# Patient Record
Sex: MALE | Race: ASIAN | NOT HISPANIC OR LATINO | Employment: FULL TIME | URBAN - METROPOLITAN AREA
[De-identification: names, ages, dates, MRNs, and addresses within clinical notes are randomized per-mention and may not be internally consistent; named-entity substitution may affect disease eponyms.]

---

## 2021-04-04 ENCOUNTER — HOSPITAL ENCOUNTER (INPATIENT)
Facility: HOSPITAL | Age: 46
LOS: 2 days | Discharge: HOME | DRG: 065 | End: 2021-04-06
Attending: EMERGENCY MEDICINE | Admitting: PSYCHIATRY & NEUROLOGY
Payer: COMMERCIAL

## 2021-04-04 DIAGNOSIS — I61.9 ICH (INTRACEREBRAL HEMORRHAGE): ICD-10-CM

## 2021-04-04 DIAGNOSIS — R00.0 TACHYCARDIA: ICD-10-CM

## 2021-04-04 DIAGNOSIS — I10 ESSENTIAL HYPERTENSION: ICD-10-CM

## 2021-04-04 DIAGNOSIS — E87.6 HYPOKALEMIA: ICD-10-CM

## 2021-04-04 DIAGNOSIS — E11.65 TYPE 2 DIABETES MELLITUS WITH HYPERGLYCEMIA, WITHOUT LONG-TERM CURRENT USE OF INSULIN: ICD-10-CM

## 2021-04-04 DIAGNOSIS — I61.0 NONTRAUMATIC SUBCORTICAL HEMORRHAGE OF RIGHT CEREBRAL HEMISPHERE: ICD-10-CM

## 2021-04-04 DIAGNOSIS — I63.9 STROKE: ICD-10-CM

## 2021-04-04 PROBLEM — I16.1 HYPERTENSIVE EMERGENCY: Status: ACTIVE | Noted: 2021-04-04

## 2021-04-04 PROBLEM — G93.6 VASOGENIC BRAIN EDEMA: Status: ACTIVE | Noted: 2021-04-04

## 2021-04-04 LAB
ABO + RH BLD: NORMAL
ALBUMIN SERPL BCP-MCNC: 3.7 G/DL (ref 3.5–5.2)
ALBUMIN SERPL BCP-MCNC: 4.2 G/DL (ref 3.5–5.2)
ALP SERPL-CCNC: 106 U/L (ref 55–135)
ALP SERPL-CCNC: 123 U/L (ref 55–135)
ALT SERPL W/O P-5'-P-CCNC: 33 U/L (ref 10–44)
ALT SERPL W/O P-5'-P-CCNC: 39 U/L (ref 10–44)
ANION GAP SERPL CALC-SCNC: 10 MMOL/L (ref 8–16)
ANION GAP SERPL CALC-SCNC: 14 MMOL/L (ref 8–16)
ANION GAP SERPL CALC-SCNC: 8 MMOL/L (ref 8–16)
ASCENDING AORTA: 3.11 CM
AST SERPL-CCNC: 25 U/L (ref 10–40)
AST SERPL-CCNC: 27 U/L (ref 10–40)
AV INDEX (PROSTH): 0.84
AV MEAN GRADIENT: 3 MMHG
AV PEAK GRADIENT: 4 MMHG
AV VALVE AREA: 3.07 CM2
AV VELOCITY RATIO: 0.87
BACTERIA #/AREA URNS AUTO: NORMAL /HPF
BASOPHILS # BLD AUTO: 0.04 K/UL (ref 0–0.2)
BASOPHILS # BLD AUTO: 0.04 K/UL (ref 0–0.2)
BASOPHILS NFR BLD: 0.5 % (ref 0–1.9)
BASOPHILS NFR BLD: 0.6 % (ref 0–1.9)
BILIRUB SERPL-MCNC: 0.8 MG/DL (ref 0.1–1)
BILIRUB SERPL-MCNC: 0.9 MG/DL (ref 0.1–1)
BILIRUB UR QL STRIP: NEGATIVE
BLD GP AB SCN CELLS X3 SERPL QL: NORMAL
BSA FOR ECHO PROCEDURE: 1.78 M2
BUN SERPL-MCNC: 10 MG/DL (ref 6–20)
BUN SERPL-MCNC: 11 MG/DL (ref 6–20)
BUN SERPL-MCNC: 13 MG/DL (ref 6–20)
CALCIUM SERPL-MCNC: 7.9 MG/DL (ref 8.7–10.5)
CALCIUM SERPL-MCNC: 8.3 MG/DL (ref 8.7–10.5)
CALCIUM SERPL-MCNC: 8.9 MG/DL (ref 8.7–10.5)
CHLORIDE SERPL-SCNC: 104 MMOL/L (ref 95–110)
CHLORIDE SERPL-SCNC: 105 MMOL/L (ref 95–110)
CHLORIDE SERPL-SCNC: 98 MMOL/L (ref 95–110)
CHOLEST SERPL-MCNC: 276 MG/DL (ref 120–199)
CHOLEST/HDLC SERPL: 4.9 {RATIO} (ref 2–5)
CLARITY UR REFRACT.AUTO: CLEAR
CO2 SERPL-SCNC: 24 MMOL/L (ref 23–29)
CO2 SERPL-SCNC: 26 MMOL/L (ref 23–29)
CO2 SERPL-SCNC: 28 MMOL/L (ref 23–29)
COLOR UR AUTO: YELLOW
CREAT SERPL-MCNC: 1.1 MG/DL (ref 0.5–1.4)
CREAT SERPL-MCNC: 1.2 MG/DL (ref 0.5–1.4)
CREAT SERPL-MCNC: 1.4 MG/DL (ref 0.5–1.4)
CTP QC/QA: YES
CV ECHO LV RWT: 0.36 CM
DIFFERENTIAL METHOD: ABNORMAL
DIFFERENTIAL METHOD: NORMAL
DOP CALC AO PEAK VEL: 1.04 M/S
DOP CALC AO VTI: 22.04 CM
DOP CALC LVOT AREA: 3.7 CM2
DOP CALC LVOT DIAMETER: 2.16 CM
DOP CALC LVOT PEAK VEL: 0.9 M/S
DOP CALC LVOT STROKE VOLUME: 67.76 CM3
DOP CALCLVOT PEAK VEL VTI: 18.5 CM
E WAVE DECELERATION TIME: 143.76 MSEC
E/A RATIO: 0.9
E/E' RATIO: 9.5 M/S
ECHO LV POSTERIOR WALL: 0.74 CM (ref 0.6–1.1)
EJECTION FRACTION: 65 %
EOSINOPHIL # BLD AUTO: 0.1 K/UL (ref 0–0.5)
EOSINOPHIL # BLD AUTO: 0.1 K/UL (ref 0–0.5)
EOSINOPHIL NFR BLD: 1 % (ref 0–8)
EOSINOPHIL NFR BLD: 1.2 % (ref 0–8)
ERYTHROCYTE [DISTWIDTH] IN BLOOD BY AUTOMATED COUNT: 12.2 % (ref 11.5–14.5)
ERYTHROCYTE [DISTWIDTH] IN BLOOD BY AUTOMATED COUNT: 12.3 % (ref 11.5–14.5)
EST. GFR  (AFRICAN AMERICAN): >60 ML/MIN/1.73 M^2
EST. GFR  (NON AFRICAN AMERICAN): 59 ML/MIN/1.73 M^2
EST. GFR  (NON AFRICAN AMERICAN): >60 ML/MIN/1.73 M^2
EST. GFR  (NON AFRICAN AMERICAN): >60 ML/MIN/1.73 M^2
ESTIMATED AVG GLUCOSE: 278 MG/DL (ref 68–131)
FRACTIONAL SHORTENING: 38 % (ref 28–44)
GLUCOSE SERPL-MCNC: 226 MG/DL (ref 70–110)
GLUCOSE SERPL-MCNC: 265 MG/DL (ref 70–110)
GLUCOSE SERPL-MCNC: 349 MG/DL (ref 70–110)
GLUCOSE UR QL STRIP: ABNORMAL
HBA1C MFR BLD: 11.3 % (ref 4–5.6)
HCT VFR BLD AUTO: 49.5 % (ref 40–54)
HCT VFR BLD AUTO: 52.2 % (ref 40–54)
HDLC SERPL-MCNC: 56 MG/DL (ref 40–75)
HDLC SERPL: 20.3 % (ref 20–50)
HGB BLD-MCNC: 17.6 G/DL (ref 14–18)
HGB BLD-MCNC: 18.5 G/DL (ref 14–18)
HGB UR QL STRIP: NEGATIVE
HYALINE CASTS UR QL AUTO: 0 /LPF
IMM GRANULOCYTES # BLD AUTO: 0.03 K/UL (ref 0–0.04)
IMM GRANULOCYTES # BLD AUTO: 0.04 K/UL (ref 0–0.04)
IMM GRANULOCYTES NFR BLD AUTO: 0.4 % (ref 0–0.5)
IMM GRANULOCYTES NFR BLD AUTO: 0.5 % (ref 0–0.5)
INR PPP: 0.9 (ref 0.8–1.2)
INTERVENTRICULAR SEPTUM: 0.89 CM (ref 0.6–1.1)
IVRT: 119.89 MSEC
KETONES UR QL STRIP: NEGATIVE
LA MAJOR: 5.19 CM
LA MINOR: 5.23 CM
LA WIDTH: 3.25 CM
LDLC SERPL CALC-MCNC: 156 MG/DL (ref 63–159)
LEFT ATRIUM SIZE: 3.44 CM
LEFT ATRIUM VOLUME INDEX MOD: 19.3 ML/M2
LEFT ATRIUM VOLUME INDEX: 28.5 ML/M2
LEFT ATRIUM VOLUME MOD: 33.62 CM3
LEFT ATRIUM VOLUME: 49.51 CM3
LEFT INTERNAL DIMENSION IN SYSTOLE: 2.53 CM (ref 2.1–4)
LEFT VENTRICLE DIASTOLIC VOLUME INDEX: 41.71 ML/M2
LEFT VENTRICLE DIASTOLIC VOLUME: 72.57 ML
LEFT VENTRICLE MASS INDEX: 56 G/M2
LEFT VENTRICLE SYSTOLIC VOLUME INDEX: 13.2 ML/M2
LEFT VENTRICLE SYSTOLIC VOLUME: 22.95 ML
LEFT VENTRICULAR INTERNAL DIMENSION IN DIASTOLE: 4.06 CM (ref 3.5–6)
LEFT VENTRICULAR MASS: 98.19 G
LEUKOCYTE ESTERASE UR QL STRIP: NEGATIVE
LV LATERAL E/E' RATIO: 9.5 M/S
LV SEPTAL E/E' RATIO: 9.5 M/S
LYMPHOCYTES # BLD AUTO: 1.5 K/UL (ref 1–4.8)
LYMPHOCYTES # BLD AUTO: 2 K/UL (ref 1–4.8)
LYMPHOCYTES NFR BLD: 20.8 % (ref 18–48)
LYMPHOCYTES NFR BLD: 25.4 % (ref 18–48)
MAGNESIUM SERPL-MCNC: 1.7 MG/DL (ref 1.6–2.6)
MAGNESIUM SERPL-MCNC: 2 MG/DL (ref 1.6–2.6)
MCH RBC QN AUTO: 30 PG (ref 27–31)
MCH RBC QN AUTO: 30.2 PG (ref 27–31)
MCHC RBC AUTO-ENTMCNC: 35.4 G/DL (ref 32–36)
MCHC RBC AUTO-ENTMCNC: 35.6 G/DL (ref 32–36)
MCV RBC AUTO: 85 FL (ref 82–98)
MCV RBC AUTO: 85 FL (ref 82–98)
MICROSCOPIC COMMENT: NORMAL
MONOCYTES # BLD AUTO: 0.5 K/UL (ref 0.3–1)
MONOCYTES # BLD AUTO: 0.6 K/UL (ref 0.3–1)
MONOCYTES NFR BLD: 6.8 % (ref 4–15)
MONOCYTES NFR BLD: 7.2 % (ref 4–15)
MV PEAK A VEL: 0.84 M/S
MV PEAK E VEL: 0.76 M/S
MV STENOSIS PRESSURE HALF TIME: 41.69 MS
MV VALVE AREA P 1/2 METHOD: 5.28 CM2
NEUTROPHILS # BLD AUTO: 5.1 K/UL (ref 1.8–7.7)
NEUTROPHILS # BLD AUTO: 5.1 K/UL (ref 1.8–7.7)
NEUTROPHILS NFR BLD: 65.2 % (ref 38–73)
NEUTROPHILS NFR BLD: 70.4 % (ref 38–73)
NITRITE UR QL STRIP: NEGATIVE
NONHDLC SERPL-MCNC: 220 MG/DL
NRBC BLD-RTO: 0 /100 WBC
NRBC BLD-RTO: 0 /100 WBC
PH UR STRIP: 6 [PH] (ref 5–8)
PHOSPHATE SERPL-MCNC: 2.2 MG/DL (ref 2.7–4.5)
PHOSPHATE SERPL-MCNC: 4 MG/DL (ref 2.7–4.5)
PISA TR MAX VEL: 2.26 M/S
PLATELET # BLD AUTO: 161 K/UL (ref 150–450)
PLATELET # BLD AUTO: 175 K/UL (ref 150–450)
PMV BLD AUTO: 10.2 FL (ref 9.2–12.9)
PMV BLD AUTO: 10.4 FL (ref 9.2–12.9)
POCT GLUCOSE: 169 MG/DL (ref 70–110)
POCT GLUCOSE: 245 MG/DL (ref 70–110)
POCT GLUCOSE: 257 MG/DL (ref 70–110)
POCT GLUCOSE: 294 MG/DL (ref 70–110)
POCT GLUCOSE: 299 MG/DL (ref 70–110)
POTASSIUM SERPL-SCNC: 2.9 MMOL/L (ref 3.5–5.1)
POTASSIUM SERPL-SCNC: 3 MMOL/L (ref 3.5–5.1)
POTASSIUM SERPL-SCNC: 4 MMOL/L (ref 3.5–5.1)
PROT SERPL-MCNC: 7 G/DL (ref 6–8.4)
PROT SERPL-MCNC: 7.9 G/DL (ref 6–8.4)
PROT UR QL STRIP: ABNORMAL
PROTHROMBIN TIME: 10 SEC (ref 9–12.5)
RA MAJOR: 3.71 CM
RA PRESSURE: 3 MMHG
RA WIDTH: 2.4 CM
RBC # BLD AUTO: 5.83 M/UL (ref 4.6–6.2)
RBC # BLD AUTO: 6.16 M/UL (ref 4.6–6.2)
RBC #/AREA URNS AUTO: 1 /HPF (ref 0–4)
RIGHT VENTRICULAR END-DIASTOLIC DIMENSION: 2.98 CM
RV TISSUE DOPPLER FREE WALL SYSTOLIC VELOCITY 1 (APICAL 4 CHAMBER VIEW): 10.6 CM/S
SARS-COV-2 RDRP RESP QL NAA+PROBE: NEGATIVE
SINUS: 3.16 CM
SODIUM SERPL-SCNC: 136 MMOL/L (ref 136–145)
SODIUM SERPL-SCNC: 140 MMOL/L (ref 136–145)
SODIUM SERPL-SCNC: 141 MMOL/L (ref 136–145)
SP GR UR STRIP: 1.01 (ref 1–1.03)
SQUAMOUS #/AREA URNS AUTO: 0 /HPF
STJ: 2.69 CM
TDI LATERAL: 0.08 M/S
TDI SEPTAL: 0.08 M/S
TDI: 0.08 M/S
TR MAX PG: 20 MMHG
TRICUSPID ANNULAR PLANE SYSTOLIC EXCURSION: 1.8 CM
TRIGL SERPL-MCNC: 320 MG/DL (ref 30–150)
TSH SERPL DL<=0.005 MIU/L-ACNC: 2.38 UIU/ML (ref 0.4–4)
TV REST PULMONARY ARTERY PRESSURE: 23 MMHG
URN SPEC COLLECT METH UR: ABNORMAL
WBC # BLD AUTO: 7.18 K/UL (ref 3.9–12.7)
WBC # BLD AUTO: 7.76 K/UL (ref 3.9–12.7)
WBC #/AREA URNS AUTO: 1 /HPF (ref 0–5)
YEAST UR QL AUTO: NORMAL

## 2021-04-04 PROCEDURE — 99291 CRITICAL CARE FIRST HOUR: CPT | Mod: ,,, | Performed by: PHYSICIAN ASSISTANT

## 2021-04-04 PROCEDURE — 99223 1ST HOSP IP/OBS HIGH 75: CPT | Mod: ,,, | Performed by: PSYCHIATRY & NEUROLOGY

## 2021-04-04 PROCEDURE — 93005 ELECTROCARDIOGRAM TRACING: CPT

## 2021-04-04 PROCEDURE — 25000003 PHARM REV CODE 250: Performed by: NURSE PRACTITIONER

## 2021-04-04 PROCEDURE — 96365 THER/PROPH/DIAG IV INF INIT: CPT

## 2021-04-04 PROCEDURE — 81001 URINALYSIS AUTO W/SCOPE: CPT | Performed by: PHYSICIAN ASSISTANT

## 2021-04-04 PROCEDURE — 86900 BLOOD TYPING SEROLOGIC ABO: CPT | Performed by: EMERGENCY MEDICINE

## 2021-04-04 PROCEDURE — 83036 HEMOGLOBIN GLYCOSYLATED A1C: CPT | Performed by: PHYSICIAN ASSISTANT

## 2021-04-04 PROCEDURE — 99291 CRITICAL CARE FIRST HOUR: CPT | Mod: 25

## 2021-04-04 PROCEDURE — 85025 COMPLETE CBC W/AUTO DIFF WBC: CPT | Mod: 91 | Performed by: PHYSICIAN ASSISTANT

## 2021-04-04 PROCEDURE — 80061 LIPID PANEL: CPT | Performed by: EMERGENCY MEDICINE

## 2021-04-04 PROCEDURE — 99223 PR INITIAL HOSPITAL CARE,LEVL III: ICD-10-PCS | Mod: ,,, | Performed by: PSYCHIATRY & NEUROLOGY

## 2021-04-04 PROCEDURE — 25500020 PHARM REV CODE 255: Performed by: PHYSICIAN ASSISTANT

## 2021-04-04 PROCEDURE — 25000003 PHARM REV CODE 250: Performed by: EMERGENCY MEDICINE

## 2021-04-04 PROCEDURE — 83735 ASSAY OF MAGNESIUM: CPT | Mod: 91 | Performed by: PSYCHIATRY & NEUROLOGY

## 2021-04-04 PROCEDURE — 25000003 PHARM REV CODE 250: Performed by: PSYCHIATRY & NEUROLOGY

## 2021-04-04 PROCEDURE — 84443 ASSAY THYROID STIM HORMONE: CPT | Performed by: EMERGENCY MEDICINE

## 2021-04-04 PROCEDURE — 80053 COMPREHEN METABOLIC PANEL: CPT | Mod: 91 | Performed by: PHYSICIAN ASSISTANT

## 2021-04-04 PROCEDURE — 83735 ASSAY OF MAGNESIUM: CPT | Performed by: PHYSICIAN ASSISTANT

## 2021-04-04 PROCEDURE — 85610 PROTHROMBIN TIME: CPT | Performed by: EMERGENCY MEDICINE

## 2021-04-04 PROCEDURE — 25000003 PHARM REV CODE 250: Performed by: PHYSICIAN ASSISTANT

## 2021-04-04 PROCEDURE — 82962 GLUCOSE BLOOD TEST: CPT

## 2021-04-04 PROCEDURE — 99223 1ST HOSP IP/OBS HIGH 75: CPT | Mod: ,,, | Performed by: NEUROLOGICAL SURGERY

## 2021-04-04 PROCEDURE — U0002 COVID-19 LAB TEST NON-CDC: HCPCS | Performed by: EMERGENCY MEDICINE

## 2021-04-04 PROCEDURE — 80048 BASIC METABOLIC PNL TOTAL CA: CPT | Performed by: PSYCHIATRY & NEUROLOGY

## 2021-04-04 PROCEDURE — 93010 ELECTROCARDIOGRAM REPORT: CPT | Mod: ,,, | Performed by: INTERNAL MEDICINE

## 2021-04-04 PROCEDURE — 96375 TX/PRO/DX INJ NEW DRUG ADDON: CPT

## 2021-04-04 PROCEDURE — 84100 ASSAY OF PHOSPHORUS: CPT | Mod: 91 | Performed by: PSYCHIATRY & NEUROLOGY

## 2021-04-04 PROCEDURE — 99223 PR INITIAL HOSPITAL CARE,LEVL III: ICD-10-PCS | Mod: ,,, | Performed by: NEUROLOGICAL SURGERY

## 2021-04-04 PROCEDURE — 84100 ASSAY OF PHOSPHORUS: CPT | Performed by: PHYSICIAN ASSISTANT

## 2021-04-04 PROCEDURE — 80053 COMPREHEN METABOLIC PANEL: CPT | Performed by: EMERGENCY MEDICINE

## 2021-04-04 PROCEDURE — 63600175 PHARM REV CODE 636 W HCPCS: Performed by: NURSE PRACTITIONER

## 2021-04-04 PROCEDURE — 11000001 HC ACUTE MED/SURG PRIVATE ROOM

## 2021-04-04 PROCEDURE — 85025 COMPLETE CBC W/AUTO DIFF WBC: CPT | Performed by: EMERGENCY MEDICINE

## 2021-04-04 PROCEDURE — 93010 EKG 12-LEAD: ICD-10-PCS | Mod: ,,, | Performed by: INTERNAL MEDICINE

## 2021-04-04 PROCEDURE — 94761 N-INVAS EAR/PLS OXIMETRY MLT: CPT

## 2021-04-04 PROCEDURE — 99291 PR CRITICAL CARE, E/M 30-74 MINUTES: ICD-10-PCS | Mod: ,,, | Performed by: PHYSICIAN ASSISTANT

## 2021-04-04 RX ORDER — LANOLIN ALCOHOL/MO/W.PET/CERES
800 CREAM (GRAM) TOPICAL
Status: DISCONTINUED | OUTPATIENT
Start: 2021-04-04 | End: 2021-04-04

## 2021-04-04 RX ORDER — NICARDIPINE HYDROCHLORIDE 0.2 MG/ML
0-15 INJECTION INTRAVENOUS CONTINUOUS
Status: DISCONTINUED | OUTPATIENT
Start: 2021-04-04 | End: 2021-04-04

## 2021-04-04 RX ORDER — AMLODIPINE BESYLATE 10 MG/1
10 TABLET ORAL DAILY
Status: DISCONTINUED | OUTPATIENT
Start: 2021-04-04 | End: 2021-04-06 | Stop reason: HOSPADM

## 2021-04-04 RX ORDER — ONDANSETRON 2 MG/ML
4 INJECTION INTRAMUSCULAR; INTRAVENOUS EVERY 8 HOURS PRN
Status: DISCONTINUED | OUTPATIENT
Start: 2021-04-04 | End: 2021-04-06 | Stop reason: HOSPADM

## 2021-04-04 RX ORDER — NICARDIPINE HYDROCHLORIDE 0.2 MG/ML
5 INJECTION INTRAVENOUS CONTINUOUS
Status: DISCONTINUED | OUTPATIENT
Start: 2021-04-04 | End: 2021-04-04

## 2021-04-04 RX ORDER — MUPIROCIN 20 MG/G
OINTMENT TOPICAL 2 TIMES DAILY
Status: DISCONTINUED | OUTPATIENT
Start: 2021-04-04 | End: 2021-04-06 | Stop reason: HOSPADM

## 2021-04-04 RX ORDER — LABETALOL HYDROCHLORIDE 5 MG/ML
10 INJECTION, SOLUTION INTRAVENOUS
Status: COMPLETED | OUTPATIENT
Start: 2021-04-04 | End: 2021-04-04

## 2021-04-04 RX ORDER — INSULIN ASPART 100 [IU]/ML
1-10 INJECTION, SOLUTION INTRAVENOUS; SUBCUTANEOUS
Status: DISCONTINUED | OUTPATIENT
Start: 2021-04-04 | End: 2021-04-05

## 2021-04-04 RX ORDER — SODIUM,POTASSIUM PHOSPHATES 280-250MG
2 POWDER IN PACKET (EA) ORAL
Status: DISCONTINUED | OUTPATIENT
Start: 2021-04-04 | End: 2021-04-04

## 2021-04-04 RX ORDER — AMOXICILLIN 250 MG
1 CAPSULE ORAL 2 TIMES DAILY
Status: DISCONTINUED | OUTPATIENT
Start: 2021-04-04 | End: 2021-04-06 | Stop reason: HOSPADM

## 2021-04-04 RX ORDER — SODIUM CHLORIDE 0.9 % (FLUSH) 0.9 %
10 SYRINGE (ML) INJECTION
Status: DISCONTINUED | OUTPATIENT
Start: 2021-04-04 | End: 2021-04-06 | Stop reason: HOSPADM

## 2021-04-04 RX ORDER — LABETALOL HCL 20 MG/4 ML
10 SYRINGE (ML) INTRAVENOUS EVERY 6 HOURS PRN
Status: DISCONTINUED | OUTPATIENT
Start: 2021-04-04 | End: 2021-04-06 | Stop reason: HOSPADM

## 2021-04-04 RX ORDER — GLUCAGON 1 MG
1 KIT INJECTION
Status: DISCONTINUED | OUTPATIENT
Start: 2021-04-04 | End: 2021-04-06 | Stop reason: HOSPADM

## 2021-04-04 RX ORDER — IBUPROFEN 200 MG
16 TABLET ORAL
Status: DISCONTINUED | OUTPATIENT
Start: 2021-04-04 | End: 2021-04-06 | Stop reason: HOSPADM

## 2021-04-04 RX ORDER — IBUPROFEN 200 MG
24 TABLET ORAL
Status: DISCONTINUED | OUTPATIENT
Start: 2021-04-04 | End: 2021-04-06 | Stop reason: HOSPADM

## 2021-04-04 RX ORDER — SODIUM CHLORIDE 9 MG/ML
INJECTION, SOLUTION INTRAVENOUS CONTINUOUS
Status: DISCONTINUED | OUTPATIENT
Start: 2021-04-04 | End: 2021-04-04

## 2021-04-04 RX ORDER — ACETAMINOPHEN 325 MG/1
650 TABLET ORAL EVERY 6 HOURS PRN
Status: DISCONTINUED | OUTPATIENT
Start: 2021-04-04 | End: 2021-04-06 | Stop reason: HOSPADM

## 2021-04-04 RX ADMIN — MUPIROCIN: 20 OINTMENT TOPICAL at 09:04

## 2021-04-04 RX ADMIN — DOCUSATE SODIUM AND SENNOSIDES 1 TABLET: 8.6; 5 TABLET, FILM COATED ORAL at 09:04

## 2021-04-04 RX ADMIN — DOCUSATE SODIUM AND SENNOSIDES 1 TABLET: 8.6; 5 TABLET, FILM COATED ORAL at 08:04

## 2021-04-04 RX ADMIN — AMLODIPINE BESYLATE 10 MG: 10 TABLET ORAL at 10:04

## 2021-04-04 RX ADMIN — NICARDIPINE HYDROCHLORIDE 5 MG/HR: 2.5 INJECTION, SOLUTION INTRAVENOUS at 08:04

## 2021-04-04 RX ADMIN — LABETALOL HYDROCHLORIDE 10 MG: 5 INJECTION INTRAVENOUS at 12:04

## 2021-04-04 RX ADMIN — LABETALOL HYDROCHLORIDE 10 MG: 5 INJECTION INTRAVENOUS at 01:04

## 2021-04-04 RX ADMIN — Medication 10 MG: at 08:04

## 2021-04-04 RX ADMIN — NICARDIPINE HYDROCHLORIDE 5 MG/HR: 0.2 INJECTION, SOLUTION INTRAVENOUS at 01:04

## 2021-04-04 RX ADMIN — INSULIN ASPART 2 UNITS: 100 INJECTION, SOLUTION INTRAVENOUS; SUBCUTANEOUS at 10:04

## 2021-04-04 RX ADMIN — POTASSIUM BICARBONATE 60 MEQ: 391 TABLET, EFFERVESCENT ORAL at 08:04

## 2021-04-04 RX ADMIN — Medication 800 MG: at 08:04

## 2021-04-04 RX ADMIN — INSULIN ASPART 3 UNITS: 100 INJECTION, SOLUTION INTRAVENOUS; SUBCUTANEOUS at 08:04

## 2021-04-04 RX ADMIN — SODIUM CHLORIDE: 0.9 INJECTION, SOLUTION INTRAVENOUS at 04:04

## 2021-04-04 RX ADMIN — MUPIROCIN: 20 OINTMENT TOPICAL at 08:04

## 2021-04-04 RX ADMIN — INSULIN ASPART 4 UNITS: 100 INJECTION, SOLUTION INTRAVENOUS; SUBCUTANEOUS at 04:04

## 2021-04-04 RX ADMIN — POTASSIUM & SODIUM PHOSPHATES POWDER PACK 280-160-250 MG 2 PACKET: 280-160-250 PACK at 08:04

## 2021-04-04 RX ADMIN — Medication 800 MG: at 05:04

## 2021-04-04 RX ADMIN — SODIUM CHLORIDE 75 ML/HR: 0.9 INJECTION, SOLUTION INTRAVENOUS at 08:04

## 2021-04-04 RX ADMIN — IOHEXOL 75 ML: 350 INJECTION, SOLUTION INTRAVENOUS at 06:04

## 2021-04-04 RX ADMIN — POTASSIUM & SODIUM PHOSPHATES POWDER PACK 280-160-250 MG 2 PACKET: 280-160-250 PACK at 05:04

## 2021-04-04 RX ADMIN — POTASSIUM BICARBONATE 60 MEQ: 391 TABLET, EFFERVESCENT ORAL at 05:04

## 2021-04-04 RX ADMIN — SODIUM CHLORIDE 1000 ML: 0.9 INJECTION, SOLUTION INTRAVENOUS at 02:04

## 2021-04-05 LAB
ALBUMIN SERPL BCP-MCNC: 3.5 G/DL (ref 3.5–5.2)
ALP SERPL-CCNC: 92 U/L (ref 55–135)
ALT SERPL W/O P-5'-P-CCNC: 31 U/L (ref 10–44)
ANION GAP SERPL CALC-SCNC: 8 MMOL/L (ref 8–16)
AST SERPL-CCNC: 23 U/L (ref 10–40)
BASOPHILS # BLD AUTO: 0.04 K/UL (ref 0–0.2)
BASOPHILS NFR BLD: 0.6 % (ref 0–1.9)
BILIRUB SERPL-MCNC: 1 MG/DL (ref 0.1–1)
BUN SERPL-MCNC: 14 MG/DL (ref 6–20)
CALCIUM SERPL-MCNC: 8.2 MG/DL (ref 8.7–10.5)
CHLORIDE SERPL-SCNC: 105 MMOL/L (ref 95–110)
CO2 SERPL-SCNC: 24 MMOL/L (ref 23–29)
CREAT SERPL-MCNC: 1.1 MG/DL (ref 0.5–1.4)
DIFFERENTIAL METHOD: ABNORMAL
EOSINOPHIL # BLD AUTO: 0.2 K/UL (ref 0–0.5)
EOSINOPHIL NFR BLD: 3.4 % (ref 0–8)
ERYTHROCYTE [DISTWIDTH] IN BLOOD BY AUTOMATED COUNT: 12.7 % (ref 11.5–14.5)
EST. GFR  (AFRICAN AMERICAN): >60 ML/MIN/1.73 M^2
EST. GFR  (NON AFRICAN AMERICAN): >60 ML/MIN/1.73 M^2
GLUCOSE SERPL-MCNC: 171 MG/DL (ref 70–110)
HCT VFR BLD AUTO: 49 % (ref 40–54)
HGB BLD-MCNC: 16.7 G/DL (ref 14–18)
IMM GRANULOCYTES # BLD AUTO: 0.02 K/UL (ref 0–0.04)
IMM GRANULOCYTES NFR BLD AUTO: 0.3 % (ref 0–0.5)
LYMPHOCYTES # BLD AUTO: 1.6 K/UL (ref 1–4.8)
LYMPHOCYTES NFR BLD: 24.1 % (ref 18–48)
MAGNESIUM SERPL-MCNC: 1.9 MG/DL (ref 1.6–2.6)
MCH RBC QN AUTO: 29.2 PG (ref 27–31)
MCHC RBC AUTO-ENTMCNC: 34.1 G/DL (ref 32–36)
MCV RBC AUTO: 86 FL (ref 82–98)
MONOCYTES # BLD AUTO: 0.5 K/UL (ref 0.3–1)
MONOCYTES NFR BLD: 7.1 % (ref 4–15)
NEUTROPHILS # BLD AUTO: 4.2 K/UL (ref 1.8–7.7)
NEUTROPHILS NFR BLD: 64.5 % (ref 38–73)
NRBC BLD-RTO: 0 /100 WBC
PHOSPHATE SERPL-MCNC: 3.1 MG/DL (ref 2.7–4.5)
PLATELET # BLD AUTO: 143 K/UL (ref 150–450)
PMV BLD AUTO: 9.9 FL (ref 9.2–12.9)
POCT GLUCOSE: 170 MG/DL (ref 70–110)
POCT GLUCOSE: 202 MG/DL (ref 70–110)
POCT GLUCOSE: 205 MG/DL (ref 70–110)
POCT GLUCOSE: 273 MG/DL (ref 70–110)
POTASSIUM SERPL-SCNC: 3.5 MMOL/L (ref 3.5–5.1)
PROT SERPL-MCNC: 6.6 G/DL (ref 6–8.4)
RBC # BLD AUTO: 5.72 M/UL (ref 4.6–6.2)
SODIUM SERPL-SCNC: 137 MMOL/L (ref 136–145)
WBC # BLD AUTO: 6.48 K/UL (ref 3.9–12.7)

## 2021-04-05 PROCEDURE — 97165 OT EVAL LOW COMPLEX 30 MIN: CPT

## 2021-04-05 PROCEDURE — 83735 ASSAY OF MAGNESIUM: CPT | Performed by: PHYSICIAN ASSISTANT

## 2021-04-05 PROCEDURE — C9399 UNCLASSIFIED DRUGS OR BIOLOG: HCPCS | Performed by: NURSE PRACTITIONER

## 2021-04-05 PROCEDURE — 99233 PR SUBSEQUENT HOSPITAL CARE,LEVL III: ICD-10-PCS | Mod: ,,, | Performed by: PSYCHIATRY & NEUROLOGY

## 2021-04-05 PROCEDURE — 36415 COLL VENOUS BLD VENIPUNCTURE: CPT | Performed by: PHYSICIAN ASSISTANT

## 2021-04-05 PROCEDURE — 25000003 PHARM REV CODE 250: Performed by: PSYCHIATRY & NEUROLOGY

## 2021-04-05 PROCEDURE — 11000001 HC ACUTE MED/SURG PRIVATE ROOM

## 2021-04-05 PROCEDURE — 25000003 PHARM REV CODE 250: Performed by: PHYSICIAN ASSISTANT

## 2021-04-05 PROCEDURE — 99233 SBSQ HOSP IP/OBS HIGH 50: CPT | Mod: ,,, | Performed by: PSYCHIATRY & NEUROLOGY

## 2021-04-05 PROCEDURE — 97530 THERAPEUTIC ACTIVITIES: CPT

## 2021-04-05 PROCEDURE — 84100 ASSAY OF PHOSPHORUS: CPT | Performed by: PHYSICIAN ASSISTANT

## 2021-04-05 PROCEDURE — 25000003 PHARM REV CODE 250: Performed by: NURSE PRACTITIONER

## 2021-04-05 PROCEDURE — 97161 PT EVAL LOW COMPLEX 20 MIN: CPT

## 2021-04-05 PROCEDURE — 92610 EVALUATE SWALLOWING FUNCTION: CPT

## 2021-04-05 PROCEDURE — 99222 1ST HOSP IP/OBS MODERATE 55: CPT | Mod: ,,, | Performed by: NURSE PRACTITIONER

## 2021-04-05 PROCEDURE — 63600175 PHARM REV CODE 636 W HCPCS: Performed by: NURSE PRACTITIONER

## 2021-04-05 PROCEDURE — 92523 SPEECH SOUND LANG COMPREHEN: CPT

## 2021-04-05 PROCEDURE — 85025 COMPLETE CBC W/AUTO DIFF WBC: CPT | Performed by: PHYSICIAN ASSISTANT

## 2021-04-05 PROCEDURE — 80053 COMPREHEN METABOLIC PANEL: CPT | Performed by: PHYSICIAN ASSISTANT

## 2021-04-05 PROCEDURE — 99222 PR INITIAL HOSPITAL CARE,LEVL II: ICD-10-PCS | Mod: ,,, | Performed by: NURSE PRACTITIONER

## 2021-04-05 RX ORDER — HYDROCHLOROTHIAZIDE 12.5 MG/1
12.5 TABLET ORAL DAILY
Status: DISCONTINUED | OUTPATIENT
Start: 2021-04-05 | End: 2021-04-06

## 2021-04-05 RX ORDER — INSULIN ASPART 100 [IU]/ML
0-5 INJECTION, SOLUTION INTRAVENOUS; SUBCUTANEOUS
Status: DISCONTINUED | OUTPATIENT
Start: 2021-04-05 | End: 2021-04-06 | Stop reason: HOSPADM

## 2021-04-05 RX ORDER — INSULIN ASPART 100 [IU]/ML
4 INJECTION, SOLUTION INTRAVENOUS; SUBCUTANEOUS
Status: DISCONTINUED | OUTPATIENT
Start: 2021-04-05 | End: 2021-04-06

## 2021-04-05 RX ADMIN — INSULIN ASPART 4 UNITS: 100 INJECTION, SOLUTION INTRAVENOUS; SUBCUTANEOUS at 04:04

## 2021-04-05 RX ADMIN — MUPIROCIN: 20 OINTMENT TOPICAL at 08:04

## 2021-04-05 RX ADMIN — INSULIN ASPART 2 UNITS: 100 INJECTION, SOLUTION INTRAVENOUS; SUBCUTANEOUS at 04:04

## 2021-04-05 RX ADMIN — DOCUSATE SODIUM AND SENNOSIDES 1 TABLET: 8.6; 5 TABLET, FILM COATED ORAL at 10:04

## 2021-04-05 RX ADMIN — Medication 10 MG: at 08:04

## 2021-04-05 RX ADMIN — Medication 10 MG: at 02:04

## 2021-04-05 RX ADMIN — INSULIN ASPART 2 UNITS: 100 INJECTION, SOLUTION INTRAVENOUS; SUBCUTANEOUS at 08:04

## 2021-04-05 RX ADMIN — DOCUSATE SODIUM AND SENNOSIDES 1 TABLET: 8.6; 5 TABLET, FILM COATED ORAL at 09:04

## 2021-04-05 RX ADMIN — INSULIN ASPART 1 UNITS: 100 INJECTION, SOLUTION INTRAVENOUS; SUBCUTANEOUS at 08:04

## 2021-04-05 RX ADMIN — INSULIN ASPART 4 UNITS: 100 INJECTION, SOLUTION INTRAVENOUS; SUBCUTANEOUS at 11:04

## 2021-04-05 RX ADMIN — AMLODIPINE BESYLATE 10 MG: 10 TABLET ORAL at 08:04

## 2021-04-05 RX ADMIN — HYDROCHLOROTHIAZIDE 12.5 MG: 12.5 TABLET ORAL at 11:04

## 2021-04-05 RX ADMIN — Medication 10 MG: at 11:04

## 2021-04-05 RX ADMIN — INSULIN DETEMIR 6 UNITS: 100 INJECTION, SOLUTION SUBCUTANEOUS at 08:04

## 2021-04-06 ENCOUNTER — PATIENT MESSAGE (OUTPATIENT)
Dept: ENDOCRINOLOGY | Facility: HOSPITAL | Age: 46
End: 2021-04-06

## 2021-04-06 ENCOUNTER — TELEPHONE (OUTPATIENT)
Dept: ENDOCRINOLOGY | Facility: HOSPITAL | Age: 46
End: 2021-04-06

## 2021-04-06 VITALS
OXYGEN SATURATION: 98 % | SYSTOLIC BLOOD PRESSURE: 146 MMHG | HEIGHT: 62 IN | WEIGHT: 147 LBS | BODY MASS INDEX: 27.05 KG/M2 | HEART RATE: 99 BPM | RESPIRATION RATE: 18 BRPM | TEMPERATURE: 97 F | DIASTOLIC BLOOD PRESSURE: 87 MMHG

## 2021-04-06 DIAGNOSIS — E11.65 TYPE 2 DIABETES MELLITUS WITH HYPERGLYCEMIA, WITHOUT LONG-TERM CURRENT USE OF INSULIN: Primary | ICD-10-CM

## 2021-04-06 LAB
ALBUMIN SERPL BCP-MCNC: 3.6 G/DL (ref 3.5–5.2)
ALP SERPL-CCNC: 94 U/L (ref 55–135)
ALT SERPL W/O P-5'-P-CCNC: 33 U/L (ref 10–44)
ANION GAP SERPL CALC-SCNC: 12 MMOL/L (ref 8–16)
AST SERPL-CCNC: 28 U/L (ref 10–40)
BASOPHILS # BLD AUTO: 0.03 K/UL (ref 0–0.2)
BASOPHILS NFR BLD: 0.4 % (ref 0–1.9)
BILIRUB SERPL-MCNC: 1.2 MG/DL (ref 0.1–1)
BUN SERPL-MCNC: 16 MG/DL (ref 6–20)
C PEPTIDE SERPL-MCNC: 2.99 NG/ML (ref 0.78–5.19)
CALCIUM SERPL-MCNC: 9.1 MG/DL (ref 8.7–10.5)
CHLORIDE SERPL-SCNC: 99 MMOL/L (ref 95–110)
CO2 SERPL-SCNC: 24 MMOL/L (ref 23–29)
CREAT SERPL-MCNC: 1 MG/DL (ref 0.5–1.4)
DIFFERENTIAL METHOD: NORMAL
EOSINOPHIL # BLD AUTO: 0.2 K/UL (ref 0–0.5)
EOSINOPHIL NFR BLD: 2.8 % (ref 0–8)
ERYTHROCYTE [DISTWIDTH] IN BLOOD BY AUTOMATED COUNT: 12.7 % (ref 11.5–14.5)
EST. GFR  (AFRICAN AMERICAN): >60 ML/MIN/1.73 M^2
EST. GFR  (NON AFRICAN AMERICAN): >60 ML/MIN/1.73 M^2
GLUCOSE SERPL-MCNC: 137 MG/DL (ref 70–110)
GLUCOSE SERPL-MCNC: 211 MG/DL (ref 70–110)
HCT VFR BLD AUTO: 50.7 % (ref 40–54)
HGB BLD-MCNC: 17.5 G/DL (ref 14–18)
IMM GRANULOCYTES # BLD AUTO: 0.04 K/UL (ref 0–0.04)
IMM GRANULOCYTES NFR BLD AUTO: 0.5 % (ref 0–0.5)
LYMPHOCYTES # BLD AUTO: 1.9 K/UL (ref 1–4.8)
LYMPHOCYTES NFR BLD: 23.9 % (ref 18–48)
MAGNESIUM SERPL-MCNC: 1.8 MG/DL (ref 1.6–2.6)
MCH RBC QN AUTO: 29.7 PG (ref 27–31)
MCHC RBC AUTO-ENTMCNC: 34.5 G/DL (ref 32–36)
MCV RBC AUTO: 86 FL (ref 82–98)
MONOCYTES # BLD AUTO: 0.6 K/UL (ref 0.3–1)
MONOCYTES NFR BLD: 7.7 % (ref 4–15)
NEUTROPHILS # BLD AUTO: 5.1 K/UL (ref 1.8–7.7)
NEUTROPHILS NFR BLD: 64.7 % (ref 38–73)
NRBC BLD-RTO: 0 /100 WBC
PHOSPHATE SERPL-MCNC: 3.5 MG/DL (ref 2.7–4.5)
PLATELET # BLD AUTO: 172 K/UL (ref 150–450)
PMV BLD AUTO: 10 FL (ref 9.2–12.9)
POCT GLUCOSE: 136 MG/DL (ref 70–110)
POCT GLUCOSE: 177 MG/DL (ref 70–110)
POTASSIUM SERPL-SCNC: 3.1 MMOL/L (ref 3.5–5.1)
POTASSIUM SERPL-SCNC: 4 MMOL/L (ref 3.5–5.1)
PROT SERPL-MCNC: 7.1 G/DL (ref 6–8.4)
RBC # BLD AUTO: 5.89 M/UL (ref 4.6–6.2)
SODIUM SERPL-SCNC: 135 MMOL/L (ref 136–145)
WBC # BLD AUTO: 7.82 K/UL (ref 3.9–12.7)

## 2021-04-06 PROCEDURE — 84132 ASSAY OF SERUM POTASSIUM: CPT | Performed by: NURSE PRACTITIONER

## 2021-04-06 PROCEDURE — 84100 ASSAY OF PHOSPHORUS: CPT | Performed by: PHYSICIAN ASSISTANT

## 2021-04-06 PROCEDURE — 99233 SBSQ HOSP IP/OBS HIGH 50: CPT | Mod: ,,, | Performed by: PSYCHIATRY & NEUROLOGY

## 2021-04-06 PROCEDURE — 80053 COMPREHEN METABOLIC PANEL: CPT | Performed by: PHYSICIAN ASSISTANT

## 2021-04-06 PROCEDURE — 25000003 PHARM REV CODE 250: Performed by: NURSE PRACTITIONER

## 2021-04-06 PROCEDURE — 36415 COLL VENOUS BLD VENIPUNCTURE: CPT | Performed by: PHYSICIAN ASSISTANT

## 2021-04-06 PROCEDURE — 99232 SBSQ HOSP IP/OBS MODERATE 35: CPT | Mod: ,,, | Performed by: NURSE PRACTITIONER

## 2021-04-06 PROCEDURE — 99232 PR SUBSEQUENT HOSPITAL CARE,LEVL II: ICD-10-PCS | Mod: ,,, | Performed by: NURSE PRACTITIONER

## 2021-04-06 PROCEDURE — 86341 ISLET CELL ANTIBODY: CPT | Performed by: NURSE PRACTITIONER

## 2021-04-06 PROCEDURE — 83735 ASSAY OF MAGNESIUM: CPT | Performed by: PHYSICIAN ASSISTANT

## 2021-04-06 PROCEDURE — 99233 PR SUBSEQUENT HOSPITAL CARE,LEVL III: ICD-10-PCS | Mod: ,,, | Performed by: PSYCHIATRY & NEUROLOGY

## 2021-04-06 PROCEDURE — 85025 COMPLETE CBC W/AUTO DIFF WBC: CPT | Performed by: PHYSICIAN ASSISTANT

## 2021-04-06 PROCEDURE — 36415 COLL VENOUS BLD VENIPUNCTURE: CPT | Performed by: NURSE PRACTITIONER

## 2021-04-06 PROCEDURE — 82947 ASSAY GLUCOSE BLOOD QUANT: CPT | Performed by: NURSE PRACTITIONER

## 2021-04-06 PROCEDURE — 25000003 PHARM REV CODE 250: Performed by: PHYSICIAN ASSISTANT

## 2021-04-06 PROCEDURE — 84681 ASSAY OF C-PEPTIDE: CPT | Performed by: NURSE PRACTITIONER

## 2021-04-06 RX ORDER — LISINOPRIL 2.5 MG/1
2.5 TABLET ORAL DAILY
Qty: 30 TABLET | Refills: 0 | Status: SHIPPED | OUTPATIENT
Start: 2021-04-07 | End: 2021-04-16 | Stop reason: DRUGHIGH

## 2021-04-06 RX ORDER — INSULIN PUMP SYRINGE, 3 ML
EACH MISCELLANEOUS
Qty: 1 EACH | Refills: 0 | Status: ON HOLD | OUTPATIENT
Start: 2021-04-06 | End: 2023-10-30 | Stop reason: HOSPADM

## 2021-04-06 RX ORDER — AMLODIPINE BESYLATE 10 MG/1
10 TABLET ORAL DAILY
Qty: 30 TABLET | Refills: 0 | Status: SHIPPED | OUTPATIENT
Start: 2021-04-07 | End: 2021-05-07 | Stop reason: SDUPTHER

## 2021-04-06 RX ORDER — FLASH GLUCOSE SENSOR
1 KIT MISCELLANEOUS
Qty: 1 KIT | Refills: 11 | OUTPATIENT
Start: 2021-04-06 | End: 2021-04-06

## 2021-04-06 RX ORDER — LANCING DEVICE
1 EACH MISCELLANEOUS 2 TIMES DAILY WITH MEALS
Qty: 1 EACH | Refills: 0 | Status: ON HOLD | OUTPATIENT
Start: 2021-04-06 | End: 2023-10-30 | Stop reason: HOSPADM

## 2021-04-06 RX ORDER — PEN NEEDLE, DIABETIC 30 GX3/16"
3 NEEDLE, DISPOSABLE MISCELLANEOUS DAILY
Qty: 100 EACH | Refills: 0 | Status: SHIPPED | OUTPATIENT
Start: 2021-04-06 | End: 2023-10-25

## 2021-04-06 RX ORDER — ATORVASTATIN CALCIUM 40 MG/1
40 TABLET, FILM COATED ORAL DAILY
Qty: 30 TABLET | Refills: 0 | Status: SHIPPED | OUTPATIENT
Start: 2021-04-08 | End: 2021-05-07 | Stop reason: SDUPTHER

## 2021-04-06 RX ORDER — LISINOPRIL 2.5 MG/1
2.5 TABLET ORAL DAILY
Status: DISCONTINUED | OUTPATIENT
Start: 2021-04-06 | End: 2021-04-06 | Stop reason: HOSPADM

## 2021-04-06 RX ORDER — LANCETS 28 GAUGE
1 EACH MISCELLANEOUS 2 TIMES DAILY WITH MEALS
Qty: 100 EACH | Refills: 11 | Status: SHIPPED | OUTPATIENT
Start: 2021-04-06 | End: 2023-10-25

## 2021-04-06 RX ORDER — POTASSIUM CHLORIDE 20 MEQ/1
40 TABLET, EXTENDED RELEASE ORAL ONCE
Status: DISCONTINUED | OUTPATIENT
Start: 2021-04-06 | End: 2021-04-06 | Stop reason: HOSPADM

## 2021-04-06 RX ORDER — ATORVASTATIN CALCIUM 20 MG/1
40 TABLET, FILM COATED ORAL DAILY
Status: DISCONTINUED | OUTPATIENT
Start: 2021-04-06 | End: 2021-04-06 | Stop reason: HOSPADM

## 2021-04-06 RX ORDER — POTASSIUM CHLORIDE 20 MEQ/1
40 TABLET, EXTENDED RELEASE ORAL ONCE
Status: COMPLETED | OUTPATIENT
Start: 2021-04-06 | End: 2021-04-06

## 2021-04-06 RX ORDER — FLASH GLUCOSE SCANNING READER
1 EACH MISCELLANEOUS
Qty: 1 EACH | Refills: 1 | OUTPATIENT
Start: 2021-04-06 | End: 2021-04-06

## 2021-04-06 RX ADMIN — MUPIROCIN: 20 OINTMENT TOPICAL at 09:04

## 2021-04-06 RX ADMIN — DOCUSATE SODIUM AND SENNOSIDES 1 TABLET: 8.6; 5 TABLET, FILM COATED ORAL at 09:04

## 2021-04-06 RX ADMIN — SITAGLIPTIN 100 MG: 100 TABLET, FILM COATED ORAL at 12:04

## 2021-04-06 RX ADMIN — LISINOPRIL 2.5 MG: 2.5 TABLET ORAL at 09:04

## 2021-04-06 RX ADMIN — POTASSIUM CHLORIDE 40 MEQ: 1500 TABLET, EXTENDED RELEASE ORAL at 09:04

## 2021-04-06 RX ADMIN — ACETAMINOPHEN 650 MG: 325 TABLET ORAL at 02:04

## 2021-04-06 RX ADMIN — AMLODIPINE BESYLATE 10 MG: 10 TABLET ORAL at 09:04

## 2021-04-06 RX ADMIN — INSULIN DETEMIR 6 UNITS: 100 INJECTION, SOLUTION SUBCUTANEOUS at 09:04

## 2021-04-06 RX ADMIN — ATORVASTATIN CALCIUM 40 MG: 20 TABLET, FILM COATED ORAL at 09:04

## 2021-04-06 RX ADMIN — INSULIN ASPART 4 UNITS: 100 INJECTION, SOLUTION INTRAVENOUS; SUBCUTANEOUS at 09:04

## 2021-04-07 ENCOUNTER — TELEPHONE (OUTPATIENT)
Dept: DIABETES | Facility: CLINIC | Age: 46
End: 2021-04-07

## 2021-04-08 ENCOUNTER — TELEPHONE (OUTPATIENT)
Dept: INTERNAL MEDICINE | Facility: CLINIC | Age: 46
End: 2021-04-08

## 2021-04-08 ENCOUNTER — PATIENT MESSAGE (OUTPATIENT)
Dept: INTERNAL MEDICINE | Facility: CLINIC | Age: 46
End: 2021-04-08

## 2021-04-09 LAB — GAD65 AB SER-SCNC: 0 NMOL/L

## 2021-04-16 ENCOUNTER — OFFICE VISIT (OUTPATIENT)
Dept: INTERNAL MEDICINE | Facility: CLINIC | Age: 46
End: 2021-04-16
Payer: COMMERCIAL

## 2021-04-16 ENCOUNTER — TELEPHONE (OUTPATIENT)
Dept: NEUROLOGY | Facility: CLINIC | Age: 46
End: 2021-04-16

## 2021-04-16 ENCOUNTER — TELEPHONE (OUTPATIENT)
Dept: INTERNAL MEDICINE | Facility: CLINIC | Age: 46
End: 2021-04-16

## 2021-04-16 DIAGNOSIS — E11.65 TYPE 2 DIABETES MELLITUS WITH HYPERGLYCEMIA, WITHOUT LONG-TERM CURRENT USE OF INSULIN: ICD-10-CM

## 2021-04-16 DIAGNOSIS — I10 ESSENTIAL HYPERTENSION: ICD-10-CM

## 2021-04-16 DIAGNOSIS — I61.0 NONTRAUMATIC SUBCORTICAL HEMORRHAGE OF RIGHT CEREBRAL HEMISPHERE: ICD-10-CM

## 2021-04-16 DIAGNOSIS — R00.0 TACHYCARDIA: ICD-10-CM

## 2021-04-16 PROCEDURE — 99204 OFFICE O/P NEW MOD 45 MIN: CPT | Mod: S$GLB,,, | Performed by: FAMILY MEDICINE

## 2021-04-16 PROCEDURE — 1126F AMNT PAIN NOTED NONE PRSNT: CPT | Mod: S$GLB,,, | Performed by: FAMILY MEDICINE

## 2021-04-16 PROCEDURE — 3008F PR BODY MASS INDEX (BMI) DOCUMENTED: ICD-10-PCS | Mod: CPTII,S$GLB,, | Performed by: FAMILY MEDICINE

## 2021-04-16 PROCEDURE — 99999 PR PBB SHADOW E&M-EST. PATIENT-LVL V: ICD-10-PCS | Mod: PBBFAC,,, | Performed by: FAMILY MEDICINE

## 2021-04-16 PROCEDURE — 1126F PR PAIN SEVERITY QUANTIFIED, NO PAIN PRESENT: ICD-10-PCS | Mod: S$GLB,,, | Performed by: FAMILY MEDICINE

## 2021-04-16 PROCEDURE — 99204 PR OFFICE/OUTPT VISIT, NEW, LEVL IV, 45-59 MIN: ICD-10-PCS | Mod: S$GLB,,, | Performed by: FAMILY MEDICINE

## 2021-04-16 PROCEDURE — 3008F BODY MASS INDEX DOCD: CPT | Mod: CPTII,S$GLB,, | Performed by: FAMILY MEDICINE

## 2021-04-16 PROCEDURE — 99999 PR PBB SHADOW E&M-EST. PATIENT-LVL V: CPT | Mod: PBBFAC,,, | Performed by: FAMILY MEDICINE

## 2021-04-16 RX ORDER — LISINOPRIL 5 MG/1
5 TABLET ORAL DAILY
Qty: 90 TABLET | Refills: 3 | Status: SHIPPED | OUTPATIENT
Start: 2021-04-16 | End: 2021-04-23 | Stop reason: DRUGHIGH

## 2021-04-19 ENCOUNTER — TELEPHONE (OUTPATIENT)
Dept: INTERNAL MEDICINE | Facility: CLINIC | Age: 46
End: 2021-04-19

## 2021-04-22 VITALS
BODY MASS INDEX: 27.1 KG/M2 | HEART RATE: 114 BPM | OXYGEN SATURATION: 97 % | WEIGHT: 147.25 LBS | TEMPERATURE: 99 F | HEIGHT: 62 IN | SYSTOLIC BLOOD PRESSURE: 126 MMHG | DIASTOLIC BLOOD PRESSURE: 95 MMHG

## 2021-04-23 ENCOUNTER — CLINICAL SUPPORT (OUTPATIENT)
Dept: INTERNAL MEDICINE | Facility: CLINIC | Age: 46
End: 2021-04-23
Payer: COMMERCIAL

## 2021-04-23 VITALS
HEIGHT: 62 IN | WEIGHT: 145.5 LBS | HEART RATE: 86 BPM | SYSTOLIC BLOOD PRESSURE: 130 MMHG | OXYGEN SATURATION: 99 % | DIASTOLIC BLOOD PRESSURE: 90 MMHG | RESPIRATION RATE: 20 BRPM | BODY MASS INDEX: 26.78 KG/M2

## 2021-04-23 DIAGNOSIS — I10 ESSENTIAL HYPERTENSION: Primary | ICD-10-CM

## 2021-04-23 PROCEDURE — 99999 PR PBB SHADOW E&M-EST. PATIENT-LVL III: CPT | Mod: PBBFAC,,,

## 2021-04-23 PROCEDURE — 99999 PR PBB SHADOW E&M-EST. PATIENT-LVL III: ICD-10-PCS | Mod: PBBFAC,,,

## 2021-04-23 RX ORDER — LISINOPRIL 10 MG/1
10 TABLET ORAL DAILY
Qty: 90 TABLET | Refills: 3 | Status: SHIPPED | OUTPATIENT
Start: 2021-04-23 | End: 2021-05-09 | Stop reason: DRUGHIGH

## 2021-05-04 ENCOUNTER — PATIENT OUTREACH (OUTPATIENT)
Dept: ADMINISTRATIVE | Facility: OTHER | Age: 46
End: 2021-05-04

## 2021-05-05 DIAGNOSIS — E11.9 TYPE 2 DIABETES MELLITUS WITHOUT COMPLICATION: ICD-10-CM

## 2021-05-05 DIAGNOSIS — E11.9 TYPE 2 DIABETES MELLITUS WITHOUT COMPLICATION, UNSPECIFIED WHETHER LONG TERM INSULIN USE: ICD-10-CM

## 2021-05-07 ENCOUNTER — TELEPHONE (OUTPATIENT)
Dept: INTERNAL MEDICINE | Facility: CLINIC | Age: 46
End: 2021-05-07

## 2021-05-07 ENCOUNTER — CLINICAL SUPPORT (OUTPATIENT)
Dept: INTERNAL MEDICINE | Facility: CLINIC | Age: 46
End: 2021-05-07
Payer: COMMERCIAL

## 2021-05-07 VITALS
RESPIRATION RATE: 20 BRPM | HEART RATE: 89 BPM | DIASTOLIC BLOOD PRESSURE: 90 MMHG | SYSTOLIC BLOOD PRESSURE: 125 MMHG | OXYGEN SATURATION: 99 %

## 2021-05-07 DIAGNOSIS — I10 ESSENTIAL HYPERTENSION: ICD-10-CM

## 2021-05-07 DIAGNOSIS — I10 ESSENTIAL HYPERTENSION: Primary | ICD-10-CM

## 2021-05-07 DIAGNOSIS — E11.9 TYPE 2 DIABETES MELLITUS WITHOUT COMPLICATION: ICD-10-CM

## 2021-05-07 PROCEDURE — 99999 PR PBB SHADOW E&M-EST. PATIENT-LVL III: CPT | Mod: PBBFAC,,,

## 2021-05-07 PROCEDURE — 99999 PR PBB SHADOW E&M-EST. PATIENT-LVL III: ICD-10-PCS | Mod: PBBFAC,,,

## 2021-05-09 RX ORDER — AMLODIPINE BESYLATE 10 MG/1
10 TABLET ORAL DAILY
Qty: 30 TABLET | Refills: 11 | Status: ON HOLD | OUTPATIENT
Start: 2021-05-09 | End: 2023-10-30

## 2021-05-09 RX ORDER — LISINOPRIL 20 MG/1
20 TABLET ORAL DAILY
Qty: 90 TABLET | Refills: 3 | Status: ON HOLD | OUTPATIENT
Start: 2021-05-09 | End: 2023-10-30

## 2021-05-09 RX ORDER — ATORVASTATIN CALCIUM 40 MG/1
40 TABLET, FILM COATED ORAL DAILY
Qty: 30 TABLET | Refills: 11 | Status: ON HOLD | OUTPATIENT
Start: 2021-05-09 | End: 2023-10-29

## 2021-05-12 DIAGNOSIS — E11.9 TYPE 2 DIABETES MELLITUS WITHOUT COMPLICATION, UNSPECIFIED WHETHER LONG TERM INSULIN USE: ICD-10-CM

## 2021-05-20 ENCOUNTER — TELEPHONE (OUTPATIENT)
Dept: NEUROLOGY | Facility: HOSPITAL | Age: 46
End: 2021-05-20

## 2021-05-21 ENCOUNTER — CLINICAL SUPPORT (OUTPATIENT)
Dept: INTERNAL MEDICINE | Facility: CLINIC | Age: 46
End: 2021-05-21
Payer: COMMERCIAL

## 2021-05-21 ENCOUNTER — PATIENT MESSAGE (OUTPATIENT)
Dept: INTERNAL MEDICINE | Facility: CLINIC | Age: 46
End: 2021-05-21

## 2021-05-21 ENCOUNTER — TELEPHONE (OUTPATIENT)
Dept: INTERNAL MEDICINE | Facility: CLINIC | Age: 46
End: 2021-05-21

## 2021-05-21 VITALS — SYSTOLIC BLOOD PRESSURE: 130 MMHG | DIASTOLIC BLOOD PRESSURE: 87 MMHG | HEART RATE: 90 BPM | OXYGEN SATURATION: 99 %

## 2021-05-21 PROCEDURE — 99999 PR PBB SHADOW E&M-EST. PATIENT-LVL III: CPT | Mod: PBBFAC,,,

## 2021-05-21 PROCEDURE — 99999 PR PBB SHADOW E&M-EST. PATIENT-LVL III: ICD-10-PCS | Mod: PBBFAC,,,

## 2021-05-29 ENCOUNTER — IMMUNIZATION (OUTPATIENT)
Dept: INTERNAL MEDICINE | Facility: CLINIC | Age: 46
End: 2021-05-29
Payer: COMMERCIAL

## 2021-05-29 DIAGNOSIS — Z23 NEED FOR VACCINATION: Primary | ICD-10-CM

## 2021-05-29 PROCEDURE — 91300 COVID-19, MRNA, LNP-S, PF, 30 MCG/0.3 ML DOSE VACCINE: CPT | Mod: PBBFAC | Performed by: INTERNAL MEDICINE

## 2021-06-10 ENCOUNTER — TELEPHONE (OUTPATIENT)
Dept: ADMINISTRATIVE | Facility: HOSPITAL | Age: 46
End: 2021-06-10

## 2021-06-19 ENCOUNTER — IMMUNIZATION (OUTPATIENT)
Dept: INTERNAL MEDICINE | Facility: CLINIC | Age: 46
End: 2021-06-19
Payer: COMMERCIAL

## 2021-06-19 DIAGNOSIS — Z23 NEED FOR VACCINATION: Primary | ICD-10-CM

## 2021-06-19 PROCEDURE — 91300 COVID-19, MRNA, LNP-S, PF, 30 MCG/0.3 ML DOSE VACCINE: CPT | Mod: PBBFAC | Performed by: INTERNAL MEDICINE

## 2021-06-19 PROCEDURE — 0002A COVID-19, MRNA, LNP-S, PF, 30 MCG/0.3 ML DOSE VACCINE: CPT | Mod: PBBFAC | Performed by: INTERNAL MEDICINE

## 2021-06-28 RX ORDER — INSULIN DETEMIR 100 [IU]/ML
INJECTION, SOLUTION SUBCUTANEOUS
Qty: 3 ML | Refills: 11 | Status: SHIPPED | OUTPATIENT
Start: 2021-06-28 | End: 2023-10-25

## 2021-07-06 ENCOUNTER — PATIENT MESSAGE (OUTPATIENT)
Dept: ADMINISTRATIVE | Facility: HOSPITAL | Age: 46
End: 2021-07-06

## 2021-07-06 ENCOUNTER — TELEPHONE (OUTPATIENT)
Dept: ADMINISTRATIVE | Facility: HOSPITAL | Age: 46
End: 2021-07-06

## 2021-10-04 ENCOUNTER — PATIENT MESSAGE (OUTPATIENT)
Dept: ADMINISTRATIVE | Facility: HOSPITAL | Age: 46
End: 2021-10-04

## 2021-10-20 DIAGNOSIS — E11.9 TYPE 2 DIABETES MELLITUS WITHOUT COMPLICATION: ICD-10-CM

## 2022-01-02 ENCOUNTER — PATIENT MESSAGE (OUTPATIENT)
Dept: ADMINISTRATIVE | Facility: HOSPITAL | Age: 47
End: 2022-01-02
Payer: COMMERCIAL

## 2022-01-18 ENCOUNTER — PATIENT MESSAGE (OUTPATIENT)
Dept: ADMINISTRATIVE | Facility: HOSPITAL | Age: 47
End: 2022-01-18
Payer: COMMERCIAL

## 2022-03-16 ENCOUNTER — PATIENT MESSAGE (OUTPATIENT)
Dept: ADMINISTRATIVE | Facility: HOSPITAL | Age: 47
End: 2022-03-16
Payer: COMMERCIAL

## 2022-03-21 ENCOUNTER — PATIENT MESSAGE (OUTPATIENT)
Dept: ADMINISTRATIVE | Facility: HOSPITAL | Age: 47
End: 2022-03-21
Payer: COMMERCIAL

## 2022-04-03 ENCOUNTER — PATIENT MESSAGE (OUTPATIENT)
Dept: ADMINISTRATIVE | Facility: HOSPITAL | Age: 47
End: 2022-04-03
Payer: COMMERCIAL

## 2023-10-25 ENCOUNTER — HOSPITAL ENCOUNTER (INPATIENT)
Facility: HOSPITAL | Age: 48
LOS: 5 days | Discharge: HOME | DRG: 065 | End: 2023-10-30
Attending: EMERGENCY MEDICINE | Admitting: PSYCHIATRY & NEUROLOGY
Payer: MEDICAID

## 2023-10-25 DIAGNOSIS — I63.511 STROKE DUE TO OCCLUSION OF RIGHT MIDDLE CEREBRAL ARTERY: ICD-10-CM

## 2023-10-25 DIAGNOSIS — E87.6 HYPOKALEMIA: ICD-10-CM

## 2023-10-25 DIAGNOSIS — E11.65 TYPE 2 DIABETES MELLITUS WITH HYPERGLYCEMIA, WITHOUT LONG-TERM CURRENT USE OF INSULIN: ICD-10-CM

## 2023-10-25 DIAGNOSIS — I63.311 STROKE DUE TO THROMBOSIS OF RIGHT MIDDLE CEREBRAL ARTERY: Primary | ICD-10-CM

## 2023-10-25 DIAGNOSIS — I10 ESSENTIAL HYPERTENSION: ICD-10-CM

## 2023-10-25 DIAGNOSIS — I63.9 STROKE: ICD-10-CM

## 2023-10-25 PROBLEM — Z86.79 HISTORY OF INTRACEREBRAL HEMORRHAGE WITHOUT RESIDUAL DEFICIT: Status: ACTIVE | Noted: 2021-04-04

## 2023-10-25 LAB
ABO + RH BLD: NORMAL
ALBUMIN SERPL BCP-MCNC: 2.7 G/DL (ref 3.5–5.2)
ALP SERPL-CCNC: 77 U/L (ref 55–135)
ALT SERPL W/O P-5'-P-CCNC: 24 U/L (ref 10–44)
ANION GAP SERPL CALC-SCNC: 14 MMOL/L (ref 8–16)
AST SERPL-CCNC: 23 U/L (ref 10–40)
BASOPHILS # BLD AUTO: 0.05 K/UL (ref 0–0.2)
BASOPHILS NFR BLD: 0.6 % (ref 0–1.9)
BILIRUB SERPL-MCNC: 0.8 MG/DL (ref 0.1–1)
BLD GP AB SCN CELLS X3 SERPL QL: NORMAL
BUN SERPL-MCNC: 13 MG/DL (ref 6–20)
CALCIUM SERPL-MCNC: 8 MG/DL (ref 8.7–10.5)
CHLORIDE SERPL-SCNC: 100 MMOL/L (ref 95–110)
CHOLEST SERPL-MCNC: 202 MG/DL (ref 120–199)
CHOLEST/HDLC SERPL: 4.6 {RATIO} (ref 2–5)
CO2 SERPL-SCNC: 21 MMOL/L (ref 23–29)
CREAT SERPL-MCNC: 1.2 MG/DL (ref 0.5–1.4)
CREAT SERPL-MCNC: 1.3 MG/DL (ref 0.5–1.4)
DIFFERENTIAL METHOD: ABNORMAL
EOSINOPHIL # BLD AUTO: 0 K/UL (ref 0–0.5)
EOSINOPHIL NFR BLD: 0.1 % (ref 0–8)
ERYTHROCYTE [DISTWIDTH] IN BLOOD BY AUTOMATED COUNT: 12.6 % (ref 11.5–14.5)
EST. GFR  (NO RACE VARIABLE): >60 ML/MIN/1.73 M^2
ESTIMATED AVG GLUCOSE: 312 MG/DL (ref 68–131)
GLUCOSE SERPL-MCNC: 339 MG/DL (ref 70–110)
HBA1C MFR BLD: 12.5 % (ref 4–5.6)
HCT VFR BLD AUTO: 41.1 % (ref 40–54)
HCV AB SERPL QL IA: NORMAL
HDLC SERPL-MCNC: 44 MG/DL (ref 40–75)
HDLC SERPL: 21.8 % (ref 20–50)
HGB BLD-MCNC: 14.7 G/DL (ref 14–18)
HIV 1+2 AB+HIV1 P24 AG SERPL QL IA: NORMAL
IMM GRANULOCYTES # BLD AUTO: 0.08 K/UL (ref 0–0.04)
IMM GRANULOCYTES NFR BLD AUTO: 0.9 % (ref 0–0.5)
INR PPP: 1 (ref 0.8–1.2)
LACTATE SERPL-SCNC: 3.1 MMOL/L (ref 0.5–2.2)
LDLC SERPL CALC-MCNC: 122.8 MG/DL (ref 63–159)
LYMPHOCYTES # BLD AUTO: 1.6 K/UL (ref 1–4.8)
LYMPHOCYTES NFR BLD: 17.8 % (ref 18–48)
MCH RBC QN AUTO: 30.6 PG (ref 27–31)
MCHC RBC AUTO-ENTMCNC: 35.8 G/DL (ref 32–36)
MCV RBC AUTO: 85 FL (ref 82–98)
MONOCYTES # BLD AUTO: 0.7 K/UL (ref 0.3–1)
MONOCYTES NFR BLD: 7.5 % (ref 4–15)
NEUTROPHILS # BLD AUTO: 6.6 K/UL (ref 1.8–7.7)
NEUTROPHILS NFR BLD: 73.1 % (ref 38–73)
NONHDLC SERPL-MCNC: 158 MG/DL
NRBC BLD-RTO: 0 /100 WBC
PLATELET # BLD AUTO: 173 K/UL (ref 150–450)
PMV BLD AUTO: 10.3 FL (ref 9.2–12.9)
POC PTINR: 1 (ref 0.9–1.2)
POC PTWBT: 11.8 SEC (ref 9.7–14.3)
POTASSIUM SERPL-SCNC: 3.1 MMOL/L (ref 3.5–5.1)
PROT SERPL-MCNC: 5.4 G/DL (ref 6–8.4)
PROTHROMBIN TIME: 10.5 SEC (ref 9–12.5)
RBC # BLD AUTO: 4.81 M/UL (ref 4.6–6.2)
SAMPLE: NORMAL
SAMPLE: NORMAL
SODIUM SERPL-SCNC: 135 MMOL/L (ref 136–145)
SPECIMEN OUTDATE: NORMAL
TRIGL SERPL-MCNC: 176 MG/DL (ref 30–150)
TSH SERPL DL<=0.005 MIU/L-ACNC: 1.43 UIU/ML (ref 0.4–4)
WBC # BLD AUTO: 9.04 K/UL (ref 3.9–12.7)

## 2023-10-25 PROCEDURE — 80053 COMPREHEN METABOLIC PANEL: CPT | Performed by: EMERGENCY MEDICINE

## 2023-10-25 PROCEDURE — 83605 ASSAY OF LACTIC ACID: CPT | Performed by: EMERGENCY MEDICINE

## 2023-10-25 PROCEDURE — 85610 PROTHROMBIN TIME: CPT

## 2023-10-25 PROCEDURE — 87389 HIV-1 AG W/HIV-1&-2 AB AG IA: CPT | Performed by: PHYSICIAN ASSISTANT

## 2023-10-25 PROCEDURE — 93010 EKG 12-LEAD: ICD-10-PCS | Mod: ,,, | Performed by: INTERNAL MEDICINE

## 2023-10-25 PROCEDURE — 11000001 HC ACUTE MED/SURG PRIVATE ROOM

## 2023-10-25 PROCEDURE — 93010 ELECTROCARDIOGRAM REPORT: CPT | Mod: ,,, | Performed by: INTERNAL MEDICINE

## 2023-10-25 PROCEDURE — 83036 HEMOGLOBIN GLYCOSYLATED A1C: CPT | Performed by: PHYSICIAN ASSISTANT

## 2023-10-25 PROCEDURE — 80061 LIPID PANEL: CPT | Performed by: EMERGENCY MEDICINE

## 2023-10-25 PROCEDURE — 25000003 PHARM REV CODE 250: Performed by: EMERGENCY MEDICINE

## 2023-10-25 PROCEDURE — 99900035 HC TECH TIME PER 15 MIN (STAT)

## 2023-10-25 PROCEDURE — 86901 BLOOD TYPING SEROLOGIC RH(D): CPT | Performed by: EMERGENCY MEDICINE

## 2023-10-25 PROCEDURE — 85610 PROTHROMBIN TIME: CPT | Performed by: EMERGENCY MEDICINE

## 2023-10-25 PROCEDURE — 86803 HEPATITIS C AB TEST: CPT | Performed by: PHYSICIAN ASSISTANT

## 2023-10-25 PROCEDURE — 63600175 PHARM REV CODE 636 W HCPCS: Performed by: PHYSICIAN ASSISTANT

## 2023-10-25 PROCEDURE — 25500020 PHARM REV CODE 255: Performed by: EMERGENCY MEDICINE

## 2023-10-25 PROCEDURE — 85025 COMPLETE CBC W/AUTO DIFF WBC: CPT | Performed by: EMERGENCY MEDICINE

## 2023-10-25 PROCEDURE — 25000003 PHARM REV CODE 250: Performed by: PHYSICIAN ASSISTANT

## 2023-10-25 PROCEDURE — 93005 ELECTROCARDIOGRAM TRACING: CPT

## 2023-10-25 PROCEDURE — 99223 1ST HOSP IP/OBS HIGH 75: CPT | Mod: ,,, | Performed by: PSYCHIATRY & NEUROLOGY

## 2023-10-25 PROCEDURE — 63600175 PHARM REV CODE 636 W HCPCS: Performed by: EMERGENCY MEDICINE

## 2023-10-25 PROCEDURE — 99223 PR INITIAL HOSPITAL CARE,LEVL III: ICD-10-PCS | Mod: ,,, | Performed by: PSYCHIATRY & NEUROLOGY

## 2023-10-25 PROCEDURE — 84443 ASSAY THYROID STIM HORMONE: CPT | Performed by: EMERGENCY MEDICINE

## 2023-10-25 PROCEDURE — 99291 CRITICAL CARE FIRST HOUR: CPT

## 2023-10-25 PROCEDURE — 82565 ASSAY OF CREATININE: CPT

## 2023-10-25 RX ORDER — IBUPROFEN 200 MG
24 TABLET ORAL
Status: DISCONTINUED | OUTPATIENT
Start: 2023-10-25 | End: 2023-10-26

## 2023-10-25 RX ORDER — INSULIN ASPART 100 [IU]/ML
0-5 INJECTION, SOLUTION INTRAVENOUS; SUBCUTANEOUS
Status: DISCONTINUED | OUTPATIENT
Start: 2023-10-25 | End: 2023-10-26

## 2023-10-25 RX ORDER — IBUPROFEN 200 MG
16 TABLET ORAL
Status: DISCONTINUED | OUTPATIENT
Start: 2023-10-25 | End: 2023-10-26

## 2023-10-25 RX ORDER — ONDANSETRON 2 MG/ML
4 INJECTION INTRAMUSCULAR; INTRAVENOUS EVERY 12 HOURS PRN
Status: DISCONTINUED | OUTPATIENT
Start: 2023-10-25 | End: 2023-10-30 | Stop reason: HOSPADM

## 2023-10-25 RX ORDER — LABETALOL HYDROCHLORIDE 5 MG/ML
10 INJECTION, SOLUTION INTRAVENOUS
Status: DISCONTINUED | OUTPATIENT
Start: 2023-10-25 | End: 2023-10-26

## 2023-10-25 RX ORDER — ASPIRIN 325 MG
325 TABLET ORAL ONCE
Status: COMPLETED | OUTPATIENT
Start: 2023-10-25 | End: 2023-10-25

## 2023-10-25 RX ORDER — ACETAMINOPHEN 325 MG/1
650 TABLET ORAL EVERY 6 HOURS PRN
Status: DISCONTINUED | OUTPATIENT
Start: 2023-10-25 | End: 2023-10-30 | Stop reason: HOSPADM

## 2023-10-25 RX ORDER — CLOPIDOGREL BISULFATE 75 MG/1
75 TABLET ORAL DAILY
Status: DISCONTINUED | OUTPATIENT
Start: 2023-10-26 | End: 2023-10-30 | Stop reason: HOSPADM

## 2023-10-25 RX ORDER — ASPIRIN 81 MG/1
81 TABLET ORAL DAILY
Status: DISCONTINUED | OUTPATIENT
Start: 2023-10-26 | End: 2023-10-30 | Stop reason: HOSPADM

## 2023-10-25 RX ORDER — CLOPIDOGREL 300 MG/1
300 TABLET, FILM COATED ORAL ONCE
Status: COMPLETED | OUTPATIENT
Start: 2023-10-25 | End: 2023-10-25

## 2023-10-25 RX ORDER — GLUCAGON 1 MG
1 KIT INJECTION
Status: DISCONTINUED | OUTPATIENT
Start: 2023-10-25 | End: 2023-10-26

## 2023-10-25 RX ORDER — HEPARIN SODIUM 5000 [USP'U]/ML
5000 INJECTION, SOLUTION INTRAVENOUS; SUBCUTANEOUS EVERY 8 HOURS
Status: DISCONTINUED | OUTPATIENT
Start: 2023-10-25 | End: 2023-10-30 | Stop reason: HOSPADM

## 2023-10-25 RX ORDER — SODIUM CHLORIDE 0.9 % (FLUSH) 0.9 %
10 SYRINGE (ML) INJECTION
Status: DISCONTINUED | OUTPATIENT
Start: 2023-10-25 | End: 2023-10-30 | Stop reason: HOSPADM

## 2023-10-25 RX ORDER — ATORVASTATIN CALCIUM 40 MG/1
40 TABLET, FILM COATED ORAL DAILY
Status: DISCONTINUED | OUTPATIENT
Start: 2023-10-26 | End: 2023-10-29

## 2023-10-25 RX ADMIN — POTASSIUM BICARBONATE 25 MEQ: 978 TABLET, EFFERVESCENT ORAL at 09:10

## 2023-10-25 RX ADMIN — HEPARIN SODIUM 5000 UNITS: 5000 INJECTION INTRAVENOUS; SUBCUTANEOUS at 09:10

## 2023-10-25 RX ADMIN — LABETALOL HYDROCHLORIDE 10 MG: 5 INJECTION INTRAVENOUS at 10:10

## 2023-10-25 RX ADMIN — IOHEXOL 100 ML: 350 INJECTION, SOLUTION INTRAVENOUS at 07:10

## 2023-10-25 RX ADMIN — INSULIN ASPART 1 UNITS: 100 INJECTION, SOLUTION INTRAVENOUS; SUBCUTANEOUS at 11:10

## 2023-10-25 RX ADMIN — SODIUM CHLORIDE, POTASSIUM CHLORIDE, SODIUM LACTATE AND CALCIUM CHLORIDE 1000 ML: 600; 310; 30; 20 INJECTION, SOLUTION INTRAVENOUS at 09:10

## 2023-10-25 RX ADMIN — CLOPIDOGREL BISULFATE 300 MG: 300 TABLET, FILM COATED ORAL at 09:10

## 2023-10-25 RX ADMIN — ASPIRIN 325 MG ORAL TABLET 325 MG: 325 PILL ORAL at 09:10

## 2023-10-26 LAB
ALBUMIN SERPL BCP-MCNC: 2.9 G/DL (ref 3.5–5.2)
ALP SERPL-CCNC: 73 U/L (ref 55–135)
ALT SERPL W/O P-5'-P-CCNC: 24 U/L (ref 10–44)
ANION GAP SERPL CALC-SCNC: 12 MMOL/L (ref 8–16)
APTT PPP: 25.4 SEC (ref 21–32)
ASCENDING AORTA: 3.23 CM
AST SERPL-CCNC: 27 U/L (ref 10–40)
AV INDEX (PROSTH): 0.85
AV MEAN GRADIENT: 2 MMHG
AV PEAK GRADIENT: 4 MMHG
AV VALVE AREA BY VELOCITY RATIO: 3.31 CM²
AV VALVE AREA: 4 CM²
AV VELOCITY RATIO: 0.7
BASOPHILS # BLD AUTO: 0.05 K/UL (ref 0–0.2)
BASOPHILS NFR BLD: 0.6 % (ref 0–1.9)
BILIRUB SERPL-MCNC: 1 MG/DL (ref 0.1–1)
BSA FOR ECHO PROCEDURE: 1.78 M2
BUN SERPL-MCNC: 12 MG/DL (ref 6–20)
CALCIUM SERPL-MCNC: 8.6 MG/DL (ref 8.7–10.5)
CHLORIDE SERPL-SCNC: 101 MMOL/L (ref 95–110)
CO2 SERPL-SCNC: 27 MMOL/L (ref 23–29)
CREAT SERPL-MCNC: 1.1 MG/DL (ref 0.5–1.4)
CV ECHO LV RWT: 0.56 CM
DIFFERENTIAL METHOD: ABNORMAL
DOP CALC AO PEAK VEL: 1.04 M/S
DOP CALC AO VTI: 15.59 CM
DOP CALC LVOT AREA: 4.7 CM2
DOP CALC LVOT DIAMETER: 2.45 CM
DOP CALC LVOT PEAK VEL: 0.73 M/S
DOP CALC LVOT STROKE VOLUME: 62.34 CM3
DOP CALCLVOT PEAK VEL VTI: 13.23 CM
E WAVE DECELERATION TIME: 88.58 MSEC
E/A RATIO: 0.79
ECHO LV POSTERIOR WALL: 1.01 CM (ref 0.6–1.1)
EOSINOPHIL # BLD AUTO: 0.2 K/UL (ref 0–0.5)
EOSINOPHIL NFR BLD: 1.9 % (ref 0–8)
ERYTHROCYTE [DISTWIDTH] IN BLOOD BY AUTOMATED COUNT: 12.7 % (ref 11.5–14.5)
EST. GFR  (NO RACE VARIABLE): >60 ML/MIN/1.73 M^2
FRACTIONAL SHORTENING: 27 % (ref 28–44)
GLUCOSE SERPL-MCNC: 159 MG/DL (ref 70–110)
HCT VFR BLD AUTO: 41.8 % (ref 40–54)
HGB BLD-MCNC: 15 G/DL (ref 14–18)
IMM GRANULOCYTES # BLD AUTO: 0.06 K/UL (ref 0–0.04)
IMM GRANULOCYTES NFR BLD AUTO: 0.7 % (ref 0–0.5)
INR PPP: 0.9 (ref 0.8–1.2)
INTERVENTRICULAR SEPTUM: 1.21 CM (ref 0.6–1.1)
IVRT: 77.07 MSEC
LA MAJOR: 4.43 CM
LA MINOR: 4.21 CM
LA WIDTH: 3.39 CM
LEFT ATRIUM SIZE: 3.49 CM
LEFT ATRIUM VOLUME INDEX MOD: 20.2 ML/M2
LEFT ATRIUM VOLUME INDEX: 25 ML/M2
LEFT ATRIUM VOLUME MOD: 35.19 CM3
LEFT ATRIUM VOLUME: 43.42 CM3
LEFT INTERNAL DIMENSION IN SYSTOLE: 2.63 CM (ref 2.1–4)
LEFT VENTRICLE DIASTOLIC VOLUME INDEX: 31.3 ML/M2
LEFT VENTRICLE DIASTOLIC VOLUME: 54.46 ML
LEFT VENTRICLE MASS INDEX: 72 G/M2
LEFT VENTRICLE SYSTOLIC VOLUME INDEX: 14.6 ML/M2
LEFT VENTRICLE SYSTOLIC VOLUME: 25.41 ML
LEFT VENTRICULAR INTERNAL DIMENSION IN DIASTOLE: 3.6 CM (ref 3.5–6)
LEFT VENTRICULAR MASS: 125.8 G
LV SEPTAL E/E' RATIO: 44.5 M/S
LYMPHOCYTES # BLD AUTO: 2.5 K/UL (ref 1–4.8)
LYMPHOCYTES NFR BLD: 28.3 % (ref 18–48)
MAGNESIUM SERPL-MCNC: 1.8 MG/DL (ref 1.6–2.6)
MCH RBC QN AUTO: 29.8 PG (ref 27–31)
MCHC RBC AUTO-ENTMCNC: 35.9 G/DL (ref 32–36)
MCV RBC AUTO: 83 FL (ref 82–98)
MONOCYTES # BLD AUTO: 0.7 K/UL (ref 0.3–1)
MONOCYTES NFR BLD: 8 % (ref 4–15)
MV A" WAVE DURATION": 12.84 MSEC
MV PEAK A VEL: 1.13 M/S
MV PEAK E VEL: 0.89 M/S
MV STENOSIS PRESSURE HALF TIME: 25.69 MS
MV VALVE AREA P 1/2 METHOD: 8.56 CM2
NEUTROPHILS # BLD AUTO: 5.3 K/UL (ref 1.8–7.7)
NEUTROPHILS NFR BLD: 60.5 % (ref 38–73)
NRBC BLD-RTO: 0 /100 WBC
PHOSPHATE SERPL-MCNC: 2.7 MG/DL (ref 2.7–4.5)
PLATELET # BLD AUTO: 171 K/UL (ref 150–450)
PMV BLD AUTO: 10.3 FL (ref 9.2–12.9)
POCT GLUCOSE: 159 MG/DL (ref 70–110)
POCT GLUCOSE: 161 MG/DL (ref 70–110)
POCT GLUCOSE: 196 MG/DL (ref 70–110)
POCT GLUCOSE: 203 MG/DL (ref 70–110)
POCT GLUCOSE: 356 MG/DL (ref 70–110)
POTASSIUM SERPL-SCNC: 2.8 MMOL/L (ref 3.5–5.1)
PROT SERPL-MCNC: 5.8 G/DL (ref 6–8.4)
PROTHROMBIN TIME: 10 SEC (ref 9–12.5)
PULM VEIN S/D RATIO: 1.56
PV PEAK D VEL: 0.27 M/S
PV PEAK S VEL: 0.42 M/S
RA MAJOR: 3.72 CM
RA PRESSURE ESTIMATED: 3 MMHG
RA WIDTH: 3.33 CM
RBC # BLD AUTO: 5.04 M/UL (ref 4.6–6.2)
RIGHT VENTRICULAR END-DIASTOLIC DIMENSION: 2.78 CM
SINUS: 3.1 CM
SODIUM SERPL-SCNC: 140 MMOL/L (ref 136–145)
STJ: 2.73 CM
TDI SEPTAL: 0.02 M/S
TRICUSPID ANNULAR PLANE SYSTOLIC EXCURSION: 2.05 CM
TROPONIN I SERPL DL<=0.01 NG/ML-MCNC: 0.01 NG/ML (ref 0–0.03)
WBC # BLD AUTO: 8.83 K/UL (ref 3.9–12.7)
Z-SCORE OF LEFT VENTRICULAR DIMENSION IN END DIASTOLE: -2.91
Z-SCORE OF LEFT VENTRICULAR DIMENSION IN END SYSTOLE: -0.99

## 2023-10-26 PROCEDURE — 97116 GAIT TRAINING THERAPY: CPT

## 2023-10-26 PROCEDURE — 99233 PR SUBSEQUENT HOSPITAL CARE,LEVL III: ICD-10-PCS | Mod: ,,, | Performed by: PSYCHIATRY & NEUROLOGY

## 2023-10-26 PROCEDURE — 36415 COLL VENOUS BLD VENIPUNCTURE: CPT | Performed by: PHYSICIAN ASSISTANT

## 2023-10-26 PROCEDURE — 92523 SPEECH SOUND LANG COMPREHEN: CPT

## 2023-10-26 PROCEDURE — 84100 ASSAY OF PHOSPHORUS: CPT | Performed by: PHYSICIAN ASSISTANT

## 2023-10-26 PROCEDURE — 11000001 HC ACUTE MED/SURG PRIVATE ROOM

## 2023-10-26 PROCEDURE — 63600175 PHARM REV CODE 636 W HCPCS: Performed by: NURSE PRACTITIONER

## 2023-10-26 PROCEDURE — 63600175 PHARM REV CODE 636 W HCPCS

## 2023-10-26 PROCEDURE — 84484 ASSAY OF TROPONIN QUANT: CPT | Performed by: PHYSICIAN ASSISTANT

## 2023-10-26 PROCEDURE — 85610 PROTHROMBIN TIME: CPT | Performed by: PHYSICIAN ASSISTANT

## 2023-10-26 PROCEDURE — 25000003 PHARM REV CODE 250

## 2023-10-26 PROCEDURE — 99233 SBSQ HOSP IP/OBS HIGH 50: CPT | Mod: ,,, | Performed by: PSYCHIATRY & NEUROLOGY

## 2023-10-26 PROCEDURE — 92610 EVALUATE SWALLOWING FUNCTION: CPT

## 2023-10-26 PROCEDURE — 93010 EKG 12-LEAD: ICD-10-PCS | Mod: ,,, | Performed by: INTERNAL MEDICINE

## 2023-10-26 PROCEDURE — 83735 ASSAY OF MAGNESIUM: CPT | Performed by: PHYSICIAN ASSISTANT

## 2023-10-26 PROCEDURE — 97535 SELF CARE MNGMENT TRAINING: CPT

## 2023-10-26 PROCEDURE — 93005 ELECTROCARDIOGRAM TRACING: CPT

## 2023-10-26 PROCEDURE — 80053 COMPREHEN METABOLIC PANEL: CPT | Performed by: PSYCHIATRY & NEUROLOGY

## 2023-10-26 PROCEDURE — 63600175 PHARM REV CODE 636 W HCPCS: Performed by: PHYSICIAN ASSISTANT

## 2023-10-26 PROCEDURE — 85730 THROMBOPLASTIN TIME PARTIAL: CPT | Performed by: PHYSICIAN ASSISTANT

## 2023-10-26 PROCEDURE — 93010 ELECTROCARDIOGRAM REPORT: CPT | Mod: ,,, | Performed by: INTERNAL MEDICINE

## 2023-10-26 PROCEDURE — 97166 OT EVAL MOD COMPLEX 45 MIN: CPT

## 2023-10-26 PROCEDURE — 36415 COLL VENOUS BLD VENIPUNCTURE: CPT | Performed by: PSYCHIATRY & NEUROLOGY

## 2023-10-26 PROCEDURE — 25000003 PHARM REV CODE 250: Performed by: PHYSICIAN ASSISTANT

## 2023-10-26 PROCEDURE — 99222 1ST HOSP IP/OBS MODERATE 55: CPT | Mod: ,,,

## 2023-10-26 PROCEDURE — 97161 PT EVAL LOW COMPLEX 20 MIN: CPT

## 2023-10-26 PROCEDURE — 99222 PR INITIAL HOSPITAL CARE,LEVL II: ICD-10-PCS | Mod: ,,,

## 2023-10-26 PROCEDURE — 99222 1ST HOSP IP/OBS MODERATE 55: CPT | Mod: ,,, | Performed by: NURSE PRACTITIONER

## 2023-10-26 PROCEDURE — 99222 PR INITIAL HOSPITAL CARE,LEVL II: ICD-10-PCS | Mod: ,,, | Performed by: NURSE PRACTITIONER

## 2023-10-26 PROCEDURE — 85025 COMPLETE CBC W/AUTO DIFF WBC: CPT | Performed by: PHYSICIAN ASSISTANT

## 2023-10-26 RX ORDER — IBUPROFEN 200 MG
16 TABLET ORAL
Status: DISCONTINUED | OUTPATIENT
Start: 2023-10-26 | End: 2023-10-26

## 2023-10-26 RX ORDER — GLUCAGON 1 MG
1 KIT INJECTION
Status: DISCONTINUED | OUTPATIENT
Start: 2023-10-26 | End: 2023-10-26

## 2023-10-26 RX ORDER — POTASSIUM CHLORIDE 7.45 MG/ML
10 INJECTION INTRAVENOUS
Status: COMPLETED | OUTPATIENT
Start: 2023-10-26 | End: 2023-10-26

## 2023-10-26 RX ORDER — IBUPROFEN 200 MG
16 TABLET ORAL
Status: DISCONTINUED | OUTPATIENT
Start: 2023-10-26 | End: 2023-10-30 | Stop reason: HOSPADM

## 2023-10-26 RX ORDER — IBUPROFEN 200 MG
16 TABLET ORAL
Status: CANCELLED | OUTPATIENT
Start: 2023-10-26

## 2023-10-26 RX ORDER — IBUPROFEN 200 MG
24 TABLET ORAL
Status: DISCONTINUED | OUTPATIENT
Start: 2023-10-26 | End: 2023-10-30 | Stop reason: HOSPADM

## 2023-10-26 RX ORDER — IBUPROFEN 200 MG
24 TABLET ORAL
Status: CANCELLED | OUTPATIENT
Start: 2023-10-26

## 2023-10-26 RX ORDER — LABETALOL HCL 20 MG/4 ML
10 SYRINGE (ML) INTRAVENOUS EVERY 6 HOURS PRN
Status: COMPLETED | OUTPATIENT
Start: 2023-10-26 | End: 2023-10-26

## 2023-10-26 RX ORDER — GLUCAGON 1 MG
1 KIT INJECTION
Status: CANCELLED | OUTPATIENT
Start: 2023-10-26

## 2023-10-26 RX ORDER — GLUCAGON 1 MG
1 KIT INJECTION
Status: DISCONTINUED | OUTPATIENT
Start: 2023-10-26 | End: 2023-10-30 | Stop reason: HOSPADM

## 2023-10-26 RX ORDER — INSULIN ASPART 100 [IU]/ML
4 INJECTION, SOLUTION INTRAVENOUS; SUBCUTANEOUS
Status: DISCONTINUED | OUTPATIENT
Start: 2023-10-26 | End: 2023-10-28

## 2023-10-26 RX ORDER — LANOLIN ALCOHOL/MO/W.PET/CERES
800 CREAM (GRAM) TOPICAL ONCE
Status: COMPLETED | OUTPATIENT
Start: 2023-10-26 | End: 2023-10-26

## 2023-10-26 RX ORDER — INSULIN ASPART 100 [IU]/ML
0-10 INJECTION, SOLUTION INTRAVENOUS; SUBCUTANEOUS
Status: DISCONTINUED | OUTPATIENT
Start: 2023-10-26 | End: 2023-10-30 | Stop reason: HOSPADM

## 2023-10-26 RX ORDER — LISINOPRIL 2.5 MG/1
2.5 TABLET ORAL DAILY
Status: DISCONTINUED | OUTPATIENT
Start: 2023-10-26 | End: 2023-10-28

## 2023-10-26 RX ORDER — IBUPROFEN 200 MG
24 TABLET ORAL
Status: DISCONTINUED | OUTPATIENT
Start: 2023-10-26 | End: 2023-10-26

## 2023-10-26 RX ADMIN — POTASSIUM CHLORIDE 10 MEQ: 7.46 INJECTION, SOLUTION INTRAVENOUS at 01:10

## 2023-10-26 RX ADMIN — INSULIN ASPART 10 UNITS: 100 INJECTION, SOLUTION INTRAVENOUS; SUBCUTANEOUS at 11:10

## 2023-10-26 RX ADMIN — Medication 800 MG: at 11:10

## 2023-10-26 RX ADMIN — HEPARIN SODIUM 5000 UNITS: 5000 INJECTION INTRAVENOUS; SUBCUTANEOUS at 05:10

## 2023-10-26 RX ADMIN — INSULIN DETEMIR 12 UNITS: 100 INJECTION, SOLUTION SUBCUTANEOUS at 12:10

## 2023-10-26 RX ADMIN — HEPARIN SODIUM 5000 UNITS: 5000 INJECTION INTRAVENOUS; SUBCUTANEOUS at 09:10

## 2023-10-26 RX ADMIN — ASPIRIN 81 MG: 81 TABLET, COATED ORAL at 10:10

## 2023-10-26 RX ADMIN — POTASSIUM CHLORIDE 10 MEQ: 7.46 INJECTION, SOLUTION INTRAVENOUS at 11:10

## 2023-10-26 RX ADMIN — POTASSIUM BICARBONATE 25 MEQ: 978 TABLET, EFFERVESCENT ORAL at 09:10

## 2023-10-26 RX ADMIN — LABETALOL HYDROCHLORIDE 10 MG: 5 INJECTION, SOLUTION INTRAVENOUS at 11:10

## 2023-10-26 RX ADMIN — INSULIN ASPART 4 UNITS: 100 INJECTION, SOLUTION INTRAVENOUS; SUBCUTANEOUS at 05:10

## 2023-10-26 RX ADMIN — LISINOPRIL 2.5 MG: 2.5 TABLET ORAL at 06:10

## 2023-10-26 RX ADMIN — LABETALOL HYDROCHLORIDE 10 MG: 5 INJECTION, SOLUTION INTRAVENOUS at 03:10

## 2023-10-26 RX ADMIN — ACETAMINOPHEN 650 MG: 325 TABLET ORAL at 03:10

## 2023-10-26 RX ADMIN — CLOPIDOGREL BISULFATE 75 MG: 75 TABLET ORAL at 10:10

## 2023-10-26 RX ADMIN — HEPARIN SODIUM 5000 UNITS: 5000 INJECTION INTRAVENOUS; SUBCUTANEOUS at 01:10

## 2023-10-26 RX ADMIN — POTASSIUM BICARBONATE 25 MEQ: 978 TABLET, EFFERVESCENT ORAL at 10:10

## 2023-10-26 RX ADMIN — LABETALOL HYDROCHLORIDE 10 MG: 5 INJECTION, SOLUTION INTRAVENOUS at 12:10

## 2023-10-26 RX ADMIN — INSULIN ASPART 2 UNITS: 100 INJECTION, SOLUTION INTRAVENOUS; SUBCUTANEOUS at 05:10

## 2023-10-26 RX ADMIN — ATORVASTATIN CALCIUM 40 MG: 40 TABLET, FILM COATED ORAL at 10:10

## 2023-10-26 NOTE — ED NOTES
Telemetry Verification   Patient placed on Telemetry Box  Verified with War Room  Box # [id number]   Monitor Tech War Room   Rate 99   Rhythm Normal Sinus

## 2023-10-26 NOTE — PLAN OF CARE
Problem: Occupational Therapy  Goal: Occupational Therapy Goal  Description: Goals to be met by: 11/26/23     Patient will increase functional independence with ADLs by performing:    UE Dressing with McLeod.  LE Dressing with McLeod.  Grooming while standing with McLeod.  Toileting from toilet with McLeod for hygiene and clothing management.   Bathing from  shower chair/bench with McLeod.    10/26/2023 1244 by Beatriz Alexander OT  Outcome: Ongoing, Progressing

## 2023-10-26 NOTE — ASSESSMENT & PLAN NOTE
BG goal: 140-180    - Start Novolog 4 units TIDWM  (WBD @ 0.3 u/kg/day)   - Start Levemir 12 units daily (WBD @ 0.3 u/kg/day)   - MDC (150/25) prn for hyperglycemia   - POCT Glucose before meals and at bedtime  - Hypoglycemia protocol in place      ** Please notify Endocrine for any change and/or advance in diet**  ** Please call Endocrine for any BG related issues **     Discharge Planning:   TBD. Please notify endocrinology prior to discharge.

## 2023-10-26 NOTE — SUBJECTIVE & OBJECTIVE
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    Shellfish containing products Shortness Of Breath     Has received iodinated contrast in the past with no reaction       Medications: I have reviewed the current medication administration record.    (Not in a hospital admission)      Review of Systems   Constitutional:  Negative for fever.   Eyes:  Negative for visual disturbance.   Respiratory:  Negative for cough.    Cardiovascular:  Negative for chest pain.   Gastrointestinal:  Negative for nausea and vomiting.   Neurological:  Positive for facial asymmetry, weakness and numbness. Negative for speech difficulty.     Objective:     Vital Signs (Most Recent):  Temp: 97.9 °F (36.6 °C) (10/25/23 1929)  Pulse: 110 (10/25/23 2040)  Resp: 20 (10/25/23 2040)  BP: (!) 194/126 (10/25/23 2040)  SpO2: 97 % (10/25/23 2040)    Vital Signs Range (Last 24H):  Temp:  [97.9 °F (36.6 °C)]   Pulse:  [110-130]   Resp:  [18-20]   BP: (172-194)/(106-126)   SpO2:  [97 %]        Physical Exam  Vitals reviewed.   Constitutional:       General: He is not in acute distress.  HENT:      Head: Normocephalic and atraumatic.   Cardiovascular:      Rate and Rhythm: Tachycardia present.   Pulmonary:      Effort: Pulmonary effort is normal. No respiratory distress.   Skin:     General: Skin is warm and dry.   Neurological:      Mental Status: He is alert.              Neurological Exam:   LOC: alert  Attention Span: Good   Language: No aphasia  Articulation: No dysarthria  Orientation: Person, Place, Time   Visual  "Fields: Full  EOM (CN III, IV, VI): Full/intact  Facial Sensation (CN V): Facial sensory loss  Facial Movement (CN VII): Lower facial weakness on the Left  Motor: Arm left  Paresis: 3/5  Leg left  Paresis: 4/5  Arm right  Normal 5/5  Leg right Normal 5/5  Sensation: L face tingling/numbness  Tone: Flaccid  LUE       Laboratory:  CMP:   Recent Labs   Lab 10/25/23  1956   CALCIUM 8.0*   ALBUMIN 2.7*   PROT 5.4*   *   K 3.1*   CO2 21*      BUN 13   CREATININE 1.3   ALKPHOS 77   ALT 24   AST 23   BILITOT 0.8     CBC:   Recent Labs   Lab 10/25/23  1956   WBC 9.04   RBC 4.81   HGB 14.7   HCT 41.1      MCV 85   MCH 30.6   MCHC 35.8     Lipid Panel:   Recent Labs   Lab 10/25/23  1956   CHOL 202*   LDLCALC 122.8   HDL 44   TRIG 176*     Coagulation:   Recent Labs   Lab 10/25/23  1956   INR 1.0     Hgb A1C: No results for input(s): "HGBA1C" in the last 168 hours.  TSH: No results for input(s): "TSH" in the last 168 hours.    Diagnostic Results:      Brain imaging:  MRI pending    Vessel Imaging:  CTA 10/25/23  No evidence of acute ICH  Remote R thalamic hypodensity  Age indeterminate hypodensity in R internal capsule, possibly contributing to patient's symptoms  No LVO or significant stenosis    Cardiac Evaluation:   TTE with bubble pending    "

## 2023-10-26 NOTE — PT/OT/SLP EVAL
Physical Therapy Co-Evaluation and Co-Treatment    Patient Name:  Cecil Clark   MRN:  [id number]    Co-evaluation and co-treatment performed for this visit due to suspected patient need for two skilled therapists to ensure patient and staff safety and to accommodate for patient activity tolerance/pain management   Recommendations:     Discharge Recommendations: Low Intensity Therapy  Discharge Equipment Recommendations: walker, rolling, shower chair   Barriers to discharge: None    Assessment:     Cecil Clark is a 48 y.o. male admitted with a medical diagnosis of Stroke due to occlusion of right middle cerebral artery. He presents with the following impairments/functional limitations: weakness, impaired endurance, impaired sensation, impaired self care skills, impaired functional mobility, gait instability, impaired balance, decreased safety awareness, pain, decreased upper extremity function, decreased lower extremity function, impaired fine motor, impaired coordination. Pt with good tolerance to PT/OT evaluation on this date. Pt requiring limited assist with transfers on this date and is limited by decreased coordination of L hand. Pt with increased assist required for ambulation 2/2 poor LLE clearance during swing phase of gait. Pt able to correct with verbal cueing, but unable to maintain correction or complete adjustments to gait independently. Pt demonstrating poor insight to current functional deficits, notable gait deficits resulting in LOB, and poor safety awareness resulting in pt being at increased risk for falls. Pt safe to return home with family assist following discharge once medically stable. Recommend low intensity therapy following discharge in order to remedy the above impairments, decrease fall risk, reduce caregiver burden, improve quality of life, and return to PLOF. Pt would continue to benefit from skilled acute PT in order to address current deficits and progress functional mobility.     Rehab  "Prognosis: Good  Recent Surgery: * No surgery found *      Plan:     During this hospitalization, patient to be seen 3 x/week to address the identified rehab impairments via gait training, therapeutic activities, therapeutic exercises, neuromuscular re-education and progress toward the following goals:    Plan of Care Expires:  11/26/23    Subjective     Chief Complaint: Fatigue  Patient/Family Comments/Goals: "I have slept 4 hours in the last 36 hours." "I had like 5 falls yesterday before I came to the ER."  Pain/Comfort:  Pain Rating 1: 0/10  Pain Rating Post-Intervention 1:  (did not rate; headache)    Patients cultural, spiritual, Church conflicts given the current situation: no    Living Environment:  Living Environment: Patient lives with their 2 roommates  in  split level home on 2nd floor with 5 stairs onto porch with no HR and roughly 23 stairs to 2nd level of home with unilateral hand rail  with walk-in shower.  Prior Level of Function: Prior to admission, patient was independent and driving and not working.  Equipment Used at Home: none.  DME owned (not currently used): none  Assistance Upon Discharge:  roommates work/go to school, pt stating that family can assist him as needed    Objective:     Communicated with nursing prior to session. Patient found up in chair with telemetry upon PT entry to room.    General Precautions: Standard, aspiration, fall  Orthopedic Precautions:N/A    Braces: N/A    Exams:  Cognitive Exam:  Patient is oriented to Person, Place, Time, Situation, follows commands 100% of the time  RLE ROM: WNL  RLE Strength: WFL, grossly 4+/5  LLE ROM: WNL  LLE Strength:  4-/5  Sensation: Intact light touch to BLEs    Functional Mobility:  Bed Mobility:    Seated in chair at start of session and returned to chair  Transfers:    Sit to Stand: stand by assistance with no AD with cues for hand placement  Gait: Patient ambulated 250' with no AD and  minimum-moderate assist .  Patient " demonstrates unsteady gait, decreased step length, narrow base of support, decreased weight shift, decreased foot clearance (L), flexed posture, decreased peng, decreased arm swing, and hemiparetic gait.   Patient required cues for upright posture, gluteal activation, sequencing, obstacle navigation, increased step size, foot placement, and increased foot clearance.  Patient demonstrated 3 LOB 2/2 poor LLE clearance in swing phase of gait requiring moderate assistance from PT to regain balance.  All lines remained intact throughout ambulation trial, gait belt utilized.    Therapeutic Activities and Exercises:  Patient educated on role of acute care PT and PT POC, safety while in hospital including calling nurse for mobility, and call light usage  Pt educated on the effects of bed rest and the importance of OOB activity. Pt encouraged to sit UIC majority of day as tolerated and continue daily ambulation with nursing assist. Pt verbalized understanding.  Answered all questions within PT scope of practice and addressed functional mobility concerns.  Whiteboard updated.  Pt safe to transfer and ambulate to/from restroom with x1 staff assist and RW left in room.    AM-PAC 6 CLICK MOBILITY  Total Score:15     Patient left up in chair with all lines intact, call button in reach, and RN notified.    GOALS:   Multidisciplinary Problems       Physical Therapy Goals          Problem: Physical Therapy    Goal Priority Disciplines Outcome Goal Variances Interventions   Physical Therapy Goal     PT, PT/OT Ongoing, Progressing     Description: Goals to be met by: 2023     Patient will increase functional independence with mobility by performin. Supine to sit with Ness  2. Sit to supine with Ness  3. Sit to stand transfer with Ness  4. Gait  x 200 feet with Contact Guard Assistance using LRAD.   5. Ascend/descend 12 stair with right Handrails Minimal Assistance using LRAD.   6. Lower  extremity exercise program x15 reps per handout, with independence                         History:     Past Medical History:   Diagnosis Date    Diabetes mellitus     Hypertension     R thalmaic hypertensive ICH 04/04/2021       Past Surgical History:   Procedure Laterality Date    ANTERIOR CRUCIATE LIGAMENT REPAIR Right     2002       Time Tracking:     PT Received On: 10/26/23  PT Start Time: 1027     PT Stop Time: 1055  PT Total Time (min): 28 min     Billable Minutes: Evaluation 10 Gait Training 15      10/26/2023

## 2023-10-26 NOTE — CONSULTS
Food & Nutrition  Education    Diet Education: Cardiac/Diabetic Education  Time Spent: 45 minutes  Learners: Patient      Nutrition Education provided with handouts:   RD discussed with patient how to identify which food have carbohydrates and how carbohydrates affect blood glucose. RD dicussed how to count carbohydrates and how many servings of carbohydrates patient can have daily. Patient was given examples of what types of food contain carbohydrates, such as bread, tortilla chips, fruit, and starchy vegetables. Patient was given sample meal ideas based off food preferences. Discussed how consuming protein and non-starchy vegetables along with carbohydrates will help to stabilize blood glucose. Discussed how to read labels at home and at the grocery store. Discussed diabetic plate and how to portion out non-starchy vegetables, protein, and complex carbohydrates. Shared how to swap out unhealthy fats, such as butter, for healthy fats, such as olive oil and avocado oil. Patient was recommended to increase daily physical activity and exercise.     RD discussed how to swap out foods that are high in saturated fats for for foods high in unsaturated fats. Patient was shown how to identify saturated fats such as, butter, whole milk, and chen. Patient was also given examples of healthy, unsaturated fats, such as olive oil, lean meats, and fruits and vegetables. Discussed with patients how to incorporate healthy foods to improve cardiac health, such as fruits, vegetables, beans, and whole grains. Explained sample menus for patient to follow at home.       Comments:  RD consulted for diabetic/cardiac education. Patient reports a good appetite. 100% meal consumption noted. Patient denies any issues with chewing/swallowing, no n/v/d/c. Per chart review, no significant weight changes noted. NFPE completed 10/26 on patient with no s/s of malnutrition at this time.   I certify that I directed the dietetic intern in service  delivery and guided them using my skilled judgment. As the cosigning dietitian, I have reviewed the dietetic interns documentation and am responsible for the treatment, assessment, and plan.   Tammy Frost  All questions and concerns answered. Dietitian's contact information provided.     Follow-Up:    Please Re-consult as needed        Thanks!

## 2023-10-26 NOTE — ED TRIAGE NOTES
"Cecil Clark, a 48 y.o. male presents to the ED w/ complaint of extremity weakness. Pt said he had "jelly leg" and fell on to his table at 0300 this morning. Denies LOC. Previous stroke 2021.     Triage note:  Chief Complaint   Patient presents with    Extremity Weakness     LUE and LLE weakness since 0230. States unable to move     Review of patient's allergies indicates:   Allergen Reactions    Aspartame Nausea And Vomiting     Violent emesis.    Shellfish containing products Shortness Of Breath     Has received iodinated contrast in the past with no reaction     Past Medical History:   Diagnosis Date    Diabetes mellitus     Hypertension     R thalmaic hypertensive ICH 04/04/2021       "

## 2023-10-26 NOTE — PLAN OF CARE
SUSY received verification from Lory with admitting.  She stated that patient's BCBS had termed in December of 2021.  It has been removed from the demographic sheet. Patient reports Medicaid was applied for at admission.  SUSY will verify.        Medicaid is pending    Staci Uribe LMSW  [facility]  [phone]

## 2023-10-26 NOTE — AI DETERIORATION ALERT
"RAPID RESPONSE NURSE AI ALERT       AI alert received.    Chart Reviewed: 10/25/2023, 9:08 PM    MRN: [id number]  Bed: ED 23/23    Dx: <principal problem not specified>    Cecil Clark has a past medical history of Diabetes mellitus, Hypertension, and R thalmaic hypertensive ICH.    Last VS: BP (!) 194/126   Pulse 110   Temp 97.9 °F (36.6 °C) (Oral)   Resp 20   Wt 68 kg (150 lb)   SpO2 97%   BMI 27.44 kg/m²     24H Vital Sign Range:  Temp:  [97.9 °F (36.6 °C)]   Pulse:  [110-130]   Resp:  [18-20]   BP: (172-194)/(106-126)   SpO2:  [97 %]     Level of Consciousness (AVPU): alert    Recent Labs     10/25/23  1956   WBC 9.04   HGB 14.7   HCT 41.1          Recent Labs     10/25/23  1956   *   K 3.1*      CO2 21*   BUN 13   CREATININE 1.3   *        No results for input(s): "PH", "PCO2", "PO2", "HCO3", "POCSATURATED", "BE" in the last 72 hours.     OXYGEN:             MEWS score:      Charge RN, Eric  contacted. No concerns verbalized at this time. Instructed to call [phone] for further concerns or assistance.    Deirdre Pearl RN        "

## 2023-10-26 NOTE — HPI
Reason for Consult: Management of T2DM, Hyperglycemia      Diabetes diagnosis year: 2010     Home Diabetes Medications:  None     Lab Results   Component Value Date    HGBA1C 12.5 (H) 10/25/2023       How often checking glucose at home? Denies      Diabetes Complications include:     Diabetic chronic kidney disease and Diabetic retinopathy      Complicating diabetes co morbidities:   History of CVA and Residual deficits from CVA        HPI:   Patient is a 48 y.o. male with a diagnosis of type 2 DM, HTN, and R thalamic ICH (2021, 2/2 HTN, no deficits) who reported to ED with LSW and L facial numbness which resulted in multiple falls. Admitted for further workup and treatment. Endocrinology consulted for BG/ DM management.

## 2023-10-26 NOTE — H&P
[facility]  Vascular Neurology  [facility]  History & Physical    Inpatient consult to Vascular (Stroke) Neurology  Consult performed by: Tamara Jimenez PA-C  Consult ordered by: Yaima Strauss MD        Assessment/Plan:     Patient is a 48 y.o. year old male with:    * Stroke due to occlusion of right middle cerebral artery  Cecil Clark is a 48 y.o. male with PMHx of DM, HTN, and R thalamic ICH (2021, 2/2 HTN, no deficits) who reported to ED with LSW and L facial numbness which resulted in multiple falls. Stroke code called on arrival. Patient reports his symptom began between 2:30 am and 3:00 am. He was out of window for TNK. CT without acute changes, possible new infarct in R internal capsule. CTA without significant stenosis or LVO. Suspect small vessel stroke. MRI and echo with bubble pending.      Antithrombotics for secondary stroke prevention: Antiplatelets: Aspirin: 81 mg daily  Aspirin: 325 mg daily  Clopidogrel: 300 mg loading dose x 1, now  Clopidogrel: 75 mg daily    Statins for secondary stroke prevention and hyperlipidemia, if present:   Statins: Atorvastatin- 40 mg daily    Aggressive risk factor modification: HTN, DM     Rehab efforts: The patient has been evaluated by a stroke team provider and the therapy needs have been fully considered based off the presenting complaints and exam findings. The following therapy evaluations are needed: PT evaluate and treat, OT evaluate and treat, SLP evaluate and treat, PM&R evaluate for appropriate placement    Diagnostics ordered/pending: HgbA1C to assess blood glucose levels, Lipid Profile to assess cholesterol levels, MRI head without contrast to assess brain parenchyma, TTE to assess cardiac function/status , TSH to assess thyroid function    VTE prophylaxis: Heparin 5000 units SQ every 8 hours  Mechanical prophylaxis: Place SCDs    BP parameters: Infarct: No intervention, SBP  <220        Tachycardia  Chronic  Patient not currently on any home medications    Type 2 diabetes mellitus with hyperglycemia, without long-term current use of insulin  Stroke risk factor  A1C pending  Glucose 140-180  SSI      Essential hypertension  Stroke risk factor   BP <220/110  PRN labetalol    History of intracerebral hemorrhage without residual deficit  Stroke risk factor  R thalamic hypertensive ICH in 2021        STROKE DOCUMENTATION     Acute Stroke Times   Last Known Normal Date: 10/25/23  Last Known Normal Time: 0230  Symptom Onset Date: 10/25/23  Symptom Onset Time: 0230  Stroke Team Called Date: 10/25/23  Stroke Team Called Time: 1930  Stroke Team Arrival Date: 10/25/23  Stroke Team Arrival Time: 1937  CT Interpretation Time: 1945  Thrombolytic Therapy Recommended: No  CTA Interpretation Time: 1951  Thrombectomy Recommended: No    NIH Scale:  Interval: baseline  1a. Level of Consciousness: 0-->Alert, keenly responsive  1b. LOC Questions: 0-->Answers both questions correctly  1c. LOC Commands: 0-->Performs both tasks correctly  2. Best Gaze: 0-->Normal  3. Visual: 0-->No visual loss  4. Facial Palsy: 1-->Minor paralysis (flattened nasolabial fold, asymmetry on smiling)  5a. Motor Arm, Left: 1-->Drift, limb holds 90 (or 45) degrees, but drifts down before full 10 seconds, does not hit bed or other support  5b. Motor Arm, Right: 0-->No drift, limb holds 90 (or 45) degrees for full 10 secs  6a. Motor Leg, Left: 1-->Drift, leg falls by the end of the 5-sec period but does not hit bed  6b. Motor Leg, Right: 0-->No drift, leg holds 30 degree position for full 5 secs  7. Limb Ataxia: 0-->Absent  8. Sensory: 1-->Mild-to-moderate sensory loss, patient feels pinprick is less sharp or is dull on the affected side, or there is a loss of superficial pain with pinprick, but patient is aware of being touched  9. Best Language: 0-->No aphasia, normal  10. Dysarthria: 0-->Normal  11. Extinction and Inattention  (formerly Neglect): 0-->No abnormality  Total (NIH Stroke Scale): 4     Modified [place] Score: 0  [place] Coma Scale:    ABCD2 Score:    [id number] Score:   HAS -BLED Score:   ICH Score:   Hunt & Gonzalez Classification:      Thrombolysis Candidate? No, Out of window - Symptom onset > 4.5 hours    Delays to Thrombolysis?  Not Applicable    Interventional Revascularization Candidate?   Is the patient eligible for mechanical endovascular reperfusion (SONYA)?  No; No large vessel occlusion identified on imaging  and No; at this time symptoms not suggestive of large vessel occlusion    Delays to Thrombectomy? Not Applicable    Hemorrhagic change of an Ischemic Stroke: Does this patient have an ischemic stroke with hemorrhagic changes? No         Subjective:     History of Present Illness:  Cecil Clark is a 48 y.o. male with PMHx of DM, HTN, and R thalamic ICH (2021, 2/2 HTN, no deficits) who reported to ED with LSW. Stroke code called on arrival. Patient reports his symptom began between 2:30am and 3am when he had finished playing video games for the night. He initially thought the weakness was due to strenuous exercise he did yesterday. The exercises he performed were more strenuous exercise than usual. Patient woke up at 7am and his symptoms were persisting. Throughout the day he kept falling and was having difficulty ambulating.    He reports that he is no longer on any medications. States he hasn't seen a PCP in 2-3 years. He chose to stop taking his medications after making lifestyle changes to control his HTN and DM. Of note, patient reports being between jobs right now and not currently having health insurance. Reports he recently applied for medicaid.              Past Medical History:   Diagnosis Date    Diabetes mellitus     Hypertension     R thalmaic hypertensive ICH 04/04/2021     Past Surgical History:   Procedure Laterality Date    ANTERIOR CRUCIATE LIGAMENT REPAIR Right     2002       Social History      Tobacco Use    Smoking status: Never   Substance Use Topics    Alcohol use: Not Currently    Drug use: Never     Review of patient's allergies indicates:   Allergen Reactions    Aspartame Nausea And Vomiting     Violent emesis.    Shellfish containing products Shortness Of Breath     Has received iodinated contrast in the past with no reaction       Medications: I have reviewed the current medication administration record.    (Not in a hospital admission)      Review of Systems   Constitutional:  Negative for fever.   Eyes:  Negative for visual disturbance.   Respiratory:  Negative for cough.    Cardiovascular:  Negative for chest pain.   Gastrointestinal:  Negative for nausea and vomiting.   Neurological:  Positive for facial asymmetry, weakness and numbness. Negative for speech difficulty.     Objective:     Vital Signs (Most Recent):  Temp: 97.9 °F (36.6 °C) (10/25/23 1929)  Pulse: 110 (10/25/23 2040)  Resp: 20 (10/25/23 2040)  BP: (!) 194/126 (10/25/23 2040)  SpO2: 97 % (10/25/23 2040)    Vital Signs Range (Last 24H):  Temp:  [97.9 °F (36.6 °C)]   Pulse:  [110-130]   Resp:  [18-20]   BP: (172-194)/(106-126)   SpO2:  [97 %]        Physical Exam  Vitals reviewed.   Constitutional:       General: He is not in acute distress.  HENT:      Head: Normocephalic and atraumatic.   Cardiovascular:      Rate and Rhythm: Tachycardia present.   Pulmonary:      Effort: Pulmonary effort is normal. No respiratory distress.   Skin:     General: Skin is warm and dry.   Neurological:      Mental Status: He is alert.              Neurological Exam:   LOC: alert  Attention Span: Good   Language: No aphasia  Articulation: No dysarthria  Orientation: Person, Place, Time   Visual Fields: Full  EOM (CN III, IV, VI): Full/intact  Facial Sensation (CN V): Facial sensory loss  Facial Movement (CN VII): Lower facial weakness on the Left  Motor: Arm left  Paresis: 3/5  Leg left  Paresis: 4/5  Arm right  Normal 5/5  Leg right Normal  "5/5  Sensation: L face tingling/numbness  Tone: Flaccid  LUE       Laboratory:  CMP:   Recent Labs   Lab 10/25/23  1956   CALCIUM 8.0*   ALBUMIN 2.7*   PROT 5.4*   *   K 3.1*   CO2 21*      BUN 13   CREATININE 1.3   ALKPHOS 77   ALT 24   AST 23   BILITOT 0.8     CBC:   Recent Labs   Lab 10/25/23  1956   WBC 9.04   RBC 4.81   HGB 14.7   HCT 41.1      MCV 85   MCH 30.6   MCHC 35.8     Lipid Panel:   Recent Labs   Lab 10/25/23  1956   CHOL 202*   LDLCALC 122.8   HDL 44   TRIG 176*     Coagulation:   Recent Labs   Lab 10/25/23  1956   INR 1.0     Hgb A1C: No results for input(s): "HGBA1C" in the last 168 hours.  TSH: No results for input(s): "TSH" in the last 168 hours.    Diagnostic Results:      Brain imaging:  MRI pending    Vessel Imaging:  CTA 10/25/23  No evidence of acute ICH  Remote R thalamic hypodensity  Age indeterminate hypodensity in R internal capsule, possibly contributing to patient's symptoms  No LVO or significant stenosis    Cardiac Evaluation:   TTE with bubble pending          Taamra Jimenez PA-C  [facility]  Department of Vascular Neurology   [facility]       "

## 2023-10-26 NOTE — PLAN OF CARE
Problem: Adjustment to Illness (Stroke, Ischemic/Transient Ischemic Attack)  Goal: Optimal Coping  Outcome: Ongoing, Progressing     Problem: Cerebral Tissue Perfusion (Stroke, Ischemic/Transient Ischemic Attack)  Goal: Optimal Cerebral Tissue Perfusion  Outcome: Ongoing, Progressing     Problem: Fall Injury Risk  Goal: Absence of Fall and Fall-Related Injury  Outcome: Ongoing, Progressing        Diastolic pressure remains high after multiple Prn medications administered. MD notified. Pt denies headache. No changes in NIH from morning assessment. No needs expressed at this  time.

## 2023-10-26 NOTE — PT/OT/SLP EVAL
Occupational Therapy  Co- Evaluation w/ PT     Name: Cecil Clark  MRN: [id number]  Admitting Diagnosis: Stroke due to occlusion of right middle cerebral artery  Recent Surgery: * No surgery found *      Recommendations:     Discharge Recommendations: Low Intensity Therapy  Discharge Equipment Recommendations:  walker, rolling, shower chair  Barriers to discharge:  None    Assessment:     Cecil Clark is a 48 y.o. male with a medical diagnosis of Stroke due to occlusion of right middle cerebral artery.  He presents with decrease functional status secondary to medical diagnosis. Performance deficits affecting function: weakness, impaired endurance, impaired sensation, impaired self care skills, impaired functional mobility, gait instability, impaired balance, decreased safety awareness, decreased upper extremity function, decreased lower extremity function, impaired fine motor, impaired coordination.  Patient with good functional performance this date requiring min assistance to complete ADL tasks. Patient limited by unsteady ambulation due to poor LLE foot clearance and LUE motor incoordination. Patient should continue with acute OT services to enhance self care performance and UE coordination. Following d/c from [facility] patient should continue with a low intensity therapy to ensure independence with all functional tasks.     Rehab Prognosis: Good; patient would benefit from acute skilled OT services to address these deficits and reach maximum level of function.       Plan:     Patient to be seen 3 x/week to address the above listed problems via self-care/home management, therapeutic activities, therapeutic exercises, neuromuscular re-education  Plan of Care Expires: 11/26/23  Plan of Care Reviewed with: patient    Subjective     Chief Complaint: Patient reporting little sleep   Patient/Family Comments/goals: D/C     Occupational Profile:  Living Environment: Patient lives with 2 roommates in 2 story house with 5 steps onto porch  with no HR and 23 stairs to second level of home with unilateral handrail. Patient has a walk in shower w/ no DME.   Previous level of function: Independent   Roles and Routines:   Equipment Used at Home: none  Assistance upon Discharge: Family     Pain/Comfort:  Pain Rating 1: 0/10    Patients cultural, spiritual, Hindu conflicts given the current situation: no    Objective:     Communicated with: RN prior to session.  Patient found supine with telemetry upon OT entry to room.    General Precautions: Standard, aspiration, fall  Orthopedic Precautions: N/A  Braces: N/A  Respiratory Status: Room air    Occupational Performance:    Bed Mobility:    Patient in chair at time of OT/PT session    Functional Mobility/Transfers:  Patient completed Sit <> Stand Transfer with stand by assistance  with  no assistive device   Functional Mobility: Patient ambulated 250' with no AD and min-mod assist. Patient with significant decreased L foot clearance causing decreased gait stability. Patient with 3 LOB due to LLE foot clearance.     Activities of Daily Living:  Grooming: minimum assistance standing at sink in bathroom   Upper Body Dressing: stand by assistance sitting in chair   Lower Body Dressing: stand by assistance sitting in chair     Cognitive/Visual Perceptual:  Cognitive/Psychosocial Skills:     -       Oriented to: Person, Place, Time, and Situation   -       Follows Commands/attention:Follows multistep  commands  -       Communication: clear/fluent  -       Safety awareness/insight to disability: intact   Visual/Perceptual:      -Intact      Physical Exam:  Upper Extremity Range of Motion:     -       Right Upper Extremity: WFL  -       Left Upper Extremity: WFL  Upper Extremity Strength:    -       Right Upper Extremity: WFL  -       Left Upper Extremity: WFL   Strength:    -       Right Upper Extremity: WFL  -       Left Upper Extremity: WFL  Fine Motor Coordination:    -       Intact    [facility] 6 Click  ADL:  AMPAC Total Score: 20    Treatment & Education:  Co-evaluation completed due to patient medical instability and to ensure patient safety.  Provided education regarding role of OT, POC, & discharge recommendations.  Pt with no further questions/concerns at this time.  OT provided education on home recommendations and fall prevention in preparation for D/C.      Patient left up in chair with all lines intact, call button in reach, and RN notified    GOALS:   Multidisciplinary Problems       Occupational Therapy Goals          Problem: Occupational Therapy    Goal Priority Disciplines Outcome Interventions   Occupational Therapy Goal     OT, PT/OT Ongoing, Progressing    Description: Goals to be met by: 11/26/23     Patient will increase functional independence with ADLs by performing:    UE Dressing with Bryant.  LE Dressing with Bryant.  Grooming while standing with Bryant.  Toileting from toilet with Bryant for hygiene and clothing management.   Bathing from  shower chair/bench with Bryant.                         History:     Past Medical History:   Diagnosis Date    Diabetes mellitus     Hypertension     R thalmaic hypertensive ICH 04/04/2021         Past Surgical History:   Procedure Laterality Date    ANTERIOR CRUCIATE LIGAMENT REPAIR Right     2002       Time Tracking:     OT Date of Treatment: 10/26/23  OT Start Time: 1027  OT Stop Time: 1055  OT Total Time (min): 28 min    Billable Minutes:Evaluation 10 minutes   Self Care/Home Management 18 minutes     10/26/2023

## 2023-10-26 NOTE — ASSESSMENT & PLAN NOTE
Cecil Clark is a 48 y.o. male with PMHx of DM, HTN, and R thalamic ICH (2021, 2/2 HTN, no deficits) who reported to ED with LSW and L facial numbness which resulted in multiple falls. Stroke code called on arrival. Patient reports his symptom began between 2:30 am and 3:00 am. He was out of window for TNK. CT without acute changes, possible new infarct in R internal capsule. CTA without significant stenosis or LVO. Suspect small vessel stroke. MRI and echo with bubble pending.      Antithrombotics for secondary stroke prevention: Antiplatelets: Aspirin: 81 mg daily  Aspirin: 325 mg daily  Clopidogrel: 300 mg loading dose x 1, now  Clopidogrel: 75 mg daily    Statins for secondary stroke prevention and hyperlipidemia, if present:   Statins: Atorvastatin- 40 mg daily    Aggressive risk factor modification: HTN, DM     Rehab efforts: The patient has been evaluated by a stroke team provider and the therapy needs have been fully considered based off the presenting complaints and exam findings. The following therapy evaluations are needed: PT evaluate and treat, OT evaluate and treat, SLP evaluate and treat, PM&R evaluate for appropriate placement    Diagnostics ordered/pending: HgbA1C to assess blood glucose levels, Lipid Profile to assess cholesterol levels, MRI head without contrast to assess brain parenchyma, TTE to assess cardiac function/status , TSH to assess thyroid function    VTE prophylaxis: Heparin 5000 units SQ every 8 hours  Mechanical prophylaxis: Place SCDs    BP parameters: Infarct: No intervention, SBP <220

## 2023-10-26 NOTE — ASSESSMENT & PLAN NOTE
Cecil Clark is a 48 y.o. male with PMHx of DM, HTN, and R thalamic ICH (2021, 2/2 HTN, no deficits) who reported to ED with LSW and L facial numbness which resulted in multiple falls. Stroke code called on arrival. Patient reports his symptom began between 2:30 am and 3:00 am. He was out of window for TNK. CT without acute changes, possible new infarct in R internal capsule. CTA without significant stenosis or LVO. Suspect small vessel stroke. MRI confirmed R internal capsule stroke. Echo with bubble pending.       Antithrombotics for secondary stroke prevention: Antiplatelets: Aspirin: 81 mg daily  Aspirin: 325 mg daily  Clopidogrel: 300 mg loading dose x 1, now  Clopidogrel: 75 mg daily    Statins for secondary stroke prevention and hyperlipidemia, if present:   Statins: Atorvastatin- 40 mg daily    Aggressive risk factor modification: HTN, DM     Rehab efforts: The patient has been evaluated by a stroke team provider and the therapy needs have been fully considered based off the presenting complaints and exam findings. The following therapy evaluations are needed: PT evaluate and treat, OT evaluate and treat, SLP evaluate and treat, PM&R evaluate for appropriate placement    Diagnostics ordered/pending: HgbA1C to assess blood glucose levels, Lipid Profile to assess cholesterol levels, MRI head without contrast to assess brain parenchyma, TTE to assess cardiac function/status , TSH to assess thyroid function    VTE prophylaxis: Heparin 5000 units SQ every 8 hours  Mechanical prophylaxis: Place SCDs    BP parameters: Infarct: No intervention, SBP <220

## 2023-10-26 NOTE — ED PROVIDER NOTES
Encounter Date: 10/25/2023       History     Chief Complaint   Patient presents with    Extremity Weakness     LUE and LLE weakness since 0230. 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 today.  Patient was nimesh at the time and noticed that the left side of his body felt like jelly.  Unclear why he delayed presenting to the ED but the symptoms have persisted throughout the day.  He also has numbness to the left side of his body.  These symptoms are new and were not residual from his right thalamic stroke    The history is provided by the patient.     Review of patient's allergies indicates:   Allergen Reactions    Aspartame Nausea And Vomiting     Violent emesis.    Shellfish containing products Shortness Of Breath     Has received iodinated contrast in the past with no reaction     Past Medical History:   Diagnosis Date    Diabetes mellitus     Hypertension     R thalmaic hypertensive ICH 04/04/2021     Past Surgical History:   Procedure Laterality Date    ANTERIOR CRUCIATE LIGAMENT REPAIR Right     2002     Family History   Problem Relation Age of Onset    Hypertension Mother     Diabetes Mother     Stroke Father     Hypertension Father      Social History     Tobacco Use    Smoking status: Never   Substance Use Topics    Alcohol use: Not Currently    Drug use: Never     Review of Systems    Physical Exam     Initial Vitals [10/25/23 1929]   BP Pulse Resp Temp SpO2   (!) 172/106 (!) 130 18 97.9 °F (36.6 °C) 97 %      MAP       --         Physical Exam    Nursing note and vitals reviewed.  Constitutional: Vital signs are normal. He appears well-developed and well-nourished. He is not diaphoretic.  Non-toxic appearance. He does not appear ill. No distress.   HENT:   Head: Normocephalic and atraumatic.   Mouth/Throat: Mucous  membranes are normal. Mucous membranes are not dry.   Eyes: Conjunctivae and lids are normal.   Neck: Neck supple.   Normal range of motion.  Cardiovascular:  Normal rate.           Musculoskeletal:      Cervical back: Normal range of motion and neck supple.     Neurological: He is alert and oriented to person, place, and time. A sensory deficit is present. GCS score is 15. GCS eye subscore is 4. GCS verbal subscore is 5. GCS motor subscore is 6.   Left facial droop, left upper extremity with 2 to 3/5 strength, left lower extremity 2 to 3/5 strength, decreased sensation to light touch to the left arm and leg.  Patient with normal speech with no dysarthria or aphasia.   Skin: Skin is dry and intact. No pallor.   Psychiatric: He has a normal mood and affect. His speech is normal and behavior is normal.         ED Course   Procedures  Labs Reviewed   CBC W/ AUTO DIFFERENTIAL - Abnormal; Notable for the following components:       Result Value    Immature Granulocytes 0.9 (*)     Immature Grans (Abs) 0.08 (*)     Gran % 73.1 (*)     Lymph % 17.8 (*)     All other components within normal limits   COMPREHENSIVE METABOLIC PANEL - Abnormal; Notable for the following components:    Sodium 135 (*)     Potassium 3.1 (*)     CO2 21 (*)     Glucose 339 (*)     Calcium 8.0 (*)     Total Protein 5.4 (*)     Albumin 2.7 (*)     All other components within normal limits   LIPID PANEL - Abnormal; Notable for the following components:    Cholesterol 202 (*)     Triglycerides 176 (*)     All other components within normal limits   LACTIC ACID, PLASMA - Abnormal; Notable for the following components:    Lactate (Lactic Acid) 3.1 (*)     All other components within normal limits   PROTIME-INR   TSH   HIV 1 / 2 ANTIBODY   HEPATITIS C ANTIBODY   HEMOGLOBIN A1C   POCT GLUCOSE, HAND-HELD DEVICE   TYPE & SCREEN   ISTAT PROCEDURE   ISTAT CREATININE          Imaging Results              X-Ray Chest AP Portable (Final result)  Result time  10/25/23 20:42:02      Final result by Juan Luis Eastman MD (10/25/23 20:42:02)                   Impression:      No acute cardiopulmonary abnormality.    Electronically signed by resident: Alejo Mina  Date:    10/25/2023  Time:    20:38    Electronically signed by: Juan Luis Eastman MD  Date:    10/25/2023  Time:    20:42               Narrative:    EXAMINATION:  XR CHEST AP PORTABLE    CLINICAL HISTORY:  Stroke;    TECHNIQUE:  Single frontal portable view of the chest was performed.    COMPARISON:  None    FINDINGS:  Cardiac monitoring wires project over the chest.    No focal consolidation. No significant pleural effusion. No pneumothorax.  There is no evidence of free air beneath the hemidiaphragms.    The cardiomediastinal silhouette is normal in size.                                       CTA STROKE MULTI-PHASE (In process)  Result time 10/25/23 19:36:51                     Medications   sodium chloride 0.9% flush 10 mL (has no administration in time range)   sodium chloride 0.9% bolus 500 mL 500 mL (has no administration in time range)   labetaloL injection 10 mg (has no administration in time range)   aspirin EC tablet 81 mg (has no administration in time range)   clopidogreL tablet 75 mg (has no administration in time range)   atorvastatin tablet 40 mg (has no administration in time range)   acetaminophen tablet 650 mg (has no administration in time range)   ondansetron injection 4 mg (has no administration in time range)   aspirin tablet 325 mg (has no administration in time range)   clopidogreL tablet 300 mg (has no administration in time range)   heparin (porcine) injection 5,000 Units (has no administration in time range)   lactated ringers bolus 1,000 mL (has no administration in time range)   potassium bicarbonate disintegrating tablet 25 mEq (has no administration in time range)   iohexoL (OMNIPAQUE 350) injection 100 mL (100 mLs Intravenous Given 10/25/23 1951)     Medical Decision Making  This is an  emergent evaluation of a patient with an acute neurologic complaint and/or deficit that is concerning for an acute stroke.    Other considerations for acute focal neurologic deficits would include complex migraine, multiple sclerosis, malignancy/brain mass, seizure, functional neurologic disorder  This patient was last seen/known normal at 0230, 17 hours prior to arrival to the ED.  This patient is not presenting within the safe window for TPA administration.  This patient does have contraindications to TPA administration.  This patient is VAN negative so large vessel occlusion as etiology of stroke is not likely; therefore, this patient will likely not be a candidate for an interventional procedure\, but will obtain CTA just in case    ED work-up of this patient will include the following:  -Stroke code activation  -Immediate vascular neurology consultation  -CTA multiphase head and neck  -MRI imaging if deemed necessary by vascular neurology consultants  -Labs, including CBC, CMP, rapid glucose testing  -EKG  -Frequent neuro checks and close monitoring  -Likely admission        Amount and/or Complexity of Data Reviewed  External Data Reviewed: radiology.  Labs: ordered. Decision-making details documented in ED Course.  Radiology: ordered and independent interpretation performed.  ECG/medicine tests: ordered and independent interpretation performed.    Risk  Prescription drug management.  Decision regarding hospitalization.              Attending Attestation:         Attending Critical Care:   Critical Care Times:   ==============================================================  Total Critical Care Time - exclusive of procedural time: 35 minutes.  ==============================================================  Critical care was necessary to treat or prevent imminent or life-threatening deterioration of the following conditions: stroke.   Critical care was time spent personally by me on the following activities:  examination of patient, review of x-rays / CT sent with the patient, obtaining history from patient or relative, review of old charts, development of treatment plan with patient or relative, ordering lab, x-rays, and/or EKG, ordering and performing treatments and interventions, evaluation of patient's response to treatment, discussion with consultants and re-evaluation of patient's conition.   Critical Care Condition: potentially life-threatening                           Clinical Impression:   Final diagnoses:  [I63.9] Stroke        ED Disposition Condition    Admit                 Yaima Strauss MD  10/25/23 3028

## 2023-10-26 NOTE — SUBJECTIVE & OBJECTIVE
Interval HPI:   Overnight events: Admitted to 922/922 A. BG and a1c elevated. Received correction scale x1.   Eating:  Diet diabetic 2000 Calorie; Thin  Nausea: No  Hypoglycemia and intervention: No  Fever: No  TPN and/or TF: No      PMH, PSH, FH, SH  reviewed     ROS:  Review of Systems   Constitutional:  Negative for unexpected weight change.   Cardiovascular:  Negative for chest pain.   Gastrointestinal:  Negative for constipation, diarrhea, nausea and vomiting.   Endocrine: Negative for polydipsia and polyuria.         Current Medications and/or Treatments Impacting Glycemic Control  Immunotherapy:    Immunosuppressants       None          Steroids:   Hormones (From admission, onward)      None          Pressors:    Autonomic Drugs (From admission, onward)      None          Hyperglycemia/Diabetes Medications:   Antihyperglycemics (From admission, onward)      Start     Stop Route Frequency Ordered    10/25/23 2205  insulin aspart U-100 pen 0-5 Units         -- SubQ Before meals & nightly PRN 10/25/23 2106             PHYSICAL EXAMINATION:  Vitals:    10/26/23 0403   BP: (!) 171/107   Pulse: 90   Resp:    Temp:      Body mass index is 31.05 kg/m².     Physical Exam  Constitutional:       General: He is not in acute distress.     Appearance: Normal appearance. He is not ill-appearing.   HENT:      Head: Normocephalic and atraumatic.      Right Ear: External ear normal.      Left Ear: External ear normal.      Nose: Nose normal.   Pulmonary:      Effort: Pulmonary effort is normal. No respiratory distress.   Neurological:      Mental Status: He is alert.   Psychiatric:         Mood and Affect: Mood normal.         Behavior: Behavior normal.

## 2023-10-26 NOTE — PLAN OF CARE
PT evaluation complete and appropriate goals established.    Problem: Physical Therapy  Goal: Physical Therapy Goal  Description: Goals to be met by: 2023     Patient will increase functional independence with mobility by performin. Supine to sit with [place]  2. Sit to supine with [place]  3. Sit to stand transfer with [place]  4. Gait  x 200 feet with Contact Guard Assistance using LRAD.   5. Ascend/descend 12 stair with right Handrails Minimal Assistance using LRAD.   6. Lower extremity exercise program x15 reps per handout, with independence    Outcome: Ongoing, Progressing     10/26/2023

## 2023-10-26 NOTE — NURSING
Nurses Note -- 4 Eyes      10/25/2023   11:56 PM      Skin assessed during: Admit      [x] No Altered Skin Integrity Present    [x]Prevention Measures Documented      [] Yes- Altered Skin Integrity Present or Discovered   [] LDA Added if Not in Epic (Describe Wound)   [] New Altered Skin Integrity was Present on Admit and Documented in LDA   [] Wound Image Taken    Wound Care Consulted? No    Attending Nurse:  Salomón Godfrey RN/Staff Member:   Elisabeth DAVILA    Bruising noted to Left lateral side and under Left Knee s/p fall prior to admission; otherwise skin intact.

## 2023-10-26 NOTE — SUBJECTIVE & OBJECTIVE
Neurologic Chief Complaint: LSW    Subjective:     Interval History: Patient is seen for follow-up neurological assessment and treatment recommendations:     Weakness is improving today. Denies any numbness. Reports he has intermittent jerking of his L leg. Denies pain during these episodes. Will continue to monitor.   Will need referral to PCP on discharge for chronic medical problems. Echo and endocrine consult pending.     HPI, Past Medical, Family, and Social History remains the same as documented in the initial encounter.     Review of Systems   Neurological:  Positive for tremors, facial asymmetry and weakness. Negative for dizziness, speech difficulty, light-headedness, numbness and headaches.     Scheduled Meds:   aspirin  81 mg Oral Daily    atorvastatin  40 mg Oral Daily    clopidogreL  75 mg Oral Daily    heparin (porcine)  5,000 Units Subcutaneous Q8H    potassium bicarbonate  25 mEq Oral BID     Continuous Infusions:   sodium chloride 0.9%       PRN Meds:acetaminophen, dextrose 10%, dextrose 10%, glucagon (human recombinant), glucose, glucose, insulin aspart U-100, labetaloL, ondansetron, sodium chloride 0.9%, sodium chloride 0.9%    Objective:     Vital Signs (Most Recent):  Temp: 99.3 °F (37.4 °C) (10/26/23 0835)  Pulse: 101 (10/26/23 0835)  Resp: 18 (10/26/23 0835)  BP: (!) 191/123 (10/26/23 0835)  SpO2: 99 % (10/26/23 0835)  BP Location: Right arm    Vital Signs Range (Last 24H):  Temp:  [97.9 °F (36.6 °C)-99.3 °F (37.4 °C)]   Pulse:  []   Resp:  [17-20]   BP: (171-194)/(106-126)   SpO2:  [96 %-99 %]   BP Location: Right arm       Physical Exam  Constitutional:       General: He is not in acute distress.     Appearance: Normal appearance.   HENT:      Head: Normocephalic and atraumatic.   Eyes:      Extraocular Movements: Extraocular movements intact.      Conjunctiva/sclera: Conjunctivae normal.      Pupils: Pupils are equal, round, and reactive to light.   Cardiovascular:      Rate and  Rhythm: Tachycardia present.   Pulmonary:      Effort: Pulmonary effort is normal. No respiratory distress.   Musculoskeletal:      Right lower leg: No edema.      Left lower leg: No edema.   Skin:     General: Skin is warm and dry.   Neurological:      Mental Status: He is alert and oriented to person, place, and time.      Motor: Weakness present.   Psychiatric:         Mood and Affect: Mood normal.       Neurological Exam:   LOC: alert  Attention Span: Good   Language: No aphasia  Articulation: No dysarthria  EOM (CN III, IV, VI): Full/intact  Facial Movement (CN VII): Lower facial weakness on the Left  Motor: Arm left  Paresis: 3/5  Leg left  Paresis: 4/5  Arm right  Normal 5/5  Leg right Normal 5/5  Sensation: Intact to light touch, temperature and vibration    Laboratory:  CMP:   Recent Labs   Lab 10/26/23  0734   CALCIUM 8.6*   ALBUMIN 2.9*   PROT 5.8*      K 2.8*   CO2 27      BUN 12   CREATININE 1.1   ALKPHOS 73   ALT 24   AST 27   BILITOT 1.0     CBC:   Recent Labs   Lab 10/26/23  0502   WBC 8.83   RBC 5.04   HGB 15.0   HCT 41.8      MCV 83   MCH 29.8   MCHC 35.9     Lipid Panel:   Recent Labs   Lab 10/25/23  1956   CHOL 202*   LDLCALC 122.8   HDL 44   TRIG 176*     Hgb A1C:   Recent Labs   Lab 10/25/23  2101   HGBA1C 12.5*     TSH:   Recent Labs   Lab 10/25/23  1956   TSH 1.429       Diagnostic Results     Brain imaging:  MRI Brain 10/25/23  Small focus of acute infarction in the posterior limb of the right internal capsule.  Chronic microhemorrhage in the right thalamus and superior to the left subinsular cortex.  Changes of chronic small vessel ischemic disease.     Vessel Imaging:  CTA 10/25/23  No evidence of acute ICH  Remote R thalamic hypodensity  Age indeterminate hypodensity in R internal capsule, possibly contributing to patient's symptoms  No LVO or significant stenosis     Cardiac Evaluation:   TTE with bubble pending

## 2023-10-26 NOTE — PLAN OF CARE
Problem: Adjustment to Illness (Stroke, Ischemic/Transient Ischemic Attack)  Goal: Optimal Coping  Outcome: Ongoing, Progressing     Problem: Bowel Elimination Impaired (Stroke, Ischemic/Transient Ischemic Attack)  Goal: Effective Bowel Elimination  Outcome: Ongoing, Progressing     Problem: Cerebral Tissue Perfusion (Stroke, Ischemic/Transient Ischemic Attack)  Goal: Optimal Cerebral Tissue Perfusion  Outcome: Ongoing, Progressing     Problem: Functional Ability Impaired (Stroke, Ischemic/Transient Ischemic Attack)  Goal: Optimal Functional Ability  Outcome: Ongoing, Progressing     Problem: Swallowing Impairment (Stroke, Ischemic/Transient Ischemic Attack)  Goal: Optimal Eating and Swallowing without Aspiration  Outcome: Ongoing, Progressing     Problem: Urinary Elimination Impaired (Stroke, Ischemic/Transient Ischemic Attack)  Goal: Effective Urinary Elimination  Outcome: Ongoing, Progressing     Problem: Fall Injury Risk  Goal: Absence of Fall and Fall-Related Injury  Outcome: Ongoing, Progressing     Problem: Adult Inpatient Plan of Care  Goal: Plan of Care Review  Outcome: Ongoing, Progressing  Goal: Patient-Specific Goal (Individualized)  Outcome: Ongoing, Progressing  Goal: Absence of Hospital-Acquired Illness or Injury  Outcome: Ongoing, Progressing  Goal: Optimal Comfort and Wellbeing  Outcome: Ongoing, Progressing  Goal: Readiness for Transition of Care  Outcome: Ongoing, Progressing     Problem: Infection  Goal: Absence of Infection Signs and Symptoms  Outcome: Ongoing, Progressing     Problem: Diabetes Comorbidity  Goal: Blood Glucose Level Within Targeted Range  Outcome: Ongoing, Progressing    POC reviewed with patient at bedside. VSS other than elevated BP, pt medicated with prn Labetalol 10mg, mildly effective. NADN. Blood glucose monitoring ACHS in place, medicated per insulin sliding scale.  Pt. AAOx4, no cognitive impairments noted. LUE/LLE weakness persists. Cardiac monitoring and SCDs on. BP  monitoring continued, current poc ongoing.

## 2023-10-26 NOTE — HOSPITAL COURSE
10/25 presented with LSW and falls, MRI significant for R internal capsule stroke. DAPT loaded.   10/26 Patient currently on DAPT, endocrine consulted for elevated A1c, echo pending. Dispo pending PT recs   10/27/2023 NAEO, neuro exam stable. Hypokalemia on labs again this morning (K 2.8 > 2.9), replacement ordered. Endo following, glucose improved on scheduled insulin. Dispo recs for low intensity therapy.   10/28/2023 HM consulted for hypokalemia. Increased lisinopril to 5mg. DME delivered.  10/29/2023 Potassium improved. Increased lisinopril to 20 per HM recs.  10/30/2023 Added amlodipine due to persistently elevated BP. Planning for d/c today. Follow up scheduled with PCP. Referral placed for endocrine and vascular neurology.

## 2023-10-26 NOTE — HPI
Cecil Clark is a 48 y.o. male with PMHx of DM, HTN, and R thalamic ICH (2021, 2/2 HTN, no deficits) who reported to ED with LSW. Stroke code called on arrival. Patient reports his symptom began between 2:30am and 3am when he had finished playing video games for the night. He initially thought the weakness was due to strenuous exercise he did yesterday. The exercises he performed were more strenuous exercise than usual. Patient woke up at 7am and his symptoms were persisting. Throughout the day he kept falling and was having difficulty ambulating.    He reports that he is no longer on any medications. States he hasn't seen a PCP in 2-3 years. He chose to stop taking his medications after making lifestyle changes to control his HTN and DM. Of note, patient reports being between jobs right now and not currently having health insurance. Reports he recently applied for medicaid.

## 2023-10-26 NOTE — CARE UPDATE
"RAPID RESPONSE NURSE CHART REVIEW        Chart Reviewed: 10/26/2023, 11:04 AM    MRN: [id number]  Bed: 922/922 A    Dx: Stroke due to occlusion of right middle cerebral artery    Cecil Clark has a past medical history of Diabetes mellitus, Hypertension, and R thalmaic hypertensive ICH.    Last VS: BP (!) 191/123 (BP Location: Right arm, Patient Position: Sitting)   Pulse 101   Temp 99.3 °F (37.4 °C) (Oral)   Resp 18   Ht 5' 2" (1.575 m)   Wt 77 kg (169 lb 12.1 oz)   SpO2 99%   BMI 31.05 kg/m²     24H Vital Sign Range:  Temp:  [97.9 °F (36.6 °C)-99.3 °F (37.4 °C)]   Pulse:  []   Resp:  [17-20]   BP: (171-194)/(106-126)   SpO2:  [96 %-99 %]     Level of Consciousness (AVPU): alert    Recent Labs     10/25/23  1956 10/26/23  0502   WBC 9.04 8.83   HGB 14.7 15.0   HCT 41.1 41.8    171       Recent Labs     10/25/23  1956 10/26/23  0502 10/26/23  0734   *  --  140   K 3.1*  --  2.8*     --  101   CO2 21*  --  27   BUN 13  --  12   CREATININE 1.3  --  1.1   *  --  159*   PHOS  --  2.7  --    MG  --  1.8  --         No results for input(s): "PH", "PCO2", "PO2", "HCO3", "POCSATURATED", "BE" in the last 72 hours.     OXYGEN:             MEWS score: 1    Charge RN, Erik  contacted for hypertension. Reports PRN antihypertensives available. No additional concerns verbalized at this time. Instructed to call [phone] for further concerns or assistance.    Marisel Rizvi RN        "

## 2023-10-26 NOTE — PLAN OF CARE
[facility] ([facility])  Initial Discharge Assessment       Primary Care Provider: Efraín Jacobsen MD    Admission Diagnosis: Stroke [I63.9]  Stroke due to occlusion of right middle cerebral artery [I63.511]    Admission Date: 10/25/2023  Expected Discharge Date:     Transition of Care Barriers: (P) Underinsured, Other (see comments) (extensive steps in home)    Payor: BLUE CROSS BLUE SHIELD / Plan: BCBS ALL OUT OF STATE / Product Type: PPO /     Extended Emergency Contact Information  Primary Emergency Contact: Efe Chaidez  Mobile Phone: [phone]  Relation: Sister  Secondary Emergency Contact: WandaRonnie  Mobile Phone: [phone]  Relation: Friend  Preferred language: English   needed? No    Discharge Plan A: (P) Rehab  Discharge Plan B: (P) [facility]      [id number] - [place] - [address]  AT [facility] [address]  [address] [place] [address]  Phone: [phone] Fax: [phone]      Initial Assessment (most recent)       Adult Discharge Assessment - 10/26/23 0856          Discharge Assessment    Assessment Type Discharge Planning Assessment (P)      Confirmed/corrected address, phone number and insurance Yes (P)      Confirmed Demographics Correct on Facesheet (P)      Source of Information patient (P)      When was your last doctors appointment? -- (P)    doesn't have a PCP, needs a new one at [facility]    Communicated SUZANNE with patient/caregiver Date not available/Unable to determine (P)      Reason For Admission stroke (P)      People in Home friend(s) (P)      Do you expect to return to your current living situation? Yes (P)      Do you have help at home or someone to help you manage your care at home? Yes (P)      Who are your caregiver(s) and their phone number(s)? 2 roommates (P)      Prior to hospitilization cognitive status: Unable to Assess (P)      Current cognitive status: Alert/Oriented (P)      Equipment  Currently Used at Home none (P)      Readmission within 30 days? No (P)      Patient currently being followed by outpatient case management? No (P)      Do you currently have service(s) that help you manage your care at home? No (P)      Do you take prescription medications? Yes (P)      Do you have prescription coverage? Yes (P)      Coverage BCBS (P)      Do you have any problems affording any of your prescribed medications? TBD (P)      Is the patient taking medications as prescribed? yes (P)      Who is going to help you get home at discharge? sister Efe or roommate Ronnie (P)      How do you get to doctors appointments? car, drives self (P)      Are you on dialysis? No (P)      Do you take coumadin? No (P)      DME Needed Upon Discharge  walker, rolling (P)      Discharge Plan discussed with: Patient (P)      Transition of Care Barriers Underinsured;Other (see comments) (P)    extensive steps in home    Discharge Plan A Rehab (P)      Discharge Plan B Home Health (P)         Physical Activity    On average, how many days per week do you engage in moderate to strenuous exercise (like a brisk walk)? 4 days (P)      On average, how many minutes do you engage in exercise at this level? 30 min (P)         Financial Resource Strain    How hard is it for you to pay for the very basics like food, housing, medical care, and heating? Not very hard (P)         Housing Stability    In the last 12 months, was there a time when you were not able to pay the mortgage or rent on time? No (P)      In the last 12 months, was there a time when you did not have a steady place to sleep or slept in a shelter (including now)? No (P)         Transportation Needs    In the past 12 months, has lack of transportation kept you from medical appointments or from getting medications? No (P)      In the past 12 months, has lack of transportation kept you from meetings, work, or from getting things needed for daily living? No (P)          Food Insecurity    Within the past 12 months, you worried that your food would run out before you got the money to buy more. Never true (P)      Within the past 12 months, the food you bought just didn't last and you didn't have money to get more. Never true (P)         Stress    Do you feel stress - tense, restless, nervous, or anxious, or unable to sleep at night because your mind is troubled all the time - these days? Only a little (P)         Social Connections    In a typical week, how many times do you talk on the phone with family, friends, or neighbors? More than three times a week (P)      How often do you get together with friends or relatives? Three times a week (P)      How often do you attend Yazidi or Taoist services? More than 4 times per year (P)      Do you belong to any clubs or organizations such as Yazidi groups, unions, fraternal or athletic groups, or school groups? No (P)      How often do you attend meetings of the clubs or organizations you belong to? Never (P)      Are you , , , , never , or living with a partner? Never  (P)         Alcohol Use    Q1: How often do you have a drink containing alcohol? 2-4 times a month (P)      Q2: How many drinks containing alcohol do you have on a typical day when you are drinking? 1 or 2 (P)      Q3: How often do you have six or more drinks on one occasion? Less than monthly (P)         OTHER    Name(s) of People in Home 2 roommates, once is Ronnie Muñoz (P)                  SW met with patient at bedside.  Patient lives with 2 roommates in a 2nd floor apartment.  He states there are several levels of stairs, 8 to the landing, then 3, then another 5.  Patient does not currently have any DME or HH services.  This is his second stroke, the first ini April 2021.  Patient is not on HD or Coumadin.      Pending PT/OT recs for assistance with dispo.  Will have support from sister and roommates.  Patient is not  employed and is concerned his insurance is no longer valid. SW will continue to follow.     Staci Uribe, MYLES  [facility]  [phone]

## 2023-10-26 NOTE — PT/OT/SLP EVAL
"Speech Language Pathology Evaluation  Cognitive/Bedside Swallow    Patient Name:  Cecil Clark   MRN:  [id number]  Admitting Diagnosis: Stroke due to occlusion of right middle cerebral artery    Recommendations:                  General Recommendations:   Diet follow up  Diet recommendations:  Regular Diet - IDDSI Level 7, Thin liquids - IDDSI Level 0   Aspiration Precautions: Check for pocketing/oral residue and Strict aspiration precautions   General Precautions: Standard, aspiration, fall  Communication strategies:  go to room if call light pushed    Assessment:     Cecil Clark is a 48 y.o. male with an SLP diagnosis of mild oral Dysphagia.  He presents with baseline speech, language, cognitive linguistic skills.    History:     Past Medical History:   Diagnosis Date    Diabetes mellitus     Hypertension     R thalmaic hypertensive ICH 04/04/2021       Past Surgical History:   Procedure Laterality Date    ANTERIOR CRUCIATE LIGAMENT REPAIR Right     2002       Social History: Patient lives with roommates.  He reports h/o previous stroke with no persistent deficits.  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 Reg diet/thin liquids at baseline.     Prior Intubation HX:  none this admission     Modified Barium Swallow: none reported.     Chest X-Rays: 10/26: "No acute cardiopulmonary abnormality."    Subjective     "This is not my problem.  It's what this arm was doing."     Pain/Comfort:  Pain Rating 1: 0/10  Pain Rating Post-Intervention 1: 0/10    Respiratory Status: Room air    Objective:     Cognitive Status:    Orientation Oriented x4  Memory Immediate Recall 100% and Delayed 3/3 unassociated words recalled after delay  Problem Solving Categories x2/2, Sequencing x1/1, Solutions x2/2, and Compare/contrast x2/2, occasional repetition required, responses were complete and detailed       Receptive Language:   Comprehension:   WFL    Expressive " Language:  Verbal:    fluent      Motor Speech:  WFL    Voice:   WFL    Visual-Spatial:  WFL    Reading:   WFL at sentence level     Written Expression:   WFL    Oral Musculature Evaluation  Oral Musculature: left weakness  Dentition: present and adequate  Secretion Management: adequate  Mucosal Quality: adequate  Mandibular Strength and Mobility: WFL  Oral Labial Strength and Mobility: impaired retraction  Lingual Strength and Mobility: impaired strength  Volitional Cough: WFL  Volitional Swallow: WFL  Voice Prior to PO Intake: clear    Bedside Swallow Eval:   Consistencies Assessed:  Thin liquids 14 oz  Solids        Oral Phase:   Pocketing Left mild    Pharyngeal Phase:   no overt clinical signs/symptoms of aspiration    Compensatory Strategies  None    Treatment: Education provided re: role of SLP, aspiration risk s/p stroke, oral motor weakness, diet recs, swallow precs, s/s aspiration, and POC.  Pt verbalized understanding and agreement.       Goals:   Multidisciplinary Problems       SLP Goals       Not on file                    Plan:     Patient to be seen:  3 x/week   Plan of Care expires:  11/25/23  Plan of Care reviewed with:  patient   SLP Follow-Up:  Yes       Discharge recommendations:  Therapy Intensity Recommendations at Discharge:  (tbd)   Barriers to Discharge:  None    Time Tracking:     SLP Treatment Date:   10/26/23  Speech Start Time:  0716  Speech Stop Time:  0739     Speech Total Time (min):  23 min    Billable Minutes: Eval 8 , Eval Swallow and Oral Function 7, and Self Care/Home Management Training 8    10/26/2023

## 2023-10-26 NOTE — CONSULTS
[facility] ([facility])  Endocrinology  Diabetes Consult Note    Consult Requested by: Efraín Jacobsen MD   Reason for admit: Stroke due to occlusion of right middle cerebral artery    HISTORY OF PRESENT ILLNESS:  Reason for Consult: Management of T2DM, Hyperglycemia      Diabetes diagnosis year: 2010     Home Diabetes Medications:  None     Lab Results   Component Value Date    HGBA1C 12.5 (H) 10/25/2023       How often checking glucose at home? Denies      Diabetes Complications include:     Diabetic chronic kidney disease and Diabetic retinopathy      Complicating diabetes co morbidities:   History of CVA and Residual deficits from CVA        HPI:   Patient is a 48 y.o. male with a diagnosis of type 2 DM, HTN, and R thalamic ICH (2021, 2/2 HTN, no deficits) who reported to ED with LSW and L facial numbness which resulted in multiple falls. Admitted for further workup and treatment. Endocrinology consulted for BG/ DM management.       Interval HPI:   Overnight events: Admitted to 922/922 A. BG and a1c elevated. BG above goal on SSI.   Eating:  Diet diabetic 2000 Calorie; Thin  Nausea: No  Hypoglycemia and intervention: No  Fever: No  TPN and/or TF: No      PMH, PSH, FH, SH  reviewed     ROS:  Review of Systems   Constitutional:  Negative for unexpected weight change.   Cardiovascular:  Negative for chest pain.   Gastrointestinal:  Negative for constipation, diarrhea, nausea and vomiting.   Endocrine: Negative for polydipsia and polyuria.         Current Medications and/or Treatments Impacting Glycemic Control  Immunotherapy:    Immunosuppressants       None          Steroids:   Hormones (From admission, onward)      None          Pressors:    Autonomic Drugs (From admission, onward)      None          Hyperglycemia/Diabetes Medications:   Antihyperglycemics (From admission, onward)      Start     Stop Route Frequency Ordered    10/25/23 2209  insulin aspart U-100 pen 0-5 Units         -- SubQ Before  "meals & nightly PRN 10/25/23 2106             PHYSICAL EXAMINATION:  Vitals:    10/26/23 0403   BP: (!) 171/107   Pulse: 90   Resp:    Temp:      Body mass index is 31.05 kg/m².     Physical Exam  Constitutional:       General: He is not in acute distress.     Appearance: Normal appearance. He is not ill-appearing.   HENT:      Head: Normocephalic and atraumatic.      Right Ear: External ear normal.      Left Ear: External ear normal.      Nose: Nose normal.   Pulmonary:      Effort: Pulmonary effort is normal. No respiratory distress.   Neurological:      Mental Status: He is alert.   Psychiatric:         Mood and Affect: Mood normal.         Behavior: Behavior normal.                Labs Reviewed and Include   Recent Labs   Lab 10/26/23  0734   *   CALCIUM 8.6*   ALBUMIN 2.9*   PROT 5.8*      K 2.8*   CO2 27      BUN 12   CREATININE 1.1   ALKPHOS 73   ALT 24   AST 27   BILITOT 1.0     Lab Results   Component Value Date    WBC 8.83 10/26/2023    HGB 15.0 10/26/2023    HCT 41.8 10/26/2023    MCV 83 10/26/2023     10/26/2023     Recent Labs   Lab 10/25/23  1956   TSH 1.429     Lab Results   Component Value Date    HGBA1C 12.5 (H) 10/25/2023       Nutritional status:   Body mass index is 31.05 kg/m².  Lab Results   Component Value Date    ALBUMIN 2.9 (L) 10/26/2023    ALBUMIN 2.7 (L) 10/25/2023    ALBUMIN 3.6 04/06/2021     No results found for: "PREALBUMIN"    Estimated Creatinine Clearance: 72.3 mL/min (based on SCr of 1.1 mg/dL).    Accu-Checks  Recent Labs     10/25/23  2305 10/26/23  0741 10/26/23  1124   POCTGLUCOSE 203* 161* 356*        ASSESSMENT and PLAN    Neuro  * Stroke due to occlusion of right middle cerebral artery  Managed per primary.   avoid hypoglycemia        History of intracerebral hemorrhage without residual deficit  Optimize BG control       Endocrine  Type 2 diabetes mellitus with hyperglycemia, without long-term current use of insulin  BG goal: 140-180    - Start " Novolog 4 units TIDWM  (WBD @ 0.3 u/kg/day)   - Start Levemir 12 units daily (WBD @ 0.3 u/kg/day)   - MDC (150/25) prn for hyperglycemia   - POCT Glucose before meals and at bedtime  - Hypoglycemia protocol in place      ** Please notify Endocrine for any change and/or advance in diet**  ** Please call Endocrine for any BG related issues **     Discharge Planning:   TBD. Please notify endocrinology prior to discharge.            Plan discussed with patient    Carlene Barlow PA-C  Endocrinology  [facility] ([facility])

## 2023-10-26 NOTE — PROGRESS NOTES
[facility] ([facility])  Vascular Neurology  Comprehensive Stroke Center  Progress Note    Assessment/Plan:     * Stroke due to occlusion of right middle cerebral artery  Cecil Clark is a 48 y.o. male with PMHx of DM, HTN, and R thalamic ICH (2021, 2/2 HTN, no deficits) who reported to ED with LSW and L facial numbness which resulted in multiple falls. Stroke code called on arrival. Patient reports his symptom began between 2:30 am and 3:00 am. He was out of window for TNK. CT without acute changes, possible new infarct in R internal capsule. CTA without significant stenosis or LVO. Suspect small vessel stroke. MRI confirmed R internal capsule stroke. Echo with bubble pending.       Antithrombotics for secondary stroke prevention: Antiplatelets: Aspirin: 81 mg daily  Aspirin: 325 mg daily  Clopidogrel: 300 mg loading dose x 1, now  Clopidogrel: 75 mg daily    Statins for secondary stroke prevention and hyperlipidemia, if present:   Statins: Atorvastatin- 40 mg daily    Aggressive risk factor modification: HTN, DM     Rehab efforts: The patient has been evaluated by a stroke team provider and the therapy needs have been fully considered based off the presenting complaints and exam findings. The following therapy evaluations are needed: PT evaluate and treat, OT evaluate and treat, SLP evaluate and treat, PM&R evaluate for appropriate placement    Diagnostics ordered/pending: HgbA1C to assess blood glucose levels, Lipid Profile to assess cholesterol levels, MRI head without contrast to assess brain parenchyma, TTE to assess cardiac function/status , TSH to assess thyroid function    VTE prophylaxis: Heparin 5000 units SQ every 8 hours  Mechanical prophylaxis: Place SCDs    BP parameters: Infarct: No intervention, SBP <220        Tachycardia  Chronic  Patient not currently on any home medications    Type 2 diabetes mellitus with hyperglycemia, without long-term current use of insulin  Stroke risk factor  A1C  12.5  Glucose 140-180  SSI  Endocrine consulted    Essential hypertension  Stroke risk factor   BP <220/110  PRN labetalol    History of intracerebral hemorrhage without residual deficit  Stroke risk factor  R thalamic hypertensive ICH in 2021         10/25 presented with LSW and falls, MRI significant for R internal capsule stroke. DAPT loaded.   10/26 Patient currently on DAPT, endocrine consulted for elevated A1c, echo pending. Dispo pending PT recs       STROKE DOCUMENTATION   Acute Stroke Times   Last Known Normal Date: 10/25/23  Last Known Normal Time: 0230  Symptom Onset Date: 10/25/23  Symptom Onset Time: 0230  Stroke Team Called Date: 10/25/23  Stroke Team Called Time: 1930  Stroke Team Arrival Date: 10/25/23  Stroke Team Arrival Time: 1937  CT Interpretation Time: 1945  Thrombolytic Therapy Recommended: No  CTA Interpretation Time: 1951  Thrombectomy Recommended: No    NIH Scale:  1a. Level of Consciousness: 0-->Alert, keenly responsive  1b. LOC Questions: 0-->Answers both questions correctly  1c. LOC Commands: 0-->Performs both tasks correctly  2. Best Gaze: 0-->Normal  3. Visual: 0-->No visual loss  4. Facial Palsy: 1-->Minor paralysis (flattened nasolabial fold, asymmetry on smiling)  5a. Motor Arm, Left: 1-->Drift, limb holds 90 (or 45) degrees, but drifts down before full 10 seconds, does not hit bed or other support  5b. Motor Arm, Right: 0-->No drift, limb holds 90 (or 45) degrees for full 10 secs  6a. Motor Leg, Left: 0-->No drift, leg holds 30 degree position for full 5 secs  6b. Motor Leg, Right: 0-->No drift, leg holds 30 degree position for full 5 secs  7. Limb Ataxia: 1-->Present in one limb  8. Sensory: 0-->Normal, no sensory loss  9. Best Language: 0-->No aphasia, normal  10. Dysarthria: 0-->Normal  11. Extinction and Inattention (formerly Neglect): 0-->No abnormality  Total (NIH Stroke Scale): 3       Modified Austin Score: 0  Concord Coma Scale:    ABCD2 Score:    [id number] Score:   HAS  -BLED Score:   ICH Score:   Hunt & Gonzalez Classification:      Hemorrhagic change of an Ischemic Stroke: Does this patient have an ischemic stroke with hemorrhagic changes? No     Neurologic Chief Complaint: LSW    Subjective:     Interval History: Patient is seen for follow-up neurological assessment and treatment recommendations:     Weakness is improving today. Denies any numbness. Reports he has intermittent jerking of his L leg. Denies pain during these episodes. Will continue to monitor.   Will need referral to PCP on discharge for chronic medical problems. Echo and endocrine consult pending.     HPI, Past Medical, Family, and Social History remains the same as documented in the initial encounter.     Review of Systems   Neurological:  Positive for tremors, facial asymmetry and weakness. Negative for dizziness, speech difficulty, light-headedness, numbness and headaches.     Scheduled Meds:   aspirin  81 mg Oral Daily    atorvastatin  40 mg Oral Daily    clopidogreL  75 mg Oral Daily    heparin (porcine)  5,000 Units Subcutaneous Q8H    potassium bicarbonate  25 mEq Oral BID     Continuous Infusions:   sodium chloride 0.9%       PRN Meds:acetaminophen, dextrose 10%, dextrose 10%, glucagon (human recombinant), glucose, glucose, insulin aspart U-100, labetaloL, ondansetron, sodium chloride 0.9%, sodium chloride 0.9%    Objective:     Vital Signs (Most Recent):  Temp: 99.3 °F (37.4 °C) (10/26/23 0835)  Pulse: 101 (10/26/23 0835)  Resp: 18 (10/26/23 0835)  BP: (!) 191/123 (10/26/23 0835)  SpO2: 99 % (10/26/23 0835)  BP Location: Right arm    Vital Signs Range (Last 24H):  Temp:  [97.9 °F (36.6 °C)-99.3 °F (37.4 °C)]   Pulse:  []   Resp:  [17-20]   BP: (171-194)/(106-126)   SpO2:  [96 %-99 %]   BP Location: Right arm       Physical Exam  Constitutional:       General: He is not in acute distress.     Appearance: Normal appearance.   HENT:      Head: Normocephalic and atraumatic.   Eyes:      Extraocular  Movements: Extraocular movements intact.      Conjunctiva/sclera: Conjunctivae normal.      Pupils: Pupils are equal, round, and reactive to light.   Cardiovascular:      Rate and Rhythm: Tachycardia present.   Pulmonary:      Effort: Pulmonary effort is normal. No respiratory distress.   Musculoskeletal:      Right lower leg: No edema.      Left lower leg: No edema.   Skin:     General: Skin is warm and dry.   Neurological:      Mental Status: He is alert and oriented to person, place, and time.      Motor: Weakness present.   Psychiatric:         Mood and Affect: Mood normal.       Neurological Exam:   LOC: alert  Attention Span: Good   Language: No aphasia  Articulation: No dysarthria  EOM (CN III, IV, VI): Full/intact  Facial Movement (CN VII): Lower facial weakness on the Left  Motor: Arm left  Paresis: 3/5  Leg left  Paresis: 4/5  Arm right  Normal 5/5  Leg right Normal 5/5  Sensation: Intact to light touch, temperature and vibration    Laboratory:  CMP:   Recent Labs   Lab 10/26/23  0734   CALCIUM 8.6*   ALBUMIN 2.9*   PROT 5.8*      K 2.8*   CO2 27      BUN 12   CREATININE 1.1   ALKPHOS 73   ALT 24   AST 27   BILITOT 1.0     CBC:   Recent Labs   Lab 10/26/23  0502   WBC 8.83   RBC 5.04   HGB 15.0   HCT 41.8      MCV 83   MCH 29.8   MCHC 35.9     Lipid Panel:   Recent Labs   Lab 10/25/23  1956   CHOL 202*   LDLCALC 122.8   HDL 44   TRIG 176*     Hgb A1C:   Recent Labs   Lab 10/25/23  2101   HGBA1C 12.5*     TSH:   Recent Labs   Lab 10/25/23  1956   TSH 1.429       Diagnostic Results     Brain imaging:  MRI Brain 10/25/23  Small focus of acute infarction in the posterior limb of the right internal capsule.  Chronic microhemorrhage in the right thalamus and superior to the left subinsular cortex.  Changes of chronic small vessel ischemic disease.     Vessel Imaging:  CTA 10/25/23  No evidence of acute ICH  Remote R thalamic hypodensity  Age indeterminate hypodensity in R internal capsule,  possibly contributing to patient's symptoms  No LVO or significant stenosis     Cardiac Evaluation:   TTE with bubble pending         Toshia Zhong MD  Comprehensive Stroke Center  Department of Vascular Neurology   [facility] ([facility])

## 2023-10-26 NOTE — CONSULTS
Inpatient consult to Physical Medicine Rehab  Consult performed by: Maia Boyd NP  Consult ordered by: Tamara Jimenez PA-C      Consult received.  Reviewed patient history and current admission.  PM&R following.    Maia Boyd NP  Physical Medicine & Rehabilitation   10/26/2023

## 2023-10-27 PROBLEM — I63.311 STROKE DUE TO THROMBOSIS OF RIGHT MIDDLE CEREBRAL ARTERY: Status: ACTIVE | Noted: 2023-10-25

## 2023-10-27 LAB
ALBUMIN SERPL BCP-MCNC: 2.7 G/DL (ref 3.5–5.2)
ALP SERPL-CCNC: 69 U/L (ref 55–135)
ALT SERPL W/O P-5'-P-CCNC: 24 U/L (ref 10–44)
ANION GAP SERPL CALC-SCNC: 13 MMOL/L (ref 8–16)
AST SERPL-CCNC: 29 U/L (ref 10–40)
BASOPHILS # BLD AUTO: 0.04 K/UL (ref 0–0.2)
BASOPHILS NFR BLD: 0.5 % (ref 0–1.9)
BILIRUB SERPL-MCNC: 0.7 MG/DL (ref 0.1–1)
BUN SERPL-MCNC: 18 MG/DL (ref 6–20)
CALCIUM SERPL-MCNC: 8.4 MG/DL (ref 8.7–10.5)
CHLORIDE SERPL-SCNC: 101 MMOL/L (ref 95–110)
CO2 SERPL-SCNC: 24 MMOL/L (ref 23–29)
CREAT SERPL-MCNC: 1.1 MG/DL (ref 0.5–1.4)
DIFFERENTIAL METHOD: ABNORMAL
EOSINOPHIL # BLD AUTO: 0.3 K/UL (ref 0–0.5)
EOSINOPHIL NFR BLD: 3.1 % (ref 0–8)
ERYTHROCYTE [DISTWIDTH] IN BLOOD BY AUTOMATED COUNT: 12.6 % (ref 11.5–14.5)
EST. GFR  (NO RACE VARIABLE): >60 ML/MIN/1.73 M^2
GLUCOSE SERPL-MCNC: 145 MG/DL (ref 70–110)
HCT VFR BLD AUTO: 41.1 % (ref 40–54)
HGB BLD-MCNC: 14.7 G/DL (ref 14–18)
IMM GRANULOCYTES # BLD AUTO: 0.07 K/UL (ref 0–0.04)
IMM GRANULOCYTES NFR BLD AUTO: 0.8 % (ref 0–0.5)
LYMPHOCYTES # BLD AUTO: 2.5 K/UL (ref 1–4.8)
LYMPHOCYTES NFR BLD: 28.3 % (ref 18–48)
MAGNESIUM SERPL-MCNC: 1.9 MG/DL (ref 1.6–2.6)
MCH RBC QN AUTO: 30.1 PG (ref 27–31)
MCHC RBC AUTO-ENTMCNC: 35.8 G/DL (ref 32–36)
MCV RBC AUTO: 84 FL (ref 82–98)
MONOCYTES # BLD AUTO: 0.8 K/UL (ref 0.3–1)
MONOCYTES NFR BLD: 8.7 % (ref 4–15)
NEUTROPHILS # BLD AUTO: 5.1 K/UL (ref 1.8–7.7)
NEUTROPHILS NFR BLD: 58.6 % (ref 38–73)
NRBC BLD-RTO: 0 /100 WBC
PHOSPHATE SERPL-MCNC: 2 MG/DL (ref 2.7–4.5)
PLATELET # BLD AUTO: 158 K/UL (ref 150–450)
PMV BLD AUTO: 10.5 FL (ref 9.2–12.9)
POCT GLUCOSE: 110 MG/DL (ref 70–110)
POCT GLUCOSE: 151 MG/DL (ref 70–110)
POCT GLUCOSE: 184 MG/DL (ref 70–110)
POCT GLUCOSE: 197 MG/DL (ref 70–110)
POTASSIUM SERPL-SCNC: 2.9 MMOL/L (ref 3.5–5.1)
POTASSIUM SERPL-SCNC: 3.5 MMOL/L (ref 3.5–5.1)
PROT SERPL-MCNC: 5.4 G/DL (ref 6–8.4)
RBC # BLD AUTO: 4.88 M/UL (ref 4.6–6.2)
SODIUM SERPL-SCNC: 138 MMOL/L (ref 136–145)
WBC # BLD AUTO: 8.69 K/UL (ref 3.9–12.7)

## 2023-10-27 PROCEDURE — 99232 SBSQ HOSP IP/OBS MODERATE 35: CPT | Mod: ,,,

## 2023-10-27 PROCEDURE — 99233 SBSQ HOSP IP/OBS HIGH 50: CPT | Mod: ,,, | Performed by: PSYCHIATRY & NEUROLOGY

## 2023-10-27 PROCEDURE — 84132 ASSAY OF SERUM POTASSIUM: CPT | Performed by: PSYCHIATRY & NEUROLOGY

## 2023-10-27 PROCEDURE — 63600175 PHARM REV CODE 636 W HCPCS: Performed by: PHYSICIAN ASSISTANT

## 2023-10-27 PROCEDURE — 36415 COLL VENOUS BLD VENIPUNCTURE: CPT | Performed by: PSYCHIATRY & NEUROLOGY

## 2023-10-27 PROCEDURE — 25000003 PHARM REV CODE 250: Performed by: PHYSICIAN ASSISTANT

## 2023-10-27 PROCEDURE — 84100 ASSAY OF PHOSPHORUS: CPT | Performed by: PHYSICIAN ASSISTANT

## 2023-10-27 PROCEDURE — 97535 SELF CARE MNGMENT TRAINING: CPT | Mod: CO

## 2023-10-27 PROCEDURE — 85025 COMPLETE CBC W/AUTO DIFF WBC: CPT | Performed by: PHYSICIAN ASSISTANT

## 2023-10-27 PROCEDURE — 97530 THERAPEUTIC ACTIVITIES: CPT | Mod: CO

## 2023-10-27 PROCEDURE — 99232 PR SUBSEQUENT HOSPITAL CARE,LEVL II: ICD-10-PCS | Mod: ,,,

## 2023-10-27 PROCEDURE — 99233 PR SUBSEQUENT HOSPITAL CARE,LEVL III: ICD-10-PCS | Mod: ,,, | Performed by: PSYCHIATRY & NEUROLOGY

## 2023-10-27 PROCEDURE — 97112 NEUROMUSCULAR REEDUCATION: CPT | Mod: CO

## 2023-10-27 PROCEDURE — 63600175 PHARM REV CODE 636 W HCPCS: Performed by: PSYCHIATRY & NEUROLOGY

## 2023-10-27 PROCEDURE — 83735 ASSAY OF MAGNESIUM: CPT | Performed by: PHYSICIAN ASSISTANT

## 2023-10-27 PROCEDURE — 80053 COMPREHEN METABOLIC PANEL: CPT | Performed by: PHYSICIAN ASSISTANT

## 2023-10-27 PROCEDURE — 36415 COLL VENOUS BLD VENIPUNCTURE: CPT | Performed by: PHYSICIAN ASSISTANT

## 2023-10-27 PROCEDURE — 11000001 HC ACUTE MED/SURG PRIVATE ROOM

## 2023-10-27 PROCEDURE — 25000003 PHARM REV CODE 250

## 2023-10-27 RX ORDER — LANOLIN ALCOHOL/MO/W.PET/CERES
400 CREAM (GRAM) TOPICAL 2 TIMES DAILY
Status: COMPLETED | OUTPATIENT
Start: 2023-10-27 | End: 2023-10-28

## 2023-10-27 RX ORDER — POTASSIUM CHLORIDE 7.45 MG/ML
10 INJECTION INTRAVENOUS
Status: DISCONTINUED | OUTPATIENT
Start: 2023-10-27 | End: 2023-10-27

## 2023-10-27 RX ORDER — POTASSIUM CHLORIDE 7.45 MG/ML
10 INJECTION INTRAVENOUS
Status: DISPENSED | OUTPATIENT
Start: 2023-10-27 | End: 2023-10-27

## 2023-10-27 RX ORDER — POTASSIUM CHLORIDE 7.45 MG/ML
10 INJECTION INTRAVENOUS ONCE
Status: COMPLETED | OUTPATIENT
Start: 2023-10-27 | End: 2023-10-27

## 2023-10-27 RX ADMIN — HEPARIN SODIUM 5000 UNITS: 5000 INJECTION INTRAVENOUS; SUBCUTANEOUS at 10:10

## 2023-10-27 RX ADMIN — INSULIN DETEMIR 12 UNITS: 100 INJECTION, SOLUTION SUBCUTANEOUS at 08:10

## 2023-10-27 RX ADMIN — POTASSIUM CHLORIDE 10 MEQ: 7.46 INJECTION, SOLUTION INTRAVENOUS at 10:10

## 2023-10-27 RX ADMIN — Medication 400 MG: at 05:10

## 2023-10-27 RX ADMIN — CLOPIDOGREL BISULFATE 75 MG: 75 TABLET ORAL at 08:10

## 2023-10-27 RX ADMIN — POTASSIUM CHLORIDE 10 MEQ: 7.46 INJECTION, SOLUTION INTRAVENOUS at 01:10

## 2023-10-27 RX ADMIN — POTASSIUM CHLORIDE 10 MEQ: 7.46 INJECTION, SOLUTION INTRAVENOUS at 11:10

## 2023-10-27 RX ADMIN — INSULIN ASPART 2 UNITS: 100 INJECTION, SOLUTION INTRAVENOUS; SUBCUTANEOUS at 04:10

## 2023-10-27 RX ADMIN — ASPIRIN 81 MG: 81 TABLET, COATED ORAL at 08:10

## 2023-10-27 RX ADMIN — INSULIN ASPART 4 UNITS: 100 INJECTION, SOLUTION INTRAVENOUS; SUBCUTANEOUS at 11:10

## 2023-10-27 RX ADMIN — INSULIN ASPART 4 UNITS: 100 INJECTION, SOLUTION INTRAVENOUS; SUBCUTANEOUS at 04:10

## 2023-10-27 RX ADMIN — HEPARIN SODIUM 5000 UNITS: 5000 INJECTION INTRAVENOUS; SUBCUTANEOUS at 06:10

## 2023-10-27 RX ADMIN — HEPARIN SODIUM 5000 UNITS: 5000 INJECTION INTRAVENOUS; SUBCUTANEOUS at 01:10

## 2023-10-27 RX ADMIN — ACETAMINOPHEN 650 MG: 325 TABLET ORAL at 12:10

## 2023-10-27 RX ADMIN — LISINOPRIL 2.5 MG: 2.5 TABLET ORAL at 08:10

## 2023-10-27 RX ADMIN — POTASSIUM CHLORIDE 10 MEQ: 7.46 INJECTION, SOLUTION INTRAVENOUS at 04:10

## 2023-10-27 RX ADMIN — POTASSIUM BICARBONATE 40 MEQ: 391 TABLET, EFFERVESCENT ORAL at 10:10

## 2023-10-27 RX ADMIN — ATORVASTATIN CALCIUM 40 MG: 40 TABLET, FILM COATED ORAL at 08:10

## 2023-10-27 RX ADMIN — INSULIN ASPART 4 UNITS: 100 INJECTION, SOLUTION INTRAVENOUS; SUBCUTANEOUS at 08:10

## 2023-10-27 NOTE — ASSESSMENT & PLAN NOTE
Cecil Clark is a 48 y.o. male with PMHx of DM, HTN, and R thalamic ICH (, 2/2 HTN, no deficits) who reported to ED with LSW and L facial numbness which resulted in multiple falls. Stroke code called on arrival. Patient reports his symptom began between 2:30 am and 3:00 am. He was out of window for TNK. CT without acute changes, possible new infarct in R internal capsule. CTA without significant stenosis or LVO. MRI confirmed R internal capsule stroke. Echo with bubble unremarkable. Stroke etiology likely  in setting of multiple uncontrolled risk factors.      Antithrombotics for secondary stroke prevention: Antiplatelets: Aspirin: 81 mg daily  Clopidogrel: 75 mg daily    Statins for secondary stroke prevention and hyperlipidemia, if present:   Statins: Atorvastatin- 40 mg daily    Aggressive risk factor modification: HTN, DM     Rehab efforts: The patient has been evaluated by a stroke team provider and the therapy needs have been fully considered based off the presenting complaints and exam findings. The following therapy evaluations are needed: PT evaluate and treat, OT evaluate and treat, SLP evaluate and treat, PM&R evaluate for appropriate placement    Diagnostics ordered/pending: HgbA1C to assess blood glucose levels, Lipid Profile to assess cholesterol levels, MRI head without contrast to assess brain parenchyma, TTE to assess cardiac function/status , TSH to assess thyroid function    VTE prophylaxis: Heparin 5000 units SQ every 8 hours  Mechanical prophylaxis: Place SCDs    BP parameters: SBP goal <180

## 2023-10-27 NOTE — PROGRESS NOTES
[facility] - Neurosurgery ([facility])  Vascular Neurology  Comprehensive Stroke Center  Progress Note    Assessment/Plan:     * Stroke due to thrombosis of right middle cerebral artery  Cecil Clark is a 48 y.o. male with PMHx of DM, HTN, and R thalamic ICH (, 2/2 HTN, no deficits) who reported to ED with LSW and L facial numbness which resulted in multiple falls. Stroke code called on arrival. Patient reports his symptom began between 2:30 am and 3:00 am. He was out of window for TNK. CT without acute changes, possible new infarct in R internal capsule. CTA without significant stenosis or LVO. MRI confirmed R internal capsule stroke. Echo with bubble unremarkable. Stroke etiology likely  in setting of multiple uncontrolled risk factors.      Antithrombotics for secondary stroke prevention: Antiplatelets: Aspirin: 81 mg daily  Clopidogrel: 75 mg daily    Statins for secondary stroke prevention and hyperlipidemia, if present:   Statins: Atorvastatin- 40 mg daily    Aggressive risk factor modification: HTN, DM     Rehab efforts: The patient has been evaluated by a stroke team provider and the therapy needs have been fully considered based off the presenting complaints and exam findings. The following therapy evaluations are needed: PT evaluate and treat, OT evaluate and treat, SLP evaluate and treat, PM&R evaluate for appropriate placement    Diagnostics ordered/pending: HgbA1C to assess blood glucose levels, Lipid Profile to assess cholesterol levels, MRI head without contrast to assess brain parenchyma, TTE to assess cardiac function/status , TSH to assess thyroid function    VTE prophylaxis: Heparin 5000 units SQ every 8 hours  Mechanical prophylaxis: Place SCDs    BP parameters: SBP goal <180        Hypokalemia  K+ 3.1 on admit  Remains hypokalemic despite repletion, K 2.8 > 2.9 today  Replacement PRN  Unclear cause of hypokalemia, suspect possibly due to insulin  Consider scheduled K+  supplementation    Tachycardia  Chronic  Patient not currently on any home medications    Currently resolved    Type 2 diabetes mellitus with hyperglycemia, without long-term current use of insulin  Stroke risk factor  A1c 12.5  BG goal while inpatient 140-180  Endocrine consulted for co-management  --Currently on Aspart 4u TID + Detemir 12u daily + SSI    Essential hypertension  Stroke risk factor   SBP goal <180  Lisinopril 2.5mg daily  PRN labetalol    History of intracerebral hemorrhage without residual deficit  Stroke risk factor  R thalamic hypertensive ICH in 2021         10/25 presented with LSW and falls, MRI significant for R internal capsule stroke. DAPT loaded.   10/26 Patient currently on DAPT, endocrine consulted for elevated A1c, echo pending. Dispo pending PT recs   10/27/2023 NAEO, neuro exam stable. Hypokalemia on labs again this morning (K 2.8 > 2.9), replacement ordered. Endo following, glucose improved on scheduled insulin. Dispo recs for low intensity therapy.         STROKE DOCUMENTATION   Acute Stroke Times   Last Known Normal Date: 10/25/23  Last Known Normal Time: 0230  Symptom Onset Date: 10/25/23  Symptom Onset Time: 0230  Stroke Team Called Date: 10/25/23  Stroke Team Called Time: 1930  Stroke Team Arrival Date: 10/25/23  Stroke Team Arrival Time: 1937  CT Interpretation Time: 1945  Thrombolytic Therapy Recommended: No  CTA Interpretation Time: 1951  Thrombectomy Recommended: No    NIH Scale:  1a. Level of Consciousness: 0-->Alert, keenly responsive  1b. LOC Questions: 0-->Answers both questions correctly  1c. LOC Commands: 0-->Performs both tasks correctly  2. Best Gaze: 0-->Normal  3. Visual: 0-->No visual loss  4. Facial Palsy: 1-->Minor paralysis (flattened nasolabial fold, asymmetry on smiling)  5a. Motor Arm, Left: 0-->No drift, limb holds 90 (or 45) degrees for full 10 secs  5b. Motor Arm, Right: 0-->No drift, limb holds 90 (or 45) degrees for full 10 secs  6a. Motor Leg, Left:  0-->No drift, leg holds 30 degree position for full 5 secs  6b. Motor Leg, Right: 0-->No drift, leg holds 30 degree position for full 5 secs  7. Limb Ataxia: 1-->Present in one limb  8. Sensory: 0-->Normal, no sensory loss  9. Best Language: 0-->No aphasia, normal  10. Dysarthria: 0-->Normal  11. Extinction and Inattention (formerly Neglect): 0-->No abnormality  Total (NIH Stroke Scale): 2       Modified Prowers Score: 0  [place] Coma Scale:    ABCD2 Score:    [id number] Score:   HAS -BLED Score:   ICH Score:   Hunt & Gonzalez Classification:      Hemorrhagic change of an Ischemic Stroke: Does this patient have an ischemic stroke with hemorrhagic changes? No     Neurologic Chief Complaint: LSW    Subjective:     Interval History: Patient is seen for follow-up neurological assessment and treatment recommendations:   NAEO, neuro exam stable. Hypokalemia on labs again this morning (K 2.8 > 2.9), replacement ordered. Endo following, glucose improved on scheduled insulin. Dispo recs for low intensity therapy.     HPI, Past Medical, Family, and Social History remains the same as documented in the initial encounter.     Review of Systems   Neurological:  Positive for facial asymmetry and weakness. Negative for dizziness, tremors, speech difficulty, light-headedness, numbness and headaches.   All other systems reviewed and are negative.    Scheduled Meds:   aspirin  81 mg Oral Daily    atorvastatin  40 mg Oral Daily    clopidogreL  75 mg Oral Daily    heparin (porcine)  5,000 Units Subcutaneous Q8H    insulin aspart U-100  4 Units Subcutaneous TIDWM    insulin detemir U-100  12 Units Subcutaneous Daily    lisinopriL  2.5 mg Oral Daily    potassium chloride  10 mEq Intravenous Q1H     Continuous Infusions:   sodium chloride 0.9%       PRN Meds:acetaminophen, dextrose 10%, dextrose 10%, dextrose 10%, dextrose 10%, dextrose 10%, glucagon (human recombinant), glucose, glucose, insulin aspart U-100, ondansetron, sodium  chloride 0.9%, sodium chloride 0.9%    Objective:     Vital Signs (Most Recent):  Temp: 97.7 °F (36.5 °C) (10/27/23 0717)  Pulse: 94 (10/27/23 0717)  Resp: 14 (10/27/23 0717)  BP: (!) 171/111 (10/27/23 0717)  SpO2: 97 % (10/27/23 0717)  BP Location: Left arm    Vital Signs Range (Last 24H):  Temp:  [97.2 °F (36.2 °C)-99.1 °F (37.3 °C)]   Pulse:  []   Resp:  [14-20]   BP: (160-191)/()   SpO2:  [97 %-99 %]   BP Location: Left arm       Physical Exam  Vitals and nursing note reviewed.   Constitutional:       General: He is not in acute distress.     Appearance: Normal appearance.   HENT:      Head: Normocephalic and atraumatic.   Eyes:      Extraocular Movements: Extraocular movements intact.      Conjunctiva/sclera: Conjunctivae normal.      Pupils: Pupils are equal, round, and reactive to light.   Cardiovascular:      Rate and Rhythm: Normal rate.   Pulmonary:      Effort: Pulmonary effort is normal. No respiratory distress.   Musculoskeletal:      Right lower leg: No edema.      Left lower leg: No edema.   Skin:     General: Skin is warm and dry.   Neurological:      Mental Status: He is alert and oriented to person, place, and time.      Cranial Nerves: Facial asymmetry present.      Coordination: Coordination abnormal.   Psychiatric:         Mood and Affect: Mood normal.       Neurological Exam:   LOC: alert  Attention Span: Good   Language: No aphasia  Articulation: No dysarthria  EOM (CN III, IV, VI): Full/intact  Facial Movement (CN VII): Lower facial weakness on the Left  Motor: Arm left  Paresis: 4/5  Leg left  Paresis: 4+/5  Arm right  Normal 5/5  Leg right Normal 5/5  Sensation: Intact to light touch    Laboratory:  CMP:   Recent Labs   Lab 10/27/23  0337   CALCIUM 8.4*   ALBUMIN 2.7*   PROT 5.4*      K 2.9*   CO2 24      BUN 18   CREATININE 1.1   ALKPHOS 69   ALT 24   AST 29   BILITOT 0.7       CBC:   Recent Labs   Lab 10/27/23  0338   WBC 8.69   RBC 4.88   HGB 14.7   HCT 41.1   PLT  158   MCV 84   MCH 30.1   MCHC 35.8       Lipid Panel:   Recent Labs   Lab 10/25/23  1956   CHOL 202*   LDLCALC 122.8   HDL 44   TRIG 176*       Hgb A1C:   Recent Labs   Lab 10/25/23  2101   HGBA1C 12.5*       TSH:   Recent Labs   Lab 10/25/23  1956   TSH 1.429         Diagnostic Results     Brain imaging:  MRI Brain without contrast 10/25/23  Impression:  Small focus of acute infarction in the posterior limb of the right internal capsule.  Chronic microhemorrhage in the right thalamus and superior to the left subinsular cortex.  Changes of chronic small vessel ischemic disease.       Vessel Imaging:  CTA stroke multiphase 10/25/23  Impression:  No evidence of acute ICH  Remote R thalamic hypodensity  Age indeterminate hypodensity in R internal capsule, possibly contributing to patient's symptoms  No LVO or significant stenosis       Cardiac Evaluation:   TTE with bubble 10/26/2023    Left Ventricle: The left ventricle is normal in size. Normal wall thickness. There is concentric remodeling. Normal wall motion. There is normal systolic function with a visually estimated ejection fraction of 60 - 65%. There is normal diastolic function.    Left Atrium: Agitated saline study of the atrial septum is negative after vasalva maneuver, suggesting absence of intracardiac shunt at the atrial level.    Right Ventricle: Normal right ventricular cavity size. Wall thickness is normal. Right ventricle wall motion  is normal. Systolic function is normal.    IVC/SVC: Normal venous pressure at 3 mmHg.         Elaine Latham PA-C  Comprehensive Stroke Center  Department of Vascular Neurology   [facility] Neurosurgery [facility])

## 2023-10-27 NOTE — HPI
Cecil Clark is a 48-year-old male with PMHx of DM, HTN, and R thalamic ICH (2021, 2/2 HTN, no deficits). Patient presented to [facility] on 10/25/23 for LSW. CT without acute changes, possible new infarct in R internal capsule. CTA without significant stenosis or LVO. Suspect small vessel stroke. MRI confirmed R internal capsule stroke. ECHO done ejection fraction of 60 - 65%. Hospital course complicated by HTN (PRN Labetalol).     Functional History: Patient lives in [place] with roommates in a apartment with 33 steps to enter. Prior to admission, I. DME: none.

## 2023-10-27 NOTE — HOSPITAL COURSE
10/26/23: Participated w/ OT.  Patient ambulated 250' with no AD and min-mod assist. Patient with significant decreased L foot clearance causing decreased gait stability. Patient with 3 LOB due to LLE foot clearance. Grooming Jose Alberto. UBD SBA.

## 2023-10-27 NOTE — PLAN OF CARE
Problem: Adjustment to Illness (Stroke, Ischemic/Transient Ischemic Attack)  Goal: Optimal Coping  Outcome: Ongoing, Progressing     Problem: Fall Injury Risk  Goal: Absence of Fall and Fall-Related Injury  Outcome: Ongoing, Progressing       Potassium replaced. Pt ambulated in rodriguez with OT. Bp remains high, MD aware. Pt denies headache. Left sided weakness improving. No s/s of distress at this time. Family at bedside

## 2023-10-27 NOTE — NURSING
Nurses Note -- 4 Eyes      10/26/2023   8:04 PM      Skin assessed during: Q Shift Change      [x] No Altered Skin Integrity Present    [x]Prevention Measures Documented      [] Yes- Altered Skin Integrity Present or Discovered   [] LDA Added if Not in Epic (Describe Wound)   [] New Altered Skin Integrity was Present on Admit and Documented in LDA   [] Wound Image Taken    Wound Care Consulted? No    Attending Nurse:  Kristen Godfrey RN/Staff Member:   Denise DAVILA

## 2023-10-27 NOTE — PT/OT/SLP PROGRESS
"Occupational Therapy   Treatment    Name: Cecil Clark  MRN: [id number]  Admitting Diagnosis:  Stroke due to thrombosis of right middle cerebral artery     Pre-op Diagnosis: * No surgery found *    Recommendations:     Discharge Recommendations: Low Intensity Therapy  Discharge Equipment Recommendations:  walker, rolling, shower chair  Barriers to discharge:  None    Assessment:     Cecil Clark is a 48 y.o. male with a medical diagnosis of Stroke due to thrombosis of right middle cerebral artery.  He presents with the following performance deficits affecting function are weakness, impaired endurance, impaired self care skills, impaired functional mobility, decreased coordination, decreased safety awareness, impaired balance, gait instability, decreased lower extremity function, decreased upper extremity function, impaired coordination, impaired fine motor.     Rehab Prognosis:  Good; patient would benefit from acute skilled OT services to address these deficits and reach maximum level of function.       Plan:     Patient to be seen 3 x/week to address the above listed problems via self-care/home management, therapeutic activities, therapeutic exercises, neuromuscular re-education  Plan of Care Expires: 11/26/23  Plan of Care Reviewed with: patient    Subjective     Chief Complaint: "It's my coordination."  Patient/Family Comments/goals: "I feel just need to be able to get up my stairs then I'll be okay."  Pain/Comfort:  Pain Rating 1: 0/10  Pain Rating Post-Intervention 1: 0/10    Objective:     Communicated with: nurse estephania s and OTR prior to session.  Patient found HOB elevated with telemetry upon OT entry to room.  A client care conference was completed by the OTR and the ELAINE prior to treatment by the ELAINE to discuss the patient's POC and current status.    General Precautions: Standard, fall, aspiration    Orthopedic Precautions:N/A  Braces: N/A  Respiratory Status: Room air     Occupational Performance:     Bed " Mobility:    Patient completed Supine to Sit with stand by assistance    Functional Mobility/Transfers:  Patient completed Sit <> Stand Transfer with stand by assistance  with  no assistive device   Patient completed Bed <> Chair Transfer using Step Transfer technique with stand by assistance with no assistive device  Patient completed Toilet Transfer Step Transfer technique with stand by assistance with  no AD  Functional Mobility: within room and hallway 100ft x2 using RW with CGA intermittent Min(A); max cues for pacing and controlled, purposeful movement    Activities of Daily Living:  Grooming: supervision hand hygiene standing sink side  Lower Body Dressing: stand by assistance don B shoes seated EOB  Toileting: modified independence shorts management and perirectal hygiene       [facility] 6 Click ADL: 21    Treatment & Education:  Dynamic standing activity including BUE 3lb dowel pattern activity with focus on gross motor bilateral control/coordination to improve (I) in ADL tasks. 3 sets x 6 reps (chest press, shoulder press, scapular retraction)   Seated table top fine motor activity including yellow putty in-hand manipulation with focus on improving dexterity and grasp required for increased (I) in ADLs.   Pincer and lateral grasping pattern along putty log  Bilateral composite fist, L hand log roll, gross motor control/coordination to place putty balls into cup with supported wrist  Patient educated on OT POC, goals, fall prevention strategies and current progress  Addressed all patient questions/concerns within ELAINE scope of practice.     Patient left sitting edge of bed with all lines intact, call button in reach, and nurse notified    GOALS:   Multidisciplinary Problems       Occupational Therapy Goals          Problem: Occupational Therapy    Goal Priority Disciplines Outcome Interventions   Occupational Therapy Goal     OT, PT/OT Ongoing, Progressing    Description: Goals to be met by: 11/26/23      Patient will increase functional independence with ADLs by performing:    UE Dressing with Ellis.  LE Dressing with Ellis.  Grooming while standing with Ellis.  Toileting from toilet with Ellis for hygiene and clothing management.   Bathing from  shower chair/bench with Ellis.                         Time Tracking:     OT Date of Treatment: 10/27/23  OT Start Time: 1515  OT Stop Time: 1619  OT Total Time (min): 64 min    Billable Minutes:Self Care/Home Management 18  Therapeutic Activity 26  Neuromuscular Re-education 20    OT/DANNY: DANNY     Number of DANNY visits since last OT visit: 1    10/27/2023

## 2023-10-27 NOTE — ASSESSMENT & PLAN NOTE
BG goal: 140-180    - Novolog 4 units TIDWM  (WBD @ 0.3 u/kg/day)   - Levemir 12 units daily (WBD @ 0.3 u/kg/day)   - MDC (150/25) prn for hyperglycemia   - POCT Glucose before meals and at bedtime  - Hypoglycemia protocol in place      ** Please notify Endocrine for any change and/or advance in diet**  ** Please call Endocrine for any BG related issues **     Discharge Planning:   TBD. Please notify endocrinology prior to discharge.

## 2023-10-27 NOTE — SUBJECTIVE & OBJECTIVE
Neurologic Chief Complaint: LSW    Subjective:     Interval History: Patient is seen for follow-up neurological assessment and treatment recommendations:   NAEO, neuro exam stable. Hypokalemia on labs again this morning (K 2.8 > 2.9), replacement ordered. Endo following, glucose improved on scheduled insulin. Dispo recs for low intensity therapy.     HPI, Past Medical, Family, and Social History remains the same as documented in the initial encounter.     Review of Systems   Neurological:  Positive for facial asymmetry and weakness. Negative for dizziness, tremors, speech difficulty, light-headedness, numbness and headaches.   All other systems reviewed and are negative.    Scheduled Meds:   aspirin  81 mg Oral Daily    atorvastatin  40 mg Oral Daily    clopidogreL  75 mg Oral Daily    heparin (porcine)  5,000 Units Subcutaneous Q8H    insulin aspart U-100  4 Units Subcutaneous TIDWM    insulin detemir U-100  12 Units Subcutaneous Daily    lisinopriL  2.5 mg Oral Daily    potassium chloride  10 mEq Intravenous Q1H     Continuous Infusions:   sodium chloride 0.9%       PRN Meds:acetaminophen, dextrose 10%, dextrose 10%, dextrose 10%, dextrose 10%, dextrose 10%, glucagon (human recombinant), glucose, glucose, insulin aspart U-100, ondansetron, sodium chloride 0.9%, sodium chloride 0.9%    Objective:     Vital Signs (Most Recent):  Temp: 97.7 °F (36.5 °C) (10/27/23 0717)  Pulse: 94 (10/27/23 0717)  Resp: 14 (10/27/23 0717)  BP: (!) 171/111 (10/27/23 0717)  SpO2: 97 % (10/27/23 0717)  BP Location: Left arm    Vital Signs Range (Last 24H):  Temp:  [97.2 °F (36.2 °C)-99.1 °F (37.3 °C)]   Pulse:  []   Resp:  [14-20]   BP: (160-191)/()   SpO2:  [97 %-99 %]   BP Location: Left arm       Physical Exam  Vitals and nursing note reviewed.   Constitutional:       General: He is not in acute distress.     Appearance: Normal appearance.   HENT:      Head: Normocephalic and atraumatic.   Eyes:      Extraocular Movements:  Extraocular movements intact.      Conjunctiva/sclera: Conjunctivae normal.      Pupils: Pupils are equal, round, and reactive to light.   Cardiovascular:      Rate and Rhythm: Normal rate.   Pulmonary:      Effort: Pulmonary effort is normal. No respiratory distress.   Musculoskeletal:      Right lower leg: No edema.      Left lower leg: No edema.   Skin:     General: Skin is warm and dry.   Neurological:      Mental Status: He is alert and oriented to person, place, and time.      Cranial Nerves: Facial asymmetry present.      Coordination: Coordination abnormal.   Psychiatric:         Mood and Affect: Mood normal.       Neurological Exam:   LOC: alert  Attention Span: Good   Language: No aphasia  Articulation: No dysarthria  EOM (CN III, IV, VI): Full/intact  Facial Movement (CN VII): Lower facial weakness on the Left  Motor: Arm left  Paresis: 4/5  Leg left  Paresis: 4+/5  Arm right  Normal 5/5  Leg right Normal 5/5  Sensation: Intact to light touch    Laboratory:  CMP:   Recent Labs   Lab 10/27/23  0337   CALCIUM 8.4*   ALBUMIN 2.7*   PROT 5.4*      K 2.9*   CO2 24      BUN 18   CREATININE 1.1   ALKPHOS 69   ALT 24   AST 29   BILITOT 0.7       CBC:   Recent Labs   Lab 10/27/23  0338   WBC 8.69   RBC 4.88   HGB 14.7   HCT 41.1      MCV 84   MCH 30.1   MCHC 35.8       Lipid Panel:   Recent Labs   Lab 10/25/23  1956   CHOL 202*   LDLCALC 122.8   HDL 44   TRIG 176*       Hgb A1C:   Recent Labs   Lab 10/25/23  2101   HGBA1C 12.5*       TSH:   Recent Labs   Lab 10/25/23  1956   TSH 1.429         Diagnostic Results     Brain imaging:  MRI Brain without contrast 10/25/23  Impression:  Small focus of acute infarction in the posterior limb of the right internal capsule.  Chronic microhemorrhage in the right thalamus and superior to the left subinsular cortex.  Changes of chronic small vessel ischemic disease.       Vessel Imaging:  CTA stroke multiphase 10/25/23  Impression:  No evidence of acute  ICH  Remote R thalamic hypodensity  Age indeterminate hypodensity in R internal capsule, possibly contributing to patient's symptoms  No LVO or significant stenosis       Cardiac Evaluation:   TTE with bubble 10/26/2023    Left Ventricle: The left ventricle is normal in size. Normal wall thickness. There is concentric remodeling. Normal wall motion. There is normal systolic function with a visually estimated ejection fraction of 60 - 65%. There is normal diastolic function.    Left Atrium: Agitated saline study of the atrial septum is negative after vasalva maneuver, suggesting absence of intracardiac shunt at the atrial level.    Right Ventricle: Normal right ventricular cavity size. Wall thickness is normal. Right ventricle wall motion  is normal. Systolic function is normal.    IVC/SVC: Normal venous pressure at 3 mmHg.

## 2023-10-27 NOTE — PROGRESS NOTES
"[facility] - Neurosurgery ([facility])  Endocrinology  Progress Note    Admit Date: 10/25/2023     Reason for Consult: Management of T2DM, Hyperglycemia      Diabetes diagnosis year:      Home Diabetes Medications:  None     Lab Results   Component Value Date    HGBA1C 12.5 (H) 10/25/2023       How often checking glucose at home? Denies      Diabetes Complications include:     Diabetic chronic kidney disease and Diabetic retinopathy      Complicating diabetes co morbidities:   History of CVA and Residual deficits from CVA        HPI:   Patient is a 48 y.o. male with a diagnosis of type 2 DM, HTN, and R thalamic ICH (, 2/2 HTN, no deficits) who reported to ED with LSW and L facial numbness which resulted in multiple falls. Admitted for further workup and treatment. Endocrinology consulted for BG/ DM management.       Interval HPI:   No acute events overnight. Patient in room 922/922 A. Blood glucose stable. BG at goal on current insulin regimen (SSI, prandial, and basal insulin ). Steroid use- None.      Renal function- Normal   Vasopressors-  None     Diet diabetic 2000 Calorie; Thin     Eatin%  Nausea: No  Hypoglycemia and intervention: No  Fever: No  TPN and/or TF: No    BP (!) 171/111   Pulse 94   Temp 97.7 °F (36.5 °C)   Resp 14   Ht 5' 2.01" (1.575 m)   Wt 72.6 kg (160 lb)   SpO2 97%   BMI 29.26 kg/m²     Labs Reviewed and Include    Recent Labs   Lab 10/27/23  033   *   CALCIUM 8.4*   ALBUMIN 2.7*   PROT 5.4*      K 2.9*   CO2 24      BUN 18   CREATININE 1.1   ALKPHOS 69   ALT 24   AST 29   BILITOT 0.7     Lab Results   Component Value Date    WBC 8.69 10/27/2023    HGB 14.7 10/27/2023    HCT 41.1 10/27/2023    MCV 84 10/27/2023     10/27/2023     Recent Labs   Lab 10/25/23  1956   TSH 1.429     Lab Results   Component Value Date    HGBA1C 12.5 (H) 10/25/2023       Nutritional status:   Body mass index is 29.26 kg/m².  Lab Results   Component Value Date    ALBUMIN " "2.7 (L) 10/27/2023    ALBUMIN 2.9 (L) 10/26/2023    ALBUMIN 2.7 (L) 10/25/2023     No results found for: "PREALBUMIN"    Estimated Creatinine Clearance: 70.2 mL/min (based on SCr of 1.1 mg/dL).    Accu-Checks  Recent Labs     10/25/23  2305 10/26/23  0741 10/26/23  1124 10/26/23  1554 10/26/23  2145 10/27/23  0759   POCTGLUCOSE 203* 161* 356* 196* 159* 151*       Current Medications and/or Treatments Impacting Glycemic Control  Immunotherapy:    Immunosuppressants       None          Steroids:   Hormones (From admission, onward)      None          Pressors:    Autonomic Drugs (From admission, onward)      None          Hyperglycemia/Diabetes Medications:   Antihyperglycemics (From admission, onward)      Start     Stop Route Frequency Ordered    10/26/23 1645  insulin aspart U-100 pen 4 Units         -- SubQ 3 times daily with meals 10/26/23 1152    10/26/23 1200  insulin detemir U-100 (Levemir) pen 12 Units         -- SubQ Daily 10/26/23 1152    10/26/23 0952  insulin aspart U-100 pen 0-10 Units         -- SubQ Before meals & nightly PRN 10/26/23 0853            ASSESSMENT and PLAN    Neuro  * Stroke due to occlusion of right middle cerebral artery  Managed per primary.   avoid hypoglycemia        History of intracerebral hemorrhage without residual deficit  Optimize BG control       Endocrine  Type 2 diabetes mellitus with hyperglycemia, without long-term current use of insulin  BG goal: 140-180    - Novolog 4 units TIDWM  (WBD @ 0.3 u/kg/day)   - Levemir 12 units daily (WBD @ 0.3 u/kg/day)   - MDC (150/25) prn for hyperglycemia   - POCT Glucose before meals and at bedtime  - Hypoglycemia protocol in place      ** Please notify Endocrine for any change and/or advance in diet**  ** Please call Endocrine for any BG related issues **     Discharge Planning:   TBD. Please notify endocrinology prior to discharge. Will likely be d/c with insulin given a1c > 12. Additional anti-hyperglycemic meds dependent on insurance " approval. Patient states he cannot tolerate Metformin due to GI upset.             Carlene Barlow PA-C  Endocrinology  [facility] Neurosurgery ([facility])

## 2023-10-27 NOTE — ASSESSMENT & PLAN NOTE
K+ 3.1 on admit  Remains hypokalemic despite repletion, K 2.8 > 2.9 today  Replacement PRN  Unclear cause of hypokalemia, suspect possibly due to insulin  Consider scheduled K+ supplementation

## 2023-10-27 NOTE — SUBJECTIVE & OBJECTIVE
Past Medical History:   Diagnosis Date    Diabetes mellitus     Hypertension     R thalmaic hypertensive ICH 04/04/2021     Past Surgical History:   Procedure Laterality Date    ANTERIOR CRUCIATE LIGAMENT REPAIR Right     2002     Review of patient's allergies indicates:   Allergen Reactions    Aspartame Nausea And Vomiting     Violent emesis.    Shellfish containing products Shortness Of Breath     Has received iodinated contrast in the past with no reaction       Scheduled Medications:    aspirin  81 mg Oral Daily    atorvastatin  40 mg Oral Daily    clopidogreL  75 mg Oral Daily    heparin (porcine)  5,000 Units Subcutaneous Q8H    insulin aspart U-100  4 Units Subcutaneous TIDWM    insulin detemir U-100  12 Units Subcutaneous Daily    lisinopriL  2.5 mg Oral Daily       PRN Medications: acetaminophen, dextrose 10%, dextrose 10%, dextrose 10%, dextrose 10%, dextrose 10%, glucagon (human recombinant), glucose, glucose, insulin aspart U-100, ondansetron, sodium chloride 0.9%, sodium chloride 0.9%    Family History       Problem Relation (Age of Onset)    Diabetes Mother    Hypertension Mother, Father    Stroke Father          Tobacco Use    Smoking status: Never    Smokeless tobacco: Not on file   Substance and Sexual Activity    Alcohol use: Not Currently    Drug use: Never    Sexual activity: Not on file     Review of Systems   Constitutional:  Positive for activity change. Negative for fatigue and fever.   HENT:  Negative for trouble swallowing and voice change.    Respiratory:  Negative for cough and shortness of breath.    Cardiovascular:  Negative for chest pain and leg swelling.   Gastrointestinal:  Negative for abdominal distention and abdominal pain.   Genitourinary:  Negative for difficulty urinating and flank pain.   Musculoskeletal:  Negative for back pain and gait problem.   Skin:  Negative for color change and rash.   Neurological:  Positive for weakness. Negative for speech difficulty and numbness.    Psychiatric/Behavioral:  Negative for agitation and confusion.      Objective:     Vital Signs (Most Recent):  Temp: 97.7 °F (36.5 °C) (10/27/23 0717)  Pulse: 94 (10/27/23 0717)  Resp: 14 (10/27/23 0717)  BP: (!) 171/111 (10/27/23 0717)  SpO2: 97 % (10/27/23 0717)    Vital Signs (24h Range):  Temp:  [97.2 °F (36.2 °C)-99.3 °F (37.4 °C)] 97.7 °F (36.5 °C)  Pulse:  [] 94  Resp:  [14-20] 14  SpO2:  [97 %-99 %] 97 %  BP: (160-191)/() 171/111     Body mass index is 29.26 kg/m².     Physical Exam  Vitals and nursing note reviewed.   Constitutional:       Appearance: He is well-developed.   HENT:      Head: Normocephalic and atraumatic.      Nose: Nose normal.      Mouth/Throat:      Mouth: Mucous membranes are moist.   Eyes:      General:         Right eye: No discharge.         Left eye: No discharge.      Pupils: Pupils are equal, round, and reactive to light.   Pulmonary:      Effort: Pulmonary effort is normal. No respiratory distress.   Abdominal:      General: There is no distension.      Tenderness: There is no abdominal tenderness.   Musculoskeletal:         General: No deformity.      Comments: LLE weakness   Skin:     General: Skin is warm and dry.   Neurological:      Mental Status: He is alert. Mental status is at baseline.      Sensory: No sensory deficit.      Motor: Weakness present. No abnormal muscle tone.      Gait: Gait abnormal.   Psychiatric:         Mood and Affect: Mood normal.         Behavior: Behavior normal.          NEUROLOGICAL EXAMINATION:     CRANIAL NERVES     CN III, IV, VI   Pupils are equal, round, and reactive to light.      Diagnostic Results: Labs: Reviewed  ECG: Reviewed  CT: Reviewed

## 2023-10-27 NOTE — SUBJECTIVE & OBJECTIVE
"Interval HPI:   No acute events overnight. Patient in room 922/922 A. Blood glucose stable. BG at goal on current insulin regimen (SSI, prandial, and basal insulin ). Steroid use- None.      Renal function- Normal   Vasopressors-  None     Diet diabetic 2000 Calorie; Thin     Eatin%  Nausea: No  Hypoglycemia and intervention: No  Fever: No  TPN and/or TF: No    BP (!) 171/111   Pulse 94   Temp 97.7 °F (36.5 °C)   Resp 14   Ht 5' 2.01" (1.575 m)   Wt 72.6 kg (160 lb)   SpO2 97%   BMI 29.26 kg/m²     Labs Reviewed and Include    Recent Labs   Lab 10/27/23  0337   *   CALCIUM 8.4*   ALBUMIN 2.7*   PROT 5.4*      K 2.9*   CO2 24      BUN 18   CREATININE 1.1   ALKPHOS 69   ALT 24   AST 29   BILITOT 0.7     Lab Results   Component Value Date    WBC 8.69 10/27/2023    HGB 14.7 10/27/2023    HCT 41.1 10/27/2023    MCV 84 10/27/2023     10/27/2023     Recent Labs   Lab 10/25/23  1956   TSH 1.429     Lab Results   Component Value Date    HGBA1C 12.5 (H) 10/25/2023       Nutritional status:   Body mass index is 29.26 kg/m².  Lab Results   Component Value Date    ALBUMIN 2.7 (L) 10/27/2023    ALBUMIN 2.9 (L) 10/26/2023    ALBUMIN 2.7 (L) 10/25/2023     No results found for: "PREALBUMIN"    Estimated Creatinine Clearance: 70.2 mL/min (based on SCr of 1.1 mg/dL).    Accu-Checks  Recent Labs     10/25/23  2305 10/26/23  0741 10/26/23  1124 10/26/23  1554 10/26/23  2145 10/27/23  0759   POCTGLUCOSE 203* 161* 356* 196* 159* 151*       Current Medications and/or Treatments Impacting Glycemic Control  Immunotherapy:    Immunosuppressants       None          Steroids:   Hormones (From admission, onward)      None          Pressors:    Autonomic Drugs (From admission, onward)      None          Hyperglycemia/Diabetes Medications:   Antihyperglycemics (From admission, onward)      Start     Stop Route Frequency Ordered    10/26/23 0570  insulin aspart U-100 pen 4 Units         -- SubQ 3 times " daily with meals 10/26/23 1152    10/26/23 1200  insulin detemir U-100 (Levemir) pen 12 Units         -- SubQ Daily 10/26/23 1152    10/26/23 0952  insulin aspart U-100 pen 0-10 Units         -- SubQ Before meals & nightly PRN 10/26/23 0884

## 2023-10-27 NOTE — PLAN OF CARE
10/27/23 1256   Post-Acute Status   Post-Acute Authorization Other   Coverage no coverage   Other Status See Comments  (uninsured, needs assistance with DME)   Hospital Resources/Appts/Education Provided Community resources provided   Discharge Plan   Discharge Plan A Home with family     Patient will likely be med cleared tomorrow.  Patient is hyperkalemic and needs potassium.  Patient does not have insurance so is unable to get post acute services.  SW will assist with rolling walker and shower chair to be delivered today bc he will not able to get on the weekend.      Staci Uribe, MYLES  [facility]  [phone]

## 2023-10-27 NOTE — CONSULTS
[facility] - Neurosurgery ([facility])  Physical Medicine & Rehab  Consult Note    Patient Name: Cecil Clark  MRN: [id number]  Admission Date: 10/25/2023  Hospital Length of Stay: 2 days  Attending Physician: Efraín Jacobsen MD     Inpatient consult to Physical Medicine & Rehabilitation  Consult performed by: Tamara Becker NP  Consult requested by:  Efraín Jacobsen MD    Collaborating Physician: Anali Black MD  Reason for Consult:  Assess rehabilitation needs     Consults  Subjective:     Principal Problem: Stroke due to occlusion of right middle cerebral artery    HPI: Cecil Clark is a 48-year-old male with PMHx of DM, HTN, and R thalamic ICH (2021, 2/2 HTN, no deficits). Patient presented to [facility] on 10/25/23 for LSW. CT without acute changes, possible new infarct in R internal capsule. CTA without significant stenosis or LVO. Suspect small vessel stroke. MRI confirmed R internal capsule stroke. ECHO done ejection fraction of 60 - 65%. Hospital course complicated by HTN (PRN Labetalol).     Functional History: Patient lives in [place] with roommates in a apartment with 33 steps to enter. Prior to admission, I. DME: none.     Hospital Course:   10/26/23: Participated w/ OT.  Patient ambulated 250' with no AD and min-mod assist. Patient with significant decreased L foot clearance causing decreased gait stability. Patient with 3 LOB due to LLE foot clearance. Grooming Jose Alberto. UBD SBA.     Past Medical History:   Diagnosis Date    Diabetes mellitus     Hypertension     R thalmaic hypertensive ICH 04/04/2021     Past Surgical History:   Procedure Laterality Date    ANTERIOR CRUCIATE LIGAMENT REPAIR Right     2002     Review of patient's allergies indicates:   Allergen Reactions    Aspartame Nausea And Vomiting     Violent emesis.    Shellfish containing products Shortness Of Breath     Has received iodinated contrast in the past with no reaction     Scheduled Medications:    aspirin  81 mg Oral Daily     atorvastatin  40 mg Oral Daily    clopidogreL  75 mg Oral Daily    heparin (porcine)  5,000 Units Subcutaneous Q8H    insulin aspart U-100  4 Units Subcutaneous TIDWM    insulin detemir U-100  12 Units Subcutaneous Daily    lisinopriL  2.5 mg Oral Daily       PRN Medications: acetaminophen, dextrose 10%, dextrose 10%, dextrose 10%, dextrose 10%, dextrose 10%, glucagon (human recombinant), glucose, glucose, insulin aspart U-100, ondansetron, sodium chloride 0.9%, sodium chloride 0.9%    Family History       Problem Relation (Age of Onset)    Diabetes Mother    Hypertension Mother, Father    Stroke Father          Tobacco Use    Smoking status: Never    Smokeless tobacco: Not on file   Substance and Sexual Activity    Alcohol use: Not Currently    Drug use: Never    Sexual activity: Not on file     Review of Systems   Constitutional:  Positive for activity change. Negative for fatigue and fever.   HENT:  Negative for trouble swallowing and voice change.    Respiratory:  Negative for cough and shortness of breath.    Cardiovascular:  Negative for chest pain and leg swelling.   Gastrointestinal:  Negative for abdominal distention and abdominal pain.   Genitourinary:  Negative for difficulty urinating and flank pain.   Musculoskeletal:  Negative for back pain and gait problem.   Skin:  Negative for color change and rash.   Neurological:  Positive for weakness. Negative for speech difficulty and numbness.   Psychiatric/Behavioral:  Negative for agitation and confusion.      Objective:     Vital Signs (Most Recent):  Temp: 97.7 °F (36.5 °C) (10/27/23 0717)  Pulse: 94 (10/27/23 0717)  Resp: 14 (10/27/23 0717)  BP: (!) 171/111 (10/27/23 0717)  SpO2: 97 % (10/27/23 0717)    Vital Signs (24h Range):  Temp:  [97.2 °F (36.2 °C)-99.3 °F (37.4 °C)] 97.7 °F (36.5 °C)  Pulse:  [] 94  Resp:  [14-20] 14  SpO2:  [97 %-99 %] 97 %  BP: (160-191)/() 171/111     Body mass index is 29.26 kg/m².     Physical  Exam  Vitals and nursing note reviewed.   Constitutional:       Appearance: He is well-developed.   HENT:      Head: Normocephalic and atraumatic.      Nose: Nose normal.      Mouth/Throat:      Mouth: Mucous membranes are moist.   Eyes:      General:         Right eye: No discharge.         Left eye: No discharge.      Pupils: Pupils are equal, round, and reactive to light.   Pulmonary:      Effort: Pulmonary effort is normal. No respiratory distress.   Abdominal:      General: There is no distension.      Tenderness: There is no abdominal tenderness.   Musculoskeletal:         General: No deformity.      Comments: LLE weakness   Skin:     General: Skin is warm and dry.   Neurological:      Mental Status: He is alert. Mental status is at baseline.      Sensory: No sensory deficit.      Motor: Weakness present. No abnormal muscle tone.      Gait: Gait abnormal.   Psychiatric:         Mood and Affect: Mood normal.         Behavior: Behavior normal.     Diagnostic Results:   Labs: Reviewed  ECG: Reviewed  CT: Reviewed    Assessment/Plan:     * Stroke due to occlusion of right middle cerebral artery  - Related to prolonged/acute hospital course.     Recommendations  -  Encourage mobility, OOB in chair at least 3 hours per day, and early ambulation as appropriate  -  PT/OT evaluate and treat  -  Pain management  -  Monitor for and prevent skin breakdown and pressure ulcers  · Early mobility, repositioning/weight shifting every 20-30 minutes when sitting, turn patient every 2 hours, proper mattress/overlay and chair cushioning, pressure relief/heel protector boots  -  DVT prophylaxis    -  Reviewed discharge options (IP rehab, SNF, HH therapy, and OP therapy)    Essential hypertension  - PRN Labetalol    PM&R Recommendation:     At this time, the PM&R team has reviewed this patient's ongoing medical case including inpatient diagnosis, medical history, clinical examination, labs, vitals, current social and functional  history.    We will continue to follow for PT & OT and improvement in HTN.     Thank you for your consult.     Tamara Becker NP  Department of Physical Medicine & Rehab  [facility] - Neurosurgery ([facility])

## 2023-10-27 NOTE — ASSESSMENT & PLAN NOTE
Stroke risk factor  A1c 12.5  BG goal while inpatient 140-180  Endocrine consulted for co-management  --Currently on Aspart 4u TID + Detemir 12u daily + SSI

## 2023-10-28 PROBLEM — R00.0 TACHYCARDIA: Status: RESOLVED | Noted: 2021-04-04 | Resolved: 2023-10-28

## 2023-10-28 LAB
ANION GAP SERPL CALC-SCNC: 12 MMOL/L (ref 8–16)
BUN SERPL-MCNC: 12 MG/DL (ref 6–20)
CALCIUM SERPL-MCNC: 8.2 MG/DL (ref 8.7–10.5)
CHLORIDE SERPL-SCNC: 105 MMOL/L (ref 95–110)
CO2 SERPL-SCNC: 24 MMOL/L (ref 23–29)
CREAT SERPL-MCNC: 1 MG/DL (ref 0.5–1.4)
EST. GFR  (NO RACE VARIABLE): >60 ML/MIN/1.73 M^2
GLUCOSE SERPL-MCNC: 109 MG/DL (ref 70–110)
MAGNESIUM SERPL-MCNC: 1.9 MG/DL (ref 1.6–2.6)
PHOSPHATE SERPL-MCNC: 3.3 MG/DL (ref 2.7–4.5)
POCT GLUCOSE: 122 MG/DL (ref 70–110)
POCT GLUCOSE: 122 MG/DL (ref 70–110)
POCT GLUCOSE: 160 MG/DL (ref 70–110)
POCT GLUCOSE: 208 MG/DL (ref 70–110)
POTASSIUM SERPL-SCNC: 3.2 MMOL/L (ref 3.5–5.1)
SODIUM SERPL-SCNC: 141 MMOL/L (ref 136–145)

## 2023-10-28 PROCEDURE — 99223 PR INITIAL HOSPITAL CARE,LEVL III: ICD-10-PCS | Mod: ,,, | Performed by: HOSPITALIST

## 2023-10-28 PROCEDURE — 84100 ASSAY OF PHOSPHORUS: CPT

## 2023-10-28 PROCEDURE — 82088 ASSAY OF ALDOSTERONE: CPT

## 2023-10-28 PROCEDURE — 63600175 PHARM REV CODE 636 W HCPCS: Performed by: PHYSICIAN ASSISTANT

## 2023-10-28 PROCEDURE — 84244 ASSAY OF RENIN: CPT

## 2023-10-28 PROCEDURE — 99233 PR SUBSEQUENT HOSPITAL CARE,LEVL III: ICD-10-PCS | Mod: ,,, | Performed by: PSYCHIATRY & NEUROLOGY

## 2023-10-28 PROCEDURE — 25000003 PHARM REV CODE 250

## 2023-10-28 PROCEDURE — 97116 GAIT TRAINING THERAPY: CPT | Mod: CQ

## 2023-10-28 PROCEDURE — 63600175 PHARM REV CODE 636 W HCPCS

## 2023-10-28 PROCEDURE — 25000003 PHARM REV CODE 250: Performed by: STUDENT IN AN ORGANIZED HEALTH CARE EDUCATION/TRAINING PROGRAM

## 2023-10-28 PROCEDURE — 80048 BASIC METABOLIC PNL TOTAL CA: CPT

## 2023-10-28 PROCEDURE — 99232 PR SUBSEQUENT HOSPITAL CARE,LEVL II: ICD-10-PCS | Mod: ,,, | Performed by: NURSE PRACTITIONER

## 2023-10-28 PROCEDURE — 83735 ASSAY OF MAGNESIUM: CPT

## 2023-10-28 PROCEDURE — 99232 SBSQ HOSP IP/OBS MODERATE 35: CPT | Mod: ,,, | Performed by: NURSE PRACTITIONER

## 2023-10-28 PROCEDURE — 25000003 PHARM REV CODE 250: Performed by: PHYSICIAN ASSISTANT

## 2023-10-28 PROCEDURE — 11000001 HC ACUTE MED/SURG PRIVATE ROOM

## 2023-10-28 PROCEDURE — 99233 SBSQ HOSP IP/OBS HIGH 50: CPT | Mod: ,,, | Performed by: PSYCHIATRY & NEUROLOGY

## 2023-10-28 PROCEDURE — 99223 1ST HOSP IP/OBS HIGH 75: CPT | Mod: ,,, | Performed by: HOSPITALIST

## 2023-10-28 RX ORDER — POTASSIUM CHLORIDE 20 MEQ/1
40 TABLET, EXTENDED RELEASE ORAL
Status: COMPLETED | OUTPATIENT
Start: 2023-10-28 | End: 2023-10-28

## 2023-10-28 RX ORDER — LISINOPRIL 2.5 MG/1
2.5 TABLET ORAL ONCE
Status: COMPLETED | OUTPATIENT
Start: 2023-10-28 | End: 2023-10-28

## 2023-10-28 RX ORDER — LISINOPRIL 5 MG/1
5 TABLET ORAL DAILY
Status: DISCONTINUED | OUTPATIENT
Start: 2023-10-29 | End: 2023-10-29

## 2023-10-28 RX ORDER — INSULIN ASPART 100 [IU]/ML
5 INJECTION, SOLUTION INTRAVENOUS; SUBCUTANEOUS
Status: DISCONTINUED | OUTPATIENT
Start: 2023-10-28 | End: 2023-10-30 | Stop reason: HOSPADM

## 2023-10-28 RX ADMIN — ATORVASTATIN CALCIUM 40 MG: 40 TABLET, FILM COATED ORAL at 08:10

## 2023-10-28 RX ADMIN — INSULIN ASPART 5 UNITS: 100 INJECTION, SOLUTION INTRAVENOUS; SUBCUTANEOUS at 03:10

## 2023-10-28 RX ADMIN — HEPARIN SODIUM 5000 UNITS: 5000 INJECTION INTRAVENOUS; SUBCUTANEOUS at 10:10

## 2023-10-28 RX ADMIN — INSULIN ASPART 4 UNITS: 100 INJECTION, SOLUTION INTRAVENOUS; SUBCUTANEOUS at 07:10

## 2023-10-28 RX ADMIN — HEPARIN SODIUM 5000 UNITS: 5000 INJECTION INTRAVENOUS; SUBCUTANEOUS at 06:10

## 2023-10-28 RX ADMIN — ASPIRIN 81 MG: 81 TABLET, COATED ORAL at 08:10

## 2023-10-28 RX ADMIN — LISINOPRIL 2.5 MG: 2.5 TABLET ORAL at 08:10

## 2023-10-28 RX ADMIN — INSULIN ASPART 2 UNITS: 100 INJECTION, SOLUTION INTRAVENOUS; SUBCUTANEOUS at 03:10

## 2023-10-28 RX ADMIN — Medication 400 MG: at 08:10

## 2023-10-28 RX ADMIN — INSULIN DETEMIR 12 UNITS: 100 INJECTION, SOLUTION SUBCUTANEOUS at 08:10

## 2023-10-28 RX ADMIN — Medication 400 MG: at 10:10

## 2023-10-28 RX ADMIN — INSULIN ASPART 4 UNITS: 100 INJECTION, SOLUTION INTRAVENOUS; SUBCUTANEOUS at 11:10

## 2023-10-28 RX ADMIN — CLOPIDOGREL BISULFATE 75 MG: 75 TABLET ORAL at 08:10

## 2023-10-28 RX ADMIN — POTASSIUM CHLORIDE 40 MEQ: 1500 TABLET, EXTENDED RELEASE ORAL at 05:10

## 2023-10-28 RX ADMIN — POTASSIUM CHLORIDE 40 MEQ: 1500 TABLET, EXTENDED RELEASE ORAL at 03:10

## 2023-10-28 RX ADMIN — ACETAMINOPHEN 650 MG: 325 TABLET ORAL at 04:10

## 2023-10-28 RX ADMIN — LISINOPRIL 2.5 MG: 2.5 TABLET ORAL at 11:10

## 2023-10-28 NOTE — SUBJECTIVE & OBJECTIVE
"Interval HPI:   Overnight events: NAEON. BG at or slightly above goal ranges on current SQ insulin regimen. Diet diabetic 2000 Calorie; Thin    Eatin%  Nausea: No  Hypoglycemia and intervention: No  Fever: No  TPN and/or TF: No  If yes, type of TF/TPN and rate: n/a    BP (!) 174/105 (Patient Position: Lying)   Pulse 81   Temp 98.6 °F (37 °C) (Oral)   Resp 18   Ht 5' 2.01" (1.575 m)   Wt 72.6 kg (160 lb)   SpO2 100%   BMI 29.26 kg/m²     Labs Reviewed and Include    Recent Labs   Lab 10/27/23  190   K 3.5     Lab Results   Component Value Date    WBC 8.69 10/27/2023    HGB 14.7 10/27/2023    HCT 41.1 10/27/2023    MCV 84 10/27/2023     10/27/2023     Recent Labs   Lab 10/25/23  195   TSH 1.429     Lab Results   Component Value Date    HGBA1C 12.5 (H) 10/25/2023       Nutritional status:   Body mass index is 29.26 kg/m².  Lab Results   Component Value Date    ALBUMIN 2.7 (L) 10/27/2023    ALBUMIN 2.9 (L) 10/26/2023    ALBUMIN 2.7 (L) 10/25/2023     No results found for: "PREALBUMIN"    Estimated Creatinine Clearance: 70.2 mL/min (based on SCr of 1.1 mg/dL).    Accu-Checks  Recent Labs     10/25/23  2305 10/26/23  0741 10/26/23  1124 10/26/23  1554 10/26/23  2145 10/27/23  0759 10/27/23  1134 10/27/23  1605 10/27/23  2121 10/28/23  0723   POCTGLUCOSE 203* 161* 356* 196* 159* 151* 184* 197* 110 122*       Current Medications and/or Treatments Impacting Glycemic Control  Immunotherapy:    Immunosuppressants       None          Steroids:   Hormones (From admission, onward)      None          Pressors:    Autonomic Drugs (From admission, onward)      None          Hyperglycemia/Diabetes Medications:   Antihyperglycemics (From admission, onward)      Start     Stop Route Frequency Ordered    10/26/23 1645  insulin aspart U-100 pen 4 Units         -- SubQ 3 times daily with meals 10/26/23 1152    10/26/23 1200  insulin detemir U-100 (Levemir) pen 12 Units         -- SubQ Daily 10/26/23 1152    " 10/26/23 0952  insulin aspart U-100 pen 0-10 Units         -- SubQ Before meals & nightly PRN 10/26/23 0853

## 2023-10-28 NOTE — ASSESSMENT & PLAN NOTE
BG goal: 140-180    - Increase Novolog to 5 units TIDWM  (20% dose increase)   - Levemir 12 units daily (WBD @ 0.3 u/kg/day)   - MDC (150/25) prn for hyperglycemia   - POCT Glucose before meals and at bedtime  - Hypoglycemia protocol in place      ** Please notify Endocrine for any change and/or advance in diet**  ** Please call Endocrine for any BG related issues **     Discharge Planning:   TBD. Please notify endocrinology prior to discharge.

## 2023-10-28 NOTE — SUBJECTIVE & OBJECTIVE
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    Shellfish containing products Shortness Of Breath     Has received iodinated contrast in the past with no reaction       No current facility-administered medications on file prior to encounter.     Current Outpatient Medications on File Prior to Encounter   Medication Sig    amLODIPine (NORVASC) 10 MG tablet Take 1 tablet (10 mg total) by mouth once daily.    atorvastatin (LIPITOR) 40 MG tablet Take 1 tablet (40 mg total) by mouth once daily.    blood-glucose meter kit Use as instructed    lancing device Misc 1 Device by Misc.(Non-Drug; Combo Route) route 2 (two) times daily with meals.    lisinopriL (PRINIVIL,ZESTRIL) 20 MG tablet Take 1 tablet (20 mg total) by mouth once daily.    SITagliptin (JANUVIA) 100 MG Tab Take 1 tablet (100 mg total) by mouth once daily.     Family History       Problem Relation (Age of Onset)    Diabetes Mother    Hypertension Mother, Father    Stroke Father          Tobacco Use    Smoking status: Never    Smokeless tobacco: Not on file   Substance and Sexual Activity    Alcohol use: Not Currently    Drug use: Never    Sexual activity: Not on file     Review of Systems   Constitutional: Negative.    HENT: Negative.     Respiratory: Negative.     Cardiovascular: Negative.    Gastrointestinal: Negative.  Negative for constipation, diarrhea, nausea and vomiting.   Musculoskeletal:  Negative for myalgias.   Neurological:  Positive for weakness.     Objective:     Vital Signs (Most Recent):  Temp: 98.3 °F (36.8 °C) (10/28/23 1544)  Pulse: 65 (10/28/23 1544)  Resp: 18 (10/28/23 1544)  BP: (!) 178/118 (10/28/23 1544)  SpO2:  100 % (10/28/23 1544) Vital Signs (24h Range):  Temp:  [97.5 °F (36.4 °C)-98.7 °F (37.1 °C)] 98.3 °F (36.8 °C)  Pulse:  [] 65  Resp:  [16-18] 18  SpO2:  [97 %-100 %] 100 %  BP: (165-186)/(103-118) 178/118     Weight: 72.6 kg (160 lb)  Body mass index is 29.26 kg/m².     Physical Exam  Vitals and nursing note reviewed.   Constitutional:       General: He is not in acute distress.     Appearance: He is not ill-appearing, toxic-appearing or diaphoretic.   HENT:      Head: Normocephalic and atraumatic.      Mouth/Throat:      Mouth: Mucous membranes are moist.   Eyes:      General: No scleral icterus.        Right eye: No discharge.         Left eye: No discharge.   Cardiovascular:      Rate and Rhythm: Normal rate and regular rhythm.      Heart sounds: Normal heart sounds.   Pulmonary:      Effort: Pulmonary effort is normal. No respiratory distress.      Breath sounds: No wheezing, rhonchi or rales.   Abdominal:      General: Bowel sounds are normal. There is no distension.      Palpations: Abdomen is soft.      Tenderness: There is no abdominal tenderness. There is no guarding or rebound.   Musculoskeletal:         General: No tenderness.      Cervical back: No rigidity.      Right lower leg: No edema.      Left lower leg: No edema.   Skin:     General: Skin is warm and dry.      Coloration: Skin is not jaundiced.   Neurological:      Mental Status: He is alert and oriented to person, place, and time. Mental status is at baseline.      Motor: Weakness present.   Psychiatric:         Mood and Affect: Mood normal.         Behavior: Behavior normal.          Significant Labs: All pertinent labs within the past 24 hours have been reviewed.  LABS:  Recent Labs   Lab 10/26/23  0734 10/27/23  0337 10/27/23  1902 10/28/23  0809    138  --  141   K 2.8* 2.9* 3.5 3.2*    101  --  105   CO2 27 24  --  24   BUN 12 18  --  12   CREATININE 1.1 1.1  --  1.0   * 145*  --  109   ANIONGAP 12 13  --  12      Recent Labs   Lab 10/26/23  0502 10/27/23  1902 10/28/23  0809   MG 1.8 1.9 1.9   PHOS 2.7 2.0* 3.3     Recent Labs   Lab 10/25/23  1956 10/26/23  0734 10/27/23  0337   AST 23 27 29   ALT 24 24 24   ALKPHOS 77 73 69   BILITOT 0.8 1.0 0.7   ALBUMIN 2.7* 2.9* 2.7*     POCT Glucose:   Recent Labs   Lab 10/28/23  0723 10/28/23  1102 10/28/23  1543   POCTGLUCOSE 122* 208* 160*    Recent Labs   Lab 10/25/23  1956 10/26/23  0502 10/27/23  0338   WBC 9.04 8.83 8.69   HGB 14.7 15.0 14.7   HCT 41.1 41.8 41.1    171 158   GRAN 73.1*  6.6 60.5  5.3 58.6  5.1            Significant Imaging: I have reviewed all pertinent imaging results/findings within the past 24 hours.      Inpatient Medications:  Continuous Infusions:   sodium chloride 0.9%       Scheduled Meds:   aspirin  81 mg Oral Daily    atorvastatin  40 mg Oral Daily    clopidogreL  75 mg Oral Daily    heparin (porcine)  5,000 Units Subcutaneous Q8H    insulin aspart U-100  5 Units Subcutaneous TIDWM    insulin detemir U-100  12 Units Subcutaneous Daily    [START ON 10/29/2023] lisinopriL  5 mg Oral Daily    magnesium oxide  400 mg Oral BID    potassium chloride  40 mEq Oral Q1H     PRN Meds:acetaminophen, dextrose 10%, dextrose 10%, dextrose 10%, dextrose 10%, dextrose 10%, glucagon (human recombinant), glucose, glucose, insulin aspart U-100, ondansetron, sodium chloride 0.9%, sodium chloride 0.9%

## 2023-10-28 NOTE — ASSESSMENT & PLAN NOTE
Recent Labs     10/26/23  0502 10/26/23  0734 10/27/23  0337 10/27/23  1902 10/28/23  0809   K  --    < > 2.9* 3.5 3.2*   PHOS 2.7  --   --  2.0* 3.3   MG 1.8  --   --  1.9 1.9    < > = values in this interval not displayed.       Differential diagnoses include, but not limited to: diarrhea, stress response    PLAN:  --Followup Aldosterone/renin ratio   --Replete electrolytes(Mag, Phos).   --Replete potassium to target K of 4

## 2023-10-28 NOTE — PLAN OF CARE
Problem: Adjustment to Illness (Stroke, Ischemic/Transient Ischemic Attack)  Goal: Optimal Coping  Outcome: Ongoing, Progressing     Problem: Bowel Elimination Impaired (Stroke, Ischemic/Transient Ischemic Attack)  Goal: Effective Bowel Elimination  Outcome: Ongoing, Progressing     Problem: Cerebral Tissue Perfusion (Stroke, Ischemic/Transient Ischemic Attack)  Goal: Optimal Cerebral Tissue Perfusion  Outcome: Ongoing, Progressing

## 2023-10-28 NOTE — PT/OT/SLP PROGRESS
Physical Therapy Treatment    Patient Name:  Cecil Clark   MRN:  [id number]    Recommendations:     Discharge Recommendations: Low Intensity Therapy  Discharge Equipment Recommendations: walker, rolling, shower chair  Barriers to discharge: None    Assessment:     Cecil Clark is a 48 y.o. male admitted with a medical diagnosis of Stroke due to thrombosis of right middle cerebral artery.  He presents with the following impairments/functional limitations: weakness, impaired endurance, gait instability, impaired balance, decreased upper extremity function, decreased lower extremity function, decreased safety awareness, abnormal tone. Pt with continued progress as demonstrated by completion of stair training with x1 LOB.     Rehab Prognosis: Good; patient would benefit from acute skilled PT services to address these deficits and reach maximum level of function.    Recent Surgery: * No surgery found *      Plan:     During this hospitalization, patient to be seen 3 x/week to address the identified rehab impairments via gait training, therapeutic activities, therapeutic exercises, neuromuscular re-education and progress toward the following goals:    Plan of Care Expires:  11/26/23    Subjective     Chief Complaint: pt agreeable to PT  Patient/Family Comments/goals: none verbalized  Pain/Comfort:  Pain Rating 1: 0/10      Objective:     Communicated with RN prior to session.  Patient found sitting edge of bed with  (no active lines) upon PT entry to room.     General Precautions: Standard, aspiration, fall  Orthopedic Precautions: N/A  Braces: N/A  Respiratory Status: Room air     Functional Mobility:  Transfers:     Sit to Stand:  stand by assistance with no AD  Gait: ~100ft to and from stairs with CGA no AD  Stairs:  Pt ascended/descended 18 stair(s) with No Assistive Device with right handrail and no handrails with Contact Guard Assistance.       AM-PAC 6 CLICK MOBILITY  Turning over in bed (including adjusting bedclothes,  sheets and blankets)?: 4  Sitting down on and standing up from a chair with arms (e.g., wheelchair, bedside commode, etc.): 4  Moving from lying on back to sitting on the side of the bed?: 3  Moving to and from a bed to a chair (including a wheelchair)?: 3  Need to walk in hospital room?: 3  Climbing 3-5 steps with a railing?: 3  Basic Mobility Total Score: 20       Treatment & Education:  Pt completed x4 stairs with no handrail and the rest with R sided handrail to mimic home environment. X1 LOB on first step due to L foot not clearing requiring min to mod to recover.   Education provided on stair strategies, safety, DF as HEP, and calling for assist.     Bedside table in front of patient and area set up for function, convenience, and safety. RN aware of patient's mobility needs and status. Questions/concerns addressed within PTA scope of practice; patient  with no further questions. Time was provided for active listening, discussion of health disposition, and discussion of safe discharge.    Patient left sitting edge of bed with all lines intact, call button in reach, RN notified, and family present..    GOALS:   Multidisciplinary Problems       Physical Therapy Goals          Problem: Physical Therapy    Goal Priority Disciplines Outcome Goal Variances Interventions   Physical Therapy Goal     PT, PT/OT Ongoing, Progressing     Description: Goals to be met by: 2023     Patient will increase functional independence with mobility by performin. Supine to sit with [place]  2. Sit to supine with [place]  3. Sit to stand transfer with [place]  4. Gait  x 200 feet with Contact Guard Assistance using LRAD.   5. Ascend/descend 12 stair with right Handrails Minimal Assistance using LRAD.   6. Lower extremity exercise program x15 reps per handout, with independence                         Time Tracking:     PT Received On: 10/28/23  PT Start Time: 1354     PT Stop Time: 1425  PT Total Time (min):  31 min     Billable Minutes: Gait Training 31    Treatment Type: Treatment  PT/PTA: PTA     Number of PTA visits since last PT visit: 1     10/28/2023

## 2023-10-28 NOTE — PROGRESS NOTES
[facility] - Neurosurgery ([facility])  Vascular Neurology  Comprehensive Stroke Center  Progress Note    Assessment/Plan:     * Stroke due to thrombosis of right middle cerebral artery  Cecil Clark is a 48 y.o. male with PMHx of DM, HTN, and R thalamic ICH (, 2/2 HTN, no deficits) who reported to ED with LSW and L facial numbness which resulted in multiple falls. Stroke code called on arrival. Patient reports his symptom began between 2:30 am and 3:00 am. He was out of window for TNK. CT without acute changes, possible new infarct in R internal capsule. CTA without significant stenosis or LVO. MRI confirmed R internal capsule stroke. Echo with bubble unremarkable. Stroke etiology likely  in setting of multiple uncontrolled risk factors.      Antithrombotics for secondary stroke prevention: Antiplatelets: Aspirin: 81 mg daily  Clopidogrel: 75 mg daily    Statins for secondary stroke prevention and hyperlipidemia, if present:   Statins: Atorvastatin- 40 mg daily    Aggressive risk factor modification: HTN, DM     Rehab efforts: The patient has been evaluated by a stroke team provider and the therapy needs have been fully considered based off the presenting complaints and exam findings. The following therapy evaluations are needed: PT evaluate and treat, OT evaluate and treat, SLP evaluate and treat, PM&R evaluate for appropriate placement    Diagnostics ordered/pending: HgbA1C to assess blood glucose levels, Lipid Profile to assess cholesterol levels, MRI head without contrast to assess brain parenchyma, TTE to assess cardiac function/status , TSH to assess thyroid function    VTE prophylaxis: Heparin 5000 units SQ every 8 hours  Mechanical prophylaxis: Place SCDs    BP parameters: SBP goal <180        Hypokalemia  K+ 3.1 on admit  Remains hypokalemic despite repletion.  -hospital medicine consulted for recommendations      Type 2 diabetes mellitus with hyperglycemia, without long-term current use of insulin  Stroke  risk factor  A1c 12.5  Basal/bolus insulin w/ BG goal while inpatient 140-180  Endocrine consulted for co-management    Essential hypertension  Stroke risk factor   SBP goal <180  -Lisinopril 5mg daily    History of intracerebral hemorrhage without residual deficit  R thalamic hypertensive ICH in 2021         10/25 presented with LSW and falls, MRI significant for R internal capsule stroke. DAPT loaded.   10/26 Patient currently on DAPT, endocrine consulted for elevated A1c, echo pending. Dispo pending PT recs   10/27/2023 NAEO, neuro exam stable. Hypokalemia on labs again this morning (K 2.8 > 2.9), replacement ordered. Endo following, glucose improved on scheduled insulin. Dispo recs for low intensity therapy.   10/28/2023 HM consulted for hypokalemia. Increased lisinopril to 5mg. DME delivered.        STROKE DOCUMENTATION   Acute Stroke Times   Last Known Normal Date: 10/25/23  Last Known Normal Time: 0230  Symptom Onset Date: 10/25/23  Symptom Onset Time: 0230  Stroke Team Called Date: 10/25/23  Stroke Team Called Time: 1930  Stroke Team Arrival Date: 10/25/23  Stroke Team Arrival Time: 1937  CT Interpretation Time: 1945  Thrombolytic Therapy Recommended: No  CTA Interpretation Time: 1951  Thrombectomy Recommended: No    NIH Scale:  1a. Level of Consciousness: 0-->Alert, keenly responsive  1b. LOC Questions: 0-->Answers both questions correctly  1c. LOC Commands: 0-->Performs both tasks correctly  2. Best Gaze: 0-->Normal  3. Visual: 0-->No visual loss  4. Facial Palsy: 0-->Normal symmetrical movements  5a. Motor Arm, Left: 0-->No drift, limb holds 90 (or 45) degrees for full 10 secs  5b. Motor Arm, Right: 0-->No drift, limb holds 90 (or 45) degrees for full 10 secs  6a. Motor Leg, Left: 0-->No drift, leg holds 30 degree position for full 5 secs  6b. Motor Leg, Right: 0-->No drift, leg holds 30 degree position for full 5 secs  7. Limb Ataxia: 0-->Absent  8. Sensory: 0-->Normal, no sensory loss  9. Best Language:  0-->No aphasia, normal  10. Dysarthria: 0-->Normal  11. Extinction and Inattention (formerly Neglect): 0-->No abnormality  Total (NIH Stroke Scale): 0       Modified [place] Score: 0  [place] Coma Scale:    ABCD2 Score:    [id number] Score:   HAS -BLED Score:   ICH Score:   Hunt & Gonzalez Classification:      Hemorrhagic change of an Ischemic Stroke: Does this patient have an ischemic stroke with hemorrhagic changes? No     Neurologic Chief Complaint: LSW    Subjective:     Interval History: Patient is seen for follow-up neurological assessment and treatment recommendations: see hospital course    HPI, Past Medical, Family, and Social History remains the same as documented in the initial encounter.     Review of Systems   Neurological:  Positive for weakness.     Scheduled Meds:   aspirin  81 mg Oral Daily    atorvastatin  40 mg Oral Daily    clopidogreL  75 mg Oral Daily    heparin (porcine)  5,000 Units Subcutaneous Q8H    insulin aspart U-100  5 Units Subcutaneous TIDWM    insulin detemir U-100  12 Units Subcutaneous Daily    [START ON 10/29/2023] lisinopriL  5 mg Oral Daily    magnesium oxide  400 mg Oral BID    potassium bicarbonate  40 mEq Oral Daily     Continuous Infusions:   sodium chloride 0.9%       PRN Meds:acetaminophen, dextrose 10%, dextrose 10%, dextrose 10%, dextrose 10%, dextrose 10%, glucagon (human recombinant), glucose, glucose, insulin aspart U-100, ondansetron, sodium chloride 0.9%, sodium chloride 0.9%    Objective:     Vital Signs (Most Recent):  Temp: 98.3 °F (36.8 °C) (10/28/23 1113)  Pulse: 108 (10/28/23 1118)  Resp: 18 (10/28/23 1113)  BP: (!) 175/115 (10/28/23 1113)  SpO2: 99 % (10/28/23 1113)  BP Location: Left arm    Vital Signs Range (Last 24H):  Temp:  [97.5 °F (36.4 °C)-98.7 °F (37.1 °C)]   Pulse:  []   Resp:  [16-18]   BP: (165-186)/(103-115)   SpO2:  [97 %-100 %]   BP Location: Left arm       Physical Exam  Vitals and nursing note reviewed.   Constitutional:        General: He is not in acute distress.     Appearance: Normal appearance.   HENT:      Head: Normocephalic and atraumatic.      Mouth/Throat:      Mouth: Mucous membranes are moist.   Eyes:      Extraocular Movements: Extraocular movements intact.   Pulmonary:      Effort: Pulmonary effort is normal. No respiratory distress.   Abdominal:      General: Abdomen is flat. There is no distension.   Musculoskeletal:         General: No swelling. Normal range of motion.      Cervical back: Normal range of motion.   Skin:     General: Skin is warm.   Neurological:      Mental Status: He is alert and oriented to person, place, and time.      Motor: Weakness present.   Psychiatric:         Mood and Affect: Mood normal.         Behavior: Behavior normal.              Neurological Exam:   LOC: alert  Attention Span: Good   Language: No aphasia  Articulation: No dysarthria  Orientation: Person, Place, Time   Visual Fields: Full  EOM (CN III, IV, VI): Full/intact  Pupils (CN II, III): PERRL  Facial Sensation (CN V): Normal  Facial Movement (CN VII): Lower facial weakness on the Left  Motor: Arm left  Paresis: 4/5  Leg left  Normal 5/5  Arm right  Normal 5/5  Leg right Normal 5/5  Cerebellum: No evidence of appendicular or axial ataxia  Sensation: intact to light touch  Tone: Normal tone throughout    Laboratory:  CMP:   Recent Labs   Lab 10/28/23  0809   CALCIUM 8.2*      K 3.2*   CO2 24      BUN 12   CREATININE 1.0     CBC:   Recent Labs   Lab 10/27/23  0338   WBC 8.69   RBC 4.88   HGB 14.7   HCT 41.1      MCV 84   MCH 30.1   MCHC 35.8       Diagnostic Results     Brain imaging:  MRI Brain without contrast 10/25/23  Impression:  Small focus of acute infarction in the posterior limb of the right internal capsule.  Chronic microhemorrhage in the right thalamus and superior to the left subinsular cortex.  Changes of chronic small vessel ischemic disease.        Vessel Imaging:  CTA stroke multiphase  10/25/23  Impression:  No evidence of acute ICH  Remote R thalamic hypodensity  Age indeterminate hypodensity in R internal capsule, possibly contributing to patient's symptoms  No LVO or significant stenosis        Cardiac Evaluation:   TTE with bubble 10/26/2023    Left Ventricle: The left ventricle is normal in size. Normal wall thickness. There is concentric remodeling. Normal wall motion. There is normal systolic function with a visually estimated ejection fraction of 60 - 65%. There is normal diastolic function.    Left Atrium: Agitated saline study of the atrial septum is negative after vasalva maneuver, suggesting absence of intracardiac shunt at the atrial level.    Right Ventricle: Normal right ventricular cavity size. Wall thickness is normal. Right ventricle wall motion  is normal. Systolic function is normal.    IVC/SVC: Normal venous pressure at 3 mmHg.         Geovanny Conroy MD  [facility] St[facility]  Department of Vascular Neurology   [facility])

## 2023-10-28 NOTE — CONSULTS
Vance Lyman - Neurosurgery ([facility])  Hospital Medicine  Consult Note    Patient Name: Cecil Clark  MRN: [id number]  Admission Date: 10/25/2023  Hospital Length of Stay: 3 days  Attending Physician: Efraín Jacobsen MD   Primary Care Provider: Efraín Jacobsen MD           Patient information was obtained from patient, past medical records, ER records and primary team.     Inpatient consult to Hospital Medicine-General  Consult performed by: Tim Nuñez DO  Consult ordered by: Elaine Latham PA-C        Subjective:     Principal Problem: Stroke due to thrombosis of right middle cerebral artery    Chief Complaint:   Chief Complaint   Patient presents with    Extremity Weakness     LUE and LLE weakness since 0230. States unable to move        HPI: Mr. Clark is a 48 y.o. male with hx of DM, HTN, and R thalamic ICH (2021, 2/2 HTN, no deficits) who [place] [facility] [place] with stroke symptoms with evaluation noting R internal capsule stroke. During admission, patient was noted to have persistent hypokalemia so Hospital medicine was consulted for further evaluation.  During initial evaluation, patient reported 1 day of diarrhea during hospitalization that has now resolved.       Past Medical History:   Diagnosis Date    Diabetes mellitus     Hypertension     R thalmaic hypertensive ICH 04/04/2021    Tachycardia 4/4/2021       Past Surgical History:   Procedure Laterality Date    ANTERIOR CRUCIATE LIGAMENT REPAIR Right     2002       Review of patient's allergies indicates:   Allergen Reactions    Aspartame Nausea And Vomiting     Violent emesis.    Shellfish containing products Shortness Of Breath     Has received iodinated contrast in the past with no reaction       No current facility-administered medications on file prior to encounter.     Current Outpatient Medications on File Prior to Encounter   Medication Sig    amLODIPine (NORVASC) 10 MG tablet Take 1 tablet (10 mg total) by mouth once  daily.    atorvastatin (LIPITOR) 40 MG tablet Take 1 tablet (40 mg total) by mouth once daily.    blood-glucose meter kit Use as instructed    lancing device Misc 1 Device by Misc.(Non-Drug; Combo Route) route 2 (two) times daily with meals.    lisinopriL (PRINIVIL,ZESTRIL) 20 MG tablet Take 1 tablet (20 mg total) by mouth once daily.    SITagliptin (JANUVIA) 100 MG Tab Take 1 tablet (100 mg total) by mouth once daily.     Family History       Problem Relation (Age of Onset)    Diabetes Mother    Hypertension Mother, Father    Stroke Father          Tobacco Use    Smoking status: Never    Smokeless tobacco: Not on file   Substance and Sexual Activity    Alcohol use: Not Currently    Drug use: Never    Sexual activity: Not on file     Review of Systems   Constitutional: Negative.    HENT: Negative.     Respiratory: Negative.     Cardiovascular: Negative.    Gastrointestinal: Negative.  Negative for constipation, diarrhea, nausea and vomiting.   Musculoskeletal:  Negative for myalgias.   Neurological:  Positive for weakness.     Objective:     Vital Signs (Most Recent):  Temp: 98.3 °F (36.8 °C) (10/28/23 1544)  Pulse: 65 (10/28/23 1544)  Resp: 18 (10/28/23 1544)  BP: (!) 178/118 (10/28/23 1544)  SpO2: 100 % (10/28/23 1544) Vital Signs (24h Range):  Temp:  [97.5 °F (36.4 °C)-98.7 °F (37.1 °C)] 98.3 °F (36.8 °C)  Pulse:  [] 65  Resp:  [16-18] 18  SpO2:  [97 %-100 %] 100 %  BP: (165-186)/(103-118) 178/118     Weight: 72.6 kg (160 lb)  Body mass index is 29.26 kg/m².     Physical Exam  Vitals and nursing note reviewed.   Constitutional:       General: He is not in acute distress.     Appearance: He is not ill-appearing, toxic-appearing or diaphoretic.   HENT:      Head: Normocephalic and atraumatic.      Mouth/Throat:      Mouth: Mucous membranes are moist.   Eyes:      General: No scleral icterus.        Right eye: No discharge.         Left eye: No discharge.   Cardiovascular:      Rate and Rhythm:  Normal rate and regular rhythm.      Heart sounds: Normal heart sounds.   Pulmonary:      Effort: Pulmonary effort is normal. No respiratory distress.      Breath sounds: No wheezing, rhonchi or rales.   Abdominal:      General: Bowel sounds are normal. There is no distension.      Palpations: Abdomen is soft.      Tenderness: There is no abdominal tenderness. There is no guarding or rebound.   Musculoskeletal:         General: No tenderness.      Cervical back: No rigidity.      Right lower leg: No edema.      Left lower leg: No edema.   Skin:     General: Skin is warm and dry.      Coloration: Skin is not jaundiced.   Neurological:      Mental Status: He is alert and oriented to person, place, and time. Mental status is at baseline.      Motor: Weakness present.   Psychiatric:         Mood and Affect: Mood normal.         Behavior: Behavior normal.          Significant Labs: All pertinent labs within the past 24 hours have been reviewed.  LABS:  Recent Labs   Lab 10/26/23  0734 10/27/23  0337 10/27/23  1902 10/28/23  0809    138  --  141   K 2.8* 2.9* 3.5 3.2*    101  --  105   CO2 27 24  --  24   BUN 12 18  --  12   CREATININE 1.1 1.1  --  1.0   * 145*  --  109   ANIONGAP 12 13  --  12     Recent Labs   Lab 10/26/23  0502 10/27/23  1902 10/28/23  0809   MG 1.8 1.9 1.9   PHOS 2.7 2.0* 3.3     Recent Labs   Lab 10/25/23  1956 10/26/23  0734 10/27/23  0337   AST 23 27 29   ALT 24 24 24   ALKPHOS 77 73 69   BILITOT 0.8 1.0 0.7   ALBUMIN 2.7* 2.9* 2.7*     POCT Glucose:   Recent Labs   Lab 10/28/23  0723 10/28/23  1102 10/28/23  1543   POCTGLUCOSE 122* 208* 160*    Recent Labs   Lab 10/25/23  1956 10/26/23  0502 10/27/23  0338   WBC 9.04 8.83 8.69   HGB 14.7 15.0 14.7   HCT 41.1 41.8 41.1    171 158   GRAN 73.1*  6.6 60.5  5.3 58.6  5.1            Significant Imaging: I have reviewed all pertinent imaging results/findings within the past 24 hours.      Inpatient Medications:  Continuous  Infusions:   sodium chloride 0.9%       Scheduled Meds:   aspirin  81 mg Oral Daily    atorvastatin  40 mg Oral Daily    clopidogreL  75 mg Oral Daily    heparin (porcine)  5,000 Units Subcutaneous Q8H    insulin aspart U-100  5 Units Subcutaneous TIDWM    insulin detemir U-100  12 Units Subcutaneous Daily    [START ON 10/29/2023] lisinopriL  5 mg Oral Daily    magnesium oxide  400 mg Oral BID    potassium chloride  40 mEq Oral Q1H     PRN Meds:acetaminophen, dextrose 10%, dextrose 10%, dextrose 10%, dextrose 10%, dextrose 10%, glucagon (human recombinant), glucose, glucose, insulin aspart U-100, ondansetron, sodium chloride 0.9%, sodium chloride 0.9%      Assessment/Plan:     Hypokalemia  Recent Labs     10/26/23  0502 10/26/23  0734 10/27/23  0337 10/27/23  1902 10/28/23  0809   K  --    < > 2.9* 3.5 3.2*   PHOS 2.7  --   --  2.0* 3.3   MG 1.8  --   --  1.9 1.9    < > = values in this interval not displayed.       Differential diagnoses include, but not limited to: diarrhea, stress response    PLAN:  --Followup Aldosterone/renin ratio   --Replete electrolytes(Mag, Phos).   --Replete potassium to target K of 4       Type 2 diabetes mellitus with hyperglycemia, without long-term current use of insulin  Endocrinology following      Essential hypertension  Chronic,     Hypertension Medications per chart review            amLODIPine (NORVASC) 10 MG tablet Take 1 tablet (10 mg total) by mouth once daily.    lisinopriL (PRINIVIL,ZESTRIL) 20 MG tablet Take 1 tablet (20 mg total) by mouth once daily.        Hypertension being managed by primary team(Stroke). Started on Lisinopril.    VTE Risk Mitigation (From admission, onward)         Ordered     heparin (porcine) injection 5,000 Units  Every 8 hours         10/25/23 2008     IP VTE LOW RISK PATIENT  Once         10/25/23 2007     Place sequential compression device  Until discontinued         10/25/23 2007                    Thank you for your consult. I will  follow-up with patient. Please contact us if you have any additional questions.    Tim Nuñez DO  Department of Hospital Medicine   Vnace Lyman - Neurosurgery ([facility]

## 2023-10-28 NOTE — SUBJECTIVE & OBJECTIVE
Neurologic Chief Complaint: LSW    Subjective:     Interval History: Patient is seen for follow-up neurological assessment and treatment recommendations: see hospital course    HPI, Past Medical, Family, and Social History remains the same as documented in the initial encounter.     Review of Systems   Neurological:  Positive for weakness.     Scheduled Meds:   aspirin  81 mg Oral Daily    atorvastatin  40 mg Oral Daily    clopidogreL  75 mg Oral Daily    heparin (porcine)  5,000 Units Subcutaneous Q8H    insulin aspart U-100  5 Units Subcutaneous TIDWM    insulin detemir U-100  12 Units Subcutaneous Daily    [START ON 10/29/2023] lisinopriL  5 mg Oral Daily    magnesium oxide  400 mg Oral BID    potassium bicarbonate  40 mEq Oral Daily     Continuous Infusions:   sodium chloride 0.9%       PRN Meds:acetaminophen, dextrose 10%, dextrose 10%, dextrose 10%, dextrose 10%, dextrose 10%, glucagon (human recombinant), glucose, glucose, insulin aspart U-100, ondansetron, sodium chloride 0.9%, sodium chloride 0.9%    Objective:     Vital Signs (Most Recent):  Temp: 98.3 °F (36.8 °C) (10/28/23 1113)  Pulse: 108 (10/28/23 1118)  Resp: 18 (10/28/23 1113)  BP: (!) 175/115 (10/28/23 1113)  SpO2: 99 % (10/28/23 1113)  BP Location: Left arm    Vital Signs Range (Last 24H):  Temp:  [97.5 °F (36.4 °C)-98.7 °F (37.1 °C)]   Pulse:  []   Resp:  [16-18]   BP: (165-186)/(103-115)   SpO2:  [97 %-100 %]   BP Location: Left arm       Physical Exam  Vitals and nursing note reviewed.   Constitutional:       General: He is not in acute distress.     Appearance: Normal appearance.   HENT:      Head: Normocephalic and atraumatic.      Mouth/Throat:      Mouth: Mucous membranes are moist.   Eyes:      Extraocular Movements: Extraocular movements intact.   Pulmonary:      Effort: Pulmonary effort is normal. No respiratory distress.   Abdominal:      General: Abdomen is flat. There is no distension.   Musculoskeletal:         General: No  swelling. Normal range of motion.      Cervical back: Normal range of motion.   Skin:     General: Skin is warm.   Neurological:      Mental Status: He is alert and oriented to person, place, and time.      Motor: Weakness present.   Psychiatric:         Mood and Affect: Mood normal.         Behavior: Behavior normal.              Neurological Exam:   LOC: alert  Attention Span: Good   Language: No aphasia  Articulation: No dysarthria  Orientation: Person, Place, Time   Visual Fields: Full  EOM (CN III, IV, VI): Full/intact  Pupils (CN II, III): PERRL  Facial Sensation (CN V): Normal  Facial Movement (CN VII): Lower facial weakness on the Left  Motor: Arm left  Paresis: 4/5  Leg left  Normal 5/5  Arm right  Normal 5/5  Leg right Normal 5/5  Cerebellum: No evidence of appendicular or axial ataxia  Sensation: intact to light touch  Tone: Normal tone throughout    Laboratory:  CMP:   Recent Labs   Lab 10/28/23  0809   CALCIUM 8.2*      K 3.2*   CO2 24      BUN 12   CREATININE 1.0     CBC:   Recent Labs   Lab 10/27/23  0338   WBC 8.69   RBC 4.88   HGB 14.7   HCT 41.1      MCV 84   MCH 30.1   MCHC 35.8       Diagnostic Results     Brain imaging:  MRI Brain without contrast 10/25/23  Impression:  Small focus of acute infarction in the posterior limb of the right internal capsule.  Chronic microhemorrhage in the right thalamus and superior to the left subinsular cortex.  Changes of chronic small vessel ischemic disease.        Vessel Imaging:  CTA stroke multiphase 10/25/23  Impression:  No evidence of acute ICH  Remote R thalamic hypodensity  Age indeterminate hypodensity in R internal capsule, possibly contributing to patient's symptoms  No LVO or significant stenosis        Cardiac Evaluation:   TTE with bubble 10/26/2023    Left Ventricle: The left ventricle is normal in size. Normal wall thickness. There is concentric remodeling. Normal wall motion. There is normal systolic function with a visually  estimated ejection fraction of 60 - 65%. There is normal diastolic function.    Left Atrium: Agitated saline study of the atrial septum is negative after vasalva maneuver, suggesting absence of intracardiac shunt at the atrial level.    Right Ventricle: Normal right ventricular cavity size. Wall thickness is normal. Right ventricle wall motion  is normal. Systolic function is normal.    IVC/SVC: Normal venous pressure at 3 mmHg.

## 2023-10-28 NOTE — ASSESSMENT & PLAN NOTE
Stroke risk factor  A1c 12.5  Basal/bolus insulin w/ BG goal while inpatient 140-180  Endocrine consulted for co-management

## 2023-10-28 NOTE — ASSESSMENT & PLAN NOTE
Chronic,     Hypertension Medications per chart review            amLODIPine (NORVASC) 10 MG tablet Take 1 tablet (10 mg total) by mouth once daily.    lisinopriL (PRINIVIL,ZESTRIL) 20 MG tablet Take 1 tablet (20 mg total) by mouth once daily.        Hypertension being managed by primary team(Stroke). Started on Lisinopril.

## 2023-10-28 NOTE — PROGRESS NOTES
"Vance Lyman - Neurosurgery ([facility])  Endocrinology  Progress Note    Admit Date: 10/25/2023     Reason for Consult: Management of T2DM, Hyperglycemia      Diabetes diagnosis year:      Home Diabetes Medications:  None     Lab Results   Component Value Date    HGBA1C 12.5 (H) 10/25/2023       How often checking glucose at home? Denies      Diabetes Complications include:     Diabetic chronic kidney disease and Diabetic retinopathy      Complicating diabetes co morbidities:   History of CVA and Residual deficits from CVA        HPI:   Patient is a 48 y.o. male with a diagnosis of type 2 DM, HTN, and R thalamic ICH (, 2/2 HTN, no deficits) who reported to ED with LSW and L facial numbness which resulted in multiple falls. Admitted for further workup and treatment. Endocrinology consulted for BG/ DM management.       Interval HPI:   Overnight events: NAEON. BG at or slightly above goal ranges on current SQ insulin regimen. Diet diabetic 2000 Calorie; Thin    Eatin%  Nausea: No  Hypoglycemia and intervention: No  Fever: No  TPN and/or TF: No  If yes, type of TF/TPN and rate: n/a    BP (!) 174/105 (Patient Position: Lying)   Pulse 81   Temp 98.6 °F (37 °C) (Oral)   Resp 18   Ht 5' 2.01" (1.575 m)   Wt 72.6 kg (160 lb)   SpO2 100%   BMI 29.26 kg/m²     Labs Reviewed and Include    Recent Labs   Lab 10/27/23  1902   K 3.5     Lab Results   Component Value Date    WBC 8.69 10/27/2023    HGB 14.7 10/27/2023    HCT 41.1 10/27/2023    MCV 84 10/27/2023     10/27/2023     Recent Labs   Lab 10/25/23  1956   TSH 1.429     Lab Results   Component Value Date    HGBA1C 12.5 (H) 10/25/2023       Nutritional status:   Body mass index is 29.26 kg/m².  Lab Results   Component Value Date    ALBUMIN 2.7 (L) 10/27/2023    ALBUMIN 2.9 (L) 10/26/2023    ALBUMIN 2.7 (L) 10/25/2023     No results found for: "PREALBUMIN"    Estimated Creatinine Clearance: 70.2 mL/min (based on SCr of 1.1 " mg/dL).    Accu-Checks  Recent Labs     10/25/23  2305 10/26/23  0741 10/26/23  1124 10/26/23  1554 10/26/23  2145 10/27/23  0759 10/27/23  1134 10/27/23  1605 10/27/23  2121 10/28/23  0723   POCTGLUCOSE 203* 161* 356* 196* 159* 151* 184* 197* 110 122*       Current Medications and/or Treatments Impacting Glycemic Control  Immunotherapy:    Immunosuppressants       None          Steroids:   Hormones (From admission, onward)      None          Pressors:    Autonomic Drugs (From admission, onward)      None          Hyperglycemia/Diabetes Medications:   Antihyperglycemics (From admission, onward)      Start     Stop Route Frequency Ordered    10/26/23 1645  insulin aspart U-100 pen 4 Units         -- SubQ 3 times daily with meals 10/26/23 1152    10/26/23 1200  insulin detemir U-100 (Levemir) pen 12 Units         -- SubQ Daily 10/26/23 1152    10/26/23 0952  insulin aspart U-100 pen 0-10 Units         -- SubQ Before meals & nightly PRN 10/26/23 0853            ASSESSMENT and PLAN    Neuro  * Stroke due to thrombosis of right middle cerebral artery  Managed per primary.   avoid hypoglycemia        History of intracerebral hemorrhage without residual deficit  Optimize BG control       Endocrine  Type 2 diabetes mellitus with hyperglycemia, without long-term current use of insulin  BG goal: 140-180    - Increase Novolog to 5 units TIDWM  (20% dose increase)   - Levemir 12 units daily (WBD @ 0.3 u/kg/day)   - [facility] (150/25) prn for hyperglycemia   - POCT Glucose before meals and at bedtime  - Hypoglycemia protocol in place      ** Please notify Endocrine for any change and/or advance in diet**  ** Please call Endocrine for any BG related issues **     Discharge Planning:   TBD. Please notify endocrinology prior to discharge.            Vickie Sweeney, NP  Endocrinology  [facility] ([facility])

## 2023-10-28 NOTE — ASSESSMENT & PLAN NOTE
K+ 3.1 on admit  Remains hypokalemic despite repletion.  -hospital medicine consulted for recommendations

## 2023-10-28 NOTE — HPI
Mr. Clark is a 48 y.o. male with hx of DM, HTN, and R thalamic ICH (2021, 2/2 HTN, no deficits) who [place] [facility] [place] with stroke symptoms with evaluation noting R internal capsule stroke. During admission, patient was noted to have persistent hypokalemia so Hospital medicine was consulted for further evaluation.  During initial evaluation, patient reported 1 day of diarrhea during hospitalization that has now resolved.

## 2023-10-29 LAB
ANION GAP SERPL CALC-SCNC: 12 MMOL/L (ref 8–16)
BUN SERPL-MCNC: 13 MG/DL (ref 6–20)
CALCIUM SERPL-MCNC: 8.4 MG/DL (ref 8.7–10.5)
CHLORIDE SERPL-SCNC: 106 MMOL/L (ref 95–110)
CO2 SERPL-SCNC: 21 MMOL/L (ref 23–29)
CREAT SERPL-MCNC: 1.1 MG/DL (ref 0.5–1.4)
EST. GFR  (NO RACE VARIABLE): >60 ML/MIN/1.73 M^2
GLUCOSE SERPL-MCNC: 98 MG/DL (ref 70–110)
MAGNESIUM SERPL-MCNC: 2.1 MG/DL (ref 1.6–2.6)
PHOSPHATE SERPL-MCNC: 3.6 MG/DL (ref 2.7–4.5)
POCT GLUCOSE: 105 MG/DL (ref 70–110)
POCT GLUCOSE: 108 MG/DL (ref 70–110)
POCT GLUCOSE: 186 MG/DL (ref 70–110)
POCT GLUCOSE: 198 MG/DL (ref 70–110)
POTASSIUM SERPL-SCNC: 3.4 MMOL/L (ref 3.5–5.1)
SODIUM SERPL-SCNC: 139 MMOL/L (ref 136–145)

## 2023-10-29 PROCEDURE — 25000003 PHARM REV CODE 250

## 2023-10-29 PROCEDURE — 63600175 PHARM REV CODE 636 W HCPCS: Performed by: PHYSICIAN ASSISTANT

## 2023-10-29 PROCEDURE — 11000001 HC ACUTE MED/SURG PRIVATE ROOM

## 2023-10-29 PROCEDURE — 94761 N-INVAS EAR/PLS OXIMETRY MLT: CPT

## 2023-10-29 PROCEDURE — 99232 SBSQ HOSP IP/OBS MODERATE 35: CPT | Mod: ,,, | Performed by: NURSE PRACTITIONER

## 2023-10-29 PROCEDURE — 99233 SBSQ HOSP IP/OBS HIGH 50: CPT | Mod: ,,, | Performed by: PSYCHIATRY & NEUROLOGY

## 2023-10-29 PROCEDURE — 99232 PR SUBSEQUENT HOSPITAL CARE,LEVL II: ICD-10-PCS | Mod: ,,, | Performed by: NURSE PRACTITIONER

## 2023-10-29 PROCEDURE — 36415 COLL VENOUS BLD VENIPUNCTURE: CPT

## 2023-10-29 PROCEDURE — 80048 BASIC METABOLIC PNL TOTAL CA: CPT

## 2023-10-29 PROCEDURE — 84100 ASSAY OF PHOSPHORUS: CPT

## 2023-10-29 PROCEDURE — 83735 ASSAY OF MAGNESIUM: CPT

## 2023-10-29 PROCEDURE — 25000003 PHARM REV CODE 250: Performed by: HOSPITALIST

## 2023-10-29 PROCEDURE — 99233 PR SUBSEQUENT HOSPITAL CARE,LEVL III: ICD-10-PCS | Mod: ,,, | Performed by: PSYCHIATRY & NEUROLOGY

## 2023-10-29 PROCEDURE — 25000003 PHARM REV CODE 250: Performed by: PHYSICIAN ASSISTANT

## 2023-10-29 RX ORDER — POTASSIUM CHLORIDE 20 MEQ/1
40 TABLET, EXTENDED RELEASE ORAL
Status: COMPLETED | OUTPATIENT
Start: 2023-10-29 | End: 2023-10-29

## 2023-10-29 RX ORDER — ATORVASTATIN CALCIUM 40 MG/1
40 TABLET, FILM COATED ORAL DAILY
Status: DISCONTINUED | OUTPATIENT
Start: 2023-10-30 | End: 2023-10-30 | Stop reason: HOSPADM

## 2023-10-29 RX ORDER — LISINOPRIL 5 MG/1
15 TABLET ORAL ONCE
Status: COMPLETED | OUTPATIENT
Start: 2023-10-29 | End: 2023-10-29

## 2023-10-29 RX ORDER — LISINOPRIL 20 MG/1
20 TABLET ORAL DAILY
Status: DISCONTINUED | OUTPATIENT
Start: 2023-10-30 | End: 2023-10-30

## 2023-10-29 RX ORDER — AMLODIPINE BESYLATE 5 MG/1
5 TABLET ORAL DAILY
Status: DISCONTINUED | OUTPATIENT
Start: 2023-10-29 | End: 2023-10-29

## 2023-10-29 RX ORDER — LISINOPRIL 5 MG/1
10 TABLET ORAL DAILY
Status: DISCONTINUED | OUTPATIENT
Start: 2023-10-30 | End: 2023-10-29

## 2023-10-29 RX ORDER — LISINOPRIL 5 MG/1
5 TABLET ORAL ONCE
Status: DISCONTINUED | OUTPATIENT
Start: 2023-10-29 | End: 2023-10-29

## 2023-10-29 RX ORDER — LISINOPRIL 5 MG/1
5 TABLET ORAL DAILY
Qty: 30 TABLET | Refills: 2 | Status: CANCELLED | OUTPATIENT
Start: 2023-10-29 | End: 2024-01-27

## 2023-10-29 RX ORDER — ATORVASTATIN CALCIUM 40 MG/1
80 TABLET, FILM COATED ORAL DAILY
Status: DISCONTINUED | OUTPATIENT
Start: 2023-10-30 | End: 2023-10-29

## 2023-10-29 RX ADMIN — INSULIN ASPART 5 UNITS: 100 INJECTION, SOLUTION INTRAVENOUS; SUBCUTANEOUS at 08:10

## 2023-10-29 RX ADMIN — POTASSIUM CHLORIDE 40 MEQ: 1500 TABLET, EXTENDED RELEASE ORAL at 12:10

## 2023-10-29 RX ADMIN — INSULIN ASPART 1 UNITS: 100 INJECTION, SOLUTION INTRAVENOUS; SUBCUTANEOUS at 09:10

## 2023-10-29 RX ADMIN — LISINOPRIL 15 MG: 5 TABLET ORAL at 01:10

## 2023-10-29 RX ADMIN — INSULIN ASPART 5 UNITS: 100 INJECTION, SOLUTION INTRAVENOUS; SUBCUTANEOUS at 12:10

## 2023-10-29 RX ADMIN — HEPARIN SODIUM 5000 UNITS: 5000 INJECTION INTRAVENOUS; SUBCUTANEOUS at 10:10

## 2023-10-29 RX ADMIN — INSULIN DETEMIR 12 UNITS: 100 INJECTION, SOLUTION SUBCUTANEOUS at 08:10

## 2023-10-29 RX ADMIN — LISINOPRIL 5 MG: 5 TABLET ORAL at 08:10

## 2023-10-29 RX ADMIN — ATORVASTATIN CALCIUM 40 MG: 40 TABLET, FILM COATED ORAL at 08:10

## 2023-10-29 RX ADMIN — ASPIRIN 81 MG: 81 TABLET, COATED ORAL at 08:10

## 2023-10-29 RX ADMIN — HEPARIN SODIUM 5000 UNITS: 5000 INJECTION INTRAVENOUS; SUBCUTANEOUS at 06:10

## 2023-10-29 RX ADMIN — INSULIN ASPART 2 UNITS: 100 INJECTION, SOLUTION INTRAVENOUS; SUBCUTANEOUS at 04:10

## 2023-10-29 RX ADMIN — HEPARIN SODIUM 5000 UNITS: 5000 INJECTION INTRAVENOUS; SUBCUTANEOUS at 01:10

## 2023-10-29 RX ADMIN — CLOPIDOGREL BISULFATE 75 MG: 75 TABLET ORAL at 08:10

## 2023-10-29 RX ADMIN — POTASSIUM CHLORIDE 40 MEQ: 1500 TABLET, EXTENDED RELEASE ORAL at 09:10

## 2023-10-29 RX ADMIN — INSULIN ASPART 5 UNITS: 100 INJECTION, SOLUTION INTRAVENOUS; SUBCUTANEOUS at 04:10

## 2023-10-29 NOTE — PROGRESS NOTES
"Vance Lyman - Neurosurgery ([facility])  Endocrinology  Progress Note    Admit Date: 10/25/2023     Reason for Consult: Management of T2DM, Hyperglycemia      Diabetes diagnosis year:      Home Diabetes Medications:  None     Lab Results   Component Value Date    HGBA1C 12.5 (H) 10/25/2023       How often checking glucose at home? Denies      Diabetes Complications include:     Diabetic chronic kidney disease and Diabetic retinopathy      Complicating diabetes co morbidities:   History of CVA and Residual deficits from CVA        HPI:   Patient is a 48 y.o. male with a diagnosis of type 2 DM, HTN, and R thalamic ICH (, 2/2 HTN, no deficits) who reported to ED with LSW and L facial numbness which resulted in multiple falls. Admitted for further workup and treatment. Endocrinology consulted for BG/ DM management.       Interval HPI:   Overnight events: Remains on neuro floor. BG well controlled on current SQ insulin regimen. Diet diabetic 2000 Calorie; Thin    Eatin%  Nausea: No  Hypoglycemia and intervention: No  Fever: No  TPN and/or TF: No  If yes, type of TF/TPN and rate: n/a    BP (!) 181/118 (BP Location: Right arm, Patient Position: Lying)   Pulse 107   Temp 97.6 °F (36.4 °C) (Oral)   Resp 20   Ht 5' 2.01" (1.575 m)   Wt 72.6 kg (160 lb)   SpO2 97%   BMI 29.26 kg/m²     Labs Reviewed and Include    Recent Labs   Lab 10/29/23  0401   GLU 98   CALCIUM 8.4*      K 3.4*   CO2 21*      BUN 13   CREATININE 1.1     Lab Results   Component Value Date    WBC 8.69 10/27/2023    HGB 14.7 10/27/2023    HCT 41.1 10/27/2023    MCV 84 10/27/2023     10/27/2023     Recent Labs   Lab 10/25/23  1956   TSH 1.429     Lab Results   Component Value Date    HGBA1C 12.5 (H) 10/25/2023       Nutritional status:   Body mass index is 29.26 kg/m².  Lab Results   Component Value Date    ALBUMIN 2.7 (L) 10/27/2023    ALBUMIN 2.9 (L) 10/26/2023    ALBUMIN 2.7 (L) 10/25/2023     No results found for: " ""PREALBUMIN"    Estimated Creatinine Clearance: 70.2 mL/min (based on SCr of 1.1 mg/dL).    Accu-Checks  Recent Labs     10/26/23  2145 10/27/23  0759 10/27/23  1134 10/27/23  1605 10/27/23  2121 10/28/23  0723 10/28/23  1102 10/28/23  1543 10/28/23  2118 10/29/23  0758   POCTGLUCOSE 159* 151* 184* 197* 110 122* 208* 160* 122* 108       Current Medications and/or Treatments Impacting Glycemic Control  Immunotherapy:    Immunosuppressants       None          Steroids:   Hormones (From admission, onward)      None          Pressors:    Autonomic Drugs (From admission, onward)      None          Hyperglycemia/Diabetes Medications:   Antihyperglycemics (From admission, onward)      Start     Stop Route Frequency Ordered    10/28/23 1645  insulin aspart U-100 pen 5 Units         -- SubQ 3 times daily with meals 10/28/23 1126    10/26/23 1200  insulin detemir U-100 (Levemir) pen 12 Units         -- SubQ Daily 10/26/23 1152    10/26/23 0952  insulin aspart U-100 pen 0-10 Units         -- SubQ Before meals & nightly PRN 10/26/23 0853            ASSESSMENT and PLAN    Neuro  * Stroke due to thrombosis of right middle cerebral artery  Managed per primary.   avoid hypoglycemia        History of intracerebral hemorrhage without residual deficit  Optimize BG control       Endocrine  Type 2 diabetes mellitus with hyperglycemia, without long-term current use of insulin  BG goal: 140-180    - Continue Novolog 5 units TIDWM    - Levemir 12 units daily (WBD @ 0.3 u/kg/day)   - [facility] (150/25) prn for hyperglycemia   - POCT Glucose before meals and at bedtime  - Hypoglycemia protocol in place      ** Please notify Endocrine for any change and/or advance in diet**  ** Please call Endocrine for any BG related issues **     Discharge Planning:   TBD. Please notify endocrinology prior to discharge.            Vickie Sweeney, NP  Endocrinology  [facility] ([facility])  "

## 2023-10-29 NOTE — PLAN OF CARE
Problem: Adjustment to Illness (Stroke, Ischemic/Transient Ischemic Attack)  Goal: Optimal Coping  Outcome: Ongoing, Not Progressing     Problem: Bowel Elimination Impaired (Stroke, Ischemic/Transient Ischemic Attack)  Goal: Effective Bowel Elimination  Outcome: Ongoing, Not Progressing     Problem: Cerebral Tissue Perfusion (Stroke, Ischemic/Transient Ischemic Attack)  Goal: Optimal Cerebral Tissue Perfusion  Outcome: Ongoing, Not Progressing     Problem: Cognitive Impairment (Stroke, Ischemic/Transient Ischemic Attack)  Goal: Optimal Cognitive Function  Outcome: Ongoing, Not Progressing     Problem: Communication Impairment (Stroke, Ischemic/Transient Ischemic Attack)  Goal: Improved Communication Skills  Outcome: Ongoing, Not Progressing     Problem: Functional Ability Impaired (Stroke, Ischemic/Transient Ischemic Attack)  Goal: Optimal Functional Ability  Outcome: Ongoing, Not Progressing     Problem: Respiratory Compromise (Stroke, Ischemic/Transient Ischemic Attack)  Goal: Effective Oxygenation and Ventilation  Outcome: Ongoing, Not Progressing     Problem: Sensorimotor Impairment (Stroke, Ischemic/Transient Ischemic Attack)  Goal: Improved Sensorimotor Function  Outcome: Ongoing, Not Progressing     Problem: Swallowing Impairment (Stroke, Ischemic/Transient Ischemic Attack)  Goal: Optimal Eating and Swallowing without Aspiration  Outcome: Ongoing, Not Progressing     Problem: Urinary Elimination Impaired (Stroke, Ischemic/Transient Ischemic Attack)  Goal: Effective Urinary Elimination  Outcome: Ongoing, Not Progressing     Problem: Fall Injury Risk  Goal: Absence of Fall and Fall-Related Injury  Outcome: Ongoing, Not Progressing     Problem: Adult Inpatient Plan of Care  Goal: Plan of Care Review  Outcome: Ongoing, Not Progressing  Goal: Patient-Specific Goal (Individualized)  Outcome: Ongoing, Not Progressing  Goal: Absence of Hospital-Acquired Illness or Injury  Outcome: Ongoing, Not Progressing  Goal:  Optimal Comfort and Wellbeing  Outcome: Ongoing, Not Progressing  Goal: Readiness for Transition of Care  Outcome: Ongoing, Not Progressing     Problem: Infection  Goal: Absence of Infection Signs and Symptoms  Outcome: Ongoing, Not Progressing     Problem: Diabetes Comorbidity  Goal: Blood Glucose Level Within Targeted Range  Outcome: Ongoing, Not Progressing

## 2023-10-29 NOTE — PLAN OF CARE
Problem: Adult Inpatient Plan of Care  Goal: Optimal Comfort and Wellbeing  Outcome: Ongoing, Progressing     Problem: Fall Injury Risk  Goal: Absence of Fall and Fall-Related Injury  Outcome: Ongoing, Progressing     Problem: Cerebral Tissue Perfusion (Stroke, Ischemic/Transient Ischemic Attack)  Goal: Optimal Cerebral Tissue Perfusion  Outcome: Ongoing, Progressing     Problem: Adjustment to Illness (Stroke, Ischemic/Transient Ischemic Attack)  Goal: Optimal Coping  Outcome: Ongoing, Progressing

## 2023-10-29 NOTE — SUBJECTIVE & OBJECTIVE
Neurologic Chief Complaint: LSW    Subjective:     Interval History: Patient is seen for follow-up neurological assessment and treatment recommendations: see hospital course    HPI, Past Medical, Family, and Social History remains the same as documented in the initial encounter.     Review of Systems   Neurological:  Positive for weakness.     Scheduled Meds:   aspirin  81 mg Oral Daily    [START ON 10/30/2023] atorvastatin  40 mg Oral Daily    clopidogreL  75 mg Oral Daily    heparin (porcine)  5,000 Units Subcutaneous Q8H    insulin aspart U-100  5 Units Subcutaneous TIDWM    insulin detemir U-100  12 Units Subcutaneous Daily    lisinopriL  15 mg Oral Once    [START ON 10/30/2023] lisinopriL  20 mg Oral Daily     Continuous Infusions:   sodium chloride 0.9%       PRN Meds:acetaminophen, dextrose 10%, dextrose 10%, dextrose 10%, dextrose 10%, dextrose 10%, glucagon (human recombinant), glucose, glucose, insulin aspart U-100, ondansetron, sodium chloride 0.9%, sodium chloride 0.9%    Objective:     Vital Signs (Most Recent):  Temp: 97.8 °F (36.6 °C) (10/29/23 1208)  Pulse: 106 (10/29/23 1208)  Resp: 18 (10/29/23 1208)  BP: (!) 172/117 (10/29/23 1208)  SpO2: 99 % (10/29/23 1208)  BP Location: Right arm    Vital Signs Range (Last 24H):  Temp:  [97.6 °F (36.4 °C)-99.1 °F (37.3 °C)]   Pulse:  []   Resp:  [16-20]   BP: (172-181)/(106-118)   SpO2:  [97 %-100 %]   BP Location: Right arm       Physical Exam  Vitals and nursing note reviewed.   Constitutional:       General: He is not in acute distress.     Appearance: Normal appearance.   HENT:      Head: Normocephalic and atraumatic.      Mouth/Throat:      Mouth: Mucous membranes are moist.   Eyes:      Extraocular Movements: Extraocular movements intact.   Pulmonary:      Effort: Pulmonary effort is normal. No respiratory distress.   Abdominal:      General: Abdomen is flat. There is no distension.   Musculoskeletal:         General: No swelling. Normal range of  motion.      Cervical back: Normal range of motion.   Skin:     General: Skin is warm.   Neurological:      Mental Status: He is alert and oriented to person, place, and time.      Motor: Weakness present.   Psychiatric:         Mood and Affect: Mood normal.         Behavior: Behavior normal.              Neurological Exam:   LOC: alert  Attention Span: Good   Language: No aphasia  Articulation: No dysarthria  Orientation: Person, Place, Time   Visual Fields: Full  EOM (CN III, IV, VI): Full/intact  Pupils (CN II, III): PERRL  Facial Sensation (CN V): Normal  Facial Movement (CN VII): Lower facial weakness on the Left  Motor: Arm left  Paresis: 4/5  Leg left  Normal 5/5  Arm right  Normal 5/5  Leg right Normal 5/5  Cerebellum: No evidence of appendicular or axial ataxia  Sensation: intact to light touch  Tone: Normal tone throughout    Laboratory:  CMP:   Recent Labs   Lab 10/29/23  0401   CALCIUM 8.4*      K 3.4*   CO2 21*      BUN 13   CREATININE 1.1       CBC:   Recent Labs   Lab 10/27/23  0338   WBC 8.69   RBC 4.88   HGB 14.7   HCT 41.1      MCV 84   MCH 30.1   MCHC 35.8         Diagnostic Results     Brain imaging:  MRI Brain without contrast 10/25/23  Impression:  Small focus of acute infarction in the posterior limb of the right internal capsule.  Chronic microhemorrhage in the right thalamus and superior to the left subinsular cortex.  Changes of chronic small vessel ischemic disease.        Vessel Imaging:  CTA stroke multiphase 10/25/23  Impression:  No evidence of acute ICH  Remote R thalamic hypodensity  Age indeterminate hypodensity in R internal capsule, possibly contributing to patient's symptoms  No LVO or significant stenosis        Cardiac Evaluation:   TTE with bubble 10/26/2023    Left Ventricle: The left ventricle is normal in size. Normal wall thickness. There is concentric remodeling. Normal wall motion. There is normal systolic function with a visually estimated ejection  fraction of 60 - 65%. There is normal diastolic function.    Left Atrium: Agitated saline study of the atrial septum is negative after vasalva maneuver, suggesting absence of intracardiac shunt at the atrial level.    Right Ventricle: Normal right ventricular cavity size. Wall thickness is normal. Right ventricle wall motion  is normal. Systolic function is normal.    IVC/SVC: Normal venous pressure at 3 mmHg.

## 2023-10-29 NOTE — SUBJECTIVE & OBJECTIVE
Interval History: NAEON. Pt remains hypertensive with SBP >180. Otherwise, pt states he is feeling great, no complaints at this time. Pt with hx of HTN, he was prescribed amlodipine and lisinopril in the past but states he has not taken these medications for years 2/2 controlled BP maintained by lifestyle changes including diet and exercise.     Review of Systems   Constitutional: Negative.    HENT: Negative.     Respiratory: Negative.     Cardiovascular: Negative.    Gastrointestinal: Negative.  Negative for constipation, diarrhea, nausea and vomiting.   Musculoskeletal:  Negative for myalgias.   Neurological:  Positive for weakness.     Objective:     Vital Signs (Most Recent):  Temp: 97.8 °F (36.6 °C) (10/29/23 1208)  Pulse: 102 (10/29/23 1509)  Resp: 18 (10/29/23 1208)  BP: (!) 172/117 (10/29/23 1208)  SpO2: 99 % (10/29/23 1208) Vital Signs (24h Range):  Temp:  [97.6 °F (36.4 °C)-99.1 °F (37.3 °C)] 97.8 °F (36.6 °C)  Pulse:  [] 102  Resp:  [16-20] 18  SpO2:  [97 %-100 %] 99 %  BP: (172-181)/(106-118) 172/117     Weight: 72.6 kg (160 lb)  Body mass index is 29.26 kg/m².  No intake or output data in the 24 hours ending 10/29/23 1628      Physical Exam  Vitals and nursing note reviewed.   Constitutional:       General: He is not in acute distress.     Appearance: He is not ill-appearing, toxic-appearing or diaphoretic.   HENT:      Head: Normocephalic and atraumatic.      Mouth/Throat:      Mouth: Mucous membranes are moist.   Eyes:      General: No scleral icterus.        Right eye: No discharge.         Left eye: No discharge.   Cardiovascular:      Rate and Rhythm: Normal rate and regular rhythm.      Heart sounds: Normal heart sounds.   Pulmonary:      Effort: Pulmonary effort is normal. No respiratory distress.      Breath sounds: No wheezing, rhonchi or rales.   Abdominal:      General: Bowel sounds are normal. There is no distension.      Palpations: Abdomen is soft.      Tenderness: There is no  abdominal tenderness. There is no guarding or rebound.   Musculoskeletal:         General: No tenderness.      Cervical back: No rigidity.      Right lower leg: No edema.      Left lower leg: No edema.   Skin:     General: Skin is warm and dry.      Coloration: Skin is not jaundiced.   Neurological:      Mental Status: He is alert and oriented to person, place, and time. Mental status is at baseline.      Motor: Weakness present.   Psychiatric:         Mood and Affect: Mood normal.         Behavior: Behavior normal.             Significant Labs: All pertinent labs within the past 24 hours have been reviewed.  CMP:   Recent Labs   Lab 10/27/23  1902 10/28/23  0809 10/29/23  0401   NA  --  141 139   K 3.5 3.2* 3.4*   CL  --  105 106   CO2  --  24 21*   GLU  --  109 98   BUN  --  12 13   CREATININE  --  1.0 1.1   CALCIUM  --  8.2* 8.4*   ANIONGAP  --  12 12     Magnesium:   Recent Labs   Lab 10/27/23  1902 10/28/23  0809 10/29/23  0401   MG 1.9 1.9 2.1       Significant Imaging: I have reviewed all pertinent imaging results/findings within the past 24 hours.

## 2023-10-29 NOTE — PROGRESS NOTES
[facility] - Neurosurgery ([facility])  Vascular Neurology  Comprehensive Stroke Center  Progress Note    Assessment/Plan:     * Stroke due to thrombosis of right middle cerebral artery  Cecil Clark is a 48 y.o. male with PMHx of DM, HTN, and R thalamic ICH (, 2/2 HTN, no deficits) who reported to ED with LSW and L facial numbness which resulted in multiple falls. Stroke code called on arrival. Patient reports his symptom began between 2:30 am and 3:00 am. He was out of window for TNK. CT without acute changes, possible new infarct in R internal capsule. CTA without significant stenosis or LVO. MRI confirmed R internal capsule stroke. Echo with bubble unremarkable. Stroke etiology likely  in setting of multiple uncontrolled risk factors.      Antithrombotics for secondary stroke prevention: Antiplatelets: Aspirin: 81 mg daily  Clopidogrel: 75 mg daily    Statins for secondary stroke prevention and hyperlipidemia, if present:   Statins: Atorvastatin- 40 mg daily    Aggressive risk factor modification: HTN, DM     Rehab efforts: The patient has been evaluated by a stroke team provider and the therapy needs have been fully considered based off the presenting complaints and exam findings. The following therapy evaluations are needed: PT evaluate and treat, OT evaluate and treat, SLP evaluate and treat, PM&R evaluate for appropriate placement    Diagnostics ordered/pending: HgbA1C to assess blood glucose levels, Lipid Profile to assess cholesterol levels, MRI head without contrast to assess brain parenchyma, TTE to assess cardiac function/status , TSH to assess thyroid function    VTE prophylaxis: Heparin 5000 units SQ every 8 hours  Mechanical prophylaxis: Place SCDs    BP parameters: SBP goal <180        Hypokalemia  K+ 3.1 on admit  Remains hypokalemic despite repletion.  -hospital medicine consulted for recommendations; possibly related to acute illness and GI loses.   -renin/isidra pending- follow up outpatient for  results  Improving      Type 2 diabetes mellitus with hyperglycemia, without long-term current use of insulin  Stroke risk factor  A1c 12.5  Basal/bolus insulin w/ BG goal while inpatient 140-180  Endocrine consulted for co-management    Essential hypertension  Stroke risk factor   SBP goal <180  -Lisinopril 20mg daily  -HM following, may add amlodipine if remains elevated    History of intracerebral hemorrhage without residual deficit  R thalamic hypertensive ICH in 2021         10/25 presented with LSW and falls, MRI significant for R internal capsule stroke. DAPT loaded.   10/26 Patient currently on DAPT, endocrine consulted for elevated A1c, echo pending. Dispo pending PT recs   10/27/2023 NAEO, neuro exam stable. Hypokalemia on labs again this morning (K 2.8 > 2.9), replacement ordered. Endo following, glucose improved on scheduled insulin. Dispo recs for low intensity therapy.   10/28/2023 HM consulted for hypokalemia. Increased lisinopril to 5mg. DME delivered.  10/29/2023 Potassium improved. Increased lisinopril to 20 per HM recs.      STROKE DOCUMENTATION   Acute Stroke Times   Last Known Normal Date: 10/25/23  Last Known Normal Time: 0230  Symptom Onset Date: 10/25/23  Symptom Onset Time: 0230  Stroke Team Called Date: 10/25/23  Stroke Team Called Time: 1930  Stroke Team Arrival Date: 10/25/23  Stroke Team Arrival Time: 1937  CT Interpretation Time: 1945  Thrombolytic Therapy Recommended: No  CTA Interpretation Time: 1951  Thrombectomy Recommended: No    NIH Scale:  1a. Level of Consciousness: 0-->Alert, keenly responsive  1b. LOC Questions: 0-->Answers both questions correctly  1c. LOC Commands: 0-->Performs both tasks correctly  2. Best Gaze: 0-->Normal  3. Visual: 0-->No visual loss  4. Facial Palsy: 0-->Normal symmetrical movements  5a. Motor Arm, Left: 0-->No drift, limb holds 90 (or 45) degrees for full 10 secs  5b. Motor Arm, Right: 0-->No drift, limb holds 90 (or 45) degrees for full 10 secs  6a.  Motor Leg, Left: 0-->No drift, leg holds 30 degree position for full 5 secs  6b. Motor Leg, Right: 0-->No drift, leg holds 30 degree position for full 5 secs  7. Limb Ataxia: 0-->Absent  8. Sensory: 0-->Normal, no sensory loss  9. Best Language: 0-->No aphasia, normal  10. Dysarthria: 0-->Normal  11. Extinction and Inattention (formerly Neglect): 0-->No abnormality  Total (NIH Stroke Scale): 0       Modified Vermillion Score: 0  Glenda Coma Scale:    ABCD2 Score:    [id number] Score:   HAS -BLED Score:   ICH Score:   Hunt & Gonzalez Classification:      Hemorrhagic change of an Ischemic Stroke: Does this patient have an ischemic stroke with hemorrhagic changes? No     Neurologic Chief Complaint: LSW    Subjective:     Interval History: Patient is seen for follow-up neurological assessment and treatment recommendations: see hospital course    HPI, Past Medical, Family, and Social History remains the same as documented in the initial encounter.     Review of Systems   Neurological:  Positive for weakness.     Scheduled Meds:   aspirin  81 mg Oral Daily    [START ON 10/30/2023] atorvastatin  40 mg Oral Daily    clopidogreL  75 mg Oral Daily    heparin (porcine)  5,000 Units Subcutaneous Q8H    insulin aspart U-100  5 Units Subcutaneous TIDWM    insulin detemir U-100  12 Units Subcutaneous Daily    lisinopriL  15 mg Oral Once    [START ON 10/30/2023] lisinopriL  20 mg Oral Daily     Continuous Infusions:   sodium chloride 0.9%       PRN Meds:acetaminophen, dextrose 10%, dextrose 10%, dextrose 10%, dextrose 10%, dextrose 10%, glucagon (human recombinant), glucose, glucose, insulin aspart U-100, ondansetron, sodium chloride 0.9%, sodium chloride 0.9%    Objective:     Vital Signs (Most Recent):  Temp: 97.8 °F (36.6 °C) (10/29/23 1208)  Pulse: 106 (10/29/23 1208)  Resp: 18 (10/29/23 1208)  BP: (!) 172/117 (10/29/23 1208)  SpO2: 99 % (10/29/23 1208)  BP Location: Right arm    Vital Signs Range (Last 24H):  Temp:  [97.6  °F (36.4 °C)-99.1 °F (37.3 °C)]   Pulse:  []   Resp:  [16-20]   BP: (172-181)/(106-118)   SpO2:  [97 %-100 %]   BP Location: Right arm       Physical Exam  Vitals and nursing note reviewed.   Constitutional:       General: He is not in acute distress.     Appearance: Normal appearance.   HENT:      Head: Normocephalic and atraumatic.      Mouth/Throat:      Mouth: Mucous membranes are moist.   Eyes:      Extraocular Movements: Extraocular movements intact.   Pulmonary:      Effort: Pulmonary effort is normal. No respiratory distress.   Abdominal:      General: Abdomen is flat. There is no distension.   Musculoskeletal:         General: No swelling. Normal range of motion.      Cervical back: Normal range of motion.   Skin:     General: Skin is warm.   Neurological:      Mental Status: He is alert and oriented to person, place, and time.      Motor: Weakness present.   Psychiatric:         Mood and Affect: Mood normal.         Behavior: Behavior normal.              Neurological Exam:   LOC: alert  Attention Span: Good   Language: No aphasia  Articulation: No dysarthria  Orientation: Person, Place, Time   Visual Fields: Full  EOM (CN III, IV, VI): Full/intact  Pupils (CN II, III): PERRL  Facial Sensation (CN V): Normal  Facial Movement (CN VII): Lower facial weakness on the Left  Motor: Arm left  Paresis: 4/5  Leg left  Normal 5/5  Arm right  Normal 5/5  Leg right Normal 5/5  Cerebellum: No evidence of appendicular or axial ataxia  Sensation: intact to light touch  Tone: Normal tone throughout    Laboratory:  CMP:   Recent Labs   Lab 10/29/23  0401   CALCIUM 8.4*      K 3.4*   CO2 21*      BUN 13   CREATININE 1.1       CBC:   Recent Labs   Lab 10/27/23  0338   WBC 8.69   RBC 4.88   HGB 14.7   HCT 41.1      MCV 84   MCH 30.1   MCHC 35.8         Diagnostic Results     Brain imaging:  MRI Brain without contrast 10/25/23  Impression:  Small focus of acute infarction in the posterior limb of the  right internal capsule.  Chronic microhemorrhage in the right thalamus and superior to the left subinsular cortex.  Changes of chronic small vessel ischemic disease.        Vessel Imaging:  CTA stroke multiphase 10/25/23  Impression:  No evidence of acute ICH  Remote R thalamic hypodensity  Age indeterminate hypodensity in R internal capsule, possibly contributing to patient's symptoms  No LVO or significant stenosis        Cardiac Evaluation:   TTE with bubble 10/26/2023    Left Ventricle: The left ventricle is normal in size. Normal wall thickness. There is concentric remodeling. Normal wall motion. There is normal systolic function with a visually estimated ejection fraction of 60 - 65%. There is normal diastolic function.    Left Atrium: Agitated saline study of the atrial septum is negative after vasalva maneuver, suggesting absence of intracardiac shunt at the atrial level.    Right Ventricle: Normal right ventricular cavity size. Wall thickness is normal. Right ventricle wall motion  is normal. Systolic function is normal.    IVC/SVC: Normal venous pressure at 3 mmHg.         Geovanny Conroy MD  Comprehensive Stroke Center  Department of Vascular Neurology   [facility])

## 2023-10-29 NOTE — PROGRESS NOTES
[facility] - Neurosurgery ([facility])  Hospital Medicine  Progress Note    Patient Name: Cecil Clark  MRN: [id number]  Patient Class: IP- Inpatient   Admission Date: 10/25/2023  Length of Stay: 4 days  Attending Physician: Efraín Jacobsen MD  Primary Care Provider: Efraín Jacobsen MD    Subjective:     Principal Problem:Stroke due to thrombosis of right middle cerebral artery    HPI:  Mr. Clark is a 48 y.o. male with hx of DM, HTN, and R thalamic ICH (2021, 2/2 HTN, no deficits) who was admitted to [facility] for after presenting with stroke symptoms with evaluation noting R internal capsule stroke. During admission, patient was noted to have persistent hypokalemia so Hospital medicine was consulted for further evaluation.  During initial evaluation, patient reported 1 day of diarrhea during hospitalization that has now resolved.       Overview/Hospital Course:  No notes on file    Interval History: NAEON. Pt remains hypertensive with SBP >180. Otherwise, pt states he is feeling great, no complaints at this time. Pt with hx of HTN, he was prescribed amlodipine and lisinopril in the past but states he has not taken these medications for years 2/2 controlled BP maintained by lifestyle changes including diet and exercise.     Review of Systems   Constitutional: Negative.    HENT: Negative.     Respiratory: Negative.     Cardiovascular: Negative.    Gastrointestinal: Negative.  Negative for constipation, diarrhea, nausea and vomiting.   Musculoskeletal:  Negative for myalgias.   Neurological:  Positive for weakness.     Objective:     Vital Signs (Most Recent):  Temp: 97.8 °F (36.6 °C) (10/29/23 1208)  Pulse: 102 (10/29/23 1509)  Resp: 18 (10/29/23 1208)  BP: (!) 172/117 (10/29/23 1208)  SpO2: 99 % (10/29/23 1208) Vital Signs (24h Range):  Temp:  [97.6 °F (36.4 °C)-99.1 °F (37.3 °C)] 97.8 °F (36.6 °C)  Pulse:  [] 102  Resp:  [16-20] 18  SpO2:  [97 %-100 %] 99 %  BP: (172-181)/(106-118) 172/117     Weight: 72.6 kg (160  lb)  Body mass index is 29.26 kg/m².  No intake or output data in the 24 hours ending 10/29/23 1628      Physical Exam  Vitals and nursing note reviewed.   Constitutional:       General: He is not in acute distress.     Appearance: He is not ill-appearing, toxic-appearing or diaphoretic.   HENT:      Head: Normocephalic and atraumatic.      Mouth/Throat:      Mouth: Mucous membranes are moist.   Eyes:      General: No scleral icterus.        Right eye: No discharge.         Left eye: No discharge.   Cardiovascular:      Rate and Rhythm: Normal rate and regular rhythm.      Heart sounds: Normal heart sounds.   Pulmonary:      Effort: Pulmonary effort is normal. No respiratory distress.      Breath sounds: No wheezing, rhonchi or rales.   Abdominal:      General: Bowel sounds are normal. There is no distension.      Palpations: Abdomen is soft.      Tenderness: There is no abdominal tenderness. There is no guarding or rebound.   Musculoskeletal:         General: No tenderness.      Cervical back: No rigidity.      Right lower leg: No edema.      Left lower leg: No edema.   Skin:     General: Skin is warm and dry.      Coloration: Skin is not jaundiced.   Neurological:      Mental Status: He is alert and oriented to person, place, and time. Mental status is at baseline.      Motor: Weakness present.   Psychiatric:         Mood and Affect: Mood normal.         Behavior: Behavior normal.             Significant Labs: All pertinent labs within the past 24 hours have been reviewed.  CMP:   Recent Labs   Lab 10/27/23  1902 10/28/23  0809 10/29/23  0401   NA  --  141 139   K 3.5 3.2* 3.4*   CL  --  105 106   CO2  --  24 21*   GLU  --  109 98   BUN  --  12 13   CREATININE  --  1.0 1.1   CALCIUM  --  8.2* 8.4*   ANIONGAP  --  12 12     Magnesium:   Recent Labs   Lab 10/27/23  1902 10/28/23  0809 10/29/23  0401   MG 1.9 1.9 2.1       Significant Imaging: I have reviewed all pertinent imaging results/findings within the past 24  hours.      Assessment/Plan:      Hypokalemia  Recent Labs     10/26/23  0502 10/26/23  0734 10/27/23  0337 10/27/23  1902 10/28/23  0809   K  --    < > 2.9* 3.5 3.2*   PHOS 2.7  --   --  2.0* 3.3   MG 1.8  --   --  1.9 1.9    < > = values in this interval not displayed.       Differential diagnoses include, but not limited to: diarrhea, stress response    PLAN:  --Followup Aldosterone/renin ratio   --Replete electrolytes(Mag, Phos).   --Replete potassium to target K of 4       Type 2 diabetes mellitus with hyperglycemia, without long-term current use of insulin  Endocrinology following      Essential hypertension  Pt with hx of HTN, he was prescribed amlodipine and lisinopril in the past but states he has not taken these medications for years 2/2 controlled BP maintained by lifestyle changes including diet and exercise.     Hypertension being managed by primary team(Stroke).   Permissive HTN with SBP < 180, DBP < 100.   Continue lisinopril 20 (first dose today, in split doses)  If BP remains elevated, consider addition of amlodipine 10 which pt tolerated in the past.       VTE Risk Mitigation (From admission, onward)         Ordered     heparin (porcine) injection 5,000 Units  Every 8 hours         10/25/23 2008     IP VTE LOW RISK PATIENT  Once         10/25/23 2007     Place sequential compression device  Until discontinued         10/25/23 2007                Discharge Planning   SUZANNE: 10/28/2023     Code Status: Full Code   Is the patient medically ready for discharge?: No    Reason for patient still in hospital (select all that apply): Patient trending condition  Discharge Plan A: Home with family            Michelle Rogers MD  Department of Hospital Medicine   [facility] ([facility])

## 2023-10-29 NOTE — ASSESSMENT & PLAN NOTE
K+ 3.1 on admit  Remains hypokalemic despite repletion.  -hospital medicine consulted for recommendations; possibly related to acute illness and GI loses.   -renin/isidra pending- follow up outpatient for results  Improving

## 2023-10-29 NOTE — ASSESSMENT & PLAN NOTE
Stroke risk factor   SBP goal <180  -Lisinopril 20mg daily  -HM following, may add amlodipine if remains elevated

## 2023-10-29 NOTE — ASSESSMENT & PLAN NOTE
Pt with hx of HTN, he was prescribed amlodipine and lisinopril in the past but states he has not taken these medications for years 2/2 controlled BP maintained by lifestyle changes including diet and exercise.     Hypertension being managed by primary team(Stroke).   Permissive HTN with SBP < 180, DBP < 100.   Continue lisinopril 20 (first dose today, in split doses)  If BP remains elevated, consider addition of amlodipine 10 which pt tolerated in the past.

## 2023-10-29 NOTE — SUBJECTIVE & OBJECTIVE
"Interval HPI:   Overnight events: Remains on neuro floor. BG well controlled on current SQ insulin regimen. Diet diabetic 2000 Calorie; Thin    Eatin%  Nausea: No  Hypoglycemia and intervention: No  Fever: No  TPN and/or TF: No  If yes, type of TF/TPN and rate: n/a    BP (!) 181/118 (BP Location: Right arm, Patient Position: Lying)   Pulse 107   Temp 97.6 °F (36.4 °C) (Oral)   Resp 20   Ht 5' 2.01" (1.575 m)   Wt 72.6 kg (160 lb)   SpO2 97%   BMI 29.26 kg/m²     Labs Reviewed and Include    Recent Labs   Lab 10/29/23  0401   GLU 98   CALCIUM 8.4*      K 3.4*   CO2 21*      BUN 13   CREATININE 1.1     Lab Results   Component Value Date    WBC 8.69 10/27/2023    HGB 14.7 10/27/2023    HCT 41.1 10/27/2023    MCV 84 10/27/2023     10/27/2023     Recent Labs   Lab 10/25/23  195   TSH 1.429     Lab Results   Component Value Date    HGBA1C 12.5 (H) 10/25/2023       Nutritional status:   Body mass index is 29.26 kg/m².  Lab Results   Component Value Date    ALBUMIN 2.7 (L) 10/27/2023    ALBUMIN 2.9 (L) 10/26/2023    ALBUMIN 2.7 (L) 10/25/2023     No results found for: "PREALBUMIN"    Estimated Creatinine Clearance: 70.2 mL/min (based on SCr of 1.1 mg/dL).    Accu-Checks  Recent Labs     10/26/23  2145 10/27/23  0759 10/27/23  1134 10/27/23  1605 10/27/23  2121 10/28/23  0723 10/28/23  1102 10/28/23  1543 10/28/23  2118 10/29/23  0758   POCTGLUCOSE 159* 151* 184* 197* 110 122* 208* 160* 122* 108       Current Medications and/or Treatments Impacting Glycemic Control  Immunotherapy:    Immunosuppressants       None          Steroids:   Hormones (From admission, onward)      None          Pressors:    Autonomic Drugs (From admission, onward)      None          Hyperglycemia/Diabetes Medications:   Antihyperglycemics (From admission, onward)      Start     Stop Route Frequency Ordered    10/28/23 1643  insulin aspart U-100 pen 5 Units         -- SubQ 3 times daily with meals 10/28/23 1126    " 10/26/23 1200  insulin detemir U-100 (Levemir) pen 12 Units         -- SubQ Daily 10/26/23 1152    10/26/23 0952  insulin aspart U-100 pen 0-10 Units         -- SubQ Before meals & nightly PRN 10/26/23 0859

## 2023-10-29 NOTE — ASSESSMENT & PLAN NOTE
BG goal: 140-180    - Continue Novolog 5 units TIDWM    - Levemir 12 units daily (WBD @ 0.3 u/kg/day)   - [facility] [place] (150/25) prn for hyperglycemia   - POCT Glucose before meals and at bedtime  - Hypoglycemia protocol in place      ** Please notify Endocrine for any change and/or advance in diet**  ** Please call Endocrine for any BG related issues **     Discharge Planning:   TBD. Please notify endocrinology prior to discharge.

## 2023-10-30 ENCOUNTER — PATIENT MESSAGE (OUTPATIENT)
Dept: ENDOCRINOLOGY | Facility: HOSPITAL | Age: 48
End: 2023-10-30
Payer: MEDICAID

## 2023-10-30 VITALS
RESPIRATION RATE: 18 BRPM | OXYGEN SATURATION: 99 % | BODY MASS INDEX: 29.44 KG/M2 | WEIGHT: 160 LBS | TEMPERATURE: 98 F | SYSTOLIC BLOOD PRESSURE: 157 MMHG | HEART RATE: 118 BPM | HEIGHT: 62 IN | DIASTOLIC BLOOD PRESSURE: 110 MMHG

## 2023-10-30 LAB
ANION GAP SERPL CALC-SCNC: 8 MMOL/L (ref 8–16)
BUN SERPL-MCNC: 17 MG/DL (ref 6–20)
CALCIUM SERPL-MCNC: 8.5 MG/DL (ref 8.7–10.5)
CHLORIDE SERPL-SCNC: 106 MMOL/L (ref 95–110)
CO2 SERPL-SCNC: 21 MMOL/L (ref 23–29)
CREAT SERPL-MCNC: 1.1 MG/DL (ref 0.5–1.4)
EST. GFR  (NO RACE VARIABLE): >60 ML/MIN/1.73 M^2
GLUCOSE SERPL-MCNC: 162 MG/DL (ref 70–110)
MAGNESIUM SERPL-MCNC: 1.8 MG/DL (ref 1.6–2.6)
PHOSPHATE SERPL-MCNC: 3.2 MG/DL (ref 2.7–4.5)
POCT GLUCOSE: 161 MG/DL (ref 70–110)
POCT GLUCOSE: 173 MG/DL (ref 70–110)
POTASSIUM SERPL-SCNC: 3.9 MMOL/L (ref 3.5–5.1)
SODIUM SERPL-SCNC: 135 MMOL/L (ref 136–145)

## 2023-10-30 PROCEDURE — 25000003 PHARM REV CODE 250: Performed by: HOSPITALIST

## 2023-10-30 PROCEDURE — 97535 SELF CARE MNGMENT TRAINING: CPT

## 2023-10-30 PROCEDURE — 99232 SBSQ HOSP IP/OBS MODERATE 35: CPT | Mod: ,,, | Performed by: NURSE PRACTITIONER

## 2023-10-30 PROCEDURE — 92526 ORAL FUNCTION THERAPY: CPT

## 2023-10-30 PROCEDURE — 84100 ASSAY OF PHOSPHORUS: CPT

## 2023-10-30 PROCEDURE — 99233 PR SUBSEQUENT HOSPITAL CARE,LEVL III: ICD-10-PCS | Mod: ,,, | Performed by: PSYCHIATRY & NEUROLOGY

## 2023-10-30 PROCEDURE — 97530 THERAPEUTIC ACTIVITIES: CPT

## 2023-10-30 PROCEDURE — 99232 PR SUBSEQUENT HOSPITAL CARE,LEVL II: ICD-10-PCS | Mod: ,,, | Performed by: NURSE PRACTITIONER

## 2023-10-30 PROCEDURE — 25000003 PHARM REV CODE 250

## 2023-10-30 PROCEDURE — 25000003 PHARM REV CODE 250: Performed by: PHYSICIAN ASSISTANT

## 2023-10-30 PROCEDURE — 36415 COLL VENOUS BLD VENIPUNCTURE: CPT

## 2023-10-30 PROCEDURE — 83735 ASSAY OF MAGNESIUM: CPT

## 2023-10-30 PROCEDURE — 80048 BASIC METABOLIC PNL TOTAL CA: CPT

## 2023-10-30 PROCEDURE — 63600175 PHARM REV CODE 636 W HCPCS: Performed by: PHYSICIAN ASSISTANT

## 2023-10-30 PROCEDURE — 99233 SBSQ HOSP IP/OBS HIGH 50: CPT | Mod: ,,, | Performed by: PSYCHIATRY & NEUROLOGY

## 2023-10-30 PROCEDURE — 25000003 PHARM REV CODE 250: Performed by: STUDENT IN AN ORGANIZED HEALTH CARE EDUCATION/TRAINING PROGRAM

## 2023-10-30 RX ORDER — AMLODIPINE BESYLATE 10 MG/1
10 TABLET ORAL DAILY
Status: DISCONTINUED | OUTPATIENT
Start: 2023-10-30 | End: 2023-10-30 | Stop reason: HOSPADM

## 2023-10-30 RX ORDER — LISINOPRIL 20 MG/1
20 TABLET ORAL ONCE
Status: COMPLETED | OUTPATIENT
Start: 2023-10-30 | End: 2023-10-30

## 2023-10-30 RX ORDER — PEN NEEDLE, DIABETIC 30 GX3/16"
1 NEEDLE, DISPOSABLE MISCELLANEOUS
Qty: 200 EACH | Refills: 0 | Status: SHIPPED | OUTPATIENT
Start: 2023-10-30

## 2023-10-30 RX ORDER — LANCETS 26 GAUGE
1 EACH MISCELLANEOUS
Qty: 1 EACH | Refills: 0 | Status: SHIPPED | OUTPATIENT
Start: 2023-10-30 | End: 2024-10-29

## 2023-10-30 RX ORDER — CLOPIDOGREL BISULFATE 75 MG/1
75 TABLET ORAL DAILY
Qty: 30 TABLET | Refills: 0 | Status: SHIPPED | OUTPATIENT
Start: 2023-10-30 | End: 2023-11-29

## 2023-10-30 RX ORDER — INSULIN ASPART 100 [IU]/ML
0-10 INJECTION, SOLUTION INTRAVENOUS; SUBCUTANEOUS
Qty: 15 ML | Refills: 1 | Status: SHIPPED | OUTPATIENT
Start: 2023-10-30 | End: 2023-10-30

## 2023-10-30 RX ORDER — INSULIN ASPART 100 [IU]/ML
5 INJECTION, SOLUTION INTRAVENOUS; SUBCUTANEOUS 3 TIMES DAILY
Qty: 18 ML | Refills: 3 | Status: SHIPPED | OUTPATIENT
Start: 2023-10-30 | End: 2024-01-28

## 2023-10-30 RX ORDER — ASPIRIN 81 MG/1
81 TABLET ORAL DAILY
Refills: 0
Start: 2023-10-30 | End: 2024-10-29

## 2023-10-30 RX ORDER — AMLODIPINE BESYLATE 10 MG/1
10 TABLET ORAL DAILY
Qty: 30 TABLET | Refills: 2 | Status: SHIPPED | OUTPATIENT
Start: 2023-10-30 | End: 2024-01-29

## 2023-10-30 RX ORDER — ATORVASTATIN CALCIUM 40 MG/1
40 TABLET, FILM COATED ORAL DAILY
Qty: 30 TABLET | Refills: 2 | Status: SHIPPED | OUTPATIENT
Start: 2023-10-30 | End: 2024-01-29

## 2023-10-30 RX ORDER — LISINOPRIL 20 MG/1
40 TABLET ORAL DAILY
Status: DISCONTINUED | OUTPATIENT
Start: 2023-10-31 | End: 2023-10-30 | Stop reason: HOSPADM

## 2023-10-30 RX ORDER — LISINOPRIL 20 MG/1
20 TABLET ORAL DAILY
Qty: 30 TABLET | Refills: 2 | Status: SHIPPED | OUTPATIENT
Start: 2023-10-30 | End: 2024-01-29

## 2023-10-30 RX ADMIN — HEPARIN SODIUM 5000 UNITS: 5000 INJECTION INTRAVENOUS; SUBCUTANEOUS at 06:10

## 2023-10-30 RX ADMIN — INSULIN DETEMIR 12 UNITS: 100 INJECTION, SOLUTION SUBCUTANEOUS at 08:10

## 2023-10-30 RX ADMIN — INSULIN ASPART 5 UNITS: 100 INJECTION, SOLUTION INTRAVENOUS; SUBCUTANEOUS at 12:10

## 2023-10-30 RX ADMIN — CLOPIDOGREL BISULFATE 75 MG: 75 TABLET ORAL at 08:10

## 2023-10-30 RX ADMIN — ASPIRIN 81 MG: 81 TABLET, COATED ORAL at 08:10

## 2023-10-30 RX ADMIN — LISINOPRIL 20 MG: 20 TABLET ORAL at 08:10

## 2023-10-30 RX ADMIN — INSULIN ASPART 2 UNITS: 100 INJECTION, SOLUTION INTRAVENOUS; SUBCUTANEOUS at 12:10

## 2023-10-30 RX ADMIN — LISINOPRIL 20 MG: 20 TABLET ORAL at 10:10

## 2023-10-30 RX ADMIN — ATORVASTATIN CALCIUM 40 MG: 40 TABLET, FILM COATED ORAL at 08:10

## 2023-10-30 RX ADMIN — INSULIN ASPART 2 UNITS: 100 INJECTION, SOLUTION INTRAVENOUS; SUBCUTANEOUS at 08:10

## 2023-10-30 RX ADMIN — AMLODIPINE BESYLATE 10 MG: 10 TABLET ORAL at 08:10

## 2023-10-30 RX ADMIN — INSULIN ASPART 5 UNITS: 100 INJECTION, SOLUTION INTRAVENOUS; SUBCUTANEOUS at 08:10

## 2023-10-30 NOTE — ASSESSMENT & PLAN NOTE
Recent Labs     10/28/23  0809 10/29/23  0401 10/30/23  0409   K 3.2* 3.4* 3.9   PHOS 3.3 3.6 3.2   MG 1.9 2.1 1.8       Differential diagnoses include, but not limited to: diarrhea, stress response    PLAN:  --Followup Aldosterone/renin ratio   --Repeat BMP in 2 days after discharge  --Replete electrolytes(Mag, Phos).   --Replete potassium to target K of 4

## 2023-10-30 NOTE — DISCHARGE INSTRUCTIONS
Diabetes information:  -Levemir 12 units daily  -Novolog 5 units with meals in addition to the following correction scale  150 - 200 + 1 unit  201 - 250 + 2 units  251 - 300 + 3 units  301 - 350 + 4 units   > 350   + 5 units

## 2023-10-30 NOTE — SUBJECTIVE & OBJECTIVE
Interval History: No acute events overnight, afebrile, hemodynamically stable. BP remains elevated despite increase in antihypertensives yesterday.          Objective:     Vital Signs (Most Recent):  Temp: 98.1 °F (36.7 °C) (10/30/23 1141)  Pulse: (!) 118 (10/30/23 1141)  Resp: 18 (10/30/23 1141)  BP: (!) 157/110 (10/30/23 1141)  SpO2: 99 % (10/30/23 1141) Vital Signs (24h Range):  Temp:  [98.1 °F (36.7 °C)-98.4 °F (36.9 °C)] 98.1 °F (36.7 °C)  Pulse:  [] 118  Resp:  [16-20] 18  SpO2:  [98 %-99 %] 99 %  BP: (157-178)/() 157/110     Weight: 72.6 kg (160 lb)  Body mass index is 29.26 kg/m².  No intake or output data in the 24 hours ending 10/30/23 1432      Physical Exam  Vitals and nursing note reviewed.   Constitutional:       General: He is not in acute distress.     Appearance: He is not ill-appearing, toxic-appearing or diaphoretic.   HENT:      Head: Normocephalic and atraumatic.      Mouth/Throat:      Mouth: Mucous membranes are moist.   Eyes:      General: No scleral icterus.        Right eye: No discharge.         Left eye: No discharge.   Cardiovascular:      Rate and Rhythm: Normal rate and regular rhythm.      Heart sounds: Normal heart sounds.   Pulmonary:      Effort: Pulmonary effort is normal. No respiratory distress.      Breath sounds: No wheezing, rhonchi or rales.   Abdominal:      General: Bowel sounds are normal. There is no distension.      Palpations: Abdomen is soft.      Tenderness: There is no abdominal tenderness. There is no guarding or rebound.   Musculoskeletal:         General: No tenderness.      Cervical back: No rigidity.      Right lower leg: No edema.      Left lower leg: No edema.   Skin:     General: Skin is warm and dry.      Coloration: Skin is not jaundiced.   Neurological:      Mental Status: He is alert and oriented to person, place, and time. Mental status is at baseline.      Motor: Weakness present.   Psychiatric:         Mood and Affect: Mood normal.          Behavior: Behavior normal.             Significant Labs: All pertinent labs within the past 24 hours have been reviewed.  LABS:  Recent Labs   Lab 10/28/23  0809 10/29/23  0401 10/30/23  0409    139 135*   K 3.2* 3.4* 3.9    106 106   CO2 24 21* 21*   BUN 12 13 17   CREATININE 1.0 1.1 1.1    98 162*   ANIONGAP 12 12 8     Recent Labs   Lab 10/28/23  0809 10/29/23  0401 10/30/23  0409   MG 1.9 2.1 1.8   PHOS 3.3 3.6 3.2     Recent Labs   Lab 10/25/23  1956 10/26/23  0734 10/27/23  0337   AST 23 27 29   ALT 24 24 24   ALKPHOS 77 73 69   BILITOT 0.8 1.0 0.7   ALBUMIN 2.7* 2.9* 2.7*     POCT Glucose:   Recent Labs   Lab 10/29/23  2100 10/30/23  0732 10/30/23  1143   POCTGLUCOSE 198* 161* 173*    Recent Labs   Lab 10/25/23  1956 10/26/23  0502 10/27/23  0338   WBC 9.04 8.83 8.69   HGB 14.7 15.0 14.7   HCT 41.1 41.8 41.1    171 158   GRAN 73.1*  6.6 60.5  5.3 58.6  5.1          Significant Imaging: I have reviewed all pertinent imaging results/findings within the past 24 hours.      Inpatient Medications:  Continuous Infusions:   sodium chloride 0.9%       Scheduled Meds:   amLODIPine  10 mg Oral Daily    aspirin  81 mg Oral Daily    atorvastatin  40 mg Oral Daily    clopidogreL  75 mg Oral Daily    heparin (porcine)  5,000 Units Subcutaneous Q8H    insulin aspart U-100  5 Units Subcutaneous TIDWM    insulin detemir U-100  12 Units Subcutaneous Daily    [START ON 10/31/2023] lisinopriL  40 mg Oral Daily     PRN Meds:acetaminophen, dextrose 10%, dextrose 10%, dextrose 10%, dextrose 10%, dextrose 10%, glucagon (human recombinant), glucose, glucose, insulin aspart U-100, ondansetron, sodium chloride 0.9%, sodium chloride 0.9%

## 2023-10-30 NOTE — SUBJECTIVE & OBJECTIVE
"Interval HPI:   Overnight events: Remains on neuro floor. BG reasonably well controlled on current SQ insulin regimen. Diet diabetic 2000 Calorie; Thin    Eatin%  Nausea: No  Hypoglycemia and intervention: No  Fever: No  TPN and/or TF: No  If yes, type of TF/TPN and rate: n/a    BP (!) 178/117   Pulse (!) 116   Temp 98.4 °F (36.9 °C)   Resp 16   Ht 5' 2.01" (1.575 m)   Wt 72.6 kg (160 lb)   SpO2 99%   BMI 29.26 kg/m²     Labs Reviewed and Include    Recent Labs   Lab 10/30/23  0409   *   CALCIUM 8.5*   *   K 3.9   CO2 21*      BUN 17   CREATININE 1.1     Lab Results   Component Value Date    WBC 8.69 10/27/2023    HGB 14.7 10/27/2023    HCT 41.1 10/27/2023    MCV 84 10/27/2023     10/27/2023     Recent Labs   Lab 10/25/23  1956   TSH 1.429     Lab Results   Component Value Date    HGBA1C 12.5 (H) 10/25/2023       Nutritional status:   Body mass index is 29.26 kg/m².  Lab Results   Component Value Date    ALBUMIN 2.7 (L) 10/27/2023    ALBUMIN 2.9 (L) 10/26/2023    ALBUMIN 2.7 (L) 10/25/2023     No results found for: "PREALBUMIN"    Estimated Creatinine Clearance: 70.2 mL/min (based on SCr of 1.1 mg/dL).    Accu-Checks  Recent Labs     10/27/23  2121 10/28/23  0723 10/28/23  1102 10/28/23  1543 10/28/23  2118 10/29/23  0758 10/29/23  1204 10/29/23  1637 10/29/23  2100 10/30/23  0732   POCTGLUCOSE 110 122* 208* 160* 122* 108 105 186* 198* 161*       Current Medications and/or Treatments Impacting Glycemic Control  Immunotherapy:    Immunosuppressants       None          Steroids:   Hormones (From admission, onward)      None          Pressors:    Autonomic Drugs (From admission, onward)      None          Hyperglycemia/Diabetes Medications:   Antihyperglycemics (From admission, onward)      Start     Stop Route Frequency Ordered    10/28/23 1645  insulin aspart U-100 pen 5 Units         -- SubQ 3 times daily with meals 10/28/23 1126    10/26/23 1200  insulin detemir U-100 " (Levemir) pen 12 Units         -- SubQ Daily 10/26/23 1152    10/26/23 0952  insulin aspart U-100 pen 0-10 Units         -- SubQ Before meals & nightly PRN 10/26/23 0853

## 2023-10-30 NOTE — PLAN OF CARE
F/U appointment on 11/2/23 at 1:00 pm.          Santy Lomas [facility]   Case Management  [phone]

## 2023-10-30 NOTE — DISCHARGE SUMMARY
[facility] - Neurosurgery ([facility])  Vascular Neurology  Comprehensive Stroke Center  Discharge Summary     Summary:     Admit Date: 10/25/2023  7:29 PM    Discharge Date and Time:  10/30/2023 1:57 PM    Attending Physician: Erick Barros MD     Discharge Provider: Geovanny Conroy MD    History of Present Illness: Cecil Clark is a 48 y.o. male with PMHx of DM, HTN, and R thalamic ICH (2021, 2/2 HTN, no deficits) who reported to ED with LSW. Stroke code called on arrival. Patient reports his symptom began between 2:30am and 3am when he had finished playing video games for the night. He initially thought the weakness was due to strenuous exercise he did yesterday. The exercises he performed were more strenuous exercise than usual. Patient woke up at 7am and his symptoms were persisting. Throughout the day he kept falling and was having difficulty ambulating.    He reports that he is no longer on any medications. States he hasn't seen a PCP in 2-3 years. He chose to stop taking his medications after making lifestyle changes to control his HTN and DM. Of note, patient reports being between jobs right now and not currently having health insurance. Reports he recently applied for medicaid.        Hospital Course (synopsis of major diagnoses, care, treatment, and services provided during the course of the hospital stay): 10/25 presented with LSW and falls, MRI significant for R internal capsule stroke. DAPT loaded.   10/26 Patient currently on DAPT, endocrine consulted for elevated A1c, echo pending. Dispo pending PT recs   10/27/2023 NAEO, neuro exam stable. Hypokalemia on labs again this morning (K 2.8 > 2.9), replacement ordered. Endo following, glucose improved on scheduled insulin. Dispo recs for low intensity therapy.   10/28/2023 HM consulted for hypokalemia. Increased lisinopril to 5mg. DME delivered.  10/29/2023 Potassium improved. Increased lisinopril to 20 per HM recs.  10/30/2023 Added amlodipine due to  persistently elevated BP. Planning for d/c today. Follow up scheduled with PCP. Referral placed for endocrine and vascular neurology.         Goals of Care Treatment Preferences:  Code Status: Full Code      Stroke Etiology: Ischemic Small Vessel Disease ([place])    STROKE DOCUMENTATION   Acute Stroke Times   Last Known Normal Date: 10/25/23  Last Known Normal Time: 0230  Symptom Onset Date: 10/25/23  Symptom Onset Time: 0230  Stroke Team Called Date: 10/25/23  Stroke Team Called Time: 1930  Stroke Team Arrival Date: 10/25/23  Stroke Team Arrival Time: 1937  CT Interpretation Time: 1945  Thrombolytic Therapy Recommended: No  CTA Interpretation Time: 1951  Thrombectomy Recommended: No     NIH Scale:  1a. Level of Consciousness: 0-->Alert, keenly responsive  1b. LOC Questions: 0-->Answers both questions correctly  1c. LOC Commands: 0-->Performs both tasks correctly  2. Best Gaze: 0-->Normal  3. Visual: 0-->No visual loss  4. Facial Palsy: 0-->Normal symmetrical movements  5a. Motor Arm, Left: 0-->No drift, limb holds 90 (or 45) degrees for full 10 secs  5b. Motor Arm, Right: 0-->No drift, limb holds 90 (or 45) degrees for full 10 secs  6a. Motor Leg, Left: 0-->No drift, leg holds 30 degree position for full 5 secs  6b. Motor Leg, Right: 0-->No drift, leg holds 30 degree position for full 5 secs  7. Limb Ataxia: 0-->Absent  8. Sensory: 0-->Normal, no sensory loss  9. Best Language: 0-->No aphasia, normal  10. Dysarthria: 0-->Normal  11. Extinction and Inattention (formerly Neglect): 0-->No abnormality  Total (NIH Stroke Scale): 0        Modified Hipolito Score: 1  [place] Coma Scale:    ABCD2 Score:    [id number] Score:   HAS -BLED Score:   ICH Score:   Hunt & Gonzalez Classification:     Vitals:    10/30/23 1141   BP: (!) 157/110   Pulse: (!) 118   Resp: 18   Temp: 98.1 °F (36.7 °C)     Physical Exam  Vitals and nursing note reviewed.   Constitutional:       General: He is not in acute distress.     Appearance: Normal  appearance.   HENT:      Head: Normocephalic and atraumatic.      Mouth/Throat:      Mouth: Mucous membranes are moist.   Eyes:      Extraocular Movements: Extraocular movements intact.   Pulmonary:      Effort: Pulmonary effort is normal. No respiratory distress.   Abdominal:      General: Abdomen is flat. There is no distension.   Musculoskeletal:         General: No swelling. Normal range of motion.      Cervical back: Normal range of motion.   Skin:     General: Skin is warm.   Neurological:      Mental Status: He is alert and oriented to person, place, and time.      Motor: Weakness present.   Psychiatric:         Mood and Affect: Mood normal.         Behavior: Behavior normal.                  Neurological Exam:   LOC: alert  Attention Span: Good   Language: No aphasia  Articulation: No dysarthria  Orientation: Person, Place, Time   Visual Fields: Full  EOM (CN III, IV, VI): Full/intact  Pupils (CN II, III): PERRL  Facial Sensation (CN V): Normal  Facial Movement (CN VII): Lower facial weakness on the Left  Motor: Arm left  Paresis: 4/5  Leg left  Normal 5/5  Arm right  Normal 5/5  Leg right Normal 5/5  Cerebellum: No evidence of appendicular or axial ataxia  Sensation: intact to light touch  Tone: Normal tone throughout      Assessment/Plan:     Diagnostic Results:      Brain imaging:  MRI Brain without contrast 10/25/23  Impression:  Small focus of acute infarction in the posterior limb of the right internal capsule.  Chronic microhemorrhage in the right thalamus and superior to the left subinsular cortex.  Changes of chronic small vessel ischemic disease.        Vessel Imaging:  CTA stroke multiphase 10/25/23  Impression:  No evidence of acute ICH  Remote R thalamic hypodensity  Age indeterminate hypodensity in R internal capsule, possibly contributing to patient's symptoms  No LVO or significant stenosis        Cardiac Evaluation:   TTE with bubble 10/26/2023    Left Ventricle: The left ventricle is  normal in size. Normal wall thickness. There is concentric remodeling. Normal wall motion. There is normal systolic function with a visually estimated ejection fraction of 60 - 65%. There is normal diastolic function.    Left Atrium: Agitated saline study of the atrial septum is negative after vasalva maneuver, suggesting absence of intracardiac shunt at the atrial level.    Right Ventricle: Normal right ventricular cavity size. Wall thickness is normal. Right ventricle wall motion  is normal. Systolic function is normal.    IVC/SVC: Normal venous pressure at 3 mmHg.    Interventions: None    Complications: None    Disposition: Home or Self Care    Final Active Diagnoses:    Diagnosis Date Noted POA    PRINCIPAL PROBLEM:  Stroke due to thrombosis of right middle cerebral artery [I63.311] 10/25/2023 Yes    History of intracerebral hemorrhage without residual deficit [Z86.79] 2021 Not Applicable    Essential hypertension [I10] 2021 Yes    Type 2 diabetes mellitus with hyperglycemia, without long-term current use of insulin [E11.65] 2021 Yes    Hypokalemia [E87.6] 2021 Yes      Problems Resolved During this Admission:    Diagnosis Date Noted Date Resolved POA    Tachycardia [R00.0] 2021 10/28/2023 Yes     Neuro  * Stroke due to thrombosis of right middle cerebral artery  Cecil Clark is a 48 y.o. male with PMHx of DM, HTN, and R thalamic ICH (, 2/2 HTN, no deficits) who reported to ED with LSW and L facial numbness which resulted in multiple falls. Stroke code called on arrival. Patient reports his symptom began between 2:30 am and 3:00 am. He was out of window for TNK. CT without acute changes, possible new infarct in R internal capsule. CTA without significant stenosis or LVO. MRI confirmed R internal capsule stroke. Echo with bubble unremarkable. Stroke etiology likely  in setting of multiple uncontrolled risk factors.      Antithrombotics for secondary stroke prevention:  Antiplatelets: Aspirin: 81 mg daily  Clopidogrel: 75 mg daily    Statins for secondary stroke prevention and hyperlipidemia, if present:   Statins: Atorvastatin- 40 mg daily    Aggressive risk factor modification: HTN, DM     Rehab efforts: The patient has been evaluated by a stroke team provider and the therapy needs have been fully considered based off the presenting complaints and exam findings. The following therapy evaluations are needed: PT evaluate and treat, OT evaluate and treat, SLP evaluate and treat, PM&R evaluate for appropriate placement    Diagnostics ordered/pending: HgbA1C to assess blood glucose levels, Lipid Profile to assess cholesterol levels, MRI head without contrast to assess brain parenchyma, TTE to assess cardiac function/status , TSH to assess thyroid function    VTE prophylaxis: Heparin 5000 units SQ every 8 hours  Mechanical prophylaxis: Place SCDs    BP parameters: SBP goal <180        History of intracerebral hemorrhage without residual deficit  R thalamic hypertensive ICH in 2021    Cardiac/Vascular  Essential hypertension  Stroke risk factor   SBP goal <180  -Lisinopril 20mg, amlodipine 10mg    Renal/  Hypokalemia  K+ 3.1 on admit  -hospital medicine consulted for recommendations; possibly related to acute illness and GI loses.   -renin/isidra pending- follow up outpatient for results      Endocrine  Type 2 diabetes mellitus with hyperglycemia, without long-term current use of insulin  Stroke risk factor  A1c 12.5  Basal/bolus insulin w/ BG goal while inpatient 140-180  Endocrine consulted for co-management  -will d/c on insulin, f/u endocrine o/p        Recommendations:     Post-discharge complication risks: None    Stroke Education given to: patient and family    Follow-up in Stroke Clinic in 4-6 weeks.     Discharge Plan:  Antithrombotics: Aspirin 81mg, Clopidogrel 75mg  Statin: Atorvastatin 40mg  Aggresive risk factor modification:  Hypertension  Diabetes  High  "Cholesterol  Diet    Follow Up:   Follow-up Information     Efraín Jacobsen MD .    Specialty: Neurology  Contact information:  [address]  [facility] [address]  [phone]             Annamarie Zapata MD Follow up on 11/2/2023.    Specialty: Family Medicine  Why: F/U appointment at 1:00 pm.  Contact information:  [address]  [facility] [address]  [phone]             Endo/Diabetes/Coumadin, [facility] -. Schedule an appointment as soon as possible for a visit.    Specialties: Endocrinology, Nephrology  Contact information:  [address] [facility] [address]  [phone]             Endo/Diabetes/Coumadin, [facility] - .    Specialties: Endocrinology, Nephrology  Contact information:  [address] [facility] [address]  [phone]                         Patient Instructions:      BATH/SHOWER CHAIR FOR HOME USE     Order Specific Question Answer Comments   Height: 5' 2.01" (1.575 m)    Weight: 72.6 kg (160 lb)    Does patient have medical equipment at home? none    Length of need (1-99 months): 99    Type: Without back      WALKER FOR HOME USE     Order Specific Question Answer Comments   Type of Walker: Adult (5'4"-6'6")    With wheels? Yes    Height: 5' 2.01" (1.575 m)    Weight: 72.6 kg (160 lb)    Length of need (1-99 months): 99    Does patient have medical equipment at home? none    Please check all that apply: Patient's condition impairs ambulation.    Please check all that apply: Walker will be used for gait training.      Ambulatory referral/consult to Endocrinology   Standing Status: Future   Referral Priority: Routine Referral Type: Consultation   Referred to Provider: [facility] - ENDO/DIABETES/COUMADIN Requested Specialty: Endocrinology   Number of Visits Requested: 1       Medications:  Reconciled Home Medications:      Medication List      START taking these medications    aspirin 81 MG EC tablet  Commonly known as: " "ECOTRIN  Take 1 tablet (81 mg total) by mouth once daily.     clopidogreL 75 mg tablet  Commonly known as: PLAVIX  Take 1 tablet (75 mg total) by mouth once daily.     lancing device with lancets Kit  1 kit by Misc.(Non-Drug; Combo Route) route 6 (six) times daily. Prior to meals to check blood glucose     LEVEMIR FLEXPEN 100 unit/mL (3 mL) Inpn pen  Generic drug: insulin detemir U-100 (Levemir)  Inject 12 Units into the skin once daily.  (Discard pen 42 days after initial use)  Start taking on: October 31, 2023     NovoLOG FlexPen U-100 Insulin 100 unit/mL (3 mL) Inpn pen  Generic drug: insulin aspart U-100  Inject 5 Units into the skin 3 (three) times daily. Use sliding scale before meals and at bedtime: 150 - 200 + 1 unit, 201 - 250 + 2 units, 251 - 300 + 3 units, 301 - 350 + 4 units, > 350 + 5 units. Discard pen 28 days after initial use. (35 units total daily dose)     TRUE METRIX GLUCOSE TEST STRIP Strp  Generic drug: blood sugar diagnostic  Use to test blood glucose 6 (six) times daily.     TRUEPLUS PEN NEEDLE 32 gauge x 5/32" Ndle  Generic drug: pen needle, diabetic  Use for insulin adminstration up to 5 times daily        CONTINUE taking these medications    amLODIPine 10 MG tablet  Commonly known as: NORVASC  Take 1 tablet (10 mg total) by mouth once daily.     atorvastatin 40 MG tablet  Commonly known as: LIPITOR  Take 1 tablet (40 mg total) by mouth once daily.     lisinopriL 20 MG tablet  Commonly known as: PRINIVIL,ZESTRIL  Take 1 tablet (20 mg total) by mouth once daily.        STOP taking these medications    lancing device Misc     SITagliptin phosphate 100 MG Tab  Commonly known as: JANUVIA        ASK your doctor about these medications    * FREESTYLE LITE METER kit  Generic drug: blood-glucose meter  Use as instructed  Replaced by: TRUE METRIX GLUCOSE METER Misc  Ask about: Which instructions should I use?     * TRUE METRIX GLUCOSE METER Misc  Generic drug: blood-glucose meter  Use to check blood " glucose 6 times daily  Replaces: FREESTYLE LITE METER kit  Ask about: Which instructions should I use?     TRUEPLUS LANCETS 30 gauge Misc  Generic drug: lancets  Use to check blood glucose 6 times daily  Ask about: Which instructions should I use?         * This list has 2 medication(s) that are the same as other medications prescribed for you. Read the directions carefully, and ask your doctor or other care provider to review them with you.                Geovanny Conroy MD  [facility]  Department of Vascular Neurology   [facility] ([facility])

## 2023-10-30 NOTE — ASSESSMENT & PLAN NOTE
Pt with hx of HTN, he was prescribed amlodipine and lisinopril in the past but states he has not taken these medications for years 2/2 controlled BP maintained by lifestyle changes including diet and exercise.     --Hypertension being co-managed with primary team(Stroke). Discussed plan of care with Stroke team at patient bedside  --Increase lisinopril to 40mg daily, agree with addition of amlodipine 10mg by primary team which pt tolerated in the past.   --PCP followup in 1-2 weeks to followup pending labs to evaluate for secondary hypertension and BP management. Can also consider HCTZ addition at the time if indicated

## 2023-10-30 NOTE — PT/OT/SLP PROGRESS
"Occupational Therapy   Treatment    Name: Cecil Clark  MRN: [id number]  Admitting Diagnosis:  Stroke due to thrombosis of right middle cerebral artery       Recommendations:     Discharge Recommendations: Low Intensity Therapy  Discharge Equipment Recommendations:  walker, rolling, shower chair  Barriers to discharge:  None    Assessment:     Cecil Clark is a 48 y.o. male with a medical diagnosis of Stroke due to thrombosis of right middle cerebral artery.  He presents with impaired ADL and mobility performance deficits. Pt found upright in bed and agreeable for therapy. Session focused on functional ambulation to complete standing ADLs in restroom. Pt with no LOB and good environmental navigation. Pt with minimal deficits in L hand fine motor capacity during tasks. Pt currently is an excellent candidate for continued therapy. Pt is highly motivated to return to OF and currently is not at their baseline. Pt currently would benefit best from low intensity therapy at discharge.   Performance deficits affecting function are weakness, impaired endurance, impaired self care skills, impaired functional mobility, gait instability, decreased safety awareness.     Rehab Prognosis:  Good; patient would benefit from acute skilled OT services to address these deficits and reach maximum level of function.       Plan:     Patient to be seen 3 x/week to address the above listed problems via self-care/home management, therapeutic activities, therapeutic exercises, neuromuscular re-education  Plan of Care Expires: 11/26/23  Plan of Care Reviewed with: patient    Subjective     Chief Complaint: "It doesn't feel right"-- in reference to L hand   Patient/Family Comments/goals: "I passed the stair test and all!"  Pain/Comfort:  Pain Rating 1: 0/10  Pain Rating Post-Intervention 1: 0/10    Objective:     Communicated with: RN prior to session.  Patient found HOB elevated with Other (comments) (no active lines) upon OT entry to room.    General " Precautions: Standard, fall, aspiration    Orthopedic Precautions:N/A  Braces: N/A  Respiratory Status: Room air     Occupational Performance:     Bed Mobility:    Patient completed Rolling/Turning to Left with  supervision  Patient completed Scooting/Bridging with supervision  Patient completed Supine to Sit with supervision     Functional Mobility/Transfers:  Patient completed Sit <> Stand Transfer with stand by assistance  with  no assistive device   Functional Mobility: Pt stood and mobilized in room and into restroom for standing shower cap and face washing at sink. Pt tolerated standing ~8 minutes at sink with no LOB.    Activities of Daily Living:  Grooming: stand by assistance washing face and donning shower cap in standing for hair care      [facility] 6 Click ADL: 21    Treatment & Education:  Pt educated on role of occupational therapy, POC, and safety during ADLs and functional mobility. Pt and OT discussed importance of safe, continued mobility to optimize daily living skills. Pt verbalized understanding.     White board updated during session. Pt given instruction to call for medical staff/nurse for assistance.       Patient left sitting edge of bed with all lines intact, call button in reach, RN notified, and MD present    GOALS:   Multidisciplinary Problems       Occupational Therapy Goals          Problem: Occupational Therapy    Goal Priority Disciplines Outcome Interventions   Occupational Therapy Goal     OT, PT/OT Ongoing, Progressing    Description: Goals to be met by: 11/26/23     Patient will increase functional independence with ADLs by performing:    UE Dressing with [place].  LE Dressing with [place].  Grooming while standing with [place].  Toileting from toilet with [place] for hygiene and clothing management.   Bathing from  shower chair/bench with [place].                         Time Tracking:     OT Date of Treatment: 10/30/23  OT Start Time: 0752  OT Stop Time:  0815  OT Total Time (min): 23 min    Billable Minutes:Self Care/Home Management 13 min  Therapeutic activity 10 min    OT/DANNY: OT     Number of DANNY visits since last OT visit: 1    10/30/2023

## 2023-10-30 NOTE — PLAN OF CARE
[facility] - Neurosurgery (Hospital)  Discharge Final Note    Primary Care Provider: Annamarie Zapata MD    Expected Discharge Date: 10/30/2023    Final Discharge Note (most recent)       Final Note - 10/30/23 1413          Final Note    Assessment Type Final Discharge Note (P)      Anticipated Discharge Disposition Home or Self Care (P)         Post-Acute Status    Post-Acute Authorization HME (P)      HME Status Set-up Complete/Auth obtained (P)      Other Status See Comments (P)      Discharge Delays Payor Issues (P)                    Patient will discharge home with rolling walker and medications.  Patient is Medicaid pending and has no post acute benefits.  DM teaching has been done at bedside with RN.  Patient will contact family for transport home.      Staci Uribe LMSW [facility]  [phone]               Contact Info       Efraín Jacobsen MD   Specialty: Neurology    [address] [facility] [address]   Phone: [phone]       Next Steps: Follow up    Annamarie Zapata MD   Specialty: Family Medicine   Relationship: PCP - General    [address] [facility] [address]   Phone: [phone]       Next Steps: Follow up on 11/2/2023    Instructions: F/U appointment at 1:00 pm.    [facility] - Endo/Diabetes/Coumadin   Specialty: Endocrinology, Nephrology    [address] [facility] [address]   Phone: [phone]       Next Steps: Schedule an appointment as soon as possible for a visit    [facility] - Endo/Diabetes/Coumadin   Specialty: Endocrinology, Nephrology    [address] [facility] [address]   Phone: [phone]       Next Steps: Follow up

## 2023-10-30 NOTE — ASSESSMENT & PLAN NOTE
Recent Labs     10/29/23  1637 10/29/23  2100 10/30/23  0732   POCTGLUCOSE 186* 198* 161*         Endocrinology following

## 2023-10-30 NOTE — PT/OT/SLP PROGRESS
Speech Language Pathology Treatment/Discharge Summary    Patient Name:  Cecil Clark   MRN:  [id number]  Admitting Diagnosis: Stroke due to thrombosis of right middle cerebral artery    Recommendations:                 General Recommendations:  Follow-up not indicated  Diet recommendations:  Regular Diet - IDDSI Level 7, Liquid Diet Level: Thin liquids - IDDSI Level 0   Aspiration Precautions: Standard aspiration precautions   General Precautions: Standard, fall, aspiration  Communication strategies:  none    Assessment:     Cecil Clark is a 48 y.o. male who presents with functional speech, language, cognitive communication, and swallowing skills. No additional skilled speech services required at this time.      Subjective     Spoke with RN prior to session. Pt found resting in bed with family at bedside.     Patient goals: to go home     Pain/Comfort:  Pain Rating 1: 0/10    Respiratory Status: Room air    Objective:     Has the patient been evaluated by SLP for swallowing?   Yes  Keep patient NPO? No   Current Respiratory Status:        Pt seen for ongoing dysphagia therapy. Pt and family endorsed pt continues to exhibit good tolerance of current regular diet with thin liquids without difficulty. He additionally endorsed no concerns regarding speech/language or cognitive communication, stating he was at baseline. PO trials of self-regulated bites of tyrel crackers and sips of thin liquids tolerated without difficulty and no pocketing exhibited. SLP provided education regarding  regular diet with thin liquids and ongoing SLP POC. Pt and family verbalized understanding, were in agreement with D/C from speech services, and had no additional questions or concerns upon SLP exit.       Goals:   Multidisciplinary Problems       SLP Goals       Not on file                    Plan:     Patient to be seen:  3 x/week   Plan of Care expires:  11/25/23  Plan of Care reviewed with:  patient, family   SLP Follow-Up:  No       Discharge  recommendations:   (tbd)   Barriers to Discharge:  None    Time Tracking:     SLP Treatment Date:   10/30/23  Speech Start Time:  1051  Speech Stop Time:  1057     Speech Total Time (min):  6 min    Billable Minutes: Treatment Swallowing Dysfunction 6      10/30/2023

## 2023-10-30 NOTE — PROGRESS NOTES
[facility] - Neurosurgery ([facility])  [facility] Medicine  Progress Note    Patient Name: Cecil Clark  MRN: [id number]  Patient Class: IP- Inpatient   Admission Date: 10/25/2023  Length of Stay: 5 days  Attending Physician: Erick Barros MD  Primary Care Provider: Annamarie Zapata MD        Subjective:     Principal Problem:Stroke due to thrombosis of right middle cerebral artery        HPI:  Mr. Clark is a 48 y.o. male with hx of DM, HTN, and R thalamic ICH (2021, 2/2 HTN, no deficits) who was admitted to [facility] for after presenting with stroke symptoms with evaluation noting R internal capsule stroke. During admission, patient was noted to have persistent hypokalemia so Hospital medicine was consulted for further evaluation.  During initial evaluation, patient reported 1 day of diarrhea during hospitalization that has now resolved.       Interval History: No acute events overnight, afebrile, hemodynamically stable. BP remains elevated despite increase in antihypertensives yesterday.          Objective:     Vital Signs (Most Recent):  Temp: 98.1 °F (36.7 °C) (10/30/23 1141)  Pulse: (!) 118 (10/30/23 1141)  Resp: 18 (10/30/23 1141)  BP: (!) 157/110 (10/30/23 1141)  SpO2: 99 % (10/30/23 1141) Vital Signs (24h Range):  Temp:  [98.1 °F (36.7 °C)-98.4 °F (36.9 °C)] 98.1 °F (36.7 °C)  Pulse:  [] 118  Resp:  [16-20] 18  SpO2:  [98 %-99 %] 99 %  BP: (157-178)/() 157/110     Weight: 72.6 kg (160 lb)  Body mass index is 29.26 kg/m².  No intake or output data in the 24 hours ending 10/30/23 1432      Physical Exam  Vitals and nursing note reviewed.   Constitutional:       General: He is not in acute distress.     Appearance: He is not ill-appearing, toxic-appearing or diaphoretic.   HENT:      Head: Normocephalic and atraumatic.      Mouth/Throat:      Mouth: Mucous membranes are moist.   Eyes:      General: No scleral icterus.        Right eye: No discharge.         Left eye: No discharge.   Cardiovascular:      Rate and  Rhythm: Normal rate and regular rhythm.      Heart sounds: Normal heart sounds.   Pulmonary:      Effort: Pulmonary effort is normal. No respiratory distress.      Breath sounds: No wheezing, rhonchi or rales.   Abdominal:      General: Bowel sounds are normal. There is no distension.      Palpations: Abdomen is soft.      Tenderness: There is no abdominal tenderness. There is no guarding or rebound.   Musculoskeletal:         General: No tenderness.      Cervical back: No rigidity.      Right lower leg: No edema.      Left lower leg: No edema.   Skin:     General: Skin is warm and dry.      Coloration: Skin is not jaundiced.   Neurological:      Mental Status: He is alert and oriented to person, place, and time. Mental status is at baseline.      Motor: Weakness present.   Psychiatric:         Mood and Affect: Mood normal.         Behavior: Behavior normal.             Significant Labs: All pertinent labs within the past 24 hours have been reviewed.  LABS:  Recent Labs   Lab 10/28/23  0809 10/29/23  0401 10/30/23  0409    139 135*   K 3.2* 3.4* 3.9    106 106   CO2 24 21* 21*   BUN 12 13 17   CREATININE 1.0 1.1 1.1    98 162*   ANIONGAP 12 12 8     Recent Labs   Lab 10/28/23  0809 10/29/23  0401 10/30/23  0409   MG 1.9 2.1 1.8   PHOS 3.3 3.6 3.2     Recent Labs   Lab 10/25/23  1956 10/26/23  0734 10/27/23  0337   AST 23 27 29   ALT 24 24 24   ALKPHOS 77 73 69   BILITOT 0.8 1.0 0.7   ALBUMIN 2.7* 2.9* 2.7*     POCT Glucose:   Recent Labs   Lab 10/29/23  2100 10/30/23  0732 10/30/23  1143   POCTGLUCOSE 198* 161* 173*    Recent Labs   Lab 10/25/23  1956 10/26/23  0502 10/27/23  0338   WBC 9.04 8.83 8.69   HGB 14.7 15.0 14.7   HCT 41.1 41.8 41.1    171 158   GRAN 73.1*  6.6 60.5  5.3 58.6  5.1          Significant Imaging: I have reviewed all pertinent imaging results/findings within the past 24 hours.      Inpatient Medications:  Continuous Infusions:   sodium chloride 0.9%        Scheduled Meds:   amLODIPine  10 mg Oral Daily    aspirin  81 mg Oral Daily    atorvastatin  40 mg Oral Daily    clopidogreL  75 mg Oral Daily    heparin (porcine)  5,000 Units Subcutaneous Q8H    insulin aspart U-100  5 Units Subcutaneous TIDWM    insulin detemir U-100  12 Units Subcutaneous Daily    [START ON 10/31/2023] lisinopriL  40 mg Oral Daily     PRN Meds:acetaminophen, dextrose 10%, dextrose 10%, dextrose 10%, dextrose 10%, dextrose 10%, glucagon (human recombinant), glucose, glucose, insulin aspart U-100, ondansetron, sodium chloride 0.9%, sodium chloride 0.9%          Assessment/Plan:      Hypokalemia  Recent Labs     10/28/23  0809 10/29/23  0401 10/30/23  0409   K 3.2* 3.4* 3.9   PHOS 3.3 3.6 3.2   MG 1.9 2.1 1.8       Differential diagnoses include, but not limited to: diarrhea, stress response    PLAN:  --Followup Aldosterone/renin ratio   --Repeat BMP in 2 days after discharge  --Replete electrolytes(Mag, Phos).   --Replete potassium to target K of 4       Type 2 diabetes mellitus with hyperglycemia, without long-term current use of insulin  Recent Labs     10/29/23  1637 10/29/23  2100 10/30/23  0732   POCTGLUCOSE 186* 198* 161*         Endocrinology following      Essential hypertension  Pt with hx of HTN, he was prescribed amlodipine and lisinopril in the past but states he has not taken these medications for years 2/2 controlled BP maintained by lifestyle changes including diet and exercise.     --Hypertension being co-managed with primary team(Stroke). Discussed plan of care with Stroke team at patient bedside  --Increase lisinopril to 40mg daily, agree with addition of amlodipine 10mg by primary team which pt tolerated in the past.   --PCP followup in 1-2 weeks to followup pending labs to evaluate for secondary hypertension and BP management. Can also consider HCTZ addition at the time if indicated    VTE Risk Mitigation (From admission, onward)         Ordered     heparin  (porcine) injection 5,000 Units  Every 8 hours         10/25/23 2008     IP VTE LOW RISK PATIENT  Once         10/25/23 2007     Place sequential compression device  Until discontinued         10/25/23 2007                Discharge Planning   SUZANNE: 10/30/2023     Code Status: Full Code   Is the patient medically ready for discharge?: No    Reason for patient still in hospital (select all that apply): Patient trending condition  Discharge Plan A: Home with family   Discharge Delays: (!) Payor Issues              Tim Nuñez DO  Department of Hospital Medicine   [facility] - Neurosurgery ([facility])

## 2023-10-30 NOTE — ASSESSMENT & PLAN NOTE
Stroke risk factor  A1c 12.5  Basal/bolus insulin w/ BG goal while inpatient 140-180  Endocrine consulted for co-management  -will d/c on insulin, f/u endocrine o/p

## 2023-10-30 NOTE — ASSESSMENT & PLAN NOTE
BG goal: 140-180    - Continue Novolog 5 units TIDWM    - Levemir 12 units daily (WBD @ 0.3 u/kg/day)   - [facility] (150/25) prn for hyperglycemia   - POCT Glucose before meals and at bedtime  - Hypoglycemia protocol in place      ** Please notify Endocrine for any change and/or advance in diet**  ** Please call Endocrine for any BG related issues **     Discharge Planning:   -Start Januvia 100 mg daily  -Start Levemir 12 units once daily  -Start Novolog 5 units TID with meals in addition to the following correction scale   150 - 200 + 1 unit  201 - 250 + 2 units  251 - 300 + 3 units  301 - 350 + 4 units   > 350   + 5 units    -Patient to keep BG logs and notify [facility] endocrine clinic for any BG < 80 or consistently > 200. Submit logs to patient portal in 3-4 days for review. Will schedule follow up at [facility] endocrine clinic. Patient refuses DM education outpatient at this time.     Discharge Teaching:    Reviewed topics related to DM including: the need for insulin, how insulin works, what makes it a high risk medication, the importance of immediate follow up with either PCP or endocrine provider, importance of and how to check BG, how to record BG on logs, how to administer insulin, appropriate insulin administration sites, importance of rotating injection sites, hyper/hypoglycemia, how and when to treat hypoglycemia, when to hold insulin, how the correction scale works, importance of storing unused insulin in the refrigerator, and when to seek medical attention.  Patient verbalized understanding, answered all questions to patient's satisfaction.

## 2023-10-30 NOTE — ASSESSMENT & PLAN NOTE
K+ 3.1 on admit  -hospital medicine consulted for recommendations; possibly related to acute illness and GI loses.   -renin/isidra pending- follow up outpatient for results

## 2023-10-30 NOTE — PLAN OF CARE
Problem: Adjustment to Illness (Stroke, Ischemic/Transient Ischemic Attack)  Goal: Optimal Coping  Outcome: Ongoing, Progressing     Problem: Bowel Elimination Impaired (Stroke, Ischemic/Transient Ischemic Attack)  Goal: Effective Bowel Elimination  Outcome: Ongoing, Progressing     Problem: Cerebral Tissue Perfusion (Stroke, Ischemic/Transient Ischemic Attack)  Goal: Optimal Cerebral Tissue Perfusion  Outcome: Ongoing, Progressing     Problem: Cognitive Impairment (Stroke, Ischemic/Transient Ischemic Attack)  Goal: Optimal Cognitive Function  Outcome: Ongoing, Progressing     Problem: Communication Impairment (Stroke, Ischemic/Transient Ischemic Attack)  Goal: Improved Communication Skills  Outcome: Ongoing, Progressing     Problem: Functional Ability Impaired (Stroke, Ischemic/Transient Ischemic Attack)  Goal: Optimal Functional Ability  Outcome: Ongoing, Progressing     Problem: Respiratory Compromise (Stroke, Ischemic/Transient Ischemic Attack)  Goal: Effective Oxygenation and Ventilation  Outcome: Ongoing, Progressing     Problem: Sensorimotor Impairment (Stroke, Ischemic/Transient Ischemic Attack)  Goal: Improved Sensorimotor Function  Outcome: Ongoing, Progressing     Problem: Swallowing Impairment (Stroke, Ischemic/Transient Ischemic Attack)  Goal: Optimal Eating and Swallowing without Aspiration  Outcome: Ongoing, Progressing     Problem: Urinary Elimination Impaired (Stroke, Ischemic/Transient Ischemic Attack)  Goal: Effective Urinary Elimination  Outcome: Ongoing, Progressing     Problem: Fall Injury Risk  Goal: Absence of Fall and Fall-Related Injury  Outcome: Ongoing, Progressing     Problem: Adult Inpatient Plan of Care  Goal: Plan of Care Review  Outcome: Ongoing, Progressing  Goal: Absence of Hospital-Acquired Illness or Injury  Outcome: Ongoing, Progressing  Goal: Optimal Comfort and Wellbeing  Outcome: Ongoing, Progressing  Goal: Readiness for Transition of Care  Outcome: Ongoing, Progressing      Problem: Infection  Goal: Absence of Infection Signs and Symptoms  Outcome: Ongoing, Progressing     Problem: Diabetes Comorbidity  Goal: Blood Glucose Level Within Targeted Range  Outcome: Ongoing, Progressing      No c/o pain. No neuro changes, no acute events. Will continue to monitor.

## 2023-10-30 NOTE — PROGRESS NOTES
"Vance Lyman - Neurosurgery ([facility])  Endocrinology  Progress Note    Admit Date: 10/25/2023     Reason for Consult: Management of T2DM, Hyperglycemia      Diabetes diagnosis year:      Home Diabetes Medications:  None     Lab Results   Component Value Date    HGBA1C 12.5 (H) 10/25/2023       How often checking glucose at home? Denies      Diabetes Complications include:     Diabetic chronic kidney disease and Diabetic retinopathy      Complicating diabetes co morbidities:   History of CVA and Residual deficits from CVA        HPI:   Patient is a 48 y.o. male with a diagnosis of type 2 DM, HTN, and R thalamic ICH (, 2/2 HTN, no deficits) who reported to ED with LSW and L facial numbness which resulted in multiple falls. Admitted for further workup and treatment. Endocrinology consulted for BG/ DM management.       Interval HPI:   Overnight events: Remains on neuro floor. BG reasonably well controlled on current SQ insulin regimen. Diet diabetic 2000 Calorie; Thin    Eatin%  Nausea: No  Hypoglycemia and intervention: No  Fever: No  TPN and/or TF: No  If yes, type of TF/TPN and rate: n/a    BP (!) 178/117   Pulse (!) 116   Temp 98.4 °F (36.9 °C)   Resp 16   Ht 5' 2.01" (1.575 m)   Wt 72.6 kg (160 lb)   SpO2 99%   BMI 29.26 kg/m²     Labs Reviewed and Include    Recent Labs   Lab 10/30/23  0409   *   CALCIUM 8.5*   *   K 3.9   CO2 21*      BUN 17   CREATININE 1.1     Lab Results   Component Value Date    WBC 8.69 10/27/2023    HGB 14.7 10/27/2023    HCT 41.1 10/27/2023    MCV 84 10/27/2023     10/27/2023     Recent Labs   Lab 10/25/23  1956   TSH 1.429     Lab Results   Component Value Date    HGBA1C 12.5 (H) 10/25/2023       Nutritional status:   Body mass index is 29.26 kg/m².  Lab Results   Component Value Date    ALBUMIN 2.7 (L) 10/27/2023    ALBUMIN 2.9 (L) 10/26/2023    ALBUMIN 2.7 (L) 10/25/2023     No results found for: "PREALBUMIN"    Estimated Creatinine " Clearance: 70.2 mL/min (based on SCr of 1.1 mg/dL).    Accu-Checks  Recent Labs     10/27/23  2121 10/28/23  0723 10/28/23  1102 10/28/23  1543 10/28/23  2118 10/29/23  0758 10/29/23  1204 10/29/23  1637 10/29/23  2100 10/30/23  0732   POCTGLUCOSE 110 122* 208* 160* 122* 108 105 186* 198* 161*       Current Medications and/or Treatments Impacting Glycemic Control  Immunotherapy:    Immunosuppressants       None          Steroids:   Hormones (From admission, onward)      None          Pressors:    Autonomic Drugs (From admission, onward)      None          Hyperglycemia/Diabetes Medications:   Antihyperglycemics (From admission, onward)      Start     Stop Route Frequency Ordered    10/28/23 1645  insulin aspart U-100 pen 5 Units         -- SubQ 3 times daily with meals 10/28/23 1126    10/26/23 1200  insulin detemir U-100 (Levemir) pen 12 Units         -- SubQ Daily 10/26/23 1152    10/26/23 0952  insulin aspart U-100 pen 0-10 Units         -- SubQ Before meals & nightly PRN 10/26/23 0853            ASSESSMENT and PLAN    Neuro  * Stroke due to thrombosis of right middle cerebral artery  Managed per primary.   avoid hypoglycemia        History of intracerebral hemorrhage without residual deficit  Optimize BG control       Endocrine  Type 2 diabetes mellitus with hyperglycemia, without long-term current use of insulin  BG goal: 140-180    - Continue Novolog 5 units TIDWM    - Levemir 12 units daily (WBD @ 0.3 u/kg/day)   - [facility] (150/25) prn for hyperglycemia   - POCT Glucose before meals and at bedtime  - Hypoglycemia protocol in place      ** Please notify Endocrine for any change and/or advance in diet**  ** Please call Endocrine for any BG related issues **     Discharge Planning:   -Start Januvia 100 mg daily  -Start Levemir 12 units once daily  -Start Novolog 5 units TID with meals in addition to the following correction scale   150 - 200 + 1 unit  201 - 250 + 2 units  251 - 300 + 3 units  301 - 350 + 4 units   >  350   + 5 units    -Patient to keep BG logs and notify [facility] endocrine clinic for any BG < 80 or consistently > 200. Submit logs to patient portal in 3-4 days for review. Will schedule follow up at [facility] endocrine clinic. Patient refuses DM education outpatient at this time.     Discharge Teaching:    Reviewed topics related to DM including: the need for insulin, how insulin works, what makes it a high risk medication, the importance of immediate follow up with either PCP or endocrine provider, importance of and how to check BG, how to record BG on logs, how to administer insulin, appropriate insulin administration sites, importance of rotating injection sites, hyper/hypoglycemia, how and when to treat hypoglycemia, when to hold insulin, how the correction scale works, importance of storing unused insulin in the refrigerator, and when to seek medical attention.  Patient verbalized understanding, answered all questions to patient's satisfaction.            Vickie Sweeney, NP  Endocrinology  [facility] ([facility])

## 2023-10-31 ENCOUNTER — TELEPHONE (OUTPATIENT)
Dept: HEPATOLOGY | Facility: HOSPITAL | Age: 48
End: 2023-10-31
Payer: MEDICAID

## 2023-10-31 DIAGNOSIS — E87.6 HYPOKALEMIA: Primary | ICD-10-CM

## 2023-10-31 NOTE — TELEPHONE ENCOUNTER
Called pt to follow up on medical conditions and discuss his f/u scheduled this Thursday 11/2 1300 at [facility].  Ordered a BMP to be done 11/2 prior to clinic. No response and no option to leave vm.

## 2023-10-31 NOTE — PROGRESS NOTES
Vance Lyman - Neurosurgery ([facility])  Vascular Neurology  Comprehensive Stroke Center  Progress Note    Assessment/Plan:     * Stroke due to thrombosis of right middle cerebral artery  Cecil Clark is a 48 y.o. male with PMHx of DM, HTN, and R thalamic ICH (, 2/2 HTN, no deficits) who reported to ED with LSW and L facial numbness which resulted in multiple falls. Stroke code called on arrival. Patient reports his symptom began between 2:30 am and 3:00 am. He was out of window for TNK. CT without acute changes, possible new infarct in R internal capsule. CTA without significant stenosis or LVO. MRI confirmed R internal capsule stroke. Echo with bubble unremarkable. Stroke etiology likely  in setting of multiple uncontrolled risk factors.      Antithrombotics for secondary stroke prevention: Antiplatelets: Aspirin: 81 mg daily  Clopidogrel: 75 mg daily    Statins for secondary stroke prevention and hyperlipidemia, if present:   Statins: Atorvastatin- 40 mg daily    Aggressive risk factor modification: HTN, DM     Rehab efforts: The patient has been evaluated by a stroke team provider and the therapy needs have been fully considered based off the presenting complaints and exam findings. The following therapy evaluations are needed: PT evaluate and treat, OT evaluate and treat, SLP evaluate and treat, PM&R evaluate for appropriate placement    Diagnostics ordered/pending: HgbA1C to assess blood glucose levels, Lipid Profile to assess cholesterol levels, MRI head without contrast to assess brain parenchyma, TTE to assess cardiac function/status , TSH to assess thyroid function    VTE prophylaxis: Heparin 5000 units SQ every 8 hours  Mechanical prophylaxis: Place SCDs    BP parameters: SBP goal <180        Hypokalemia  K+ 3.1 on admit  -hospital medicine consulted for recommendations; possibly related to acute illness and GI loses.   -renin/isidra pending- follow up outpatient for results      Type 2 diabetes mellitus with  hyperglycemia, without long-term current use of insulin  Stroke risk factor  A1c 12.5  Basal/bolus insulin w/ BG goal while inpatient 140-180  Endocrine consulted for co-management  -will d/c on insulin, f/u endocrine o/p    Essential hypertension  Stroke risk factor   SBP goal <180  -Lisinopril 20mg, amlodipine 10mg    History of intracerebral hemorrhage without residual deficit  R thalamic hypertensive ICH in 2021         10/25 presented with LSW and falls, MRI significant for R internal capsule stroke. DAPT loaded.   10/26 Patient currently on DAPT, endocrine consulted for elevated A1c, echo pending. Dispo pending PT recs   10/27/2023 NAEO, neuro exam stable. Hypokalemia on labs again this morning (K 2.8 > 2.9), replacement ordered. Endo following, glucose improved on scheduled insulin. Dispo recs for low intensity therapy.   10/28/2023 HM consulted for hypokalemia. Increased lisinopril to 5mg. DME delivered.  10/29/2023 Potassium improved. Increased lisinopril to 20 per HM recs.  10/30/2023 Added amlodipine due to persistently elevated BP. Planning for d/c today. Follow up scheduled with PCP. Referral placed for endocrine and vascular neurology.         STROKE DOCUMENTATION   Acute Stroke Times   Last Known Normal Date: 10/25/23  Last Known Normal Time: 0230  Symptom Onset Date: 10/25/23  Symptom Onset Time: 0230  Stroke Team Called Date: 10/25/23  Stroke Team Called Time: 1930  Stroke Team Arrival Date: 10/25/23  Stroke Team Arrival Time: 1937  CT Interpretation Time: 1945  Thrombolytic Therapy Recommended: No  CTA Interpretation Time: 1951  Thrombectomy Recommended: No    NIH Scale:          Modified Hipolito Score: 1  [place] Coma Scale:    ABCD2 Score:    [id number] Score:   HAS -BLED Score:   ICH Score:   Hunt & Gonzalez Classification:      Hemorrhagic change of an Ischemic Stroke: Does this patient have an ischemic stroke with hemorrhagic changes? No     Neurologic Chief Complaint: LSW    Subjective:      Interval History: Patient is seen for follow-up neurological assessment and treatment recommendations: see hospital course    HPI, Past Medical, Family, and Social History remains the same as documented in the initial encounter.     Review of Systems   Neurological:  Positive for weakness.     Scheduled Meds:      Continuous Infusions:      PRN Meds:    Objective:     Vital Signs (Most Recent):  Temp: 98.1 °F (36.7 °C) (10/30/23 1141)  Pulse: (!) 118 (10/30/23 1141)  Resp: 18 (10/30/23 1141)  BP: (!) 157/110 (10/30/23 1141)  SpO2: 99 % (10/30/23 1141)  BP Location: Left arm    Vital Signs Range (Last 24H):  Temp:  [98.1 °F (36.7 °C)]   Pulse:  [116-118]   Resp:  [18]   BP: (157)/(110)   SpO2:  [99 %]   BP Location: Left arm       Physical Exam  Vitals and nursing note reviewed.   Constitutional:       General: He is not in acute distress.     Appearance: Normal appearance.   HENT:      Head: Normocephalic and atraumatic.      Mouth/Throat:      Mouth: Mucous membranes are moist.   Eyes:      Extraocular Movements: Extraocular movements intact.   Pulmonary:      Effort: Pulmonary effort is normal. No respiratory distress.   Abdominal:      General: Abdomen is flat. There is no distension.   Musculoskeletal:         General: No swelling. Normal range of motion.      Cervical back: Normal range of motion.   Skin:     General: Skin is warm.   Neurological:      Mental Status: He is alert and oriented to person, place, and time.      Motor: Weakness present.   Psychiatric:         Mood and Affect: Mood normal.         Behavior: Behavior normal.              Neurological Exam:   LOC: alert  Attention Span: Good   Language: No aphasia  Articulation: No dysarthria  Orientation: Person, Place, Time   Visual Fields: Full  EOM (CN III, IV, VI): Full/intact  Pupils (CN II, III): PERRL  Facial Sensation (CN V): Normal  Facial Movement (CN VII): Lower facial weakness on the Left  Motor: Arm left  Paresis: 4/5  Leg left   "Normal 5/5  Arm right  Normal 5/5  Leg right Normal 5/5  Cerebellum: No evidence of appendicular or axial ataxia  Sensation: intact to light touch  Tone: Normal tone throughout    Laboratory:  CMP:   No results for input(s): "GLUCOSE", "CALCIUM", "ALBUMIN", "PROT", "NA", "K", "CO2", "CL", "BUN", "CREATININE", "ALKPHOS", "ALT", "AST", "BILITOT" in the last 24 hours.    CBC:   Recent Labs   Lab 10/27/23  0338   WBC 8.69   RBC 4.88   HGB 14.7   HCT 41.1      MCV 84   MCH 30.1   MCHC 35.8         Diagnostic Results     Brain imaging:  MRI Brain without contrast 10/25/23  Impression:  Small focus of acute infarction in the posterior limb of the right internal capsule.  Chronic microhemorrhage in the right thalamus and superior to the left subinsular cortex.  Changes of chronic small vessel ischemic disease.        Vessel Imaging:  CTA stroke multiphase 10/25/23  Impression:  No evidence of acute ICH  Remote R thalamic hypodensity  Age indeterminate hypodensity in R internal capsule, possibly contributing to patient's symptoms  No LVO or significant stenosis        Cardiac Evaluation:   TTE with bubble 10/26/2023    Left Ventricle: The left ventricle is normal in size. Normal wall thickness. There is concentric remodeling. Normal wall motion. There is normal systolic function with a visually estimated ejection fraction of 60 - 65%. There is normal diastolic function.    Left Atrium: Agitated saline study of the atrial septum is negative after vasalva maneuver, suggesting absence of intracardiac shunt at the atrial level.    Right Ventricle: Normal right ventricular cavity size. Wall thickness is normal. Right ventricle wall motion  is normal. Systolic function is normal.    IVC/SVC: Normal venous pressure at 3 mmHg.         Geovanny Conroy MD  Comprehensive Stroke Center  Department of Vascular Neurology   [facility] Neurosurgery \[facility]\"")      "

## 2023-10-31 NOTE — SUBJECTIVE & OBJECTIVE
Neurologic Chief Complaint: LSW    Subjective:     Interval History: Patient is seen for follow-up neurological assessment and treatment recommendations: see hospital course    HPI, Past Medical, Family, and Social History remains the same as documented in the initial encounter.     Review of Systems   Neurological:  Positive for weakness.     Scheduled Meds:      Continuous Infusions:      PRN Meds:    Objective:     Vital Signs (Most Recent):  Temp: 98.1 °F (36.7 °C) (10/30/23 1141)  Pulse: (!) 118 (10/30/23 1141)  Resp: 18 (10/30/23 1141)  BP: (!) 157/110 (10/30/23 1141)  SpO2: 99 % (10/30/23 1141)  BP Location: Left arm    Vital Signs Range (Last 24H):  Temp:  [98.1 °F (36.7 °C)]   Pulse:  [116-118]   Resp:  [18]   BP: (157)/(110)   SpO2:  [99 %]   BP Location: Left arm       Physical Exam  Vitals and nursing note reviewed.   Constitutional:       General: He is not in acute distress.     Appearance: Normal appearance.   HENT:      Head: Normocephalic and atraumatic.      Mouth/Throat:      Mouth: Mucous membranes are moist.   Eyes:      Extraocular Movements: Extraocular movements intact.   Pulmonary:      Effort: Pulmonary effort is normal. No respiratory distress.   Abdominal:      General: Abdomen is flat. There is no distension.   Musculoskeletal:         General: No swelling. Normal range of motion.      Cervical back: Normal range of motion.   Skin:     General: Skin is warm.   Neurological:      Mental Status: He is alert and oriented to person, place, and time.      Motor: Weakness present.   Psychiatric:         Mood and Affect: Mood normal.         Behavior: Behavior normal.              Neurological Exam:   LOC: alert  Attention Span: Good   Language: No aphasia  Articulation: No dysarthria  Orientation: Person, Place, Time   Visual Fields: Full  EOM (CN III, IV, VI): Full/intact  Pupils (CN II, III): PERRL  Facial Sensation (CN V): Normal  Facial Movement (CN VII): Lower facial weakness on the  "Left  Motor: Arm left  Paresis: 4/5  Leg left  Normal 5/5  Arm right  Normal 5/5  Leg right Normal 5/5  Cerebellum: No evidence of appendicular or axial ataxia  Sensation: intact to light touch  Tone: Normal tone throughout    Laboratory:  CMP:   No results for input(s): "GLUCOSE", "CALCIUM", "ALBUMIN", "PROT", "NA", "K", "CO2", "CL", "BUN", "CREATININE", "ALKPHOS", "ALT", "AST", "BILITOT" in the last 24 hours.    CBC:   Recent Labs   Lab 10/27/23  0338   WBC 8.69   RBC 4.88   HGB 14.7   HCT 41.1      MCV 84   MCH 30.1   MCHC 35.8         Diagnostic Results     Brain imaging:  MRI Brain without contrast 10/25/23  Impression:  Small focus of acute infarction in the posterior limb of the right internal capsule.  Chronic microhemorrhage in the right thalamus and superior to the left subinsular cortex.  Changes of chronic small vessel ischemic disease.        Vessel Imaging:  CTA stroke multiphase 10/25/23  Impression:  No evidence of acute ICH  Remote R thalamic hypodensity  Age indeterminate hypodensity in R internal capsule, possibly contributing to patient's symptoms  No LVO or significant stenosis        Cardiac Evaluation:   TTE with bubble 10/26/2023    Left Ventricle: The left ventricle is normal in size. Normal wall thickness. There is concentric remodeling. Normal wall motion. There is normal systolic function with a visually estimated ejection fraction of 60 - 65%. There is normal diastolic function.    Left Atrium: Agitated saline study of the atrial septum is negative after vasalva maneuver, suggesting absence of intracardiac shunt at the atrial level.    Right Ventricle: Normal right ventricular cavity size. Wall thickness is normal. Right ventricle wall motion  is normal. Systolic function is normal.    IVC/SVC: Normal venous pressure at 3 mmHg.     "

## 2023-11-01 ENCOUNTER — TELEPHONE (OUTPATIENT)
Dept: NEUROLOGY | Facility: HOSPITAL | Age: 48
End: 2023-11-01
Payer: MEDICAID

## 2023-11-02 LAB
ALDOST SERPL-MCNC: 5 NG/DL
ALDOST/RENIN PLAS-RTO: 5.6 RATIO
RENIN PLAS-CCNC: 0.9 NG/ML/HR

## 2023-11-06 ENCOUNTER — TELEPHONE (OUTPATIENT)
Dept: NEUROLOGY | Facility: HOSPITAL | Age: 48
End: 2023-11-06
Payer: MEDICAID

## 2023-11-06 NOTE — TELEPHONE ENCOUNTER
Attempted to reach patient regarding a stroke discharge followup call. Left message with callback info.     Previous Accession (Optional): [id number] Previous Accession (Optional): [id number]

## 2024-01-29 PROBLEM — I63.311 STROKE DUE TO THROMBOSIS OF RIGHT MIDDLE CEREBRAL ARTERY: Status: RESOLVED | Noted: 2023-10-25 | Resolved: 2024-01-29

## 2024-02-12 ENCOUNTER — PATIENT MESSAGE (OUTPATIENT)
Dept: NEUROLOGY | Facility: HOSPITAL | Age: 49
End: 2024-02-12
Payer: MEDICAID

## 2024-02-19 ENCOUNTER — PATIENT MESSAGE (OUTPATIENT)
Dept: NEUROLOGY | Facility: HOSPITAL | Age: 49
End: 2024-02-19
Payer: MEDICAID

## 2025-03-06 ENCOUNTER — HOSPITAL ENCOUNTER (INPATIENT)
Facility: HOSPITAL | Age: 50
LOS: 8 days | Discharge: HOME | DRG: 291 | End: 2025-03-14
Attending: EMERGENCY MEDICINE | Admitting: EMERGENCY MEDICINE
Payer: MEDICAID

## 2025-03-06 DIAGNOSIS — I50.9 ACUTE ON CHRONIC CONGESTIVE HEART FAILURE, UNSPECIFIED HEART FAILURE TYPE: Primary | ICD-10-CM

## 2025-03-06 DIAGNOSIS — L03.115 CELLULITIS OF RIGHT LOWER EXTREMITY: ICD-10-CM

## 2025-03-06 DIAGNOSIS — N17.9 AKI (ACUTE KIDNEY INJURY): ICD-10-CM

## 2025-03-06 DIAGNOSIS — I10 ESSENTIAL HYPERTENSION: ICD-10-CM

## 2025-03-06 DIAGNOSIS — I50.9 NEW ONSET OF CONGESTIVE HEART FAILURE: ICD-10-CM

## 2025-03-06 DIAGNOSIS — R07.9 CHEST PAIN: ICD-10-CM

## 2025-03-06 DIAGNOSIS — R06.02 SHORTNESS OF BREATH: ICD-10-CM

## 2025-03-06 DIAGNOSIS — I69.30 HISTORY OF CVA WITH RESIDUAL DEFICIT: ICD-10-CM

## 2025-03-06 DIAGNOSIS — E11.65 TYPE 2 DIABETES MELLITUS WITH HYPERGLYCEMIA, WITHOUT LONG-TERM CURRENT USE OF INSULIN: ICD-10-CM

## 2025-03-06 LAB
ALBUMIN SERPL BCP-MCNC: 2.9 G/DL (ref 3.5–5.2)
ALP SERPL-CCNC: 70 U/L (ref 40–150)
ALT SERPL W/O P-5'-P-CCNC: 20 U/L (ref 10–44)
ANION GAP SERPL CALC-SCNC: 9 MMOL/L (ref 8–16)
AST SERPL-CCNC: 47 U/L (ref 10–40)
BASOPHILS # BLD AUTO: 0.07 K/UL (ref 0–0.2)
BASOPHILS NFR BLD: 0.8 % (ref 0–1.9)
BILIRUB SERPL-MCNC: 1 MG/DL (ref 0.1–1)
BNP SERPL-MCNC: 1427 PG/ML (ref 0–99)
BUN SERPL-MCNC: 28 MG/DL (ref 6–20)
CALCIUM SERPL-MCNC: 8.3 MG/DL (ref 8.7–10.5)
CHLORIDE SERPL-SCNC: 109 MMOL/L (ref 95–110)
CO2 SERPL-SCNC: 23 MMOL/L (ref 23–29)
CREAT SERPL-MCNC: 2 MG/DL (ref 0.5–1.4)
DIFFERENTIAL METHOD BLD: NORMAL
EOSINOPHIL # BLD AUTO: 0.1 K/UL (ref 0–0.5)
EOSINOPHIL NFR BLD: 1.6 % (ref 0–8)
ERYTHROCYTE [DISTWIDTH] IN BLOOD BY AUTOMATED COUNT: 14.2 % (ref 11.5–14.5)
EST. GFR  (NO RACE VARIABLE): 39.4 ML/MIN/1.73 M^2
GLUCOSE SERPL-MCNC: 168 MG/DL (ref 70–110)
HCT VFR BLD AUTO: 51.8 % (ref 40–54)
HCV AB SERPL QL IA: NORMAL
HGB BLD-MCNC: 16.6 G/DL (ref 14–18)
HIV 1+2 AB+HIV1 P24 AG SERPL QL IA: NORMAL
IMM GRANULOCYTES # BLD AUTO: 0.04 K/UL (ref 0–0.04)
IMM GRANULOCYTES NFR BLD AUTO: 0.5 % (ref 0–0.5)
INFLUENZA A, MOLECULAR: NEGATIVE
INFLUENZA B, MOLECULAR: NEGATIVE
LYMPHOCYTES # BLD AUTO: 1.9 K/UL (ref 1–4.8)
LYMPHOCYTES NFR BLD: 21.3 % (ref 18–48)
MCH RBC QN AUTO: 29.2 PG (ref 27–31)
MCHC RBC AUTO-ENTMCNC: 32 G/DL (ref 32–36)
MCV RBC AUTO: 91 FL (ref 82–98)
MONOCYTES # BLD AUTO: 0.7 K/UL (ref 0.3–1)
MONOCYTES NFR BLD: 7.8 % (ref 4–15)
NEUTROPHILS # BLD AUTO: 6 K/UL (ref 1.8–7.7)
NEUTROPHILS NFR BLD: 68 % (ref 38–73)
NRBC BLD-RTO: 0 /100 WBC
PLATELET # BLD AUTO: 225 K/UL (ref 150–450)
PMV BLD AUTO: 11.2 FL (ref 9.2–12.9)
POTASSIUM SERPL-SCNC: 4.9 MMOL/L (ref 3.5–5.1)
PROT SERPL-MCNC: 6.6 G/DL (ref 6–8.4)
RBC # BLD AUTO: 5.68 M/UL (ref 4.6–6.2)
SARS-COV-2 RDRP RESP QL NAA+PROBE: NEGATIVE
SODIUM SERPL-SCNC: 141 MMOL/L (ref 136–145)
SPECIMEN SOURCE: NORMAL
TROPONIN I SERPL DL<=0.01 NG/ML-MCNC: 18 NG/L (ref 0–35)
WBC # BLD AUTO: 8.76 K/UL (ref 3.9–12.7)

## 2025-03-06 PROCEDURE — 93005 ELECTROCARDIOGRAM TRACING: CPT

## 2025-03-06 PROCEDURE — 80053 COMPREHEN METABOLIC PANEL: CPT | Performed by: STUDENT IN AN ORGANIZED HEALTH CARE EDUCATION/TRAINING PROGRAM

## 2025-03-06 PROCEDURE — 83880 ASSAY OF NATRIURETIC PEPTIDE: CPT | Performed by: STUDENT IN AN ORGANIZED HEALTH CARE EDUCATION/TRAINING PROGRAM

## 2025-03-06 PROCEDURE — 86803 HEPATITIS C AB TEST: CPT | Performed by: PHYSICIAN ASSISTANT

## 2025-03-06 PROCEDURE — 85025 COMPLETE CBC W/AUTO DIFF WBC: CPT | Performed by: STUDENT IN AN ORGANIZED HEALTH CARE EDUCATION/TRAINING PROGRAM

## 2025-03-06 PROCEDURE — 99291 CRITICAL CARE FIRST HOUR: CPT

## 2025-03-06 PROCEDURE — 87635 SARS-COV-2 COVID-19 AMP PRB: CPT | Performed by: STUDENT IN AN ORGANIZED HEALTH CARE EDUCATION/TRAINING PROGRAM

## 2025-03-06 PROCEDURE — 63600175 PHARM REV CODE 636 W HCPCS: Performed by: EMERGENCY MEDICINE

## 2025-03-06 PROCEDURE — 84484 ASSAY OF TROPONIN QUANT: CPT | Performed by: STUDENT IN AN ORGANIZED HEALTH CARE EDUCATION/TRAINING PROGRAM

## 2025-03-06 PROCEDURE — 87502 INFLUENZA DNA AMP PROBE: CPT | Performed by: STUDENT IN AN ORGANIZED HEALTH CARE EDUCATION/TRAINING PROGRAM

## 2025-03-06 PROCEDURE — 93010 ELECTROCARDIOGRAM REPORT: CPT | Mod: ,,, | Performed by: INTERNAL MEDICINE

## 2025-03-06 PROCEDURE — 87389 HIV-1 AG W/HIV-1&-2 AB AG IA: CPT | Performed by: PHYSICIAN ASSISTANT

## 2025-03-06 PROCEDURE — 96374 THER/PROPH/DIAG INJ IV PUSH: CPT

## 2025-03-06 PROCEDURE — 12000002 HC ACUTE/MED SURGE SEMI-PRIVATE ROOM

## 2025-03-06 RX ORDER — FUROSEMIDE 10 MG/ML
40 INJECTION INTRAMUSCULAR; INTRAVENOUS
Status: COMPLETED | OUTPATIENT
Start: 2025-03-06 | End: 2025-03-06

## 2025-03-06 RX ORDER — FUROSEMIDE 10 MG/ML
40 INJECTION INTRAMUSCULAR; INTRAVENOUS EVERY 12 HOURS
Status: DISCONTINUED | OUTPATIENT
Start: 2025-03-07 | End: 2025-03-08

## 2025-03-06 RX ADMIN — FUROSEMIDE 40 MG: 10 INJECTION, SOLUTION INTRAVENOUS at 10:03

## 2025-03-07 ENCOUNTER — DOCUMENTATION ONLY (OUTPATIENT)
Dept: CARDIOLOGY | Facility: CLINIC | Age: 50
End: 2025-03-07
Payer: MEDICAID

## 2025-03-07 PROBLEM — I50.9 NEW ONSET OF CONGESTIVE HEART FAILURE: Status: ACTIVE | Noted: 2025-03-07

## 2025-03-07 PROBLEM — L03.115 CELLULITIS OF RIGHT LOWER EXTREMITY: Status: ACTIVE | Noted: 2025-03-07

## 2025-03-07 PROBLEM — N17.9 AKI (ACUTE KIDNEY INJURY): Status: ACTIVE | Noted: 2025-03-07

## 2025-03-07 PROBLEM — I69.30 HISTORY OF CVA WITH RESIDUAL DEFICIT: Status: ACTIVE | Noted: 2025-03-07

## 2025-03-07 LAB
ALBUMIN SERPL BCP-MCNC: 2.6 G/DL (ref 3.5–5.2)
ALP SERPL-CCNC: 63 U/L (ref 40–150)
ALT SERPL W/O P-5'-P-CCNC: 19 U/L (ref 10–44)
ANION GAP SERPL CALC-SCNC: 12 MMOL/L (ref 8–16)
ASCENDING AORTA: 3.28 CM
AST SERPL-CCNC: 26 U/L (ref 10–40)
AV AREA BY CONTINUOUS VTI: 1.6 CM2
AV INDEX (PROSTH): 0.48
AV LVOT MEAN GRADIENT: 1 MMHG
AV LVOT PEAK GRADIENT: 1 MMHG
AV MEAN GRADIENT: 3 MMHG
AV PEAK GRADIENT: 5 MMHG
AV VALVE AREA BY VELOCITY RATIO: 1.9 CM²
AV VALVE AREA: 1.7 CM2
AV VELOCITY RATIO: 0.55
BACTERIA #/AREA URNS AUTO: ABNORMAL /HPF
BILIRUB SERPL-MCNC: 1.1 MG/DL (ref 0.1–1)
BILIRUB UR QL STRIP: NEGATIVE
BSA FOR ECHO PROCEDURE: 1.85 M2
BUN SERPL-MCNC: 27 MG/DL (ref 6–20)
CALCIUM SERPL-MCNC: 8.1 MG/DL (ref 8.7–10.5)
CHLORIDE SERPL-SCNC: 110 MMOL/L (ref 95–110)
CHOLEST SERPL-MCNC: 154 MG/DL (ref 120–199)
CHOLEST/HDLC SERPL: 2.7 {RATIO} (ref 2–5)
CLARITY UR REFRACT.AUTO: CLEAR
CO2 SERPL-SCNC: 19 MMOL/L (ref 23–29)
COLOR UR AUTO: COLORLESS
CREAT SERPL-MCNC: 2 MG/DL (ref 0.5–1.4)
CV ECHO LV RWT: 0.53 CM
DOP CALC AO PEAK VEL: 1.1 M/S
DOP CALC AO VTI: 19.7 CM
DOP CALC LVOT AREA: 3.5 CM2
DOP CALC LVOT DIAMETER: 2.1 CM
DOP CALC LVOT PEAK VEL: 0.6 M/S
DOP CALC LVOT STROKE VOLUME: 32.9 CM3
DOP CALCLVOT PEAK VEL VTI: 9.5 CM
E WAVE DECELERATION TIME: 127 MS
E/A RATIO: 1.07
E/E' RATIO: 12 M/S
ECHO EF ESTIMATED: 27 %
ECHO LV POSTERIOR WALL: 1.2 CM (ref 0.6–1.1)
EST. GFR  (NO RACE VARIABLE): 39.4 ML/MIN/1.73 M^2
ESTIMATED AVG GLUCOSE: 263 MG/DL (ref 68–131)
FRACTIONAL SHORTENING: 13.3 % (ref 28–44)
GLOBAL LONGITUIDAL STRAIN: 8.3 %
GLUCOSE SERPL-MCNC: 151 MG/DL (ref 70–110)
GLUCOSE UR QL STRIP: NEGATIVE
HBA1C MFR BLD: 10.8 % (ref 4–5.6)
HDLC SERPL-MCNC: 57 MG/DL (ref 40–75)
HDLC SERPL: 37 % (ref 20–50)
HGB UR QL STRIP: ABNORMAL
HYALINE CASTS UR QL AUTO: 0 /LPF
INTERVENTRICULAR SEPTUM: 1.2 CM (ref 0.6–1.1)
KETONES UR QL STRIP: NEGATIVE
LA MAJOR: 4.8 CM
LA MINOR: 5.3 CM
LA WIDTH: 3.7 CM
LDLC SERPL CALC-MCNC: 78.2 MG/DL (ref 63–159)
LEFT ATRIUM SIZE: 3.7 CM
LEFT ATRIUM VOLUME INDEX MOD: 30 ML/M2
LEFT ATRIUM VOLUME INDEX: 33 ML/M2
LEFT ATRIUM VOLUME MOD: 54 ML
LEFT ATRIUM VOLUME: 59 CM3
LEFT INTERNAL DIMENSION IN SYSTOLE: 3.9 CM (ref 2.1–4)
LEFT VENTRICLE DIASTOLIC VOLUME INDEX: 50.84 ML/M2
LEFT VENTRICLE DIASTOLIC VOLUME: 91 ML
LEFT VENTRICLE MASS INDEX: 110.7 G/M2
LEFT VENTRICLE SYSTOLIC VOLUME INDEX: 36.9 ML/M2
LEFT VENTRICLE SYSTOLIC VOLUME: 66 ML
LEFT VENTRICULAR INTERNAL DIMENSION IN DIASTOLE: 4.5 CM (ref 3.5–6)
LEFT VENTRICULAR MASS: 198.1 G
LEUKOCYTE ESTERASE UR QL STRIP: NEGATIVE
LV LATERAL E/E' RATIO: 13.1
LV SEPTAL E/E' RATIO: 10.2
MAGNESIUM SERPL-MCNC: 1.8 MG/DL (ref 1.6–2.6)
MICROSCOPIC COMMENT: ABNORMAL
MV PEAK A VEL: 0.86 M/S
MV PEAK E VEL: 0.92 M/S
NITRITE UR QL STRIP: NEGATIVE
NONHDLC SERPL-MCNC: 97 MG/DL
OHS CV RV/LV RATIO: 0.87 CM
OHS LV EJECTION FRACTION SIMPSONS BIPLANE MOD: 26 %
OHS QRS DURATION: 78 MS
OHS QTC CALCULATION: 473 MS
PH UR STRIP: 7 [PH] (ref 5–8)
PISA TR MAX VEL: 2.6 M/S
POCT GLUCOSE: 187 MG/DL (ref 70–110)
POCT GLUCOSE: 227 MG/DL (ref 70–110)
POTASSIUM SERPL-SCNC: 3.5 MMOL/L (ref 3.5–5.1)
PROT SERPL-MCNC: 5.4 G/DL (ref 6–8.4)
PROT UR QL STRIP: ABNORMAL
RA MAJOR: 4.8 CM
RA PRESSURE ESTIMATED: 15 MMHG
RA WIDTH: 4 CM
RBC #/AREA URNS AUTO: 5 /HPF (ref 0–4)
RIGHT ATRIAL AREA: 19 CM2
RIGHT VENTRICLE DIASTOLIC BASEL DIMENSION: 3.9 CM
RV TB RVSP: 18 MMHG
SINUS: 2.99 CM
SODIUM SERPL-SCNC: 141 MMOL/L (ref 136–145)
SP GR UR STRIP: 1.01 (ref 1–1.03)
STJ: 2.77 CM
TDI LATERAL: 0.07 M/S
TDI SEPTAL: 0.09 M/S
TDI: 0.08 M/S
TRICUSPID ANNULAR PLANE SYSTOLIC EXCURSION: 1.14 CM
TRIGL SERPL-MCNC: 94 MG/DL (ref 30–150)
TROPONIN I SERPL DL<=0.01 NG/ML-MCNC: 20 NG/L (ref 0–35)
TSH SERPL DL<=0.005 MIU/L-ACNC: 1.8 UIU/ML (ref 0.4–4)
TV PEAK GRADIENT: 29 MMHG
TV REST PULMONARY ARTERY PRESSURE: 42 MMHG
URN SPEC COLLECT METH UR: ABNORMAL
WBC #/AREA URNS AUTO: 1 /HPF (ref 0–5)
Z-SCORE OF LEFT VENTRICULAR DIMENSION IN END DIASTOLE: -0.95
Z-SCORE OF LEFT VENTRICULAR DIMENSION IN END SYSTOLE: 1.92

## 2025-03-07 PROCEDURE — 87040 BLOOD CULTURE FOR BACTERIA: CPT | Performed by: HOSPITALIST

## 2025-03-07 PROCEDURE — 83735 ASSAY OF MAGNESIUM: CPT | Performed by: PHYSICIAN ASSISTANT

## 2025-03-07 PROCEDURE — 83036 HEMOGLOBIN GLYCOSYLATED A1C: CPT | Performed by: PHYSICIAN ASSISTANT

## 2025-03-07 PROCEDURE — 25000003 PHARM REV CODE 250: Performed by: PHYSICIAN ASSISTANT

## 2025-03-07 PROCEDURE — 63600175 PHARM REV CODE 636 W HCPCS: Performed by: HOSPITALIST

## 2025-03-07 PROCEDURE — 84484 ASSAY OF TROPONIN QUANT: CPT | Performed by: PHYSICIAN ASSISTANT

## 2025-03-07 PROCEDURE — 81001 URINALYSIS AUTO W/SCOPE: CPT | Performed by: EMERGENCY MEDICINE

## 2025-03-07 PROCEDURE — 20600001 HC STEP DOWN PRIVATE ROOM

## 2025-03-07 PROCEDURE — 25000003 PHARM REV CODE 250

## 2025-03-07 PROCEDURE — 80053 COMPREHEN METABOLIC PANEL: CPT | Performed by: PHYSICIAN ASSISTANT

## 2025-03-07 PROCEDURE — 80061 LIPID PANEL: CPT | Performed by: PHYSICIAN ASSISTANT

## 2025-03-07 PROCEDURE — 36415 COLL VENOUS BLD VENIPUNCTURE: CPT | Performed by: HOSPITALIST

## 2025-03-07 PROCEDURE — 63600175 PHARM REV CODE 636 W HCPCS: Performed by: PHYSICIAN ASSISTANT

## 2025-03-07 PROCEDURE — 36415 COLL VENOUS BLD VENIPUNCTURE: CPT | Performed by: PHYSICIAN ASSISTANT

## 2025-03-07 PROCEDURE — 84443 ASSAY THYROID STIM HORMONE: CPT | Performed by: PHYSICIAN ASSISTANT

## 2025-03-07 PROCEDURE — 25000003 PHARM REV CODE 250: Performed by: HOSPITALIST

## 2025-03-07 RX ORDER — IBUPROFEN 200 MG
16 TABLET ORAL
Status: DISCONTINUED | OUTPATIENT
Start: 2025-03-07 | End: 2025-03-11

## 2025-03-07 RX ORDER — GLUCAGON 1 MG
1 KIT INJECTION
Status: DISCONTINUED | OUTPATIENT
Start: 2025-03-07 | End: 2025-03-11

## 2025-03-07 RX ORDER — HYDRALAZINE HYDROCHLORIDE 25 MG/1
25 TABLET, FILM COATED ORAL EVERY 8 HOURS PRN
Status: DISCONTINUED | OUTPATIENT
Start: 2025-03-07 | End: 2025-03-07

## 2025-03-07 RX ORDER — ISOSORBIDE DINITRATE 20 MG/1
20 TABLET ORAL EVERY 8 HOURS
Status: DISCONTINUED | OUTPATIENT
Start: 2025-03-07 | End: 2025-03-09

## 2025-03-07 RX ORDER — SODIUM CHLORIDE 0.9 % (FLUSH) 0.9 %
10 SYRINGE (ML) INJECTION
Status: DISCONTINUED | OUTPATIENT
Start: 2025-03-07 | End: 2025-03-14 | Stop reason: HOSPADM

## 2025-03-07 RX ORDER — POLYETHYLENE GLYCOL 3350 17 G/17G
17 POWDER, FOR SOLUTION ORAL DAILY PRN
Status: DISCONTINUED | OUTPATIENT
Start: 2025-03-07 | End: 2025-03-14 | Stop reason: HOSPADM

## 2025-03-07 RX ORDER — IBUPROFEN 200 MG
24 TABLET ORAL
Status: DISCONTINUED | OUTPATIENT
Start: 2025-03-07 | End: 2025-03-11

## 2025-03-07 RX ORDER — HYDRALAZINE HYDROCHLORIDE 25 MG/1
25 TABLET, FILM COATED ORAL EVERY 8 HOURS
Status: DISCONTINUED | OUTPATIENT
Start: 2025-03-07 | End: 2025-03-07

## 2025-03-07 RX ORDER — TALC
6 POWDER (GRAM) TOPICAL NIGHTLY PRN
Status: DISCONTINUED | OUTPATIENT
Start: 2025-03-07 | End: 2025-03-14 | Stop reason: HOSPADM

## 2025-03-07 RX ORDER — INSULIN GLARGINE 100 [IU]/ML
5 INJECTION, SOLUTION SUBCUTANEOUS NIGHTLY
Status: DISCONTINUED | OUTPATIENT
Start: 2025-03-07 | End: 2025-03-10

## 2025-03-07 RX ORDER — GLUCAGON 1 MG
1 KIT INJECTION
Status: DISCONTINUED | OUTPATIENT
Start: 2025-03-07 | End: 2025-03-14 | Stop reason: HOSPADM

## 2025-03-07 RX ORDER — HEPARIN SODIUM 5000 [USP'U]/ML
5000 INJECTION, SOLUTION INTRAVENOUS; SUBCUTANEOUS EVERY 8 HOURS
Status: DISCONTINUED | OUTPATIENT
Start: 2025-03-07 | End: 2025-03-14 | Stop reason: HOSPADM

## 2025-03-07 RX ORDER — IBUPROFEN 200 MG
16 TABLET ORAL
Status: DISCONTINUED | OUTPATIENT
Start: 2025-03-07 | End: 2025-03-14 | Stop reason: HOSPADM

## 2025-03-07 RX ORDER — ACETAMINOPHEN 325 MG/1
650 TABLET ORAL EVERY 4 HOURS PRN
Status: DISCONTINUED | OUTPATIENT
Start: 2025-03-07 | End: 2025-03-10

## 2025-03-07 RX ORDER — DOXYCYCLINE HYCLATE 100 MG
100 TABLET ORAL EVERY 12 HOURS
Status: DISCONTINUED | OUTPATIENT
Start: 2025-03-07 | End: 2025-03-14 | Stop reason: HOSPADM

## 2025-03-07 RX ORDER — HYDRALAZINE HYDROCHLORIDE 20 MG/ML
10 INJECTION INTRAMUSCULAR; INTRAVENOUS EVERY 6 HOURS PRN
Status: DISCONTINUED | OUTPATIENT
Start: 2025-03-07 | End: 2025-03-14 | Stop reason: HOSPADM

## 2025-03-07 RX ORDER — INSULIN ASPART 100 [IU]/ML
0-5 INJECTION, SOLUTION INTRAVENOUS; SUBCUTANEOUS
Status: DISCONTINUED | OUTPATIENT
Start: 2025-03-07 | End: 2025-03-13

## 2025-03-07 RX ORDER — ISOSORBIDE DINITRATE 20 MG/1
20 TABLET ORAL EVERY 8 HOURS
Status: DISCONTINUED | OUTPATIENT
Start: 2025-03-07 | End: 2025-03-07

## 2025-03-07 RX ORDER — MAGNESIUM SULFATE HEPTAHYDRATE 40 MG/ML
2 INJECTION, SOLUTION INTRAVENOUS ONCE
Status: COMPLETED | OUTPATIENT
Start: 2025-03-07 | End: 2025-03-07

## 2025-03-07 RX ORDER — IBUPROFEN 200 MG
24 TABLET ORAL
Status: DISCONTINUED | OUTPATIENT
Start: 2025-03-07 | End: 2025-03-14 | Stop reason: HOSPADM

## 2025-03-07 RX ORDER — HYDRALAZINE HYDROCHLORIDE 25 MG/1
25 TABLET, FILM COATED ORAL EVERY 8 HOURS
Status: DISCONTINUED | OUTPATIENT
Start: 2025-03-07 | End: 2025-03-09

## 2025-03-07 RX ADMIN — HEPARIN SODIUM 5000 UNITS: 5000 INJECTION INTRAVENOUS; SUBCUTANEOUS at 06:03

## 2025-03-07 RX ADMIN — DOXYCYCLINE HYCLATE 100 MG: 100 TABLET, COATED ORAL at 01:03

## 2025-03-07 RX ADMIN — HYDRALAZINE HYDROCHLORIDE 25 MG: 25 TABLET ORAL at 01:03

## 2025-03-07 RX ADMIN — DOXYCYCLINE HYCLATE 100 MG: 100 TABLET, COATED ORAL at 08:03

## 2025-03-07 RX ADMIN — HYDRALAZINE HYDROCHLORIDE 25 MG: 25 TABLET ORAL at 03:03

## 2025-03-07 RX ADMIN — INSULIN ASPART 2 UNITS: 100 INJECTION, SOLUTION INTRAVENOUS; SUBCUTANEOUS at 04:03

## 2025-03-07 RX ADMIN — HEPARIN SODIUM 5000 UNITS: 5000 INJECTION INTRAVENOUS; SUBCUTANEOUS at 10:03

## 2025-03-07 RX ADMIN — FUROSEMIDE 40 MG: 10 INJECTION, SOLUTION INTRAVENOUS at 08:03

## 2025-03-07 RX ADMIN — HYDRALAZINE HYDROCHLORIDE 25 MG: 25 TABLET ORAL at 10:03

## 2025-03-07 RX ADMIN — HEPARIN SODIUM 5000 UNITS: 5000 INJECTION INTRAVENOUS; SUBCUTANEOUS at 03:03

## 2025-03-07 RX ADMIN — HYDRALAZINE HYDROCHLORIDE 10 MG: 20 INJECTION, SOLUTION INTRAMUSCULAR; INTRAVENOUS at 04:03

## 2025-03-07 RX ADMIN — DOXYCYCLINE HYCLATE 100 MG: 100 TABLET, COATED ORAL at 10:03

## 2025-03-07 RX ADMIN — ACETAMINOPHEN 650 MG: 325 TABLET ORAL at 08:03

## 2025-03-07 RX ADMIN — MICONAZOLE NITRATE: 20 OINTMENT TOPICAL at 09:03

## 2025-03-07 RX ADMIN — ISOSORBIDE DINITRATE 20 MG: 20 TABLET ORAL at 10:03

## 2025-03-07 RX ADMIN — HYDRALAZINE HYDROCHLORIDE 25 MG: 25 TABLET ORAL at 06:03

## 2025-03-07 RX ADMIN — ISOSORBIDE DINITRATE 20 MG: 20 TABLET ORAL at 02:03

## 2025-03-07 RX ADMIN — FUROSEMIDE 40 MG: 10 INJECTION, SOLUTION INTRAVENOUS at 10:03

## 2025-03-07 RX ADMIN — INSULIN GLARGINE 5 UNITS: 100 INJECTION, SOLUTION SUBCUTANEOUS at 10:03

## 2025-03-07 RX ADMIN — MAGNESIUM SULFATE HEPTAHYDRATE 2 G: 40 INJECTION, SOLUTION INTRAVENOUS at 08:03

## 2025-03-07 RX ADMIN — ISOSORBIDE DINITRATE 20 MG: 20 TABLET ORAL at 03:03

## 2025-03-07 NOTE — ASSESSMENT & PLAN NOTE
- noncompliant with meds  - LDSSI for now, ACHS accuchecks  - A1c in AM  - may need to start long acting insulin pending BG trend     Lab Results   Component Value Date    HGBA1C 12.5 (H) 10/25/2023

## 2025-03-07 NOTE — CONSULTS
Vance Lyman - Cardiology Stepdown  Wound Care    Patient Name:  Cecil Clark   MRN:  [id number]  Date: 3/7/2025  Diagnosis: New onset of congestive heart failure    History:     Past Medical History:   Diagnosis Date    Diabetes mellitus     Hypertension     R thalmaic hypertensive ICH 04/04/2021    Tachycardia 4/4/2021       Social History[1]    Precautions:     Allergies as of 03/06/2025 - Reviewed 03/06/2025   Allergen Reaction Noted    Aspartame Nausea And Vomiting 04/04/2021    Shellfish containing products Shortness Of Breath 10/25/2023       [facility] Assessment Details/Treatment     Wound care consult received for BLE.  Chart reviewed for this encounter.  See flowsheets for findings.    Pt found lying in bed, agreeable to care at this time.  Per pt, he developed small, fluid filled blisters to BLE about two weeks ago after taking a hot shower. Blisters have since ruptured, pt states he has been completing daily, 'debridements' of wounds w/ use of peroxide, rubbing alcohol and tweezers. Wounds are currently dry/crusted w/o drainage, small amount of pink discoloration to alonso-wound. Cleansed wounds w/ Vashe, will order MediHoney for antimicrobial properties and to stimulate autolytic debridement, to be applied by bedside nursing once it arrives to the floor from central.  Discussed expected wound healing w/ pt and educated pt on proper wound care. Pt instructed not to use rubbing alcohol or peroxide on wounds as these solutions can have adverse effect. Pt instructed to let crusting/scabs lift/debride on their own as opposed to manual debridement. Pt verbalized understanding.  At this time, pt asked for WC RN to assess 'tinea' on his L upper thigh. Pt appears to have large, dry and flaky plaque, somewhat lighter than normal skin tone. Per pt, he was given a 'z-pack' and a topical ointment, unable to recall which one. He says rash was getting better, but he ended up scratching it so much he created open wounds and it has never  gone away. Will order Miconazole ointment for continued care.    RECOMMENDATIONS:  BID - L upper thigh - cleanse gently w/ soap and water, pat dry and apply Miconazole ointment to affected area, allow to dry before application of clothing.    Q2 days - BLE/shins - cleanse gently w/ Vashe, pat dry and apply MediHoney to wounds, cover w/ Mepilex foam dressings. (Vashe and MediHoney can be ordered through Epic).     03/07/25 1350   WOCN Assessment   WOCN Total Time (mins) 30   Visit Date 03/07/25   Visit Time 1350   Consult Type New   WOCN Speciality Wound   Intervention assessed;changed;applied;chart review;coordination of care;orders   Teaching on-going        Wound 03/07/25 1000 Ulceration Left medial;lower Leg   Date First Assessed/Time First Assessed: 03/07/25 1000   Present on Original Admission: Yes  Primary Wound Type: Ulceration  Side: Left  Orientation: medial;lower  Location: Leg   Wound Image    Dressing Appearance Open to air   Drainage Amount None   Drainage Characteristics/Odor No odor   Appearance Pink;Maroon;Yellow;Dry   Periwound Area Intact;Dry;Pink   Wound Edges Open;Defined   Care Cleansed with:;Antimicrobial agent        Wound 03/07/25 1000 Ulceration Right lower Leg   Date First Assessed/Time First Assessed: 03/07/25 1000   Present on Original Admission: Yes  Primary Wound Type: Ulceration  Side: Right  Orientation: lower  Location: Leg   Wound Image    Dressing Appearance Open to air   Drainage Amount None   Drainage Characteristics/Odor No odor   Appearance Intact;Red;Dry   Periwound Area Intact;Dry   Wound Edges Defined   Care Cleansed with:;Antimicrobial agent        Wound 03/07/25 1000 Rash Left upper;medial Thigh   Date First Assessed/Time First Assessed: 03/07/25 1000   Present on Original Admission: Yes  Primary Wound Type: Rash  Side: Left  Orientation: upper;medial  Location: Thigh   Distribution localized   Configuration/Shape other (see comments)  (plaque)   Groupings solitary    Characteristics crusting   Color color consistent w/skin          [1]   Social History  Socioeconomic History    Marital status: Single   Tobacco Use    Smoking status: Never   Substance and Sexual Activity    Alcohol use: Not Currently    Drug use: Never     Social Drivers of Health     Financial Resource Strain: Low Risk  (3/7/2025)    Overall Financial Resource Strain (CARDIA)     Difficulty of Paying Living Expenses: Not very hard   Food Insecurity: Patient Declined (3/7/2025)    Hunger Vital Sign     Worried About Running Out of Food in the Last Year: Patient declined     Ran Out of Food in the Last Year: Patient declined   Physical Activity: Inactive (3/7/2025)    Exercise Vital Sign     Days of Exercise per Week: 0 days     Minutes of Exercise per Session: 0 min   Stress: Patient Declined (3/7/2025)    [employer] [place] [employer] - Occupational Stress Questionnaire     Feeling of Stress : Patient declined   Housing Stability: Low Risk  (3/7/2025)    Housing Stability Vital Sign     Unable to Pay for Housing in the Last Year: No     Homeless in the Last Year: No

## 2025-03-07 NOTE — PLAN OF CARE
Problem: Adult Inpatient Plan of Care  Goal: Plan of Care Review  Outcome: Progressing  Goal: Patient-Specific Goal (Individualized)  Outcome: Progressing  Goal: Absence of Hospital-Acquired Illness or Injury  Outcome: Progressing  Goal: Optimal Comfort and Wellbeing  Outcome: Progressing  Goal: Readiness for Transition of Care  Outcome: Progressing     Problem: Skin Injury Risk Increased  Goal: Skin Health and Integrity  Outcome: Progressing     Problem: Infection  Goal: Absence of Infection Signs and Symptoms  Outcome: Progressing     Problem: Diabetes Comorbidity  Goal: Blood Glucose Level Within Targeted Range  Outcome: Progressing     Problem: Acute Kidney Injury/Impairment  Goal: Fluid and Electrolyte Balance  Outcome: Progressing  Goal: Improved Oral Intake  Outcome: Progressing  Goal: Effective Renal Function  Outcome: Progressing

## 2025-03-07 NOTE — FIRST PROVIDER EVALUATION
Medical screening examination initiated.  I have conducted a focused provider triage encounter, findings are as follows:    Brief history of present illness:  49yo M with DM, history of stroke, HTN presents for 2 weeks of SOB and lower extremity/abdominal swelling    There were no vitals filed for this visit.    Pertinent physical exam:  Mild tachypnea, coughing, speaking full sentences    Brief workup plan:  Labs, CXR, EKG    EKG shows no STEMI    Preliminary workup initiated; this workup will be continued and followed by the physician or advanced practice provider that is assigned to the patient when roomed.

## 2025-03-07 NOTE — HPI
"Cecil Clark is a 50 y.o. male with a PMHx of CVA x2, DM, HTN who presents to [facility] for evaluation of shortness of breath and anasarca. Patient reports worsening swelling beginning in just his feet but now extending to his bilateral lower extremities and into his thighs/ hips for the past few weeks. He now has swelling into his scrotum/ penis. Endorses associated shortness of breath, worse with exertion for the past few days. He's only urinated a very small amount over the last month. Denies any chest pain. He also reports open blisters formed on his lower extremities about 2 weeks ago. He's been "debriding" the wounds with tweezers, peroxide, and alcohol to try to clean the wounds, but now has yellow colored drainage and surrounding pain to the wound on the RLE. Denies fever, chills, N/V, abdominal pain or syncope. Patient hasn't taken any of his home medications in about 2 years. Does occasionally take insulin when he eats "big meals", last took insulin around Shane time.     ED: tachycardic to  and hypertensive to /125. Troponin normal. BNP elevated at 1427. CXR with small bilateral pleural effusions. Bedside echo showed moderately reduced ejection fraction with trace pericardial effusion, no significant right heart strain. Given lasix 40mg IVP.   "

## 2025-03-07 NOTE — ED NOTES
Telemetry Verification   Patient placed on Telemetry Box  Verified with War Room  Box # [id number]   Monitor Tech    Rate    Rhythm

## 2025-03-07 NOTE — PROGRESS NOTES
03/07/25 0154 03/07/25 0200   Vital Signs   Temp 98 °F (36.7 °C)  --    Temp Source Oral  --    Pulse (!) 114  --    Resp 18  --    SpO2 96 %  --    BP (!) 169/119 (!) 189/110   MAP (mmHg)  --  44      Reassessed pt. BP increased. Pt asymptomatic. LEILA Vail notified. Awaiting new orders. Will continue to monitor BP.

## 2025-03-07 NOTE — ASSESSMENT & PLAN NOTE
- discussed BP regimen w/ cardiology given unable to start ACE/ARB (DIONTE), BB (new decompensated CHF), or CCB  (new reduced EF)-- cards recommend starting Bidil until IDONTE resolved/ euvolemic  - start bidil 20/25mg TID for now  - consider starting BB once euvolemic

## 2025-03-07 NOTE — ASSESSMENT & PLAN NOTE
- mild L sided sensory deficits since prior stroke, no acute issues  - resume ASA, statin  - lipid panel in AM

## 2025-03-07 NOTE — PLAN OF CARE
[facility] - [facility]iology Stepdown  Initial Discharge Assessment       Primary Care Provider: No, Primary Doctor    Admission Diagnosis: Shortness of breath [R06.02]  Cellulitis of right lower extremity [L03.115]  Essential hypertension [I10]  DIONTE (acute kidney injury) [N17.9]  Chest pain [R07.9]  History of CVA with residual deficit [I69.30]  Type 2 diabetes mellitus with hyperglycemia, without long-term current use of insulin [E11.65]  New onset of congestive heart failure [I50.9]  Acute on chronic congestive heart failure, unspecified heart failure type [I50.9]    Admission Date: 3/6/2025  Expected Discharge Date: 3/10/2025    Transition of Care Barriers: None    Payor: MEDICAID / Plan: [employer] [facility] / Product Type: Managed Medicaid /     Extended Emergency Contact Information  Primary Emergency Contact: Efe Chaidez  Mobile Phone: [phone]  Relation: Sister  Secondary Emergency Contact: Ronnie Muñoz  Mobile Phone: [phone]  Relation: Friend  Preferred language: English   needed? No    Discharge Plan A: Home Health  Discharge Plan B: Home with family      [id number] [address]  [facility] [address]  [address] [place] [address]  Phone: [phone] Fax: [phone]    CM met with the pt at bedside. Pt answered all dc questions and given dc pamphlet. Family will provide transportation home when discharged. DC plan A is home with family. DC plan B is home.      Discharge Plan A and Plan B have been determined by review of patient's clinical status, future medical and therapeutic needs, and coverage/benefits for post-acute care in coordination with multidisciplinary team members.     Initial Assessment (most recent)       Adult Discharge Assessment - 03/07/25 1316          Discharge Assessment    Assessment Type Discharge Planning Assessment     Confirmed/corrected address, phone number and insurance Yes     Confirmed  Demographics Correct on Facesheet     Source of Information patient     When was your last doctors appointment? --   unsure    Does patient/caregiver understand observation status Yes     Communicated SUZANNE with patient/caregiver Yes     Reason For Admission new onset CHF     People in Home friend(s) (P)      Facility Arrived From: home     Do you expect to return to your current living situation? Yes     Do you have help at home or someone to help you manage your care at home? Yes     Who are your caregiver(s) and their phone number(s)? Efe Chaidez (Sister)  [phone]     Prior to hospitilization cognitive status: Alert/Oriented     Current cognitive status: Alert/Oriented     Walking or Climbing Stairs Difficulty no     Dressing/Bathing Difficulty yes     Dressing/Bathing bathing difficulty, requires equipment     Equipment Currently Used at Home shower chair     Readmission within 30 days? No     Patient currently being followed by outpatient case management? No     Do you currently have service(s) that help you manage your care at home? No     Do you have prescription coverage? Yes     Coverage MEDICAID - [facility] -     Do you have any problems affording any of your prescribed medications? No     Is the patient taking medications as prescribed? yes     Who is going to help you get home at discharge? Efe Chaidez (Sister)  [phone]     How do you get to doctors appointments? family or friend will provide     Are you on dialysis? No     Do you take coumadin? No     Discharge Plan A Home Health     Discharge Plan B Home with family     DME Needed Upon Discharge  none     Discharge Plan discussed with: Patient     Transition of Care Barriers None        Physical Activity    On average, how many days per week do you engage in moderate to strenuous exercise (like a brisk walk)? 0 days     On average, how many minutes do you engage in exercise at this level? 0 min        Financial  Resource Strain    How hard is it for you to pay for the very basics like food, housing, medical care, and heating? Not very hard        Housing Stability    In the last 12 months, was there a time when you were not able to pay the mortgage or rent on time? No     At any time in the past 12 months, were you homeless or living in a shelter (including now)? No        Transportation Needs    Has the lack of transportation kept you from medical appointments, meetings, work or from getting things needed for daily living? No        Food Insecurity    Within the past 12 months, you worried that your food would run out before you got the money to buy more. Patient declined     Within the past 12 months, the food you bought just didn't last and you didn't have money to get more. Patient declined        Stress    Do you feel stress - tense, restless, nervous, or anxious, or unable to sleep at night because your mind is troubled all the time - these days? Patient declined        Social Isolation    How often do you feel lonely or isolated from those around you?  Patient unable to answer        Alcohol Use    Q1: How often do you have a drink containing alcohol? Patient declined     Q2: How many drinks containing alcohol do you have on a typical day when you are drinking? Patient declined     Q3: How often do you have six or more drinks on one occasion? Patient declined        Utilities    In the past 12 months has the electric, gas, oil, or water company threatened to shut off services in your home? Patient declined        Health Literacy    How often do you need to have someone help you when you read instructions, pamphlets, or other written material from your doctor or pharmacy? Never        OTHER    Name(s) of People in Home Efe Chaidez (Sister)  [phone] (P)                    Mauricio Hilario, MSN  RN Case Management  [phone]

## 2025-03-07 NOTE — PROGRESS NOTES
03/07/25 0154   Vital Signs   Temp 98 °F (36.7 °C)   Temp Source Oral   Pulse (!) 114   Resp 18   SpO2 96 %   BP (!) 169/119       Pt arrived via wheelchair from ER. AAOx4. Personal items handed to pt. Elevated BP and HR. Pt asymptomatic. PRN hydrazine was given in  ER. Will recheck BP.

## 2025-03-07 NOTE — H&P
"[facility] - Emergency Dept  [facility] Medicine  History & Physical    Patient Name: Cecil Clark  MRN: [id number]  Patient Class: IP- Inpatient  Admission Date: 3/6/2025  Attending Physician: Nadiya Dale MD   Primary Care Provider: Mi Primary Doctor         Patient information was obtained from patient, past medical records, and ER records.     Subjective:     Principal Problem:New onset of congestive heart failure    Chief Complaint:   Chief Complaint   Patient presents with    Shortness of Breath     Daily for 3 wks; hx of 2 strokes; cough         HPI: Cecil Clark is a 50 y.o. male with a PMHx of CVA x2, DM, HTN who presents to [facility] for evaluation of shortness of breath and anasarca. Patient reports worsening swelling beginning in just his feet but now extending to his bilateral lower extremities and into his thighs/ hips for the past few weeks. He now has swelling into his scrotum/ penis. Endorses associated shortness of breath, worse with exertion for the past few days. He's only urinated a very small amount over the last month. Denies any chest pain. He also reports open blisters formed on his lower extremities about 2 weeks ago. He's been "debriding" the wounds with tweezers, peroxide, and alcohol to try to clean the wounds, but now has yellow colored drainage and surrounding pain to the wound on the RLE. Denies fever, chills, N/V, abdominal pain or syncope. Patient hasn't taken any of his home medications in about 2 years. Does occasionally take insulin when he eats "big meals", last took insulin around Shane time.     ED: tachycardic to  and hypertensive to /125. Troponin normal. BNP elevated at 1427. CXR with small bilateral pleural effusions. Bedside echo showed moderately reduced ejection fraction with trace pericardial effusion, no significant right heart strain. Given lasix 40mg IVP.     Past Medical History:   Diagnosis Date    Diabetes mellitus     Hypertension     R thalmaic hypertensive " "ICH 04/04/2021    Tachycardia 4/4/2021       Past Surgical History:   Procedure Laterality Date    ANTERIOR CRUCIATE LIGAMENT REPAIR Right     2002       Review of patient's allergies indicates:   Allergen Reactions    Aspartame Nausea And Vomiting     Violent emesis.    Shellfish containing products Shortness Of Breath     Has received iodinated contrast in the past with no reaction       No current facility-administered medications on file prior to encounter.     Current Outpatient Medications on File Prior to Encounter   Medication Sig    amLODIPine (NORVASC) 10 MG tablet Take 1 tablet (10 mg total) by mouth once daily.    aspirin (ECOTRIN) 81 MG EC tablet Take 1 tablet (81 mg total) by mouth once daily.    atorvastatin (LIPITOR) 40 MG tablet Take 1 tablet (40 mg total) by mouth once daily.    blood sugar diagnostic Strp Use to test blood glucose 6 (six) times daily.    blood-glucose meter Misc Use to check blood glucose 6 times daily    clopidogreL (PLAVIX) 75 mg tablet Take 1 tablet (75 mg total) by mouth once daily.    insulin aspart U-100 (NOVOLOG) 100 unit/mL (3 mL) InPn pen Inject 5 Units into the skin 3 (three) times daily. Use sliding scale before meals and at bedtime: 150 - 200 + 1 unit, 201 - 250 + 2 units, 251 - 300 + 3 units, 301 - 350 + 4 units, > 350 + 5 units. Discard pen 28 days after initial use. (35 units total daily dose)    insulin detemir U-100, Levemir, 100 unit/mL (3 mL) SubQ InPn pen Inject 12 Units into the skin once daily.  (Discard pen 42 days after initial use)    lancets (TRUEPLUS LANCETS) 30 gauge Misc Use to check blood glucose 6 times daily    lancing device with lancets Kit 1 kit by Misc.(Non-Drug; Combo Route) route 6 (six) times daily. Prior to meals to check blood glucose    lisinopriL (PRINIVIL,ZESTRIL) 20 MG tablet Take 1 tablet (20 mg total) by mouth once daily.    pen needle, diabetic 32 gauge x 5/32" Ndle Use for insulin adminstration up to 5 times daily     Family History "       Problem Relation (Age of Onset)    Diabetes Mother    Hypertension Mother, Father    Stroke Father          Tobacco Use    Smoking status: Never    Smokeless tobacco: Not on file   Substance and Sexual Activity    Alcohol use: Not Currently    Drug use: Never    Sexual activity: Not on file     Review of Systems   Constitutional:  Negative for activity change, chills and fever.   HENT:  Negative for trouble swallowing.    Eyes:  Negative for photophobia and visual disturbance.   Respiratory:  Positive for cough and shortness of breath. Negative for chest tightness and wheezing.    Cardiovascular:  Positive for leg swelling. Negative for chest pain and palpitations.   Gastrointestinal:  Negative for abdominal pain, constipation, diarrhea, nausea and vomiting.   Genitourinary:  Negative for dysuria, frequency, hematuria and urgency.   Musculoskeletal:  Negative for arthralgias, back pain and gait problem.   Skin:  Positive for color change and wound. Negative for rash.   Neurological:  Negative for dizziness, syncope, weakness, light-headedness, numbness and headaches.   Psychiatric/Behavioral:  Negative for agitation and confusion. The patient is not nervous/anxious.      Objective:     Vital Signs (Most Recent):  Temp: 98 °F (36.7 °C) (03/06/25 2136)  Pulse: 107 (03/06/25 2315)  Resp: 20 (03/06/25 2315)  BP: (!) 172/126 (03/06/25 2315)  SpO2: 99 % (03/06/25 2315) Vital Signs (24h Range):  Temp:  [98 °F (36.7 °C)] 98 °F (36.7 °C)  Pulse:  [107-118] 107  Resp:  [17-20] 20  SpO2:  [97 %-100 %] 99 %  BP: (172-194)/(125-136) 172/126     Weight: 72.6 kg (160 lb)  Body mass index is 29.26 kg/m².     Physical Exam  Vitals and nursing note reviewed.   Constitutional:       General: He is not in acute distress.     Appearance: He is well-developed.   HENT:      Head: Normocephalic and atraumatic.      Mouth/Throat:      Pharynx: No oropharyngeal exudate.   Eyes:      General: No scleral icterus.     Conjunctiva/sclera:  Conjunctivae normal.   Cardiovascular:      Rate and Rhythm: Normal rate and regular rhythm.      Heart sounds: Normal heart sounds.   Pulmonary:      Effort: Pulmonary effort is normal. No respiratory distress.      Breath sounds: Rales present. No wheezing.      Comments: Very mild crackles to BLL. Breathing comfortably on RA  Abdominal:      General: Bowel sounds are normal. There is no distension.      Palpations: Abdomen is soft.      Tenderness: There is no abdominal tenderness.   Musculoskeletal:         General: No tenderness. Normal range of motion.      Cervical back: Normal range of motion and neck supple.      Right lower leg: Edema present.      Left lower leg: Edema present.      Comments: 3+ pitting edema up to thigh/hip area   Lymphadenopathy:      Cervical: No cervical adenopathy.   Skin:     General: Skin is warm and dry.      Capillary Refill: Capillary refill takes less than 2 seconds.      Findings: No rash.   Neurological:      Mental Status: He is alert and oriented to person, place, and time.      Cranial Nerves: No cranial nerve deficit.      Sensory: No sensory deficit.      Coordination: Coordination normal.   Psychiatric:         Behavior: Behavior normal.         Thought Content: Thought content normal.         Judgment: Judgment normal.                Significant Labs: All pertinent labs within the past 24 hours have been reviewed.  CBC:   Recent Labs   Lab 03/06/25 2027   WBC 8.76   HGB 16.6   HCT 51.8        CMP:   Recent Labs   Lab 03/06/25 2027      K 4.9      CO2 23   *   BUN 28*   CREATININE 2.0*   CALCIUM 8.3*   PROT 6.6   ALBUMIN 2.9*   BILITOT 1.0   ALKPHOS 70   AST 47*   ALT 20   ANIONGAP 9       Significant Imaging: I have reviewed all pertinent imaging results/findings within the past 24 hours.  X-Ray Chest AP Portable  Narrative: EXAMINATION:  XR CHEST AP PORTABLE    CLINICAL HISTORY:  Shortness of breath;    TECHNIQUE:  Single frontal view of  the chest was performed.    COMPARISON:  10/25/2023.    FINDINGS:  The lungs are hypoexpanded and clear. No focal opacities are seen.  Mild nonspecific chronic coarse interstitial thickening, stable.  Suspected small bilateral pleural effusions are seen.  The cardiac silhouette is enlarged.  The visualized osseous structures are unremarkable.  Impression: Small bilateral pleural effusions.    Electronically signed by: Sanjay Pope  Date:    03/07/2025  Time:    00:10      Assessment/Plan:     Assessment & Plan  New onset of congestive heart failure  - no hx CHF or prior echo on file but grossly overloaded on exam with new reduced EF on bedside echo in the ED  - BNP 1427  - CXR with bilat pleural effusions  - continue lasix IV 40mg BID  - formal echo in AM  - optimize GDMT as above  - strict I/Os, daily weights  DIONTE (acute kidney injury)  - Cr 2.0, baseline ~1.1  - suspect cardiorenal  - IV diuresis as above  - avoid nephrotoxins, renally dose meds  - trend daily BMP  Essential hypertension  - discussed BP regimen w/ cardiology given unable to start ACE/ARB (DIONTE), BB (new decompensated CHF), or CCB  (new reduced EF)-- cards recommend starting Bidil until DIONTE resolved/ euvolemic  - start bidil 20/25mg TID for now  - consider starting BB once euvolemic   Type 2 diabetes mellitus with hyperglycemia, without long-term current use of insulin  - noncompliant with meds  - LDSSI for now, ACHS accuchecks  - A1c in AM  - may need to start long acting insulin pending BG trend     Lab Results   Component Value Date    HGBA1C 12.5 (H) 10/25/2023       History of CVA with residual deficit  - mild L sided sensory deficits since prior stroke, no acute issues  - resume ASA, statin  - lipid panel in AM  Cellulitis of right lower extremity  - erythema, possible scant purulent drainage from wound of RLE  - afebrile without leukocytosis  - start doxy 100mg BID  - wound care consult      VTE Risk Mitigation (From admission, onward)            Ordered     heparin (porcine) injection 5,000 Units  Every 8 hours         03/07/25 0100     IP VTE HIGH RISK PATIENT  Once         03/07/25 0100     Place sequential compression device  Until discontinued         03/07/25 0029                                    Cee Stone PA-C  Department of Hospital Medicine  [facility] - Emergency Dept

## 2025-03-07 NOTE — ED PROVIDER NOTES
Encounter Date: 3/6/2025       History     Chief Complaint   Patient presents with    Shortness of Breath     Daily for 3 wks; hx of 2 strokes; cough      50-year-old male with a past medical history of CVA x2, DM, HTN presents with a chief complaint of shortness of breath and anasarca.  He has had 3 weeks of progressively worsening anasarca and dyspnea on exertion as well as decreased tolerance for ADLs.  He has a nonproductive cough.  No chest pain or fevers.  He has chronic wounds on his leg that have somewhat worsened lately but he notes his blood glucoses has been under control.        Review of patient's allergies indicates:   Allergen Reactions    Aspartame Nausea And Vomiting     Violent emesis.    Shellfish containing products Shortness Of Breath     Has received iodinated contrast in the past with no reaction     Past Medical History:   Diagnosis Date    Diabetes mellitus     Hypertension     R thalmaic hypertensive ICH 04/04/2021    Tachycardia 4/4/2021     Past Surgical History:   Procedure Laterality Date    ANTERIOR CRUCIATE LIGAMENT REPAIR Right     2002     Family History   Problem Relation Name Age of Onset    Hypertension Mother      Diabetes Mother      Stroke Father      Hypertension Father       Social History[1]  Review of Systems    Physical Exam     Initial Vitals [03/06/25 1916]   BP Pulse Resp Temp SpO2   (!) 189/136 (!) 114 17 98 °F (36.7 °C) 100 %      MAP       --         Physical Exam    Nursing note and vitals reviewed.  Constitutional: He appears well-developed and well-nourished. He is not diaphoretic. No distress.   HENT:   Head: Normocephalic and atraumatic.   Eyes: Right eye exhibits no discharge. Left eye exhibits no discharge. No scleral icterus.   Neck: Neck supple. No JVD present.   Normal range of motion.  Cardiovascular:  Regular rhythm and normal heart sounds.   Tachycardia present.   Exam reveals no gallop and no friction rub.       No murmur heard.  Pulmonary/Chest: No  respiratory distress. He has no wheezes. He has no rhonchi. He has no rales.   Decreased breath sounds bilaterally.  Minimally increased work of breathing on room air   Abdominal: Abdomen is soft. He exhibits no distension and no mass. There is no abdominal tenderness. There is no rebound and no guarding.   Musculoskeletal:         General: Edema (Pitting edema symmetric to bilateral lower extremities; no palpable cords) present. No tenderness. Normal range of motion.      Cervical back: Normal range of motion and neck supple.     Neurological: He is alert and oriented to person, place, and time. He has normal strength. No sensory deficit.   Skin: Skin is warm and dry. Capillary refill takes less than 2 seconds.   Psychiatric: Thought content normal.             Photo of left leg with associated cell phone photo from his own phone from when the started 2 months ago.  ED Course   Procedures  Labs Reviewed   COMPREHENSIVE METABOLIC PANEL - Abnormal       Result Value    Sodium 141      Potassium 4.9      Chloride 109      CO2 23      Glucose 168 (*)     BUN 28 (*)     Creatinine 2.0 (*)     Calcium 8.3 (*)     Total Protein 6.6      Albumin 2.9 (*)     Total Bilirubin 1.0      Alkaline Phosphatase 70      AST 47 (*)     ALT 20      eGFR 39.4 (*)     Anion Gap 9      Narrative:     Release to patient->Immediate   B-TYPE NATRIURETIC PEPTIDE - Abnormal    BNP 1,427 (*)     Narrative:     Release to patient->Immediate   INFLUENZA A & B BY MOLECULAR    Influenza A, Molecular Negative      Influenza B, Molecular Negative      Flu A & B Source Nasal swab     CBC W/ AUTO DIFFERENTIAL    WBC 8.76      RBC 5.68      Hemoglobin 16.6      Hematocrit 51.8      MCV 91      MCH 29.2      MCHC 32.0      RDW 14.2      Platelets 225      MPV 11.2      Immature Granulocytes 0.5      Gran # (ANC) 6.0      Immature Grans (Abs) 0.04      Lymph # 1.9      Mono # 0.7      Eos # 0.1      Baso # 0.07      nRBC 0      Gran % 68.0      Lymph %  21.3      Mono % 7.8      Eosinophil % 1.6      Basophil % 0.8      Differential Method Automated      Narrative:     Release to patient->Immediate   TROPONIN I HIGH SENSITIVITY    Troponin I High Sensitivity 18      Narrative:     Release to patient->Immediate   SARS-COV-2 RNA AMPLIFICATION, QUAL    SARS-CoV-2 RNA, Amplification, Qual Negative     HEPATITIS C ANTIBODY    Hepatitis C Ab Non-reactive      Narrative:     Release to patient->Immediate   HIV 1 / 2 ANTIBODY    HIV 1/2 Ag/Ab Non-reactive      Narrative:     Release to patient->Immediate   URINALYSIS, REFLEX TO URINE CULTURE     EKG Readings: (Independently Interpreted)   Initial Reading: No STEMI. Rhythm: Sinus Tachycardia. Heart Rate: 115. ST Segments: Normal ST Segments. Axis: Right Axis Deviation.       Imaging Results              ED US Echo (Final result)  Result time 03/06/25 22:45:30      Final result by Interface, Lab In [facility] (03/06/25 22:45:30)                   Narrative:    Exam : Sukh Jackson  POCUS_Cardiac:    Exam Information:  Exam category:  Diagnostic  Consent obtained?:  Yes    Indication(s) for Exam:  Dyspnea    Views Obtained:  Parasternal long-axis, Parasternal short-axis, Subxiphoid, Apical 4-chamber, IVC view  Other:  Dependent thoraces, right upper quadrant abdomen, left upper quadrant abdomen    Findings:  Left Ventricle Ejection Fraction:  Depressed (30-55% EF)  Pericardial Effusion:  Small pericardial effusion  Gross [place] (RV:LV):  Normal  IVC diameter:  > 2 cm  IVC collapsibility:  Low collapsibility (<50%)  Other findings / comments:   moderate right pleural effusion, small left pleural effusion, no intra-abdominal ascites    Interpretation:  Diminished LV function    Electronically signed by Sukh Jackson on Thursday, March 6, 2025 at 10:45 PM                                     X-Ray Chest AP Portable (In process)                      Medications   furosemide injection 40 mg (has no administration in time  range)     Medical Decision Making  50-year-old male with a past medical history of CVA x2, DM, HTN presents with a chief complaint of shortness of breath and anasarca.  On exam he is hypervolemic and tachycardic.    Labs were ordered by provider in triage that revealed a negative COVID and flu.  CBC without leukocytosis or anemia.  CMP with an acute kidney injury with a creatinine of 2.0 up from a normal baseline.  Troponin normal.  BNP elevated at 1427.  No known history of heart failure.  Chest x-ray on my independent interpretation with mild pulmonary edema.  Bedside echo reserved a moderately reduced ejection fraction.  Trace pericardial effusion.  No significant right heart strain.  Plethoric IVC.  Moderate right-sided pleural effusion.  Small left-sided pleural effusion.  No ascites.  Presentation is consistent with a new onset CHF.  Will diurese with 40 of IV Lasix and admit.    Risk  Prescription drug management.  Decision regarding hospitalization.              Attending Attestation:         Attending Critical Care:   Critical Care Times:   ==============================================================  Total Critical Care Time - exclusive of procedural time: 30 minutes.  ==============================================================  Critical care was necessary to treat or prevent imminent or life-threatening deterioration of the following conditions: congestive heart failure.                                  Clinical Impression:  Final diagnoses:  [R06.02] Shortness of breath  [I50.9] Acute on chronic congestive heart failure, unspecified heart failure type (Primary)  [N17.9] DIONTE (acute kidney injury)  [I50.9] New onset of congestive heart failure          ED Disposition Condition    Admit Stable                  [1]   Social History  Tobacco Use    Smoking status: Never   Substance Use Topics    Alcohol use: Not Currently    Drug use: Never        Sukh Jackson MD  03/06/25 2341

## 2025-03-07 NOTE — ED TRIAGE NOTES
"Patient arrives to ED complaining of SOB, bilateral leg swelling, testicle swelling, and stomach distention/pain. Patient states he noted the blisters on his legs 12 days ago and has been "debriding" his leg wounds. Pt states he is non complaint with home medication   "

## 2025-03-07 NOTE — ASSESSMENT & PLAN NOTE
- erythema, possible scant purulent drainage from wound of RLE  - afebrile without leukocytosis  - start doxy 100mg BID  - wound care consult

## 2025-03-07 NOTE — NURSING
New orders received. Pt will received scheduled isosorbide dinitrate 20 mg now instead of original scheduled time, per provider LEILA Vail.

## 2025-03-07 NOTE — SUBJECTIVE & OBJECTIVE
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    Shellfish containing products Shortness Of Breath     Has received iodinated contrast in the past with no reaction       No current facility-administered medications on file prior to encounter.     Current Outpatient Medications on File Prior to Encounter   Medication Sig    amLODIPine (NORVASC) 10 MG tablet Take 1 tablet (10 mg total) by mouth once daily.    aspirin (ECOTRIN) 81 MG EC tablet Take 1 tablet (81 mg total) by mouth once daily.    atorvastatin (LIPITOR) 40 MG tablet Take 1 tablet (40 mg total) by mouth once daily.    blood sugar diagnostic Strp Use to test blood glucose 6 (six) times daily.    blood-glucose meter Misc Use to check blood glucose 6 times daily    clopidogreL (PLAVIX) 75 mg tablet Take 1 tablet (75 mg total) by mouth once daily.    insulin aspart U-100 (NOVOLOG) 100 unit/mL (3 mL) InPn pen Inject 5 Units into the skin 3 (three) times daily. Use sliding scale before meals and at bedtime: 150 - 200 + 1 unit, 201 - 250 + 2 units, 251 - 300 + 3 units, 301 - 350 + 4 units, > 350 + 5 units. Discard pen 28 days after initial use. (35 units total daily dose)    insulin detemir U-100, Levemir, 100 unit/mL (3 mL) SubQ InPn pen Inject 12 Units into the skin once daily.  (Discard pen 42 days after initial use)    lancets (TRUEPLUS LANCETS) 30 gauge Misc Use to check blood glucose 6 times daily    lancing device with lancets Kit 1 kit by Misc.(Non-Drug; Combo Route) route 6 (six) times daily. Prior to meals to check blood glucose    lisinopriL (PRINIVIL,ZESTRIL) 20 MG tablet Take 1 tablet (20 mg total) by mouth once daily.    pen  "needle, diabetic 32 gauge x 5/32" Ndle Use for insulin adminstration up to 5 times daily     Family History       Problem Relation (Age of Onset)    Diabetes Mother    Hypertension Mother, Father    Stroke Father          Tobacco Use    Smoking status: Never    Smokeless tobacco: Not on file   Substance and Sexual Activity    Alcohol use: Not Currently    Drug use: Never    Sexual activity: Not on file     Review of Systems   Constitutional:  Negative for activity change, chills and fever.   HENT:  Negative for trouble swallowing.    Eyes:  Negative for photophobia and visual disturbance.   Respiratory:  Positive for cough and shortness of breath. Negative for chest tightness and wheezing.    Cardiovascular:  Positive for leg swelling. Negative for chest pain and palpitations.   Gastrointestinal:  Negative for abdominal pain, constipation, diarrhea, nausea and vomiting.   Genitourinary:  Negative for dysuria, frequency, hematuria and urgency.   Musculoskeletal:  Negative for arthralgias, back pain and gait problem.   Skin:  Positive for color change and wound. Negative for rash.   Neurological:  Negative for dizziness, syncope, weakness, light-headedness, numbness and headaches.   Psychiatric/Behavioral:  Negative for agitation and confusion. The patient is not nervous/anxious.      Objective:     Vital Signs (Most Recent):  Temp: 98 °F (36.7 °C) (03/06/25 2136)  Pulse: 107 (03/06/25 2315)  Resp: 20 (03/06/25 2315)  BP: (!) 172/126 (03/06/25 2315)  SpO2: 99 % (03/06/25 2315) Vital Signs (24h Range):  Temp:  [98 °F (36.7 °C)] 98 °F (36.7 °C)  Pulse:  [107-118] 107  Resp:  [17-20] 20  SpO2:  [97 %-100 %] 99 %  BP: (172-194)/(125-136) 172/126     Weight: 72.6 kg (160 lb)  Body mass index is 29.26 kg/m².     Physical Exam  Vitals and nursing note reviewed.   Constitutional:       General: He is not in acute distress.     Appearance: He is well-developed.   HENT:      Head: Normocephalic and atraumatic.      " Mouth/Throat:      Pharynx: No oropharyngeal exudate.   Eyes:      General: No scleral icterus.     Conjunctiva/sclera: Conjunctivae normal.   Cardiovascular:      Rate and Rhythm: Normal rate and regular rhythm.      Heart sounds: Normal heart sounds.   Pulmonary:      Effort: Pulmonary effort is normal. No respiratory distress.      Breath sounds: Rales present. No wheezing.      Comments: Very mild crackles to BLL. Breathing comfortably on RA  Abdominal:      General: Bowel sounds are normal. There is no distension.      Palpations: Abdomen is soft.      Tenderness: There is no abdominal tenderness.   Musculoskeletal:         General: No tenderness. Normal range of motion.      Cervical back: Normal range of motion and neck supple.      Right lower leg: Edema present.      Left lower leg: Edema present.      Comments: 3+ pitting edema up to thigh/hip area   Lymphadenopathy:      Cervical: No cervical adenopathy.   Skin:     General: Skin is warm and dry.      Capillary Refill: Capillary refill takes less than 2 seconds.      Findings: No rash.   Neurological:      Mental Status: He is alert and oriented to person, place, and time.      Cranial Nerves: No cranial nerve deficit.      Sensory: No sensory deficit.      Coordination: Coordination normal.   Psychiatric:         Behavior: Behavior normal.         Thought Content: Thought content normal.         Judgment: Judgment normal.                Significant Labs: All pertinent labs within the past 24 hours have been reviewed.  CBC:   Recent Labs   Lab 03/06/25 2027   WBC 8.76   HGB 16.6   HCT 51.8        CMP:   Recent Labs   Lab 03/06/25 2027      K 4.9      CO2 23   *   BUN 28*   CREATININE 2.0*   CALCIUM 8.3*   PROT 6.6   ALBUMIN 2.9*   BILITOT 1.0   ALKPHOS 70   AST 47*   ALT 20   ANIONGAP 9       Significant Imaging: I have reviewed all pertinent imaging results/findings within the past 24 hours.  X-Ray Chest AP  Portable  Narrative: EXAMINATION:  XR CHEST AP PORTABLE    CLINICAL HISTORY:  Shortness of breath;    TECHNIQUE:  Single frontal view of the chest was performed.    COMPARISON:  10/25/2023.    FINDINGS:  The lungs are hypoexpanded and clear. No focal opacities are seen.  Mild nonspecific chronic coarse interstitial thickening, stable.  Suspected small bilateral pleural effusions are seen.  The cardiac silhouette is enlarged.  The visualized osseous structures are unremarkable.  Impression: Small bilateral pleural effusions.    Electronically signed by: Sanjay Pope  Date:    03/07/2025  Time:    00:10

## 2025-03-07 NOTE — ASSESSMENT & PLAN NOTE
- Cr 2.0, baseline ~1.1  - suspect cardiorenal  - IV diuresis as above  - avoid nephrotoxins, renally dose meds  - trend daily BMP

## 2025-03-07 NOTE — SUBJECTIVE & OBJECTIVE
Interval History: NAEON.   Improving, leg pain and lower abd pain (both 2/2 edema) improved. +thirst on fluid restriction, ice cubes here and at home prior to admit    Review of Systems   All other systems reviewed and are negative.    Objective:   VS and Is/Os reviewed        Physical Exam  Vitals and nursing note reviewed.   Constitutional:       General: He is not in acute distress.     Appearance: He is well-developed.   HENT:      Head: Normocephalic and atraumatic.      Mouth/Throat:      Pharynx: No oropharyngeal exudate.   Eyes:      General: No scleral icterus.     Conjunctiva/sclera: Conjunctivae normal.   Cardiovascular:      Rate and Rhythm: Normal rate and regular rhythm.      Heart sounds: Normal heart sounds.   Pulmonary:      Effort: Pulmonary effort is normal. No respiratory distress.      Breath sounds: Rales present. No wheezing.      Comments: Very mild crackles to BLL. Breathing comfortably on RA  Abdominal:      General: Bowel sounds are normal. There is no distension.      Palpations: Abdomen is soft.      Tenderness: There is no abdominal tenderness.   Musculoskeletal:         General: No tenderness. Normal range of motion.      Cervical back: Normal range of motion and neck supple.      Right lower leg: Edema present.      Left lower leg: Edema present.      Comments: 3+ pitting edema up to thigh/hip area   Lymphadenopathy:      Cervical: No cervical adenopathy.   Skin:     General: Skin is warm and dry.      Capillary Refill: Capillary refill takes less than 2 seconds.      Findings: No rash.   Neurological:      Mental Status: He is alert and oriented to person, place, and time.      Cranial Nerves: No cranial nerve deficit.      Sensory: No sensory deficit.      Coordination: Coordination normal.   Psychiatric:         Behavior: Behavior normal.         Thought Content: Thought content normal.         Judgment: Judgment normal.               Significant Labs: All pertinent labs within the  past 24 hours have been reviewed.    Significant Imaging: I have reviewed all pertinent imaging results/findings within the past 24 hours.

## 2025-03-07 NOTE — ASSESSMENT & PLAN NOTE
- no hx CHF or prior echo on file but grossly overloaded on exam with new reduced EF on bedside echo in the ED  - BNP 1427  - CXR with bilat pleural effusions  - continue lasix IV 40mg BID  - formal echo in AM  - optimize GDMT as above  - strict I/Os, daily weights

## 2025-03-07 NOTE — PROGRESS NOTES
03/07/25 0333   Vital Signs   Temp 97.5 °F (36.4 °C)   Temp Source Oral   Pulse 109   Heart Rate Source Monitor   Resp 19   SpO2 97 %   BP (!) 161/100   MAP (mmHg) 120   BP Location Right arm   Patient Position Lying   Assessments (Pre/Post)   Level of Consciousness (AVPU) alert       Pt reassessed. Provider notified. No new orders at this time. Will continue to monitor BP.

## 2025-03-07 NOTE — HOSPITAL COURSE
Admitted for new-onset heart failure. ECHO with global hypokinesis and EF 25-30%. Initiated on IV Lasix 80 TID. Net negative 11L this admit  Cardiology plans for ischemic w/u outpatient PET stress. Added GDMT( entresto/coreg/jardiance) . Plan fro spironolactone at OP cardiology follow up visit Noted to have BLE cellulitis with wounds. Plan to complete 10 days of doxycycline  Also tested positive for COVID 19 PNA. S/p 3/3 remdesvir, Not hypoxic   Creatinine stable at 2 likely new baseline. Hyperglycemic,initiated on basal bolus regimen A1C 10.8 Not taking home anti DM regimen. Patient is discharged on lantus 10 units at night and moderate dose sliding scale/jardiance. Educated on importance to maintain blood glucose logs and compliance with meds To follow up with PCP in 1 week OP. Repeat BMP at that visit and uptitrate DM regimen based on BG logs

## 2025-03-07 NOTE — PROGRESS NOTES
[facility] ([facility] Team notified of pt referral via [facility] (automated inbasket notification) .    Patient screened today 3/7/2025 by provider and LPN for enrollment to program.      Call [facility] if anyone tries to change your Fluid pills or Heart medications.   Do daily weights to see if you are gaining fluid. Upon waking empty your bladder weigh yourself without much clothing and before you eat or drink anything. Record your weights and compare them for weight gain of 3 - 4lbs overnight or 5lbs in 3 days. Call [facility] if you have this.  Follow a low Salt/Sodium diet (2,000-3,000mg sodium) and limit your fluid to 1.5 - 2 liters a day.  A liter is a quart. Measure all that you drink.   Stop smoking and start exercising.   Keep all your appointments and call the [facility] if you have any SOB or signs of fluid gain.     Pt was deemed not a candidate for enrollment at this time related to patient refused. The pt states he only take in about 700mg of sodium per day and he does not drink much fluid. During this same conversation the pt has informed me of drinking about a gallon of water per day, about 72oz of tea per day, and he eat chinese food every day. He has admitted he does well with out taking his medications. The pt does not have a cardiologist in his chart. The pt also says his filtration rate is not where it should be as well, but he does not have a nephrologist who follows him.     Pt will require additional follow up planning per primary team.     If pt status, diagnosis, or treatment plan changes , please place AMB referral to [facility] ([id number]) for [facility] enrollment re-evalution.

## 2025-03-08 LAB
ALBUMIN SERPL BCP-MCNC: 2.6 G/DL (ref 3.5–5.2)
ALP SERPL-CCNC: 69 U/L (ref 40–150)
ALT SERPL W/O P-5'-P-CCNC: 18 U/L (ref 10–44)
ANION GAP SERPL CALC-SCNC: 11 MMOL/L (ref 8–16)
AST SERPL-CCNC: 23 U/L (ref 10–40)
BILIRUB SERPL-MCNC: 0.9 MG/DL (ref 0.1–1)
BUN SERPL-MCNC: 27 MG/DL (ref 6–20)
CALCIUM SERPL-MCNC: 8 MG/DL (ref 8.7–10.5)
CHLORIDE SERPL-SCNC: 109 MMOL/L (ref 95–110)
CO2 SERPL-SCNC: 21 MMOL/L (ref 23–29)
CREAT SERPL-MCNC: 2 MG/DL (ref 0.5–1.4)
EST. GFR  (NO RACE VARIABLE): 39.4 ML/MIN/1.73 M^2
GLUCOSE SERPL-MCNC: 104 MG/DL (ref 70–110)
GLUCOSE SERPL-MCNC: 150 MG/DL (ref 70–110)
MAGNESIUM SERPL-MCNC: 2 MG/DL (ref 1.6–2.6)
PHOSPHATE SERPL-MCNC: 3.2 MG/DL (ref 2.7–4.5)
POCT GLUCOSE: 102 MG/DL (ref 70–110)
POCT GLUCOSE: 126 MG/DL (ref 70–110)
POCT GLUCOSE: 145 MG/DL (ref 70–110)
POTASSIUM SERPL-SCNC: 3.5 MMOL/L (ref 3.5–5.1)
PROT SERPL-MCNC: 5.6 G/DL (ref 6–8.4)
SODIUM SERPL-SCNC: 141 MMOL/L (ref 136–145)

## 2025-03-08 PROCEDURE — 63600175 PHARM REV CODE 636 W HCPCS: Performed by: HOSPITALIST

## 2025-03-08 PROCEDURE — 25000003 PHARM REV CODE 250

## 2025-03-08 PROCEDURE — 84100 ASSAY OF PHOSPHORUS: CPT | Performed by: HOSPITALIST

## 2025-03-08 PROCEDURE — 80053 COMPREHEN METABOLIC PANEL: CPT | Performed by: HOSPITALIST

## 2025-03-08 PROCEDURE — 25000003 PHARM REV CODE 250: Performed by: HOSPITALIST

## 2025-03-08 PROCEDURE — 36415 COLL VENOUS BLD VENIPUNCTURE: CPT | Performed by: HOSPITALIST

## 2025-03-08 PROCEDURE — 25000003 PHARM REV CODE 250: Performed by: PHYSICIAN ASSISTANT

## 2025-03-08 PROCEDURE — 83735 ASSAY OF MAGNESIUM: CPT | Performed by: PHYSICIAN ASSISTANT

## 2025-03-08 PROCEDURE — 99222 1ST HOSP IP/OBS MODERATE 55: CPT | Mod: ,,, | Performed by: STUDENT IN AN ORGANIZED HEALTH CARE EDUCATION/TRAINING PROGRAM

## 2025-03-08 PROCEDURE — 63600175 PHARM REV CODE 636 W HCPCS: Performed by: PHYSICIAN ASSISTANT

## 2025-03-08 PROCEDURE — 20600001 HC STEP DOWN PRIVATE ROOM

## 2025-03-08 RX ORDER — FUROSEMIDE 10 MG/ML
80 INJECTION INTRAMUSCULAR; INTRAVENOUS EVERY 12 HOURS
Status: DISCONTINUED | OUTPATIENT
Start: 2025-03-08 | End: 2025-03-10

## 2025-03-08 RX ORDER — POTASSIUM CHLORIDE 20 MEQ/1
40 TABLET, EXTENDED RELEASE ORAL
Status: COMPLETED | OUTPATIENT
Start: 2025-03-08 | End: 2025-03-08

## 2025-03-08 RX ADMIN — MICONAZOLE NITRATE: 20 OINTMENT TOPICAL at 08:03

## 2025-03-08 RX ADMIN — HEPARIN SODIUM 5000 UNITS: 5000 INJECTION INTRAVENOUS; SUBCUTANEOUS at 03:03

## 2025-03-08 RX ADMIN — ISOSORBIDE DINITRATE 20 MG: 20 TABLET ORAL at 06:03

## 2025-03-08 RX ADMIN — ACETAMINOPHEN 650 MG: 325 TABLET ORAL at 12:03

## 2025-03-08 RX ADMIN — HEPARIN SODIUM 5000 UNITS: 5000 INJECTION INTRAVENOUS; SUBCUTANEOUS at 10:03

## 2025-03-08 RX ADMIN — POTASSIUM CHLORIDE 40 MEQ: 1500 TABLET, EXTENDED RELEASE ORAL at 12:03

## 2025-03-08 RX ADMIN — HEPARIN SODIUM 5000 UNITS: 5000 INJECTION INTRAVENOUS; SUBCUTANEOUS at 06:03

## 2025-03-08 RX ADMIN — ISOSORBIDE DINITRATE 20 MG: 20 TABLET ORAL at 10:03

## 2025-03-08 RX ADMIN — MICONAZOLE NITRATE: 20 OINTMENT TOPICAL at 09:03

## 2025-03-08 RX ADMIN — HYDRALAZINE HYDROCHLORIDE 25 MG: 25 TABLET ORAL at 10:03

## 2025-03-08 RX ADMIN — DOXYCYCLINE HYCLATE 100 MG: 100 TABLET, COATED ORAL at 08:03

## 2025-03-08 RX ADMIN — ISOSORBIDE DINITRATE 20 MG: 20 TABLET ORAL at 03:03

## 2025-03-08 RX ADMIN — DOXYCYCLINE HYCLATE 100 MG: 100 TABLET, COATED ORAL at 10:03

## 2025-03-08 RX ADMIN — FUROSEMIDE 40 MG: 10 INJECTION, SOLUTION INTRAVENOUS at 08:03

## 2025-03-08 RX ADMIN — INSULIN GLARGINE 5 UNITS: 100 INJECTION, SOLUTION SUBCUTANEOUS at 10:03

## 2025-03-08 RX ADMIN — HYDRALAZINE HYDROCHLORIDE 25 MG: 25 TABLET ORAL at 03:03

## 2025-03-08 RX ADMIN — HYDRALAZINE HYDROCHLORIDE 10 MG: 20 INJECTION, SOLUTION INTRAMUSCULAR; INTRAVENOUS at 04:03

## 2025-03-08 RX ADMIN — HYDRALAZINE HYDROCHLORIDE 10 MG: 20 INJECTION, SOLUTION INTRAMUSCULAR; INTRAVENOUS at 12:03

## 2025-03-08 RX ADMIN — POTASSIUM CHLORIDE 40 MEQ: 1500 TABLET, EXTENDED RELEASE ORAL at 08:03

## 2025-03-08 RX ADMIN — HYDRALAZINE HYDROCHLORIDE 25 MG: 25 TABLET ORAL at 08:03

## 2025-03-08 RX ADMIN — FUROSEMIDE 80 MG: 10 INJECTION, SOLUTION INTRAVENOUS at 10:03

## 2025-03-08 NOTE — SUBJECTIVE & OBJECTIVE
Interval History: doing well, increased Lasix to 80 IV BID (from 40 BID) per cards.     Review of Systems   All other systems reviewed and are negative.    Objective:     Vital Signs (Most Recent):  Temp: 98.4 °F (36.9 °C) (03/08/25 0801)  Pulse: 105 (03/08/25 0801)  Resp: 17 (03/08/25 0801)  BP: (!) 154/91 (03/08/25 0801)  SpO2: 99 % (03/08/25 0801) Vital Signs (24h Range):  Temp:  [97.3 °F (36.3 °C)-98.4 °F (36.9 °C)] 98.4 °F (36.9 °C)  Pulse:  [103-114] 105  Resp:  [16-19] 17  SpO2:  [96 %-99 %] 99 %  BP: (130-173)/() 154/91     Weight: 73.4 kg (161 lb 13.1 oz)  Body mass index is 29.6 kg/m².    Intake/Output Summary (Last 24 hours) at 3/8/2025 1009  Last data filed at 3/8/2025 0412  Gross per 24 hour   Intake 720 ml   Output 2450 ml   Net -1730 ml         Physical Exam  Vitals and nursing note reviewed.   Constitutional:       General: He is not in acute distress.     Appearance: He is well-developed.   HENT:      Head: Normocephalic and atraumatic.      Mouth/Throat:      Pharynx: No oropharyngeal exudate.   Eyes:      General: No scleral icterus.     Conjunctiva/sclera: Conjunctivae normal.   Cardiovascular:      Rate and Rhythm: Normal rate and regular rhythm.      Heart sounds: Normal heart sounds.   Pulmonary:      Effort: Pulmonary effort is normal. No respiratory distress.      Breath sounds: Rales present. No wheezing.      Comments: Very mild crackles to BLL. Breathing comfortably on RA  Abdominal:      General: Bowel sounds are normal. There is no distension.      Palpations: Abdomen is soft.      Tenderness: There is no abdominal tenderness.   Musculoskeletal:         General: No tenderness. Normal range of motion.      Cervical back: Normal range of motion and neck supple.      Right lower leg: Edema present.      Left lower leg: Edema present.      Comments: 3+ pitting edema up to thigh/hip area   Lymphadenopathy:      Cervical: No cervical adenopathy.   Skin:     General: Skin is warm and  dry.      Capillary Refill: Capillary refill takes less than 2 seconds.      Findings: No rash.   Neurological:      Mental Status: He is alert and oriented to person, place, and time.      Cranial Nerves: No cranial nerve deficit.      Sensory: No sensory deficit.      Coordination: Coordination normal.   Psychiatric:         Behavior: Behavior normal.         Thought Content: Thought content normal.         Judgment: Judgment normal.               Significant Labs: All pertinent labs within the past 24 hours have been reviewed.    Significant Imaging: I have reviewed all pertinent imaging results/findings within the past 24 hours.

## 2025-03-08 NOTE — PLAN OF CARE
"Pt seen/examined on am rounds. Full note to follow.    Improving, leg pain and lower abd pain (both 2/2 edema) improved. +thirst on fluid restriction, ice cubes here and at home prior to admit    Acute biV heart failure with BLE edema to scrotum. RFs: uncontrolled DM, HTN. TSH wnl. NO meds at home  - diuresing w Lasix 40 IV q12  - DRY weight 142-143 lbs (64.5 - 65 kg)  - avoid BB for now per cards recs to admitting   - gen cards consult 3/8  - needs ischemic w/u inpt vs outpt  - liberalized fluid restrict to 2L for now given thirst, tachycardia, and infectious w/u as below  - education     Cellulitis LE (alonso-wounds 2/2 edema) - cont Doxy 100 BID for now   Tachycardia and "Shivers"- possible rigors - BCx x 2. Monitor closely, low threshold for empiric ABx.     DM2 uncontrolled. A1C 10.8. Not on any orals or insulin at home  - LDSSI, FSQACHS  - Basal insulin 5 units for now  - avoid insulin stacking in low GFR/DIONTE  - considering Endo consult    DIONTE vs DIONTE on CKD vs CKD. UA with 5 RBCs, no casts  - f/u FeNa (urine studies "add-on" to UA done in ED prior to Lasix)  - if no improvement or worsening, renal U/S    H/O stroke x 2 (ICH and ischemic)- no acute issues. monitor  "

## 2025-03-08 NOTE — HPI
"As per chart review: 50 y.o. male with a PMHx of CVA x2, DM, HTN [place] [facility] [place] breath and anasarca.  Patient reports worsening swelling beginning in just his feet but now extending to his bilateral lower extremities and into his thighs/ hips for the past few weeks. He now has swelling into his scrotum/ penis. He's only urinated a very small amount over the last month. Denies any chest pain. He also reports open blisters formed on his lower extremities about 2 weeks ago. He's been "debriding" the wounds with tweezers, peroxide, and alcohol to try to clean the wounds, but now has yellow colored drainage and surrounding pain to the wound on the RLE    ED: tachycardic to  and hypertensive to /125. Troponin normal. BNP elevated at 1427. CXR with small bilateral pleural effusions. Bedside echo showed moderately reduced ejection fraction with trace pericardial effusion, no significant right heart strain. Given lasix 40mg IVP. Notable Hx of medication non compliance.     Cardiology consulted for New onset HF; management and ischemic work up   "

## 2025-03-08 NOTE — ASSESSMENT & PLAN NOTE
"DIONTE vs DIONTE on CKD vs CKD. Suspect cardiorenal. UA with 5 RBCs, no casts  - f/u FeNa (urine studies "add-on" to UA done in ED prior to Lasix)  - if no improvement or worsening, renal U/SCr 2.0, baseline ~1.1  - IV diuresis as above  - avoid nephrotoxins, renally dose meds  - trend daily BMP  "

## 2025-03-08 NOTE — ASSESSMENT & PLAN NOTE
Pt presented with SOB and anasarca. Reported that it has been going on for few weeks now. Also reported decrease urinary output. Patient has Systolic (HFrEF) heart failure that is Acute. On presentation their CHF was decompensated. Evidence of decompensated CHF on presentation includes: edema, elevated JVD, weight gain, and orthopnea. The etiology of their decompensation is likely medication non compliance . Most recent BNP and echo results are listed below.  Recent Labs     03/06/25 2027   BNP 1,427*       Intake/Output Summary (Last 24 hours) at 3/8/2025 0754  Last data filed at 3/8/2025 0412  Gross per 24 hour   Intake 960 ml   Output 2450 ml   Net -1490 ml       Latest ECHO  Results for orders placed during the hospital encounter of 03/06/25    Echo    Interpretation Summary    Left Ventricle: The left ventricle is normal in size. Ventricular mass is normal. Normal wall thickness. There is concentric remodeling. Global hypokinesis present. There is severely reduced systolic function with a visually estimated ejection fraction of 25 - 30%. Global longitudinal strain is -8.3%. Global longitudinal strain is reduced. There is diastolic dysfunction.    Right Ventricle: The right ventricle is normal in size. Wall thickness is normal. Right ventricle wall motion has global hypokinesis. Systolic function is moderately to severely reduced.    Mitral Valve: There is mild regurgitation.    Pulmonary Artery: The estimated pulmonary artery systolic pressure is 42 mmHg.    IVC/SVC: Elevated venous pressure at 15 mmHg.    Current Heart Failure Medications  furosemide injection 40 mg, Every 12 hours, Intravenous  isosorbide dinitrate tablet 20 mg, Every 8 hours, Oral  hydrALAZINE tablet 25 mg, Every 8 hours, Oral  hydrALAZINE injection 10 mg, Every 6 hours PRN, Intravenous    - NHYA Class III  - ACC/AHA stage C      Recommendations   - Monitor strict I&Os and daily weights.    - Place on telemetry  - Low sodium diet  - Place  on fluid restriction of 1.5 L.   - Increase  lasix 80 mg IV BID   - Monitor electrolytes and supplement as needed   - Once pt is euvolemic; and improvement in cr;  consult interventional cardiology for ischemic work up   - Importance of medication compliance should be discussed   - Consider gradually initiating GDMT - Starting with SGLT2i

## 2025-03-08 NOTE — ASSESSMENT & PLAN NOTE
Acute biV heart failure with BLE edema to scrotum. RFs: uncontrolled DM, HTN. Viral illness.  TSH wnl. NO meds at home  - diuresing w Lasix 80 IV q12  - GDMT: discussed BP regimen w/ cardiology given unable to start ACE/ARB (DIONTE), BB (new decompensated CHF), or CCB  (new reduced EF)-- cards recommend starting Bidil until DIONTE resolved/ euvolemic  - continue new bidil 20/25mg TID for now  - no ACEi or ARB while DIONTE / renal function dynamic  - avoid BB for now per cards recs to admitting HM, start asap  - gen cards consulted, appreciate recs  - liberalized fluid restrict to 2L for now given thirst, tachycardia, and infectious w/u as below  - education

## 2025-03-08 NOTE — ASSESSMENT & PLAN NOTE
DM2 uncontrolled. A1C 10.8. Not on any orals or insulin at home  - LDSSI, FSQACHS  - Basal insulin 5 units for now  - avoid insulin stacking in low GFR/DIONTE  - considering Endo consult

## 2025-03-08 NOTE — PLAN OF CARE
Problem: Adult Inpatient Plan of Care  Goal: Plan of Care Review  Outcome: Progressing  Goal: Patient-Specific Goal (Individualized)  Outcome: Progressing  Goal: Absence of Hospital-Acquired Illness or Injury  Outcome: Progressing  Goal: Optimal Comfort and Wellbeing  Outcome: Progressing  Goal: Readiness for Transition of Care  Outcome: Progressing     Problem: Skin Injury Risk Increased  Goal: Skin Health and Integrity  Outcome: Progressing     Problem: Infection  Goal: Absence of Infection Signs and Symptoms  Outcome: Progressing     Problem: Diabetes Comorbidity  Goal: Blood Glucose Level Within Targeted Range  Outcome: Progressing     Problem: Acute Kidney Injury/Impairment  Goal: Fluid and Electrolyte Balance  Outcome: Progressing  Goal: Improved Oral Intake  Outcome: Progressing  Goal: Effective Renal Function  Outcome: Progressing     Problem: Wound  Goal: Optimal Coping  Outcome: Progressing  Goal: Optimal Functional Ability  Outcome: Progressing  Goal: Absence of Infection Signs and Symptoms  Outcome: Progressing  Goal: Improved Oral Intake  Outcome: Progressing  Goal: Optimal Pain Control and Function  Outcome: Progressing  Goal: Skin Health and Integrity  Outcome: Progressing  Goal: Optimal Wound Healing  Outcome: Progressing

## 2025-03-08 NOTE — CONSULTS
Nutrition-Related Diabetes Education      Time Spent: 6 minutes     Learners: Pt     Current HbA1c: 10.8 on 3/7    Is patient aware of their A1c and their goal A1c?       ____x___ yes    Home diabetes medication(s):    Nutrition Education with handouts:   Handouts discussed how counting carbohydrates can help control blood glucose levels, and which foods contain carbohydrates such as breads, fruits, and what a starchy vegetable is    Comments: Spoke w/ pt at bedside, pt kept falling in and out of sleep- not receptive to education- was not responsive to questions asked. Briefly was able to discuss A1c.  Handouts were left.     Barriers to Learning: Yes     Follow up: Yes     Please consult as needed.  Thank you!

## 2025-03-08 NOTE — ASSESSMENT & PLAN NOTE
Pt with Hx of CVA  x2. Reported mild Left sided weakness after the second CVA with intermittent balance issues.   - EKG: Sinus Tachycardia      Recommendations  - Continue with ASA 81 mg qd   - Continue with Lipitor 40 mg HS   - Pt will benefit from 30 day event monitor prior to discharge r/o AF

## 2025-03-08 NOTE — CONSULTS
"[facility]  Cardiology  Consult Note    Patient Name: Cecil Clark  MRN: [id number]  Admission Date: 3/6/2025  Hospital Length of Stay: 2 days  Code Status: Full Code   Attending Provider: Nadiya Dale MD   Consulting Provider: Lilly Ware MD  Primary Care Physician: Mi, Primary Doctor  Principal Problem:New onset of congestive heart failure    Patient information was obtained from patient, past medical records, and ER records.     Inpatient consult to Cardiology  Consult performed by: Lilly Ware MD  Consult ordered by: Nadiya Dael MD        Subjective:     Chief Complaint:  SOB (New onset HF)      HPI:   As per chart review: 50 y.o. male with a PMHx of CVA x2, DM, HTN who [place] [facility] [place]ness of breath and anasarca.  Patient reports worsening swelling beginning in just his feet but now extending to his bilateral lower extremities and into his thighs/ hips for the past few weeks. He now has swelling into his scrotum/ penis. He's only urinated a very small amount over the last month. Denies any chest pain. He also reports open blisters formed on his lower extremities about 2 weeks ago. He's been "debriding" the wounds with tweezers, peroxide, and alcohol to try to clean the wounds, but now has yellow colored drainage and surrounding pain to the wound on the RLE    ED: tachycardic to  and hypertensive to /125. Troponin normal. BNP elevated at 1427. CXR with small bilateral pleural effusions. Bedside echo showed moderately reduced ejection fraction with trace pericardial effusion, no significant right heart strain. Given lasix 40mg IVP. Notable Hx of medication non compliance.     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    Shellfish containing products Shortness Of Breath     Has received iodinated contrast in the past with no reaction       No current facility-administered medications on file prior to encounter.     Current Outpatient Medications on File Prior to Encounter   Medication Sig    amLODIPine (NORVASC) 10 MG tablet Take 1 tablet (10 mg total) by mouth once daily.    aspirin (ECOTRIN) 81 MG EC tablet Take 1 tablet (81 mg total) by mouth once daily.    atorvastatin (LIPITOR) 40 MG tablet Take 1 tablet (40 mg total) by mouth once daily.    blood sugar diagnostic Strp Use to test blood glucose 6 (six) times daily.    blood-glucose meter Misc Use to check blood glucose 6 times daily    clopidogreL (PLAVIX) 75 mg tablet Take 1 tablet (75 mg total) by mouth once daily.    insulin aspart U-100 (NOVOLOG) 100 unit/mL (3 mL) InPn pen Inject 5 Units into the skin 3 (three) times daily. Use sliding scale before meals and at bedtime: 150 - 200 + 1 unit, 201 - 250 + 2 units, 251 - 300 + 3 units, 301 - 350 + 4 units, > 350 + 5 units. Discard pen 28 days after initial use. (35 units total daily dose)    insulin detemir U-100, Levemir, 100 unit/mL (3 mL) SubQ InPn pen Inject 12 Units into the skin once daily.  (Discard pen 42 days after initial use)    lancets (TRUEPLUS LANCETS) 30 gauge Misc Use to check blood glucose 6 times daily    lancing device with lancets Kit 1 kit by Misc.(Non-Drug; Combo Route) route 6 (six) times daily. Prior to meals to check blood glucose    lisinopriL (PRINIVIL,ZESTRIL) 20 MG tablet Take 1 tablet (20 mg total) by mouth once daily.    pen needle, diabetic 32 gauge x 5/32" Ndle Use for insulin adminstration up to 5 times daily     Family History       Problem Relation (Age of Onset)    Diabetes Mother    Hypertension Mother, Father    Stroke " Father          Tobacco Use    Smoking status: Never    Smokeless tobacco: Not on file   Substance and Sexual Activity    Alcohol use: Not Currently    Drug use: Never    Sexual activity: Not on file     Review of Systems   Constitutional: Negative.   HENT:  Positive for congestion. Negative for hoarse voice, nosebleeds, sore throat and tinnitus.    Cardiovascular: Negative.    Respiratory: Negative.     Skin:  Positive for poor wound healing.   Neurological: Negative.         Reported mild left sided residual weakness post CVA    Psychiatric/Behavioral: Negative.       Objective:     Vital Signs (Most Recent):  Temp: 97.3 °F (36.3 °C) (03/08/25 0359)  Pulse: 103 (03/08/25 0600)  Resp: 19 (03/08/25 0359)  BP: (!) 149/95 (03/08/25 0610)  SpO2: 98 % (03/08/25 0359) Vital Signs (24h Range):  Temp:  [97.3 °F (36.3 °C)-98.4 °F (36.9 °C)] 97.3 °F (36.3 °C)  Pulse:  [103-114] 103  Resp:  [16-19] 19  SpO2:  [96 %-98 %] 98 %  BP: (130-179)/() 149/95     Weight: 73.4 kg (161 lb 13.1 oz)  Body mass index is 29.6 kg/m².    SpO2: 98 %         Intake/Output Summary (Last 24 hours) at 3/8/2025 0705  Last data filed at 3/8/2025 0412  Gross per 24 hour   Intake 960 ml   Output 2825 ml   Net -1865 ml       Lines/Drains/Airways       Peripheral Intravenous Line  Duration                  Peripheral IV - Single Lumen 03/06/25 2209 20 G Right Antecubital 1 day                     Physical Exam  Constitutional:       Appearance: Normal appearance.   HENT:      Head: Atraumatic.   Cardiovascular:      Rate and Rhythm: Normal rate and regular rhythm.      Pulses: Normal pulses.      Heart sounds: Normal heart sounds.      Comments: Elevated JVP  Pulmonary:      Effort: Pulmonary effort is normal.      Breath sounds: Normal breath sounds.   Musculoskeletal:      Right lower leg: Edema present.      Left lower leg: Edema present.   Skin:     Capillary Refill: Capillary refill takes less than 2 seconds.   Neurological:      Mental  Status: He is alert and oriented to person, place, and time.   Psychiatric:         Mood and Affect: Mood normal.         Behavior: Behavior normal.          Significant Labs:   Recent Labs   Lab 03/06/25 2027   WBC 8.76   HGB 16.6   HCT 51.8          Recent Labs   Lab 03/06/25 2027 03/07/25  0550 03/08/25  0230    141 141   K 4.9 3.5 3.5    110 109   CO2 23 19* 21*   BUN 28* 27* 27*   CREATININE 2.0* 2.0* 2.0*   CALCIUM 8.3* 8.1* 8.0*   PHOS  --   --  3.2       Recent Labs   Lab 03/06/25 2027 03/07/25  0550 03/08/25  0230   ALKPHOS 70 63 69   BILITOT 1.0 1.1* 0.9   PROT 6.6 5.4* 5.6*   ALT 20 19 18   AST 47* 26 23       Recent Labs   Lab 03/07/25  0550   CHOL 154   TRIG 94   HDL 57     Lab Results   Component Value Date    CHOL 154 03/07/2025    HDL 57 03/07/2025    LDLCALC 78.2 03/07/2025    TRIG 94 03/07/2025       Lab Results   Component Value Date    HGBA1C 10.8 (H) 03/07/2025       Lab Results   Component Value Date    BNP 1,427 (H) 03/06/2025       Significant Imaging:     ECHO 3/6/25     Left Ventricle: The left ventricle is normal in size. Ventricular mass is normal. Normal wall thickness. There is concentric remodeling. Global hypokinesis present. There is severely reduced systolic function with a visually estimated ejection fraction of 25 - 30%. Global longitudinal strain is -8.3%. Global longitudinal strain is reduced. There is diastolic dysfunction.    Right Ventricle: The right ventricle is normal in size. Wall thickness is normal. Right ventricle wall motion has global hypokinesis. Systolic function is moderately to severely reduced.    Mitral Valve: There is mild regurgitation.    Pulmonary Artery: The estimated pulmonary artery systolic pressure is 42 mmHg.    IVC/SVC: Elevated venous pressure at 15 mmHg.      ECHO 10/25/23     Left Ventricle: The left ventricle is normal in size. Normal wall thickness. There is concentric remodeling. Normal wall motion. There is normal  systolic function with a visually estimated ejection fraction of 60 - 65%. There is normal diastolic function.    Left Atrium: Agitated saline study of the atrial septum is negative after vasalva maneuver, suggesting absence of intracardiac shunt at the atrial level.    Right Ventricle: Normal right ventricular cavity size. Wall thickness is normal. Right ventricle wall motion  is normal. Systolic function is normal.    IVC/SVC: Normal venous pressure at 3 mmHg.      Assessment and Plan:     * New onset of congestive heart failure  Pt presented with SOB and anasarca. Reported that it has been going on for few weeks now. Also reported decrease urinary output. Patient has Systolic (HFrEF) heart failure that is Acute. On presentation their CHF was decompensated. Evidence of decompensated CHF on presentation includes: edema, elevated JVD, weight gain, and orthopnea. The etiology of their decompensation is likely medication non compliance . Most recent BNP and echo results are listed below.  Recent Labs     03/06/25 2027   BNP 1,427*       Intake/Output Summary (Last 24 hours) at 3/8/2025 0754  Last data filed at 3/8/2025 0412  Gross per 24 hour   Intake 960 ml   Output 2450 ml   Net -1490 ml       Latest ECHO  Results for orders placed during the hospital encounter of 03/06/25    Echo    Interpretation Summary    Left Ventricle: The left ventricle is normal in size. Ventricular mass is normal. Normal wall thickness. There is concentric remodeling. Global hypokinesis present. There is severely reduced systolic function with a visually estimated ejection fraction of 25 - 30%. Global longitudinal strain is -8.3%. Global longitudinal strain is reduced. There is diastolic dysfunction.    Right Ventricle: The right ventricle is normal in size. Wall thickness is normal. Right ventricle wall motion has global hypokinesis. Systolic function is moderately to severely reduced.    Mitral Valve: There is mild regurgitation.     Pulmonary Artery: The estimated pulmonary artery systolic pressure is 42 mmHg.    IVC/SVC: Elevated venous pressure at 15 mmHg.    Current Heart Failure Medications  furosemide injection 40 mg, Every 12 hours, Intravenous  isosorbide dinitrate tablet 20 mg, Every 8 hours, Oral  hydrALAZINE tablet 25 mg, Every 8 hours, Oral  hydrALAZINE injection 10 mg, Every 6 hours PRN, Intravenous    - NHYA Class III  - ACC/AHA stage C      Recommendations   - Monitor strict I&Os and daily weights.    - Place on telemetry  - Low sodium diet  - Place on fluid restriction of 1.5 L.   - Increase  lasix 80 mg IV BID   - Monitor electrolytes and supplement as needed   - Once pt is euvolemic; and improvement in cr;  Will consider ischemic work up   - Importance of medication compliance should be discussed         Essential hypertension  Patient's blood pressure range in the last 24 hours was: BP  Min: 130/78  Max: 179/101.The patient's inpatient anti-hypertensive regimen is listed below:  Current Antihypertensives  furosemide injection 40 mg, Every 12 hours, Intravenous  isosorbide dinitrate tablet 20 mg, Every 8 hours, Oral  hydrALAZINE tablet 25 mg, Every 8 hours, Oral  hydrALAZINE injection 10 mg, Every 6 hours PRN, Intravenous    Plan  - BP is uncontrolled, will adjust as follows: Goal SBP<130   - Monitor Cr and dose medication based on CrCl - Notable DIONTE in the setting on acute decompensated HF and uncontrolled HTN   - Will consider starting Valsartan/Entresto once pts Cr improves.   - Continue with Hydralazine 25 mg q 8 and titrate up as needed   - Avoid BB/ ACE/ARBDs for now     History of CVA with residual deficit  Pt with Hx of CVA  x2. Reported mild Left sided weakness after the second CVA with intermittent balance issues.   - EKG: Sinus Tachycardia      Recommendations  - Continue with ASA 81 mg qd   - Continue with Lipitor 40 mg HS   - Pt will benefit from 30 day event monitor prior to discharge r/o AF         VTE Risk  Mitigation (From admission, onward)           Ordered     heparin (porcine) injection 5,000 Units  Every 8 hours         03/07/25 0100     IP VTE HIGH RISK PATIENT  Once         03/07/25 0100     Place sequential compression device  Until discontinued         03/07/25 0029                    Thank you for your consult. I will follow-up with patient. Please contact us if you have any additional questions. Attending's attestation will follow, please review it. Rest of the care as per primary team.     Lilly Ware MD  Cardiology   [facility]

## 2025-03-08 NOTE — ASSESSMENT & PLAN NOTE
Acute biV heart failure with BLE edema to scrotum. RFs: uncontrolled DM, HTN. TSH wnl. NO meds at home  - diuresing w Lasix 40 IV q12  - DRY weight 142-143 lbs (64.5 - 65 kg)  - avoid BB for now per cards recs to admitting HM  - gen cards consult 3/8  - needs ischemic w/u inpt vs outpt  - liberalized fluid restrict to 2L for now given thirst, tachycardia, and infectious w/u as below  - education   - GDMT: discussed BP regimen w/ cardiology given unable to start ACE/ARB (DIONTE), BB (new decompensated CHF), or CCB  (new reduced EF)-- cards recommend starting Bidil until DIONTE resolved/ euvolemic  - continue new bidil 20/25mg TID for now  - consider starting BB once euvolemic

## 2025-03-08 NOTE — ASSESSMENT & PLAN NOTE
Patient's blood pressure range in the last 24 hours was: BP  Min: 130/78  Max: 179/101.The patient's inpatient anti-hypertensive regimen is listed below:  Current Antihypertensives  furosemide injection 40 mg, Every 12 hours, Intravenous  isosorbide dinitrate tablet 20 mg, Every 8 hours, Oral  hydrALAZINE tablet 25 mg, Every 8 hours, Oral  hydrALAZINE injection 10 mg, Every 6 hours PRN, Intravenous    Plan  - BP is uncontrolled, will adjust as follows: Goal SBP<130   - Monitor Cr and dose medication based on CrCl - Notable DIONTE in the setting on acute decompensated HF and uncontrolled HTN   - Will consider starting Valsartan/Entresto once pts Cr improves.   - Continue with Hydralazine 25 mg q 8 and titrate up as needed   - Avoid BB/ ACE/ARBDs for now

## 2025-03-08 NOTE — ASSESSMENT & PLAN NOTE
"Cellulitis LE (alonso-wounds 2/2 edema). erythema, possible scant purulent drainage from wound of RLE  - afebrile without leukocytosis  - cont doxy 100mg BID  - f/u wound care consult  - cont Doxy 100 BID for now   - Tachycardia and "Shivers" reported 3/7- possible rigors - f/u BCx x 2.   - Monitor closely, low threshold for broadening ABx to Vanc/CTX.    "

## 2025-03-08 NOTE — SUBJECTIVE & OBJECTIVE
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    Shellfish containing products Shortness Of Breath     Has received iodinated contrast in the past with no reaction       No current facility-administered medications on file prior to encounter.     Current Outpatient Medications on File Prior to Encounter   Medication Sig    amLODIPine (NORVASC) 10 MG tablet Take 1 tablet (10 mg total) by mouth once daily.    aspirin (ECOTRIN) 81 MG EC tablet Take 1 tablet (81 mg total) by mouth once daily.    atorvastatin (LIPITOR) 40 MG tablet Take 1 tablet (40 mg total) by mouth once daily.    blood sugar diagnostic Strp Use to test blood glucose 6 (six) times daily.    blood-glucose meter Misc Use to check blood glucose 6 times daily    clopidogreL (PLAVIX) 75 mg tablet Take 1 tablet (75 mg total) by mouth once daily.    insulin aspart U-100 (NOVOLOG) 100 unit/mL (3 mL) InPn pen Inject 5 Units into the skin 3 (three) times daily. Use sliding scale before meals and at bedtime: 150 - 200 + 1 unit, 201 - 250 + 2 units, 251 - 300 + 3 units, 301 - 350 + 4 units, > 350 + 5 units. Discard pen 28 days after initial use. (35 units total daily dose)    insulin detemir U-100, Levemir, 100 unit/mL (3 mL) SubQ InPn pen Inject 12 Units into the skin once daily.  (Discard pen 42 days after initial use)    lancets (TRUEPLUS LANCETS) 30 gauge Misc Use to check blood glucose 6 times daily    lancing device with lancets Kit 1 kit by Misc.(Non-Drug; Combo Route) route 6 (six) times daily. Prior to meals to check blood glucose    lisinopriL (PRINIVIL,ZESTRIL) 20 MG tablet Take 1 tablet (20 mg total) by mouth once daily.    pen  "needle, diabetic 32 gauge x 5/32" Ndle Use for insulin adminstration up to 5 times daily     Family History       Problem Relation (Age of Onset)    Diabetes Mother    Hypertension Mother, Father    Stroke Father          Tobacco Use    Smoking status: Never    Smokeless tobacco: Not on file   Substance and Sexual Activity    Alcohol use: Not Currently    Drug use: Never    Sexual activity: Not on file     Review of Systems   Constitutional: Negative.   HENT:  Positive for congestion. Negative for hoarse voice, nosebleeds, sore throat and tinnitus.    Cardiovascular: Negative.    Respiratory: Negative.     Skin:  Positive for poor wound healing.   Neurological: Negative.         Reported mild left sided residual weakness post CVA    Psychiatric/Behavioral: Negative.       Objective:     Vital Signs (Most Recent):  Temp: 97.3 °F (36.3 °C) (03/08/25 0359)  Pulse: 103 (03/08/25 0600)  Resp: 19 (03/08/25 0359)  BP: (!) 149/95 (03/08/25 0610)  SpO2: 98 % (03/08/25 0359) Vital Signs (24h Range):  Temp:  [97.3 °F (36.3 °C)-98.4 °F (36.9 °C)] 97.3 °F (36.3 °C)  Pulse:  [103-114] 103  Resp:  [16-19] 19  SpO2:  [96 %-98 %] 98 %  BP: (130-179)/() 149/95     Weight: 73.4 kg (161 lb 13.1 oz)  Body mass index is 29.6 kg/m².    SpO2: 98 %         Intake/Output Summary (Last 24 hours) at 3/8/2025 0705  Last data filed at 3/8/2025 0412  Gross per 24 hour   Intake 960 ml   Output 2825 ml   Net -1865 ml       Lines/Drains/Airways       Peripheral Intravenous Line  Duration                  Peripheral IV - Single Lumen 03/06/25 2209 20 G Right Antecubital 1 day                     Physical Exam  Constitutional:       Appearance: Normal appearance.   HENT:      Head: Atraumatic.   Cardiovascular:      Rate and Rhythm: Normal rate and regular rhythm.      Pulses: Normal pulses.      Heart sounds: Normal heart sounds.      Comments: Elevated JVP  Pulmonary:      Effort: Pulmonary effort is normal.      Breath sounds: Normal breath " sounds.   Musculoskeletal:      Right lower leg: Edema present.      Left lower leg: Edema present.   Skin:     Capillary Refill: Capillary refill takes less than 2 seconds.   Neurological:      Mental Status: He is alert and oriented to person, place, and time.   Psychiatric:         Mood and Affect: Mood normal.         Behavior: Behavior normal.          Significant Labs:   Recent Labs   Lab 03/06/25 2027   WBC 8.76   HGB 16.6   HCT 51.8          Recent Labs   Lab 03/06/25 2027 03/07/25  0550 03/08/25  0230    141 141   K 4.9 3.5 3.5    110 109   CO2 23 19* 21*   BUN 28* 27* 27*   CREATININE 2.0* 2.0* 2.0*   CALCIUM 8.3* 8.1* 8.0*   PHOS  --   --  3.2       Recent Labs   Lab 03/06/25 2027 03/07/25  0550 03/08/25  0230   ALKPHOS 70 63 69   BILITOT 1.0 1.1* 0.9   PROT 6.6 5.4* 5.6*   ALT 20 19 18   AST 47* 26 23       Recent Labs   Lab 03/07/25  0550   CHOL 154   TRIG 94   HDL 57     Lab Results   Component Value Date    CHOL 154 03/07/2025    HDL 57 03/07/2025    LDLCALC 78.2 03/07/2025    TRIG 94 03/07/2025       Lab Results   Component Value Date    HGBA1C 10.8 (H) 03/07/2025       Lab Results   Component Value Date    BNP 1,427 (H) 03/06/2025       Significant Imaging:     ECHO 3/6/25     Left Ventricle: The left ventricle is normal in size. Ventricular mass is normal. Normal wall thickness. There is concentric remodeling. Global hypokinesis present. There is severely reduced systolic function with a visually estimated ejection fraction of 25 - 30%. Global longitudinal strain is -8.3%. Global longitudinal strain is reduced. There is diastolic dysfunction.    Right Ventricle: The right ventricle is normal in size. Wall thickness is normal. Right ventricle wall motion has global hypokinesis. Systolic function is moderately to severely reduced.    Mitral Valve: There is mild regurgitation.    Pulmonary Artery: The estimated pulmonary artery systolic pressure is 42 mmHg.    IVC/SVC: Elevated  venous pressure at 15 mmHg.      ECHO 10/25/23     Left Ventricle: The left ventricle is normal in size. Normal wall thickness. There is concentric remodeling. Normal wall motion. There is normal systolic function with a visually estimated ejection fraction of 60 - 65%. There is normal diastolic function.    Left Atrium: Agitated saline study of the atrial septum is negative after vasalva maneuver, suggesting absence of intracardiac shunt at the atrial level.    Right Ventricle: Normal right ventricular cavity size. Wall thickness is normal. Right ventricle wall motion  is normal. Systolic function is normal.    IVC/SVC: Normal venous pressure at 3 mmHg.

## 2025-03-08 NOTE — PROGRESS NOTES
"[facility] - Cardiology [facility] Medicine  Progress Note    Patient Name: Cecil Clark  MRN: [id number]  Patient Class: IP- Inpatient   Admission Date: 3/6/2025  Length of Stay: 2 days  Attending Physician: Nadiya Dale MD  Primary Care Provider: Mi, Primary Doctor        Subjective     Principal Problem:New onset of congestive heart failure        HPI:  Cecil Clark is a 50 y.o. male with a PMHx of CVA x2, DM, HTN who [place] to [facility] [place] for evaluation of shortness of breath and anasarca. Patient reports worsening swelling beginning in just his feet but now extending to his bilateral lower extremities and into his thighs/ hips for the past few weeks. He now has swelling into his scrotum/ penis. Endorses associated shortness of breath, worse with exertion for the past few days. He's only urinated a very small amount over the last month. Denies any chest pain. He also reports open blisters formed on his lower extremities about 2 weeks ago. He's been "debriding" the wounds with tweezers, peroxide, and alcohol to try to clean the wounds, but now has yellow colored drainage and surrounding pain to the wound on the RLE. Denies fever, chills, N/V, abdominal pain or syncope. Patient hasn't taken any of his home medications in about 2 years. Does occasionally take insulin when he eats "big meals", last took insulin around Shane time.     ED: tachycardic to  and hypertensive to /125. Troponin normal. BNP elevated at 1427. CXR with small bilateral pleural effusions. Bedside echo showed moderately reduced ejection fraction with trace pericardial effusion, no significant right heart strain. Given lasix 40mg IVP.     Overview/Hospital Course:  Brief HPI:  50M w HTN, DM2 uncontrolled (10.8) w neuro manifestations,  CVA x 2 (R thalamic ICH 2/2 HTN 2021; R MCA stroke '23), nonadherence with meds/no meds in 2 years, who presents to [facility] ED for SOB and anasarca, w worsening swelling starting in his feet and now extending " "to his bilateral lower extremities up to the thighs/hips and scrotum x weeks.  SOB is worse with exertion.  He's only urinated a very small amount over the last month. He also reports open blisters formed on his lower extremities about 2 weeks ago. He's been "debriding" the wounds with tweezers, peroxide, and alcohol to try to clean the wounds, but now has yellow colored drainage and surrounding pain to the wound on the RLE. No CP/palpitations/F/C/N/V/abd pain or syncope. Does occasionally take insulin when he eats "big meals," last took around [place] time. In ED: tachy 118 and hypertensive 194/125. Troponin normal. BNP elevated at 1427. CXR with small bilateral pleural effusions. Bedside echo showed moderately reduced ejection fraction with trace pericardial effusion, no significant right heart strain. Given lasix 40mg IV.     Admitted to [facility].  Diuresing on Lasix 40 IV q12.  Possible DIONTE, Cr 2.0, so avoided ACEi and ARB, also avoided BB initially in new onset CHF, so started on bidil (hyrdal 20 PO q8 and isosorbide 25 PO q8). TTE with biV failure.  TSH pending. Started on Doxy PO for LE wounds with surrounding cellulitis.  Wound care consulted.     Weights  03/07/25 0145 -- 78.4 kg (172 lb 13.5 oz) -- ST   03/06/25 1916 -- 72.6 kg (160 lb)              Interval History: NAEON.   Improving, leg pain and lower abd pain (both 2/2 edema) improved. +thirst on fluid restriction, ice cubes here and at home prior to admit    Review of Systems   All other systems reviewed and are negative.    Objective:   VS and Is/Os reviewed        Physical Exam  Vitals and nursing note reviewed.   Constitutional:       General: He is not in acute distress.     Appearance: He is well-developed.   HENT:      Head: Normocephalic and atraumatic.      Mouth/Throat:      Pharynx: No oropharyngeal exudate.   Eyes:      General: No scleral icterus.     Conjunctiva/sclera: Conjunctivae normal.   Cardiovascular:      Rate and Rhythm: Normal " rate and regular rhythm.      Heart sounds: Normal heart sounds.   Pulmonary:      Effort: Pulmonary effort is normal. No respiratory distress.      Breath sounds: Rales present. No wheezing.      Comments: Very mild crackles to BLL. Breathing comfortably on RA  Abdominal:      General: Bowel sounds are normal. There is no distension.      Palpations: Abdomen is soft.      Tenderness: There is no abdominal tenderness.   Musculoskeletal:         General: No tenderness. Normal range of motion.      Cervical back: Normal range of motion and neck supple.      Right lower leg: Edema present.      Left lower leg: Edema present.      Comments: 3+ pitting edema up to thigh/hip area   Lymphadenopathy:      Cervical: No cervical adenopathy.   Skin:     General: Skin is warm and dry.      Capillary Refill: Capillary refill takes less than 2 seconds.      Findings: No rash.   Neurological:      Mental Status: He is alert and oriented to person, place, and time.      Cranial Nerves: No cranial nerve deficit.      Sensory: No sensory deficit.      Coordination: Coordination normal.   Psychiatric:         Behavior: Behavior normal.         Thought Content: Thought content normal.         Judgment: Judgment normal.               Significant Labs: All pertinent labs within the past 24 hours have been reviewed.    Significant Imaging: I have reviewed all pertinent imaging results/findings within the past 24 hours.      Assessment & Plan  New onset of congestive heart failure  Acute biV heart failure with BLE edema to scrotum. RFs: uncontrolled DM, HTN. TSH wnl. NO meds at home  - diuresing w Lasix 40 IV q12  - DRY weight 142-143 lbs (64.5 - 65 kg)  - avoid BB for now per cards recs to admitting   - gen cards consult 3/8  - needs ischemic w/u inpt vs outpt  - liberalized fluid restrict to 2L for now given thirst, tachycardia, and infectious w/u as below  - education   - GDMT: discussed BP regimen w/ cardiology given unable to start  "ACE/ARB (DIONTE), BB (new decompensated CHF), or CCB  (new reduced EF)-- cards recommend starting Bidil until DIONTE resolved/ euvolemic  - continue new bidil 20/25mg TID for now  - consider starting BB once euvolemic      Essential hypertension  See CHF above  DIONTE (acute kidney injury)  DIONTE vs DIONTE on CKD vs CKD. Suspect cardiorenal. UA with 5 RBCs, no casts  - f/u FeNa (urine studies "add-on" to UA done in ED prior to Lasix)  - if no improvement or worsening, renal U/SCr 2.0, baseline ~1.1  - IV diuresis as above  - avoid nephrotoxins, renally dose meds  - trend daily BMP             Cellulitis of right lower extremity  Cellulitis LE (alonso-wounds 2/2 edema). erythema, possible scant purulent drainage from wound of RLE  - afebrile without leukocytosis  - cont doxy 100mg BID  - f/u wound care consult  - cont Doxy 100 BID for now   - Tachycardia and "Shivers" reported 3/7- possible rigors - f/u BCx x 2.   - Monitor closely, low threshold for broadening ABx to Vanc/CTX.      Type 2 diabetes mellitus with hyperglycemia, without long-term current use of insulin  DM2 uncontrolled. A1C 10.8. Not on any orals or insulin at home  - LDSSI, FSQACHS  - Basal insulin 5 units for now  - avoid insulin stacking in low GFR/DIONTE  - considering Endo consult       History of CVA with residual deficit  - mild L sided sensory deficits since prior stroke, no acute issues  - home ASA, statin  VTE Risk Mitigation (From admission, onward)           Ordered     heparin (porcine) injection 5,000 Units  Every 8 hours         03/07/25 0100     IP VTE HIGH RISK PATIENT  Once         03/07/25 0100     Place sequential compression device  Until discontinued         03/07/25 0029                    Discharge Planning   SUZANNE: 3/10/2025     Code Status: Full Code   Medical Readiness for Discharge Date:   Discharge Plan A: Home Health                        Nadiya Dale MD  Department of Hospital Medicine   [facility] - Cardiology Stepdown    "

## 2025-03-08 NOTE — PROGRESS NOTES
"[facility] - Cardiology [facility] Medicine  Progress Note    Patient Name: Cecil Clark  MRN: [id number]  Patient Class: IP- Inpatient   Admission Date: 3/6/2025  Length of Stay: 2 days  Attending Physician: Nadiya Dale MD  Primary Care Provider: Mi, Primary Doctor    Principal Problem:New onset of congestive heart failure    HPI:  Cecil Clark is a 50 y.o. male with a PMHx of CVA x2, DM, HTN who presents to [facility] [place] for evaluation of shortness of breath and anasarca. Patient reports worsening swelling beginning in just his feet but now extending to his bilateral lower extremities and into his thighs/ hips for the past few weeks. He now has swelling into his scrotum/ penis. Endorses associated shortness of breath, worse with exertion for the past few days. He's only urinated a very small amount over the last month. Denies any chest pain. He also reports open blisters formed on his lower extremities about 2 weeks ago. He's been "debriding" the wounds with tweezers, peroxide, and alcohol to try to clean the wounds, but now has yellow colored drainage and surrounding pain to the wound on the RLE. Denies fever, chills, N/V, abdominal pain or syncope. Patient hasn't taken any of his home medications in about 2 years. Does occasionally take insulin when he eats "big meals", last took insulin around [place] time.     ED: tachycardic to  and hypertensive to /125. Troponin normal. BNP elevated at 1427. CXR with small bilateral pleural effusions. Bedside echo showed moderately reduced ejection fraction with trace pericardial effusion, no significant right heart strain. Given lasix 40mg IVP.     Overview/Hospital Course:  Brief HPI:  50M w HTN, DM2 uncontrolled (10.8) w neuro manifestations,  CVA x 2 (R thalamic ICH 2/2 HTN 2021; R MCA stroke '23), nonadherence with meds/no meds in 2 years, who presents to [facility] ED for SOB and anasarca, w worsening swelling starting in his feet and now extending to his bilateral lower " "extremities up to the thighs/hips and scrotum x weeks.  SOB is worse with exertion.  He's only urinated a very small amount over the last month. He also reports open blisters formed on his lower extremities about 2 weeks ago. He's been "debriding" the wounds with tweezers, peroxide, and alcohol to try to clean the wounds, but now has yellow colored drainage and surrounding pain to the wound on the RLE. No CP/palpitations/F/C/N/V/abd pain or syncope. Does occasionally take insulin when he eats "big meals," last took around [place] time. In ED: tachy 118 and hypertensive 194/125. Troponin normal. BNP elevated at 1427. CXR with small bilateral pleural effusions. Bedside echo showed moderately reduced ejection fraction with trace pericardial effusion, no significant right heart strain. Given lasix 40mg IV.     Ad[place] [facility] [place].  Diuresing on Lasix 40 IV q12.  Possible DIONTE, Cr 2.0, so avoided ACEi and ARB, also avoided BB initially in new onset CHF, so started on bidil (hyrdal 20 PO q8 and isosorbide 25 PO q8). TTE with biV failure.  TSH wnl. Started on Doxy PO for LE wounds with surrounding cellulitis.  Wound care consulted.     Weights  3/8 73.4 kg (161 lb 13.1 oz)  3/7 78.4 kg (172 lb 13.5 oz)  3/6 72.6 kg (160 lb)  DRY weight 142-143 lbs (64.5 - 65 kg)    Interval History: doing well, increased Lasix to 80 IV BID (from 40 BID) per cards.     Review of Systems   All other systems reviewed and are negative.    Objective:     Vital Signs (Most Recent):  Temp: 98.4 °F (36.9 °C) (03/08/25 0801)  Pulse: 105 (03/08/25 0801)  Resp: 17 (03/08/25 0801)  BP: (!) 154/91 (03/08/25 0801)  SpO2: 99 % (03/08/25 0801) Vital Signs (24h Range):  Temp:  [97.3 °F (36.3 °C)-98.4 °F (36.9 °C)] 98.4 °F (36.9 °C)  Pulse:  [103-114] 105  Resp:  [16-19] 17  SpO2:  [96 %-99 %] 99 %  BP: (130-173)/() 154/91     Weight: 73.4 kg (161 lb 13.1 oz)  Body mass index is 29.6 kg/m².    Intake/Output Summary (Last 24 hours) at 3/8/2025 1009  Last " data filed at 3/8/2025 0412  Gross per 24 hour   Intake 720 ml   Output 2450 ml   Net -1730 ml         Physical Exam  Vitals and nursing note reviewed.   Constitutional:       General: He is not in acute distress.     Appearance: He is well-developed.   HENT:      Head: Normocephalic and atraumatic.      Mouth/Throat:      Pharynx: No oropharyngeal exudate.   Eyes:      General: No scleral icterus.     Conjunctiva/sclera: Conjunctivae normal.   Cardiovascular:      Rate and Rhythm: Normal rate and regular rhythm.      Heart sounds: Normal heart sounds.   Pulmonary:      Effort: Pulmonary effort is normal. No respiratory distress.      Breath sounds: Rales present. No wheezing.      Comments: Very mild crackles to BLL. Breathing comfortably on RA  Abdominal:      General: Bowel sounds are normal. There is no distension.      Palpations: Abdomen is soft.      Tenderness: There is no abdominal tenderness.   Musculoskeletal:         General: No tenderness. Normal range of motion.      Cervical back: Normal range of motion and neck supple.      Right lower leg: Edema present.      Left lower leg: Edema present.      Comments: 3+ pitting edema up to thigh/hip area   Lymphadenopathy:      Cervical: No cervical adenopathy.   Skin:     General: Skin is warm and dry.      Capillary Refill: Capillary refill takes less than 2 seconds.      Findings: No rash.   Neurological:      Mental Status: He is alert and oriented to person, place, and time.      Cranial Nerves: No cranial nerve deficit.      Sensory: No sensory deficit.      Coordination: Coordination normal.   Psychiatric:         Behavior: Behavior normal.         Thought Content: Thought content normal.         Judgment: Judgment normal.               Significant Labs: All pertinent labs within the past 24 hours have been reviewed.    Significant Imaging: I have reviewed all pertinent imaging results/findings within the past 24 hours.      Assessment & Plan  New onset  "of congestive heart failure  Acute biV heart failure with BLE edema to scrotum. RFs: uncontrolled DM, HTN. Viral illness.  TSH wnl. NO meds at home  - diuresing w Lasix 80 IV q12  - GDMT: discussed BP regimen w/ cardiology given unable to start ACE/ARB (DIONTE), BB (new decompensated CHF), or CCB  (new reduced EF)-- cards recommend starting Bidil until DIONTE resolved/ euvolemic  - continue new bidil 20/25mg TID for now  - no ACEi or ARB while DIONTE / renal function dynamic  - avoid BB for now per cards recs to admitting HM, start asap  - gen cards consulted, appreciate recs  - liberalized fluid restrict to 2L for now given thirst, tachycardia, and infectious w/u as below  - education   Essential hypertension  See CHF above  DIONTE (acute kidney injury)  DIONTE vs DIONTE on CKD vs CKD. Suspect cardiorenal. UA with 5 RBCs, no casts  - f/u FeNa (urine studies "add-on" to UA done in ED prior to Lasix)  - if no improvement or worsening, renal U/SCr 2.0, baseline ~1.1  - IV diuresis as above  - avoid nephrotoxins, renally dose meds  - trend daily BMP  Cellulitis of right lower extremity  Cellulitis LE (alonso-wounds 2/2 edema). erythema, possible scant purulent drainage from wound of RLE  - afebrile without leukocytosis  - cont doxy 100mg BID  - f/u wound care consult  - cont Doxy 100 BID for now   - Tachycardia and "Shivers" reported 3/7- possible rigors - f/u BCx x 2.   - Monitor closely, low threshold for broadening ABx to Vanc/CTX.    Type 2 diabetes mellitus with hyperglycemia, without long-term current use of insulin  DM2 uncontrolled. A1C 10.8. Not on any orals or insulin at home  - LDSSI, FSQACHS  - Basal insulin 5 units for now  - avoid insulin stacking in low GFR/DIONTE  - considering Endo consult  History of CVA with residual deficit  - mild L sided sensory deficits since prior stroke, no acute issues  - home ASA, statin  VTE Risk Mitigation (From admission, onward)           Ordered     heparin (porcine) injection 5,000 Units  " Every 8 hours         03/07/25 0100     IP VTE HIGH RISK PATIENT  Once         03/07/25 0100     Place sequential compression device  Until discontinued         03/07/25 0029                    Discharge Planning   SUZANNE: 3/10/2025     Code Status: Full Code   Medical Readiness for Discharge Date:   Discharge Plan A: Home Health            Nadiya Dale MD  Department of Hospital Medicine   [facility] - Cardiology Step[facility]

## 2025-03-09 LAB
ALBUMIN SERPL BCP-MCNC: 2.8 G/DL (ref 3.5–5.2)
ALP SERPL-CCNC: 68 U/L (ref 40–150)
ALT SERPL W/O P-5'-P-CCNC: 17 U/L (ref 10–44)
ANION GAP SERPL CALC-SCNC: 8 MMOL/L (ref 8–16)
AST SERPL-CCNC: 26 U/L (ref 10–40)
BILIRUB SERPL-MCNC: 1.1 MG/DL (ref 0.1–1)
BUN SERPL-MCNC: 23 MG/DL (ref 6–20)
CALCIUM SERPL-MCNC: 8 MG/DL (ref 8.7–10.5)
CHLORIDE SERPL-SCNC: 108 MMOL/L (ref 95–110)
CO2 SERPL-SCNC: 20 MMOL/L (ref 23–29)
CREAT SERPL-MCNC: 2 MG/DL (ref 0.5–1.4)
EST. GFR  (NO RACE VARIABLE): 39.4 ML/MIN/1.73 M^2
GLUCOSE SERPL-MCNC: 163 MG/DL (ref 70–110)
LACTATE SERPL-SCNC: 0.9 MMOL/L (ref 0.5–2.2)
MAGNESIUM SERPL-MCNC: 2 MG/DL (ref 1.6–2.6)
PHOSPHATE SERPL-MCNC: 3.1 MG/DL (ref 2.7–4.5)
POCT GLUCOSE: 148 MG/DL (ref 70–110)
POCT GLUCOSE: 184 MG/DL (ref 70–110)
POCT GLUCOSE: 197 MG/DL (ref 70–110)
POCT GLUCOSE: 237 MG/DL (ref 70–110)
POTASSIUM SERPL-SCNC: 4.1 MMOL/L (ref 3.5–5.1)
PROT SERPL-MCNC: 6.3 G/DL (ref 6–8.4)
SODIUM SERPL-SCNC: 136 MMOL/L (ref 136–145)

## 2025-03-09 PROCEDURE — 63600175 PHARM REV CODE 636 W HCPCS: Performed by: HOSPITALIST

## 2025-03-09 PROCEDURE — 63600175 PHARM REV CODE 636 W HCPCS: Performed by: PHYSICIAN ASSISTANT

## 2025-03-09 PROCEDURE — 25000003 PHARM REV CODE 250

## 2025-03-09 PROCEDURE — 80053 COMPREHEN METABOLIC PANEL: CPT | Performed by: HOSPITALIST

## 2025-03-09 PROCEDURE — 25000003 PHARM REV CODE 250: Performed by: HOSPITALIST

## 2025-03-09 PROCEDURE — 99232 SBSQ HOSP IP/OBS MODERATE 35: CPT | Mod: ,,, | Performed by: STUDENT IN AN ORGANIZED HEALTH CARE EDUCATION/TRAINING PROGRAM

## 2025-03-09 PROCEDURE — 83605 ASSAY OF LACTIC ACID: CPT | Performed by: STUDENT IN AN ORGANIZED HEALTH CARE EDUCATION/TRAINING PROGRAM

## 2025-03-09 PROCEDURE — 84100 ASSAY OF PHOSPHORUS: CPT | Performed by: HOSPITALIST

## 2025-03-09 PROCEDURE — 36415 COLL VENOUS BLD VENIPUNCTURE: CPT | Performed by: HOSPITALIST

## 2025-03-09 PROCEDURE — 25000003 PHARM REV CODE 250: Performed by: PHYSICIAN ASSISTANT

## 2025-03-09 PROCEDURE — 83735 ASSAY OF MAGNESIUM: CPT | Performed by: PHYSICIAN ASSISTANT

## 2025-03-09 PROCEDURE — 20600001 HC STEP DOWN PRIVATE ROOM

## 2025-03-09 PROCEDURE — 36415 COLL VENOUS BLD VENIPUNCTURE: CPT | Performed by: STUDENT IN AN ORGANIZED HEALTH CARE EDUCATION/TRAINING PROGRAM

## 2025-03-09 RX ORDER — HYDRALAZINE HYDROCHLORIDE 50 MG/1
50 TABLET, FILM COATED ORAL EVERY 8 HOURS
Status: DISCONTINUED | OUTPATIENT
Start: 2025-03-09 | End: 2025-03-10

## 2025-03-09 RX ORDER — POTASSIUM CHLORIDE 750 MG/1
30 CAPSULE, EXTENDED RELEASE ORAL
Status: COMPLETED | OUTPATIENT
Start: 2025-03-09 | End: 2025-03-09

## 2025-03-09 RX ORDER — MAGNESIUM SULFATE HEPTAHYDRATE 40 MG/ML
2 INJECTION, SOLUTION INTRAVENOUS ONCE
Status: COMPLETED | OUTPATIENT
Start: 2025-03-09 | End: 2025-03-09

## 2025-03-09 RX ADMIN — MICONAZOLE NITRATE: 20 OINTMENT TOPICAL at 09:03

## 2025-03-09 RX ADMIN — HYDRALAZINE HYDROCHLORIDE 50 MG: 50 TABLET ORAL at 09:03

## 2025-03-09 RX ADMIN — INSULIN ASPART 1 UNITS: 100 INJECTION, SOLUTION INTRAVENOUS; SUBCUTANEOUS at 09:03

## 2025-03-09 RX ADMIN — POTASSIUM CHLORIDE 30 MEQ: 750 CAPSULE, EXTENDED RELEASE ORAL at 10:03

## 2025-03-09 RX ADMIN — HYDRALAZINE HYDROCHLORIDE 25 MG: 25 TABLET ORAL at 02:03

## 2025-03-09 RX ADMIN — ACETAMINOPHEN 650 MG: 325 TABLET ORAL at 02:03

## 2025-03-09 RX ADMIN — MAGNESIUM SULFATE HEPTAHYDRATE 2 G: 40 INJECTION, SOLUTION INTRAVENOUS at 08:03

## 2025-03-09 RX ADMIN — HYDRALAZINE HYDROCHLORIDE 25 MG: 25 TABLET ORAL at 06:03

## 2025-03-09 RX ADMIN — INSULIN GLARGINE 5 UNITS: 100 INJECTION, SOLUTION SUBCUTANEOUS at 09:03

## 2025-03-09 RX ADMIN — HEPARIN SODIUM 5000 UNITS: 5000 INJECTION INTRAVENOUS; SUBCUTANEOUS at 09:03

## 2025-03-09 RX ADMIN — POTASSIUM CHLORIDE 30 MEQ: 750 CAPSULE, EXTENDED RELEASE ORAL at 08:03

## 2025-03-09 RX ADMIN — MICONAZOLE NITRATE: 20 OINTMENT TOPICAL at 08:03

## 2025-03-09 RX ADMIN — ISOSORBIDE DINITRATE 20 MG: 20 TABLET ORAL at 06:03

## 2025-03-09 RX ADMIN — HEPARIN SODIUM 5000 UNITS: 5000 INJECTION INTRAVENOUS; SUBCUTANEOUS at 06:03

## 2025-03-09 RX ADMIN — HEPARIN SODIUM 5000 UNITS: 5000 INJECTION INTRAVENOUS; SUBCUTANEOUS at 02:03

## 2025-03-09 RX ADMIN — DOXYCYCLINE HYCLATE 100 MG: 100 TABLET, COATED ORAL at 08:03

## 2025-03-09 RX ADMIN — ISOSORBIDE DINITRATE 20 MG: 20 TABLET ORAL at 02:03

## 2025-03-09 RX ADMIN — FUROSEMIDE 80 MG: 10 INJECTION, SOLUTION INTRAVENOUS at 08:03

## 2025-03-09 RX ADMIN — DOXYCYCLINE HYCLATE 100 MG: 100 TABLET, COATED ORAL at 09:03

## 2025-03-09 RX ADMIN — FUROSEMIDE 80 MG: 10 INJECTION, SOLUTION INTRAVENOUS at 09:03

## 2025-03-09 NOTE — ASSESSMENT & PLAN NOTE
"DIONTE vs DIONTE on CKD vs CKD. Suspect cardiorenal. No prior labs sine 10/2023, a year and a half ago, so baseline unclear- Cr 1.0 at that time.  UA with 5 RBCs, no casts  - f/u FeNa (urine studies "add-on" to UA done in ED prior to Lasix)  - if no improvement or worsening, renal U/SCr 2.0, baseline ~1.1  - IV diuresis as above  - avoid nephrotoxins, renally dose meds  - trend daily BMP  "

## 2025-03-09 NOTE — ASSESSMENT & PLAN NOTE
Acute biV heart failure with BLE edema to scrotum. RFs: uncontrolled DM, HTN. Viral illness.  TSH wnl. NO meds at home  - diuresing w Lasix 80 IV q12  - GDMT: discussed BP regimen w/ cardiology given unable to start ACE/ARB (DIONTE), BB (new decompensated CHF), or CCB  (new reduced EF)-- cards recommend starting Bidil until DIONTE resolved/ euvolemic  - increased hydralazine to 50 PO q8 for better afterload reduction  - stopped isosobide, not needed per cards  - no ACEi or ARB while DIONTE / renal function dynamic  - avoid BB for now per cards recs to admitting HM, start asap  - gen cards consulted, appreciate recs  - initially liberalized fluid restrict to 2L for now given thirst, tachycardia, and infectious w/u as below. Decrease back to 1500cc on 3/9  - continue education, Heart failure Pathway    Weights  3/9 65.5 kg (144 lb 6.4 oz) ?accurate  3/8 73.4 kg (161 lb 13.1 oz)  3/7 78.4 kg (172 lb 13.5 oz)  3/6 72.6 kg (160 lb)  DRY weight 142-143 lbs (64.5 - 65 kg)

## 2025-03-09 NOTE — PROGRESS NOTES
"[facility] Cardiology [facility] Medicine  Progress Note    Patient Name: Cecil Clark  MRN: [id number]  Patient Class: IP- Inpatient   Admission Date: 3/6/2025  Length of Stay: 3 days  Attending Physician: Nadiya Dale MD  Primary Care Provider: Mi, Primary Doctor    Principal Problem:New onset of congestive heart failure    HPI:  Cecil Clark is a 50 y.o. male with a PMHx of CVA x2, DM, HTN who [place] [facility] [place]ness of breath and anasarca. Patient reports worsening swelling beginning in just his feet but now extending to his bilateral lower extremities and into his thighs/ hips for the past few weeks. He now has swelling into his scrotum/ penis. Endorses associated shortness of breath, worse with exertion for the past few days. He's only urinated a very small amount over the last month. Denies any chest pain. He also reports open blisters formed on his lower extremities about 2 weeks ago. He's been "debriding" the wounds with tweezers, peroxide, and alcohol to try to clean the wounds, but now has yellow colored drainage and surrounding pain to the wound on the RLE. Denies fever, chills, N/V, abdominal pain or syncope. Patient hasn't taken any of his home medications in about 2 years. Does occasionally take insulin when he eats "big meals", last took insulin around Shane time.     ED: tachycardic to  and hypertensive to /125. Troponin normal. BNP elevated at 1427. CXR with small bilateral pleural effusions. Bedside echo showed moderately reduced ejection fraction with trace pericardial effusion, no significant right heart strain. Given lasix 40mg IVP.     Overview/Hospital Course:  Brief HPI:  50M w HTN, DM2 uncontrolled (10.8) w neuro manifestations,  CVA x 2 (R thalamic ICH 2/2 HTN 2021; R MCA stroke '23), nonadherence/no meds in 2 years, with recent viral illness 12/2024 (not tested for flu or covid), who [place] [facility] [place]B and anasarca, w worsening swelling " "starting in his feet and now extending to his bilateral lower extremities up to the thighs/hips and scrotum x months.  SOB is worse with exertion.  He's only urinated a very small amount over the last month. He also reports open blisters on legs, "debriding"wounds with tweezers, peroxide, and alcohol, and now w yellow colored drainage and pain to the wound on the RLE. No CP/palp/F/C/N/V/abd pain.  Does occasionally take insulin when he eats "big meals," last took around Shane time. No EtOH or drugs. In ED: tachy 118 and hypertensive 194/125. Troponin normal. BNP elevated at 1427. CXR with small bilateral pleural effusions. Bedside echo showed moderately reduced ejection fraction with trace pericardial effusion, no significant right heart strain. Given lasix 40mg IV.     [place] [facility] [place]  Diuresing on Lasix 40 IV q12.  Possible DIONTE, Cr 2.0 (unknown baseline and last labs 1.5 yrs ago), so avoided RAAS, also avoided BB initially in new onset CHF, started bidil (hyrdal 20 PO q8 and isosorbide 25 PO q8). TTE with biV failure.  TSH wnl. Started Doxy PO for LE wounds with surrounding cellulitis.  Reported shaking chills- BCx x 2 ngtd.  Wound care consulted, appreciate recs. Diuresing okay but not at goal -2-3L/day so upped Lasix to 80 IV BID (from 40 BID) per cards on 3/8.  Next day, IVC 1.7 cm, <50% collapsible, c/w CVP of 8.  Continued Lasix, stopped the isosobide and incerased hydralazine to 50 PO q8 for better afterload reduction.      Dispo- after d/c, outpt PET for ischemic eval        Interval History: see updated hosp course above    Review of Systems   All other systems reviewed and are negative.    Objective:     Vital Signs (Most Recent):  Temp: 97.8 °F (36.6 °C) (03/09/25 0721)  Pulse: 107 (03/09/25 0721)  Resp: 18 (03/09/25 0721)  BP: 124/76 (03/09/25 0721)  SpO2: 98 % (03/09/25 0721) Vital Signs (24h Range):  Temp:  [96.7 °F (35.9 °C)-98.4 °F (36.9 °C)] 97.8 °F (36.6 °C)  Pulse:  [105-121] 107  Resp:  " [18] 18  SpO2:  [97 %-99 %] 98 %  BP: (124-171)/() 124/76     Weight: 65.5 kg (144 lb 6.4 oz)  Body mass index is 26.41 kg/m².    Intake/Output Summary (Last 24 hours) at 3/9/2025 0857  Last data filed at 3/9/2025 0604  Gross per 24 hour   Intake 1090 ml   Output 2451 ml   Net -1361 ml         Physical Exam  Vitals and nursing note reviewed.   Constitutional:       General: He is not in acute distress.     Appearance: He is well-developed.   HENT:      Head: Normocephalic and atraumatic.      Mouth/Throat:      Pharynx: No oropharyngeal exudate.   Eyes:      General: No scleral icterus.     Conjunctiva/sclera: Conjunctivae normal.   Cardiovascular:      Rate and Rhythm: Normal rate and regular rhythm.      Heart sounds: Normal heart sounds.   Pulmonary:      Effort: Pulmonary effort is normal. No respiratory distress.      Breath sounds: Rales present. No wheezing.      Comments: Very mild crackles to BLL. Breathing comfortably on RA  Abdominal:      General: Bowel sounds are normal. There is no distension.      Palpations: Abdomen is soft.      Tenderness: There is no abdominal tenderness.   Musculoskeletal:         General: No tenderness. Normal range of motion.      Cervical back: Normal range of motion and neck supple.      Right lower leg: Edema present.      Left lower leg: Edema present.      Comments: 3+ pitting edema up to thigh/hip area   Lymphadenopathy:      Cervical: No cervical adenopathy.   Skin:     General: Skin is warm and dry.      Capillary Refill: Capillary refill takes less than 2 seconds.      Findings: No rash.   Neurological:      Mental Status: He is alert and oriented to person, place, and time.      Cranial Nerves: No cranial nerve deficit.      Sensory: No sensory deficit.      Coordination: Coordination normal.   Psychiatric:         Behavior: Behavior normal.         Thought Content: Thought content normal.         Judgment: Judgment normal.               Significant Labs: All  "pertinent labs within the past 24 hours have been reviewed.    Significant Imaging: I have reviewed all pertinent imaging results/findings within the past 24 hours.      Assessment & Plan  New onset of congestive heart failure  Acute biV heart failure with BLE edema to scrotum. RFs: uncontrolled DM, HTN. Viral illness.  TSH wnl. NO meds at home  - diuresing w Lasix 80 IV q12  - GDMT: discussed BP regimen w/ cardiology given unable to start ACE/ARB (DIONTE), BB (new decompensated CHF), or CCB  (new reduced EF)-- cards recommend starting Bidil until DIONTE resolved/ euvolemic  - increased hydralazine to 50 PO q8 for better afterload reduction  - stopped isosobide, not needed per cards  - no ACEi or ARB while DIONTE / renal function dynamic  - avoid BB for now per cards recs to admitting HM, start asap  - gen cards consulted, appreciate recs  - initially liberalized fluid restrict to 2L for now given thirst, tachycardia, and infectious w/u as below. Decrease back to 1500cc on 3/9  - continue education, Heart failure Pathway    Weights  3/9 65.5 kg (144 lb 6.4 oz) ?accurate  3/8 73.4 kg (161 lb 13.1 oz)  3/7 78.4 kg (172 lb 13.5 oz)  3/6 72.6 kg (160 lb)  DRY weight 142-143 lbs (64.5 - 65 kg)  Essential hypertension  See CHF above  DIOTNE (acute kidney injury)  DIONTE vs DIONTE on CKD vs CKD. Suspect cardiorenal. No prior labs sine 10/2023, a year and a half ago, so baseline unclear- Cr 1.0 at that time.  UA with 5 RBCs, no casts  - f/u FeNa (urine studies "add-on" to UA done in ED prior to Lasix)  - if no improvement or worsening, renal U/SCr 2.0, baseline ~1.1  - IV diuresis as above  - avoid nephrotoxins, renally dose meds  - trend daily BMP  Cellulitis of right lower extremity  Cellulitis LE (alonso-wounds 2/2 edema). erythema, possible scant purulent drainage from wound of RLE  - afebrile without leukocytosis  - cont doxy 100mg BID  - f/u wound care consult  - cont Doxy 100 BID for now   - Tachycardia and "Shivers" reported 3/7- " possible rigors - f/u BCx x 2 - ngtd  - Continue to monitor closely, low threshold for broadening ABx to Vanc/CTX.    Type 2 diabetes mellitus with hyperglycemia, without long-term current use of insulin  DM2 uncontrolled. A1C 10.8. Not on any orals or insulin at home  - LDSSI, FSQACHS  - Basal insulin 5 units for now  - avoid insulin stacking in low GFR/DIONTE  - considering Endo consult  History of CVA with residual deficit  - mild L sided sensory deficits since prior stroke, no acute issues  - home ASA, statin  VTE Risk Mitigation (From admission, onward)           Ordered     heparin (porcine) injection 5,000 Units  Every 8 hours         03/07/25 0100     IP VTE HIGH RISK PATIENT  Once         03/07/25 0100     Place sequential compression device  Until discontinued         03/07/25 0029                    Discharge Planning   SUZANNE: 3/13/2025     Code Status: Full Code   Medical Readiness for Discharge Date:   Discharge Plan A: Home Health          Nadiya Dale MD  Department of Hospital Medicine   [facility] Step[facility]

## 2025-03-09 NOTE — SUBJECTIVE & OBJECTIVE
Interval History: IVC 1.7 cm, <50% collapsible - consistent with CVP of 8. Symptoms and weight improved    Review of Systems   Cardiovascular:  Positive for dyspnea on exertion.     Objective:     Vital Signs (Most Recent):  Temp: 98.7 °F (37.1 °C) (03/09/25 1115)  Pulse: 106 (03/09/25 1140)  Resp: 18 (03/09/25 1115)  BP: (!) 151/98 (03/09/25 1115)  SpO2: 98 % (03/09/25 1115) Vital Signs (24h Range):  Temp:  [96.7 °F (35.9 °C)-98.7 °F (37.1 °C)] 98.7 °F (37.1 °C)  Pulse:  [103-121] 106  Resp:  [18] 18  SpO2:  [97 %-99 %] 98 %  BP: (124-169)/(76-99) 151/98     Weight: 65.5 kg (144 lb 6.4 oz)  Body mass index is 26.41 kg/m².     SpO2: 98 %         Intake/Output Summary (Last 24 hours) at 3/9/2025 1228  Last data filed at 3/9/2025 0941  Gross per 24 hour   Intake 968 ml   Output 1276 ml   Net -308 ml       Lines/Drains/Airways       Peripheral Intravenous Line  Duration                  Peripheral IV - Single Lumen 03/06/25 2209 20 G Right Antecubital 2 days                       Physical Exam  Constitutional:       General: He is not in acute distress.     Appearance: Normal appearance. He is not ill-appearing.   HENT:      Head: Normocephalic and atraumatic.      Nose: No congestion.      Mouth/Throat:      Mouth: Mucous membranes are moist.   Eyes:      Conjunctiva/sclera: Conjunctivae normal.   Cardiovascular:      Rate and Rhythm: Normal rate and regular rhythm.      Pulses: Normal pulses.   Pulmonary:      Effort: Pulmonary effort is normal. No respiratory distress.   Abdominal:      General: Abdomen is flat. There is no distension.      Palpations: Abdomen is soft.   Musculoskeletal:      Cervical back: Normal range of motion.      Right lower leg: No edema.      Left lower leg: No edema.   Skin:     Capillary Refill: Capillary refill takes less than 2 seconds.      Findings: No rash.   Neurological:      Mental Status: He is alert and oriented to person, place, and time.   Psychiatric:         Mood and Affect:  Mood normal.            Significant Labs: All pertinent lab results from the last 24 hours have been reviewed.

## 2025-03-09 NOTE — SUBJECTIVE & OBJECTIVE
Interval History: see updated hosp course above    Review of Systems   All other systems reviewed and are negative.    Objective:     Vital Signs (Most Recent):  Temp: 97.8 °F (36.6 °C) (03/09/25 0721)  Pulse: 107 (03/09/25 0721)  Resp: 18 (03/09/25 0721)  BP: 124/76 (03/09/25 0721)  SpO2: 98 % (03/09/25 0721) Vital Signs (24h Range):  Temp:  [96.7 °F (35.9 °C)-98.4 °F (36.9 °C)] 97.8 °F (36.6 °C)  Pulse:  [105-121] 107  Resp:  [18] 18  SpO2:  [97 %-99 %] 98 %  BP: (124-171)/() 124/76     Weight: 65.5 kg (144 lb 6.4 oz)  Body mass index is 26.41 kg/m².    Intake/Output Summary (Last 24 hours) at 3/9/2025 0857  Last data filed at 3/9/2025 0604  Gross per 24 hour   Intake 1090 ml   Output 2451 ml   Net -1361 ml         Physical Exam  Vitals and nursing note reviewed.   Constitutional:       General: He is not in acute distress.     Appearance: He is well-developed.   HENT:      Head: Normocephalic and atraumatic.      Mouth/Throat:      Pharynx: No oropharyngeal exudate.   Eyes:      General: No scleral icterus.     Conjunctiva/sclera: Conjunctivae normal.   Cardiovascular:      Rate and Rhythm: Normal rate and regular rhythm.      Heart sounds: Normal heart sounds.   Pulmonary:      Effort: Pulmonary effort is normal. No respiratory distress.      Breath sounds: Rales present. No wheezing.      Comments: Very mild crackles to BLL. Breathing comfortably on RA  Abdominal:      General: Bowel sounds are normal. There is no distension.      Palpations: Abdomen is soft.      Tenderness: There is no abdominal tenderness.   Musculoskeletal:         General: No tenderness. Normal range of motion.      Cervical back: Normal range of motion and neck supple.      Right lower leg: Edema present.      Left lower leg: Edema present.      Comments: 3+ pitting edema up to thigh/hip area   Lymphadenopathy:      Cervical: No cervical adenopathy.   Skin:     General: Skin is warm and dry.      Capillary Refill: Capillary refill  takes less than 2 seconds.      Findings: No rash.   Neurological:      Mental Status: He is alert and oriented to person, place, and time.      Cranial Nerves: No cranial nerve deficit.      Sensory: No sensory deficit.      Coordination: Coordination normal.   Psychiatric:         Behavior: Behavior normal.         Thought Content: Thought content normal.         Judgment: Judgment normal.               Significant Labs: All pertinent labs within the past 24 hours have been reviewed.    Significant Imaging: I have reviewed all pertinent imaging results/findings within the past 24 hours.

## 2025-03-09 NOTE — PROGRESS NOTES
[facility] Step[facility]  Cardiology  Progress Note    Patient Name: Cecil Clark  MRN: [id number]  Admission Date: 3/6/2025  Hospital Length of Stay: 3 days  Code Status: Full Code   Attending Physician: Nadiya Dale MD   Primary Care Physician: Mi, Primary Doctor  Expected Discharge Date: 3/13/2025  Principal Problem:New onset of congestive heart failure    Subjective:     Interval History: IVC 1.7 cm, <50% collapsible - consistent with CVP of 8. Symptoms and weight improved    Review of Systems   Cardiovascular:  Positive for dyspnea on exertion.     Objective:     Vital Signs (Most Recent):  Temp: 98.7 °F (37.1 °C) (03/09/25 1115)  Pulse: 106 (03/09/25 1140)  Resp: 18 (03/09/25 1115)  BP: (!) 151/98 (03/09/25 1115)  SpO2: 98 % (03/09/25 1115) Vital Signs (24h Range):  Temp:  [96.7 °F (35.9 °C)-98.7 °F (37.1 °C)] 98.7 °F (37.1 °C)  Pulse:  [103-121] 106  Resp:  [18] 18  SpO2:  [97 %-99 %] 98 %  BP: (124-169)/(76-99) 151/98     Weight: 65.5 kg (144 lb 6.4 oz)  Body mass index is 26.41 kg/m².     SpO2: 98 %         Intake/Output Summary (Last 24 hours) at 3/9/2025 1228  Last data filed at 3/9/2025 0941  Gross per 24 hour   Intake 968 ml   Output 1276 ml   Net -308 ml       Lines/Drains/Airways       Peripheral Intravenous Line  Duration                  Peripheral IV - Single Lumen 03/06/25 2209 20 G Right Antecubital 2 days                       Physical Exam  Constitutional:       General: He is not in acute distress.     Appearance: Normal appearance. He is not ill-appearing.   HENT:      Head: Normocephalic and atraumatic.      Nose: No congestion.      Mouth/Throat:      Mouth: Mucous membranes are moist.   Eyes:      Conjunctiva/sclera: Conjunctivae normal.   Cardiovascular:      Rate and Rhythm: Normal rate and regular rhythm.      Pulses: Normal pulses.   Pulmonary:      Effort: Pulmonary effort is normal. No respiratory distress.   Abdominal:      General: Abdomen is flat. There is no distension.       Palpations: Abdomen is soft.   Musculoskeletal:      Cervical back: Normal range of motion.      Right lower leg: No edema.      Left lower leg: No edema.   Skin:     Capillary Refill: Capillary refill takes less than 2 seconds.      Findings: No rash.   Neurological:      Mental Status: He is alert and oriented to person, place, and time.   Psychiatric:         Mood and Affect: Mood normal.            Significant Labs: All pertinent lab results from the last 24 hours have been reviewed.      Assessment and Plan:         * New onset of congestive heart failure  Pt presented with SOB and anasarca. Reported that it has been going on for few weeks now. Also reported decrease urinary output. Patient has Systolic (HFrEF) heart failure that is Acute. On presentation their CHF was decompensated. Evidence of decompensated CHF on presentation includes: edema, elevated JVD, weight gain, and orthopnea. The etiology of their decompensation is likely medication non compliance . Most recent BNP and echo results are listed below.  Recent Labs     03/06/25 2027   BNP 1,427*       Intake/Output Summary (Last 24 hours) at 3/8/2025 0754  Last data filed at 3/8/2025 0412  Gross per 24 hour   Intake 960 ml   Output 2450 ml   Net -1490 ml       Latest ECHO  Results for orders placed during the hospital encounter of 03/06/25    Echo    Interpretation Summary    Left Ventricle: The left ventricle is normal in size. Ventricular mass is normal. Normal wall thickness. There is concentric remodeling. Global hypokinesis present. There is severely reduced systolic function with a visually estimated ejection fraction of 25 - 30%. Global longitudinal strain is -8.3%. Global longitudinal strain is reduced. There is diastolic dysfunction.    Right Ventricle: The right ventricle is normal in size. Wall thickness is normal. Right ventricle wall motion has global hypokinesis. Systolic function is moderately to severely reduced.    Mitral Valve: There  is mild regurgitation.    Pulmonary Artery: The estimated pulmonary artery systolic pressure is 42 mmHg.    IVC/SVC: Elevated venous pressure at 15 mmHg.    Current Heart Failure Medications  furosemide injection 40 mg, Every 12 hours, Intravenous  isosorbide dinitrate tablet 20 mg, Every 8 hours, Oral  hydrALAZINE tablet 25 mg, Every 8 hours, Oral  hydrALAZINE injection 10 mg, Every 6 hours PRN, Intravenous    - NHYA Class III  - ACC/AHA stage C      Recommendations   - cont lasix 80 mg IV BID   - GDMT - hold until DIONTE is improved; however, this may be new baseline. Will continue to monitor for now  - do not need bidil but suggest keeping hydralazine and increase dose for better afterload reduction   - Will plan for outpt PET  - Monitor strict I&Os and daily weights.    - Place on telemetry  - Low sodium diet  - Place on fluid restriction of 1.5 L.        VTE Risk Mitigation (From admission, onward)           Ordered     heparin (porcine) injection 5,000 Units  Every 8 hours         03/07/25 0100     IP VTE HIGH RISK PATIENT  Once         03/07/25 0100     Place sequential compression device  Until discontinued         03/07/25 0029                    Scott Javieror, MD  Cardiology  [facility]

## 2025-03-09 NOTE — PLAN OF CARE
Problem: Adult Inpatient Plan of Care  Goal: Plan of Care Review  3/9/2025 0416 by Hammad Morton RN  Outcome: Progressing  3/9/2025 0416 by Hammad Morton RN  Outcome: Progressing  Goal: Patient-Specific Goal (Individualized)  3/9/2025 0416 by Hammad Morton RN  Outcome: Progressing  3/9/2025 0416 by Hammad Morton RN  Outcome: Progressing  Goal: Absence of Hospital-Acquired Illness or Injury  3/9/2025 0416 by Hammad Morton RN  Outcome: Progressing  3/9/2025 0416 by Hammad Morton RN  Outcome: Progressing  Goal: Optimal Comfort and Wellbeing  3/9/2025 0416 by Hammad Morton RN  Outcome: Progressing  3/9/2025 0416 by Hammad Morton RN  Outcome: Progressing  Goal: Readiness for Transition of Care  3/9/2025 0416 by Hammad Morton RN  Outcome: Progressing  3/9/2025 0416 by Hammad Morton RN  Outcome: Progressing     Problem: Skin Injury Risk Increased  Goal: Skin Health and Integrity  3/9/2025 0416 by Hammad Morton RN  Outcome: Progressing  3/9/2025 0416 by Hammad Morton RN  Outcome: Progressing     Problem: Infection  Goal: Absence of Infection Signs and Symptoms  3/9/2025 0416 by Hammad Morton RN  Outcome: Progressing  3/9/2025 0416 by Hammad Morton RN  Outcome: Progressing     Problem: Diabetes Comorbidity  Goal: Blood Glucose Level Within Targeted Range  3/9/2025 0416 by Hammad Morton RN  Outcome: Progressing  3/9/2025 0416 by Hammad Morton RN  Outcome: Progressing     Problem: Acute Kidney Injury/Impairment  Goal: Fluid and Electrolyte Balance  3/9/2025 0416 by Hammad Morton RN  Outcome: Progressing  3/9/2025 0416 by Hammad Morton RN  Outcome: Progressing  Goal: Improved Oral Intake  3/9/2025 0416 by Hammad Morton RN  Outcome: Progressing  3/9/2025 0416 by Hammad Morton RN  Outcome: Progressing  Goal: Effective Renal Function  3/9/2025 0416 by Hammad Morton, RN  Outcome: Progressing  3/9/2025 0416 by Hammad Morton, RN  Outcome:  Progressing     Problem: Wound  Goal: Optimal Coping  3/9/2025 0416 by Hammad Morton RN  Outcome: Progressing  3/9/2025 0416 by Hammad Morton RN  Outcome: Progressing  Goal: Optimal Functional Ability  3/9/2025 0416 by Hammad Morton RN  Outcome: Progressing  3/9/2025 0416 by Hammad Morton RN  Outcome: Progressing  Goal: Absence of Infection Signs and Symptoms  3/9/2025 0416 by Hammad Morton RN  Outcome: Progressing  3/9/2025 0416 by Hammad Morton RN  Outcome: Progressing  Goal: Improved Oral Intake  3/9/2025 0416 by Hammad Morton RN  Outcome: Progressing  3/9/2025 0416 by Hammad Morton RN  Outcome: Progressing  Goal: Optimal Pain Control and Function  3/9/2025 0416 by Hammad Morton RN  Outcome: Progressing  3/9/2025 0416 by Hammad Morton RN  Outcome: Progressing  Goal: Skin Health and Integrity  3/9/2025 0416 by Hammad Morton RN  Outcome: Progressing  3/9/2025 0416 by Hammad Morton RN  Outcome: Progressing  Goal: Optimal Wound Healing  3/9/2025 0416 by Hammad Morton RN  Outcome: Progressing  3/9/2025 0416 by Hammad Morton RN  Outcome: Progressing

## 2025-03-09 NOTE — ASSESSMENT & PLAN NOTE
"Cellulitis LE (alonso-wounds 2/2 edema). erythema, possible scant purulent drainage from wound of RLE  - afebrile without leukocytosis  - cont doxy 100mg BID  - f/u wound care consult  - cont Doxy 100 BID for now   - Tachycardia and "Shivers" reported 3/7- possible rigors - f/u BCx x 2 - ngtd  - Continue to monitor closely, low threshold for broadening ABx to Vanc/CTX.    "

## 2025-03-10 LAB
ALBUMIN SERPL BCP-MCNC: 2.6 G/DL (ref 3.5–5.2)
ALP SERPL-CCNC: 75 U/L (ref 40–150)
ALT SERPL W/O P-5'-P-CCNC: 16 U/L (ref 10–44)
ANION GAP SERPL CALC-SCNC: 11 MMOL/L (ref 8–16)
AST SERPL-CCNC: 26 U/L (ref 10–40)
BILIRUB SERPL-MCNC: 1.1 MG/DL (ref 0.1–1)
BUN SERPL-MCNC: 26 MG/DL (ref 6–20)
CALCIUM SERPL-MCNC: 7.6 MG/DL (ref 8.7–10.5)
CHLORIDE SERPL-SCNC: 105 MMOL/L (ref 95–110)
CO2 SERPL-SCNC: 21 MMOL/L (ref 23–29)
CREAT SERPL-MCNC: 1.8 MG/DL (ref 0.5–1.4)
EST. GFR  (NO RACE VARIABLE): 44.7 ML/MIN/1.73 M^2
GLUCOSE SERPL-MCNC: 118 MG/DL (ref 70–110)
GLUCOSE SERPL-MCNC: 183 MG/DL (ref 70–110)
MAGNESIUM SERPL-MCNC: 2.1 MG/DL (ref 1.6–2.6)
PHOSPHATE SERPL-MCNC: 2.8 MG/DL (ref 2.7–4.5)
POCT GLUCOSE: 172 MG/DL (ref 70–110)
POCT GLUCOSE: 183 MG/DL (ref 70–110)
POCT GLUCOSE: 191 MG/DL (ref 70–110)
POTASSIUM SERPL-SCNC: 3.7 MMOL/L (ref 3.5–5.1)
PROT SERPL-MCNC: 6 G/DL (ref 6–8.4)
SODIUM SERPL-SCNC: 137 MMOL/L (ref 136–145)

## 2025-03-10 PROCEDURE — 25000003 PHARM REV CODE 250: Performed by: PHYSICIAN ASSISTANT

## 2025-03-10 PROCEDURE — 20600001 HC STEP DOWN PRIVATE ROOM

## 2025-03-10 PROCEDURE — 63600175 PHARM REV CODE 636 W HCPCS: Performed by: PHYSICIAN ASSISTANT

## 2025-03-10 PROCEDURE — 63600175 PHARM REV CODE 636 W HCPCS: Performed by: HOSPITALIST

## 2025-03-10 PROCEDURE — 80053 COMPREHEN METABOLIC PANEL: CPT | Performed by: HOSPITALIST

## 2025-03-10 PROCEDURE — 84100 ASSAY OF PHOSPHORUS: CPT | Performed by: HOSPITALIST

## 2025-03-10 PROCEDURE — 99232 SBSQ HOSP IP/OBS MODERATE 35: CPT | Mod: ,,, | Performed by: STUDENT IN AN ORGANIZED HEALTH CARE EDUCATION/TRAINING PROGRAM

## 2025-03-10 PROCEDURE — 25000003 PHARM REV CODE 250: Performed by: HOSPITALIST

## 2025-03-10 PROCEDURE — 63600175 PHARM REV CODE 636 W HCPCS

## 2025-03-10 PROCEDURE — 83735 ASSAY OF MAGNESIUM: CPT | Performed by: PHYSICIAN ASSISTANT

## 2025-03-10 RX ORDER — INSULIN GLARGINE 100 [IU]/ML
8 INJECTION, SOLUTION SUBCUTANEOUS NIGHTLY
Status: DISCONTINUED | OUTPATIENT
Start: 2025-03-10 | End: 2025-03-13

## 2025-03-10 RX ORDER — POTASSIUM CHLORIDE 20 MEQ/1
40 TABLET, EXTENDED RELEASE ORAL ONCE
Status: COMPLETED | OUTPATIENT
Start: 2025-03-10 | End: 2025-03-10

## 2025-03-10 RX ORDER — FUROSEMIDE 10 MG/ML
80 INJECTION INTRAMUSCULAR; INTRAVENOUS 3 TIMES DAILY
Status: DISCONTINUED | OUTPATIENT
Start: 2025-03-10 | End: 2025-03-13

## 2025-03-10 RX ORDER — ACETAMINOPHEN 500 MG
1000 TABLET ORAL EVERY 8 HOURS PRN
Status: DISCONTINUED | OUTPATIENT
Start: 2025-03-10 | End: 2025-03-14 | Stop reason: HOSPADM

## 2025-03-10 RX ADMIN — FUROSEMIDE 80 MG: 10 INJECTION, SOLUTION INTRAVENOUS at 02:03

## 2025-03-10 RX ADMIN — HYDRALAZINE HYDROCHLORIDE 50 MG: 50 TABLET ORAL at 02:03

## 2025-03-10 RX ADMIN — DOXYCYCLINE HYCLATE 100 MG: 100 TABLET, COATED ORAL at 09:03

## 2025-03-10 RX ADMIN — HEPARIN SODIUM 5000 UNITS: 5000 INJECTION INTRAVENOUS; SUBCUTANEOUS at 09:03

## 2025-03-10 RX ADMIN — HYDRALAZINE HYDROCHLORIDE 75 MG: 25 TABLET ORAL at 09:03

## 2025-03-10 RX ADMIN — HEPARIN SODIUM 5000 UNITS: 5000 INJECTION INTRAVENOUS; SUBCUTANEOUS at 05:03

## 2025-03-10 RX ADMIN — POTASSIUM CHLORIDE 40 MEQ: 1500 TABLET, EXTENDED RELEASE ORAL at 09:03

## 2025-03-10 RX ADMIN — FUROSEMIDE 80 MG: 10 INJECTION, SOLUTION INTRAVENOUS at 09:03

## 2025-03-10 RX ADMIN — HYDRALAZINE HYDROCHLORIDE 50 MG: 50 TABLET ORAL at 05:03

## 2025-03-10 RX ADMIN — ACETAMINOPHEN 1000 MG: 500 TABLET ORAL at 02:03

## 2025-03-10 RX ADMIN — INSULIN GLARGINE 8 UNITS: 100 INJECTION, SOLUTION SUBCUTANEOUS at 09:03

## 2025-03-10 RX ADMIN — MICONAZOLE NITRATE: 20 OINTMENT TOPICAL at 09:03

## 2025-03-10 RX ADMIN — HEPARIN SODIUM 5000 UNITS: 5000 INJECTION INTRAVENOUS; SUBCUTANEOUS at 02:03

## 2025-03-10 NOTE — PROGRESS NOTES
[facility]  Cardiology  Progress Note    Patient Name: Cecil Clark  MRN: [id number]  Admission Date: 3/6/2025  Hospital Length of Stay: 4 days  Code Status: Full Code   Attending Physician: Nadiya Dale MD   Primary Care Physician: Mi, Primary Doctor  Expected Discharge Date: 3/13/2025  Principal Problem:New onset of congestive heart failure    Subjective:     Interval History: UO: 1.4L, Net:  -740 ml, Since admit: -4.676L. Still has bilateral leg swelling extending to mid-shin. Will increase Lasix 80 mg to TID.     ROS  Objective:     Vital Signs (Most Recent):  Temp: 97.4 °F (36.3 °C) (03/10/25 1108)  Pulse: 107 (03/10/25 1108)  Resp: 17 (03/10/25 1108)  BP: (!) 143/92 (03/10/25 1108)  SpO2: 96 % (03/10/25 0734) Vital Signs (24h Range):  Temp:  [96.7 °F (35.9 °C)-97.8 °F (36.6 °C)] 97.4 °F (36.3 °C)  Pulse:  [101-118] 107  Resp:  [17-18] 17  SpO2:  [96 %-100 %] 96 %  BP: (129-149)/(80-99) 143/92     Weight: 65.5 kg (144 lb 6.4 oz)  Body mass index is 26.41 kg/m².     SpO2: 96 %         Intake/Output Summary (Last 24 hours) at 3/10/2025 1140  Last data filed at 3/10/2025 1119  Gross per 24 hour   Intake 712 ml   Output 1625 ml   Net -913 ml       Lines/Drains/Airways       Peripheral Intravenous Line  Duration                  Peripheral IV - Single Lumen 03/06/25 2209 20 G Right Antecubital 3 days                       Physical Exam  Vitals and nursing note reviewed.   Constitutional:       Appearance: Normal appearance.   HENT:      Head: Normocephalic and atraumatic.   Cardiovascular:      Rate and Rhythm: Normal rate and regular rhythm.      Heart sounds: Normal heart sounds.   Pulmonary:      Effort: Pulmonary effort is normal.      Breath sounds: Normal breath sounds.   Abdominal:      General: Abdomen is flat.      Tenderness: There is no abdominal tenderness. There is no guarding or rebound.   Musculoskeletal:         General: Swelling present.      Right lower leg: Edema present.       "Left lower leg: Edema present.   Neurological:      General: No focal deficit present.      Mental Status: He is alert and oriented to person, place, and time. Mental status is at baseline.   Psychiatric:         Mood and Affect: Mood normal.         Behavior: Behavior normal.            Significant Labs: All pertinent lab results from the last 24 hours have been reviewed.    Significant Imaging:  All pertinent imaging results from the last 24 hours have been reviewed.   Assessment and Plan:     * New onset of congestive heart failure  Pt presented with SOB and anasarca. Reported that it has been going on for few weeks now. Also reported decrease urinary output. Patient has Systolic (HFrEF) heart failure that is Acute. On presentation their CHF was decompensated. Evidence of decompensated CHF on presentation includes: edema, elevated JVD, weight gain, and orthopnea. The etiology of their decompensation is likely medication non compliance . Most recent BNP and echo results are listed below.  No results for input(s): "BNP" in the last 72 hours.      Intake/Output Summary (Last 24 hours) at 3/10/2025 1146  Last data filed at 3/10/2025 1119  Gross per 24 hour   Intake 712 ml   Output 1625 ml   Net -913 ml       Latest ECHO  Results for orders placed during the hospital encounter of 03/06/25    Echo    Interpretation Summary    Left Ventricle: The left ventricle is normal in size. Ventricular mass is normal. Normal wall thickness. There is concentric remodeling. Global hypokinesis present. There is severely reduced systolic function with a visually estimated ejection fraction of 25 - 30%. Global longitudinal strain is -8.3%. Global longitudinal strain is reduced. There is diastolic dysfunction.    Right Ventricle: The right ventricle is normal in size. Wall thickness is normal. Right ventricle wall motion has global hypokinesis. Systolic function is moderately to severely reduced.    Mitral Valve: There is mild " regurgitation.    Pulmonary Artery: The estimated pulmonary artery systolic pressure is 42 mmHg.    IVC/SVC: Elevated venous pressure at 15 mmHg.    Current Heart Failure Medications  hydrALAZINE injection 10 mg, Every 6 hours PRN, Intravenous  hydrALAZINE tablet 50 mg, Every 8 hours, Oral  furosemide injection 80 mg, 3 times daily, Intravenous    - NHYA Class III  - ACC/AHA stage C      Recommendations   - Increase Lasix 80 mg IV to TID  - Goal UOP of 2-3L   - Monitor strict I&Os and daily weights.    - Place on telemetry  - Low sodium diet  - Place on fluid restriction of 1.5 L.   - Monitor electrolytes and supplement as needed   - Will hold off on GDMT until DIONTE improves; however this may be his new baseline. Will continue to monitor for now   - Will need outpatient PET  - Importance of medication compliance should be discussed       Essential hypertension  Patient's blood pressure range in the last 24 hours was: BP  Min: 130/78  Max: 179/101.The patient's inpatient anti-hypertensive regimen is listed below:  Current Antihypertensives  furosemide injection 40 mg, Every 12 hours, Intravenous  isosorbide dinitrate tablet 20 mg, Every 8 hours, Oral  hydrALAZINE tablet 25 mg, Every 8 hours, Oral  hydrALAZINE injection 10 mg, Every 6 hours PRN, Intravenous    Plan  - Monitor Cr and dose medication based on CrCl - Notable DIONTE in the setting on acute decompensated HF and uncontrolled HTN   - Will consider starting Valsartan/Entresto once pts Cr improves.   - Continue with Hydralazine 50 mg q 8 and titrate up as needed   - Avoid BB/ACE/ARBs for now         VTE Risk Mitigation (From admission, onward)           Ordered     heparin (porcine) injection 5,000 Units  Every 8 hours         03/07/25 0100     IP VTE HIGH RISK PATIENT  Once         03/07/25 0100     Place sequential compression device  Until discontinued         03/07/25 0029                    Mawadah Samad, MD  Cardiology  [facility]

## 2025-03-10 NOTE — SUBJECTIVE & OBJECTIVE
Interval History: see updated hosp course above    Review of Systems   All other systems reviewed and are negative.    Objective:     Vital Signs (Most Recent):  Temp: 97.8 °F (36.6 °C) (03/10/25 0734)  Pulse: 104 (03/10/25 0734)  Resp: 18 (03/10/25 0324)  BP: 130/80 (03/10/25 0734)  SpO2: 96 % (03/10/25 0734) Vital Signs (24h Range):  Temp:  [96.7 °F (35.9 °C)-98.7 °F (37.1 °C)] 97.8 °F (36.6 °C)  Pulse:  [101-118] 104  Resp:  [18] 18  SpO2:  [96 %-100 %] 96 %  BP: (129-151)/(80-99) 130/80     Weight: 65.5 kg (144 lb 6.4 oz)  Body mass index is 26.41 kg/m².    Intake/Output Summary (Last 24 hours) at 3/10/2025 0800  Last data filed at 3/10/2025 0345  Gross per 24 hour   Intake 710 ml   Output 1450 ml   Net -740 ml         Physical Exam  Vitals and nursing note reviewed.   Constitutional:       General: He is not in acute distress.     Appearance: He is well-developed.   HENT:      Head: Normocephalic and atraumatic.      Mouth/Throat:      Pharynx: No oropharyngeal exudate.   Eyes:      General: No scleral icterus.     Conjunctiva/sclera: Conjunctivae normal.   Cardiovascular:      Rate and Rhythm: Normal rate and regular rhythm.      Heart sounds: Normal heart sounds.   Pulmonary:      Effort: Pulmonary effort is normal. No respiratory distress.      Breath sounds: Rales present. No wheezing.      Comments: Very mild crackles to BLL. Breathing comfortably on RA  Abdominal:      General: Bowel sounds are normal. There is no distension.      Palpations: Abdomen is soft.      Tenderness: There is no abdominal tenderness.   Musculoskeletal:         General: No tenderness. Normal range of motion.      Cervical back: Normal range of motion and neck supple.      Right lower leg: Edema present.      Left lower leg: Edema present.      Comments: 3+ pitting edema up to thigh/hip area   Lymphadenopathy:      Cervical: No cervical adenopathy.   Skin:     General: Skin is warm and dry.      Capillary Refill: Capillary refill  takes less than 2 seconds.      Findings: No rash.   Neurological:      Mental Status: He is alert and oriented to person, place, and time.      Cranial Nerves: No cranial nerve deficit.      Sensory: No sensory deficit.      Coordination: Coordination normal.   Psychiatric:         Behavior: Behavior normal.         Thought Content: Thought content normal.         Judgment: Judgment normal.               Significant Labs: All pertinent labs within the past 24 hours have been reviewed.    Significant Imaging: I have reviewed all pertinent imaging results/findings within the past 24 hours.

## 2025-03-10 NOTE — ASSESSMENT & PLAN NOTE
Acute biV heart failure with BLE edema to scrotum. RFs: uncontrolled DM, HTN. Viral illness.  TSH wnl. NO meds at home  - diuresing w Lasix 80 IV q8  - GDMT: discussed BP regimen w/ cardiology given unable to start ACE/ARB (DIONTE), BB (new decompensated CHF), or CCB  (new reduced EF)-- cards recommend starting Bidil until DIONTE resolved/ euvolemic  - cont hydralazine 50 PO q8 for afterload reduction  - stopped isosobide, not needed per cards  - likely start entresto after DIONTE resolves  - no ACEi or ARB while DIONTE / renal function dynamic  - avoid BB for now per cards recs to admitting [facility], start asap  - gen cards consulted, appreciate recs  - initially liberalized fluid restrict to 2L for now given thirst, tachycardia, and infectious w/u as below. Decreased back to 1500cc on 3/9, increased again 3/10 for severe thirst/HA  - continue education, Heart failure Pathway    Weights  3/10 pending  3/9 65.5 kg (144 lb 6.4 oz) ?accurate  3/8 73.4 kg (161 lb 13.1 oz)  3/7 78.4 kg (172 lb 13.5 oz)  3/6 72.6 kg (160 lb)  DRY weight 142-143 lbs (64.5 - 65 kg)

## 2025-03-10 NOTE — SUBJECTIVE & OBJECTIVE
Interval History: UO: 1.4L, Net:  -740 ml, Since admit: -4.676L. Still has bilateral leg swelling extending to mid-shin. Will increase Lasix 80 mg to TID.     ROS  Objective:     Vital Signs (Most Recent):  Temp: 97.4 °F (36.3 °C) (03/10/25 1108)  Pulse: 107 (03/10/25 1108)  Resp: 17 (03/10/25 1108)  BP: (!) 143/92 (03/10/25 1108)  SpO2: 96 % (03/10/25 0734) Vital Signs (24h Range):  Temp:  [96.7 °F (35.9 °C)-97.8 °F (36.6 °C)] 97.4 °F (36.3 °C)  Pulse:  [101-118] 107  Resp:  [17-18] 17  SpO2:  [96 %-100 %] 96 %  BP: (129-149)/(80-99) 143/92     Weight: 65.5 kg (144 lb 6.4 oz)  Body mass index is 26.41 kg/m².     SpO2: 96 %         Intake/Output Summary (Last 24 hours) at 3/10/2025 1140  Last data filed at 3/10/2025 1119  Gross per 24 hour   Intake 712 ml   Output 1625 ml   Net -913 ml       Lines/Drains/Airways       Peripheral Intravenous Line  Duration                  Peripheral IV - Single Lumen 03/06/25 2209 20 G Right Antecubital 3 days                       Physical Exam  Vitals and nursing note reviewed.   Constitutional:       Appearance: Normal appearance.   HENT:      Head: Normocephalic and atraumatic.   Cardiovascular:      Rate and Rhythm: Normal rate and regular rhythm.      Heart sounds: Normal heart sounds.   Pulmonary:      Effort: Pulmonary effort is normal.      Breath sounds: Normal breath sounds.   Abdominal:      General: Abdomen is flat.      Tenderness: There is no abdominal tenderness. There is no guarding or rebound.   Musculoskeletal:         General: Swelling present.      Right lower leg: Edema present.      Left lower leg: Edema present.   Neurological:      General: No focal deficit present.      Mental Status: He is alert and oriented to person, place, and time. Mental status is at baseline.   Psychiatric:         Mood and Affect: Mood normal.         Behavior: Behavior normal.            Significant Labs: All pertinent lab results from the last 24 hours have been  reviewed.    Significant Imaging:  All pertinent imaging results from the last 24 hours have been reviewed.

## 2025-03-10 NOTE — PROGRESS NOTES
"[facility] Medicine  Progress Note    Patient Name: Cecil Clark  MRN: [id number]  Patient Class: IP- Inpatient   Admission Date: 3/6/2025  Length of Stay: 4 days  Attending Physician: Nadiya Dale MD  Primary Care Provider: Mi, Primary Doctor    Principal Problem:New onset of congestive heart failure    HPI:  Cecil Clark is a 50 y.o. male with a PMHx of CVA x2, DM, HTN who presents to [facility] for evaluation of shortness of breath and anasarca. Patient reports worsening swelling beginning in just his feet but now extending to his bilateral lower extremities and into his thighs/ hips for the past few weeks. He now has swelling into his scrotum/ penis. Endorses associated shortness of breath, worse with exertion for the past few days. He's only urinated a very small amount over the last month. Denies any chest pain. He also reports open blisters formed on his lower extremities about 2 weeks ago. He's been "debriding" the wounds with tweezers, peroxide, and alcohol to try to clean the wounds, but now has yellow colored drainage and surrounding pain to the wound on the RLE. Denies fever, chills, N/V, abdominal pain or syncope. Patient hasn't taken any of his home medications in about 2 years. Does occasionally take insulin when he eats "big meals", last took insulin around Shane time.     ED: tachycardic to  and hypertensive to /125. Troponin normal. BNP elevated at 1427. CXR with small bilateral pleural effusions. Bedside echo showed moderately reduced ejection fraction with trace pericardial effusion, no significant right heart strain. Given lasix 40mg IVP.     Overview/Hospital Course:  Brief HPI:  50M w HTN, DM2 uncontrolled (10.8) w neuro manifestations,  CVA x 2 (R thalamic ICH 2/2 HTN 2021; R MCA stroke '23), nonadherence/no meds in 2 years, with recent viral illness 12/2024 (not tested for flu or covid), who presents to [facility] ED for SOB and anasarca, w worsening swelling " "starting in his feet and now extending to his bilateral lower extremities up to the thighs/hips and scrotum x months.  SOB is worse with exertion.  He's only urinated a very small amount over the last month. He also reports open blisters on legs, "debriding"wounds with tweezers, peroxide, and alcohol, and now w yellow colored drainage and pain to the wound on the RLE. No CP/palp/F/C/N/V/abd pain.  Does occasionally take insulin when he eats "big meals," last took around Shane time. No EtOH or drugs. In ED: tachy 118 and hypertensive 194/125. Troponin normal. BNP elevated at 1427. CXR with small bilateral pleural effusions. Bedside echo showed moderately reduced ejection fraction with trace pericardial effusion, no significant right heart strain. Given lasix 40mg IV.     [place] [facility]  Diuresing on Lasix 40 IV q12.  Possible DIONTE, Cr 2.0 (unknown baseline and last labs 1.5 yrs ago), so avoided RAAS, also avoided BB initially in new onset CHF, started bidil (hyrdal 20 PO q8 and isosorbide 25 PO q8). TTE with biV failure.  TSH wnl. Started Doxy PO for LE wounds with surrounding cellulitis.  Reported shaking chills- BCx x 2 ngtd.  Wound care consulted, appreciate recs. Diuresing okay but not at goal -2-3L/day so upped Lasix to 80 IV BID (from 40 BID) per cards on 3/8.  Next day, IVC 1.7 cm, <50% collapsible, c/w CVP of 8.  Continued Lasix, stopped the isosobide and incerased hydralazine to 50 PO q8 for better afterload reduction.  Upped Lasix again to 80 IV q8 on 3/10, c/o severe HA 2/2 severe thirst (with dry MM) so liberalized fluid restrict temporarily    Dispo- after d/c, outpt PET for ischemic eval        No new subjective & objective note has been filed under this hospital service since the last note was generated.        Assessment & Plan  New onset of congestive heart failure  Acute biV heart failure with BLE edema to scrotum. RFs: uncontrolled DM, HTN. Viral illness.  TSH wnl. NO meds at home  - diuresing " "w Lasix 80 IV q8  - GDMT: discussed BP regimen w/ cardiology given unable to start ACE/ARB (DIONTE), BB (new decompensated CHF), or CCB  (new reduced EF)-- cards recommend starting Bidil until DIONTE resolved/ euvolemic  - cont hydralazine 50 PO q8 for afterload reduction  - stopped isosobide, not needed per cards  - likely start entresto after DIONTE resolves  - no ACEi or ARB while DIONTE / renal function dynamic  - avoid BB for now per cards recs to admitting HM, start asap  - gen cards consulted, appreciate recs  - initially liberalized fluid restrict to 2L for now given thirst, tachycardia, and infectious w/u as below. Decreased back to 1500cc on 3/9, increased again 3/10 for severe thirst/HA  - continue education, Heart failure Pathway    Weights  3/10 pending  3/9 65.5 kg (144 lb 6.4 oz) ?accurate  3/8 73.4 kg (161 lb 13.1 oz)  3/7 78.4 kg (172 lb 13.5 oz)  3/6 72.6 kg (160 lb)  DRY weight 142-143 lbs (64.5 - 65 kg)  Essential hypertension  See CHF above  DIONTE (acute kidney injury)  DIONTE vs DIONTE on CKD vs CKD. Suspect cardiorenal. No prior labs sine 10/2023, a year and a half ago, so baseline unclear- Cr 1.0 at that time.  UA with 5 RBCs, no casts  - f/u FeNa (urine studies "add-on" to UA done in ED prior to Lasix)  - if no improvement or worsening, renal U/SCr 2.0, baseline ~1.1  - IV diuresis as above  - avoid nephrotoxins, renally dose meds  - trend daily BMP  Cellulitis of right lower extremity  Cellulitis LE (alonso-wounds 2/2 edema). erythema, possible scant purulent drainage from wound of RLE  - afebrile without leukocytosis  - cont doxy 100mg BID  - f/u wound care consult  - cont Doxy 100 BID for now   - Tachycardia and "Shivers" reported 3/7- possible rigors - f/u BCx x 2 - ngtd  - Continue to monitor closely, low threshold for broadening ABx to Vanc/CTX.    Type 2 diabetes mellitus with hyperglycemia, without long-term current use of insulin  DM2 uncontrolled. A1C 10.8. Not on any orals or insulin at home  - FRANSI, " FSQACHS  - increase basal insulin to 8 units qhs  - avoid insulin stacking in low GFR/DIONTE  - considering Endo consult  History of CVA with residual deficit  - mild L sided sensory deficits since prior stroke, no acute issues  - home ASA, statin  VTE Risk Mitigation (From admission, onward)           Ordered     heparin (porcine) injection 5,000 Units  Every 8 hours         03/07/25 0100     IP VTE HIGH RISK PATIENT  Once         03/07/25 0100     Place sequential compression device  Until discontinued         03/07/25 0029                    Discharge Planning   SUZANNE: 3/13/2025     Code Status: Full Code   Medical Readiness for Discharge Date:   Discharge Plan A: Home Health          Nadiya Dale MD  Department of Hospital Medicine   [facility]

## 2025-03-10 NOTE — PLAN OF CARE
Plan of care reviewed with patient. Patient is AOX4 and VS stable.  Patient remained free of falls and trauma, fall precautions are in place. Patient is ambulating independently. Patient ambulated in rodriguez x1.  Patient has no complaints of pain. Patient verbalized discomfort with bed, waffle mattress overlay offered, patient refused. Wound care performed per orders.  Patient has no questions at this time. Wheels are locked and the bed is in lowest position. The call bell is within reach. Telemetry is on.       Problem: Adult Inpatient Plan of Care  Goal: Plan of Care Review  Outcome: Progressing  Flowsheets (Taken 3/10/2025 0412)  Plan of Care Reviewed With: patient     Problem: Wound  Goal: Optimal Functional Ability  Outcome: Progressing  Intervention: Optimize Functional Ability  Flowsheets (Taken 3/10/2025 0412)  Activity Management: Ambulated in rodriguez - L4  Activity Assistance Provided: independent  Goal: Optimal Wound Healing  Outcome: Progressing  Intervention: Promote Wound Healing  Flowsheets (Taken 3/10/2025 0412)  Sleep/Rest Enhancement:   regular sleep/rest pattern promoted   relaxation techniques promoted     Problem: Fall Injury Risk  Goal: Absence of Fall and Fall-Related Injury  Outcome: Progressing  Intervention: Identify and Manage Contributors  Flowsheets (Taken 3/10/2025 0412)  Self-Care Promotion:   independence encouraged   BADL personal objects within reach   adaptive equipment use encouraged  Medication Review/Management: medications reviewed  Intervention: Promote Injury-Free Environment  Flowsheets (Taken 3/10/2025 0412)  Safety Promotion/Fall Prevention:   assistive device/personal item within reach   Fall Risk reviewed with patient/family   Fall Risk signage in place   medications reviewed   instructed to call staff for mobility   nonskid shoes/socks when out of bed   patient expresses understanding of fall risk and prevention   room near unit station   side rails raised x 2

## 2025-03-10 NOTE — ASSESSMENT & PLAN NOTE
DM2 uncontrolled. A1C 10.8. Not on any orals or insulin at home  - LDSSI, FSQACHS  - increase basal insulin to 8 units qhs  - avoid insulin stacking in low GFR/DIONTE  - considering Endo consult

## 2025-03-10 NOTE — PROGRESS NOTES
"[facility] Medicine  Progress Note    Patient Name: Cecil Clark  MRN: [id number]  Patient Class: IP- Inpatient   Admission Date: 3/6/2025  Length of Stay: 4 days  Attending Physician: Nadiya Dale MD  Primary Care Provider: Mi, Primary Doctor    Principal Problem:New onset of congestive heart failure    HPI:  Cecil Clark is a 50 y.o. male with a PMHx of CVA x2, DM, HTN who [place] [facility] [place]ness of breath and anasarca. Patient reports worsening swelling beginning in just his feet but now extending to his bilateral lower extremities and into his thighs/ hips for the past few weeks. He now has swelling into his scrotum/ penis. Endorses associated shortness of breath, worse with exertion for the past few days. He's only urinated a very small amount over the last month. Denies any chest pain. He also reports open blisters formed on his lower extremities about 2 weeks ago. He's been "debriding" the wounds with tweezers, peroxide, and alcohol to try to clean the wounds, but now has yellow colored drainage and surrounding pain to the wound on the RLE. Denies fever, chills, N/V, abdominal pain or syncope. Patient hasn't taken any of his home medications in about 2 years. Does occasionally take insulin when he eats "big meals", last took insulin around Shane time.     ED: tachycardic to  and hypertensive to /125. Troponin normal. BNP elevated at 1427. CXR with small bilateral pleural effusions. Bedside echo showed moderately reduced ejection fraction with trace pericardial effusion, no significant right heart strain. Given lasix 40mg IVP.     Overview/Hospital Course:  Brief HPI:  50M w HTN, DM2 uncontrolled (10.8) w neuro manifestations,  CVA x 2 (R thalamic ICH 2/2 HTN 2021; R MCA stroke '23), nonadherence/no meds in 2 years, with recent viral illness 12/2024 (not tested for flu or covid), who [place] [facility] [place]B and anasarca, w worsening swelling " "starting in his feet and now extending to his bilateral lower extremities up to the thighs/hips and scrotum x months.  SOB is worse with exertion.  He's only urinated a very small amount over the last month. He also reports open blisters on legs, "debriding"wounds with tweezers, peroxide, and alcohol, and now w yellow colored drainage and pain to the wound on the RLE. No CP/palp/F/C/N/V/abd pain.  Does occasionally take insulin when he eats "big meals," last took around Shane time. No EtOH or drugs. In ED: tachy 118 and hypertensive 194/125. Troponin normal. BNP elevated at 1427. CXR with small bilateral pleural effusions. Bedside echo showed moderately reduced ejection fraction with trace pericardial effusion, no significant right heart strain. Given lasix 40mg IV.     Admitted to [facility] [place].  Diuresing on Lasix 40 IV q12.  Possible DIONTE, Cr 2.0 (unknown baseline and last labs 1.5 yrs ago), so avoided RAAS, also avoided BB initially in new onset CHF, started bidil (hyrdal 20 PO q8 and isosorbide 25 PO q8). TTE with biV failure.  TSH wnl. Started Doxy PO for LE wounds with surrounding cellulitis.  Reported shaking chills- BCx x 2 ngtd.  Wound care consulted, appreciate recs. Diuresing okay but not at goal -2-3L/day so upped Lasix to 80 IV BID (from 40 BID) per cards on 3/8.  Next day, IVC 1.7 cm, <50% collapsible, c/w CVP of 8.  Continued Lasix, stopped the isosobide and incerased hydralazine to 50 PO q8 for better afterload reduction.  Upped Lasix again to 80 IV q8 on 3/10, c/o severe HA 2/2 severe thirst (with dry MM) so liberalized fluid restrict temporarily    Dispo- after d/c, outpt PET for ischemic eval        Interval History: see updated hosp course above    Review of Systems   All other systems reviewed and are negative.    Objective:     Vital Signs (Most Recent):  Temp: 97.8 °F (36.6 °C) (03/10/25 0734)  Pulse: 104 (03/10/25 0734)  Resp: 18 (03/10/25 0324)  BP: 130/80 (03/10/25 0734)  SpO2: 96 % (03/10/25 " 0734) Vital Signs (24h Range):  Temp:  [96.7 °F (35.9 °C)-98.7 °F (37.1 °C)] 97.8 °F (36.6 °C)  Pulse:  [101-118] 104  Resp:  [18] 18  SpO2:  [96 %-100 %] 96 %  BP: (129-151)/(80-99) 130/80     Weight: 65.5 kg (144 lb 6.4 oz)  Body mass index is 26.41 kg/m².    Intake/Output Summary (Last 24 hours) at 3/10/2025 0800  Last data filed at 3/10/2025 0345  Gross per 24 hour   Intake 710 ml   Output 1450 ml   Net -740 ml         Physical Exam  Vitals and nursing note reviewed.   Constitutional:       General: He is not in acute distress.     Appearance: He is well-developed.   HENT:      Head: Normocephalic and atraumatic.      Mouth/Throat:      Pharynx: No oropharyngeal exudate.   Eyes:      General: No scleral icterus.     Conjunctiva/sclera: Conjunctivae normal.   Cardiovascular:      Rate and Rhythm: Normal rate and regular rhythm.      Heart sounds: Normal heart sounds.   Pulmonary:      Effort: Pulmonary effort is normal. No respiratory distress.      Breath sounds: Rales present. No wheezing.      Comments: Very mild crackles to BLL. Breathing comfortably on RA  Abdominal:      General: Bowel sounds are normal. There is no distension.      Palpations: Abdomen is soft.      Tenderness: There is no abdominal tenderness.   Musculoskeletal:         General: No tenderness. Normal range of motion.      Cervical back: Normal range of motion and neck supple.      Right lower leg: Edema present.      Left lower leg: Edema present.      Comments: 3+ pitting edema up to thigh/hip area   Lymphadenopathy:      Cervical: No cervical adenopathy.   Skin:     General: Skin is warm and dry.      Capillary Refill: Capillary refill takes less than 2 seconds.      Findings: No rash.   Neurological:      Mental Status: He is alert and oriented to person, place, and time.      Cranial Nerves: No cranial nerve deficit.      Sensory: No sensory deficit.      Coordination: Coordination normal.   Psychiatric:         Behavior: Behavior  "normal.         Thought Content: Thought content normal.         Judgment: Judgment normal.               Significant Labs: All pertinent labs within the past 24 hours have been reviewed.    Significant Imaging: I have reviewed all pertinent imaging results/findings within the past 24 hours.      Assessment & Plan  New onset of congestive heart failure  Acute biV heart failure with BLE edema to scrotum. RFs: uncontrolled DM, HTN. Viral illness.  TSH wnl. NO meds at home  - diuresing w Lasix 80 IV q8  - GDMT: discussed BP regimen w/ cardiology given unable to start ACE/ARB (DIONTE), BB (new decompensated CHF), or CCB  (new reduced EF)-- cards recommend starting Bidil until DIONTE resolved/ euvolemic  - cont hydralazine 50 PO q8 for afterload reduction  - stopped isosobide, not needed per cards  - likely start entresto after DIONTE resolves  - no ACEi or ARB while DIONTE / renal function dynamic  - avoid BB for now per cards recs to admitting HM, start asap  - gen cards consulted, appreciate recs  - initially liberalized fluid restrict to 2L for now given thirst, tachycardia, and infectious w/u as below. Decreased back to 1500cc on 3/9, increased again 3/10 for severe thirst/HA  - continue education, Heart failure Pathway    Weights  3/10 pending  3/9 65.5 kg (144 lb 6.4 oz) ?accurate  3/8 73.4 kg (161 lb 13.1 oz)  3/7 78.4 kg (172 lb 13.5 oz)  3/6 72.6 kg (160 lb)  DRY weight 142-143 lbs (64.5 - 65 kg)  Essential hypertension  See CHF above  DIONTE (acute kidney injury)  DIONTE vs DIONTE on CKD vs CKD. Suspect cardiorenal. No prior labs sine 10/2023, a year and a half ago, so baseline unclear- Cr 1.0 at that time.  UA with 5 RBCs, no casts  - f/u FeNa (urine studies "add-on" to UA done in ED prior to Lasix)  - if no improvement or worsening, renal U/SCr 2.0, baseline ~1.1  - IV diuresis as above  - avoid nephrotoxins, renally dose meds  - trend daily BMP  Cellulitis of right lower extremity  Cellulitis LE (alonso-wounds 2/2 edema). " "erythema, possible scant purulent drainage from wound of RLE  - afebrile without leukocytosis  - cont doxy 100mg BID  - f/u wound care consult  - cont Doxy 100 BID for now   - Tachycardia and "Shivers" reported 3/7- possible rigors - f/u BCx x 2 - ngtd  - Continue to monitor closely, low threshold for broadening ABx to Vanc/CTX.    Type 2 diabetes mellitus with hyperglycemia, without long-term current use of insulin  DM2 uncontrolled. A1C 10.8. Not on any orals or insulin at home  - LDSSI, FSQACHS  - increase basal insulin to 8 units qhs  - avoid insulin stacking in low GFR/DIONTE  - considering Endo consult  History of CVA with residual deficit  - mild L sided sensory deficits since prior stroke, no acute issues  - home ASA, statin  VTE Risk Mitigation (From admission, onward)           Ordered     heparin (porcine) injection 5,000 Units  Every 8 hours         03/07/25 0100     IP VTE HIGH RISK PATIENT  Once         03/07/25 0100     Place sequential compression device  Until discontinued         03/07/25 0029                    Discharge Planning   SUZANNE: 3/13/2025     Code Status: Full Code   Medical Readiness for Discharge Date:   Discharge Plan A: Home Health            Nadiya Dale MD  Department of Hospital Medicine   [facility] - [facility] Step[facility]    "

## 2025-03-10 NOTE — ASSESSMENT & PLAN NOTE
"Pt presented with SOB and anasarca. Reported that it has been going on for few weeks now. Also reported decrease urinary output. Patient has Systolic (HFrEF) heart failure that is Acute. On presentation their CHF was decompensated. Evidence of decompensated CHF on presentation includes: edema, elevated JVD, weight gain, and orthopnea. The etiology of their decompensation is likely medication non compliance . Most recent BNP and echo results are listed below.  No results for input(s): "BNP" in the last 72 hours.      Intake/Output Summary (Last 24 hours) at 3/10/2025 1146  Last data filed at 3/10/2025 1119  Gross per 24 hour   Intake 712 ml   Output 1625 ml   Net -913 ml       Latest ECHO  Results for orders placed during the hospital encounter of 03/06/25    Echo    Interpretation Summary    Left Ventricle: The left ventricle is normal in size. Ventricular mass is normal. Normal wall thickness. There is concentric remodeling. Global hypokinesis present. There is severely reduced systolic function with a visually estimated ejection fraction of 25 - 30%. Global longitudinal strain is -8.3%. Global longitudinal strain is reduced. There is diastolic dysfunction.    Right Ventricle: The right ventricle is normal in size. Wall thickness is normal. Right ventricle wall motion has global hypokinesis. Systolic function is moderately to severely reduced.    Mitral Valve: There is mild regurgitation.    Pulmonary Artery: The estimated pulmonary artery systolic pressure is 42 mmHg.    IVC/SVC: Elevated venous pressure at 15 mmHg.    Current Heart Failure Medications  hydrALAZINE injection 10 mg, Every 6 hours PRN, Intravenous  hydrALAZINE tablet 50 mg, Every 8 hours, Oral  furosemide injection 80 mg, 3 times daily, Intravenous    - NHYA Class III  - ACC/AHA stage C      Recommendations   - Increase Lasix 80 mg IV to TID  - Goal UOP of 2-3L   - Monitor strict I&Os and daily weights.    - Place on telemetry  - Low sodium diet  - " Place on fluid restriction of 1.5 L.   - Monitor electrolytes and supplement as needed   - Will hold off on GDMT until DIONTE improves; however this may be his new baseline. Will continue to monitor for now   - Will need outpatient PET  - Importance of medication compliance should be discussed

## 2025-03-11 PROBLEM — U07.1 COVID-19 VIRUS INFECTION: Status: ACTIVE | Noted: 2025-03-11

## 2025-03-11 PROBLEM — I69.30 HISTORY OF CVA WITH RESIDUAL DEFICIT: Status: RESOLVED | Noted: 2025-03-07 | Resolved: 2025-03-11

## 2025-03-11 LAB
ALBUMIN SERPL BCP-MCNC: 2.6 G/DL (ref 3.5–5.2)
ALP SERPL-CCNC: 79 U/L (ref 40–150)
ALT SERPL W/O P-5'-P-CCNC: 19 U/L (ref 10–44)
ANION GAP SERPL CALC-SCNC: 9 MMOL/L (ref 8–16)
AST SERPL-CCNC: 30 U/L (ref 10–40)
BILIRUB SERPL-MCNC: 0.8 MG/DL (ref 0.1–1)
BUN SERPL-MCNC: 34 MG/DL (ref 6–20)
CALCIUM SERPL-MCNC: 7.8 MG/DL (ref 8.7–10.5)
CHLORIDE SERPL-SCNC: 103 MMOL/L (ref 95–110)
CO2 SERPL-SCNC: 22 MMOL/L (ref 23–29)
CREAT SERPL-MCNC: 1.9 MG/DL (ref 0.5–1.4)
EST. GFR  (NO RACE VARIABLE): 41.9 ML/MIN/1.73 M^2
GLUCOSE SERPL-MCNC: 124 MG/DL (ref 70–110)
GLUCOSE SERPL-MCNC: 252 MG/DL (ref 70–110)
INFLUENZA A, MOLECULAR: NOT DETECTED
INFLUENZA B, MOLECULAR: NOT DETECTED
MAGNESIUM SERPL-MCNC: 1.9 MG/DL (ref 1.6–2.6)
PHOSPHATE SERPL-MCNC: 3.4 MG/DL (ref 2.7–4.5)
POCT GLUCOSE: 123 MG/DL (ref 70–110)
POCT GLUCOSE: 178 MG/DL (ref 70–110)
POCT GLUCOSE: 252 MG/DL (ref 70–110)
POCT GLUCOSE: 268 MG/DL (ref 70–110)
POCT GLUCOSE: 85 MG/DL (ref 70–110)
POTASSIUM SERPL-SCNC: 3.5 MMOL/L (ref 3.5–5.1)
PROT SERPL-MCNC: 6 G/DL (ref 6–8.4)
RSV AG BY MOLECULAR METHOD: NOT DETECTED
SARS-COV-2 RNA RESP QL NAA+PROBE: DETECTED
SODIUM SERPL-SCNC: 134 MMOL/L (ref 136–145)

## 2025-03-11 PROCEDURE — 99232 SBSQ HOSP IP/OBS MODERATE 35: CPT | Mod: ,,, | Performed by: STUDENT IN AN ORGANIZED HEALTH CARE EDUCATION/TRAINING PROGRAM

## 2025-03-11 PROCEDURE — 25000003 PHARM REV CODE 250: Performed by: HOSPITALIST

## 2025-03-11 PROCEDURE — 83735 ASSAY OF MAGNESIUM: CPT | Performed by: PHYSICIAN ASSISTANT

## 2025-03-11 PROCEDURE — 25000003 PHARM REV CODE 250: Performed by: PHYSICIAN ASSISTANT

## 2025-03-11 PROCEDURE — 94761 N-INVAS EAR/PLS OXIMETRY MLT: CPT

## 2025-03-11 PROCEDURE — 99900035 HC TECH TIME PER 15 MIN (STAT)

## 2025-03-11 PROCEDURE — 84100 ASSAY OF PHOSPHORUS: CPT | Performed by: HOSPITALIST

## 2025-03-11 PROCEDURE — XW033E5 INTRODUCTION OF REMDESIVIR ANTI-INFECTIVE INTO PERIPHERAL VEIN, PERCUTANEOUS APPROACH, NEW TECHNOLOGY GROUP 5: ICD-10-PCS | Performed by: STUDENT IN AN ORGANIZED HEALTH CARE EDUCATION/TRAINING PROGRAM

## 2025-03-11 PROCEDURE — 63600175 PHARM REV CODE 636 W HCPCS: Performed by: HOSPITALIST

## 2025-03-11 PROCEDURE — 27000207 HC ISOLATION

## 2025-03-11 PROCEDURE — 63600175 PHARM REV CODE 636 W HCPCS

## 2025-03-11 PROCEDURE — 94799 UNLISTED PULMONARY SVC/PX: CPT

## 2025-03-11 PROCEDURE — 0241U SARS-COV2 (COVID) WITH FLU/RSV BY PCR: CPT | Performed by: HOSPITALIST

## 2025-03-11 PROCEDURE — 36415 COLL VENOUS BLD VENIPUNCTURE: CPT | Performed by: PHYSICIAN ASSISTANT

## 2025-03-11 PROCEDURE — 80053 COMPREHEN METABOLIC PANEL: CPT | Performed by: HOSPITALIST

## 2025-03-11 PROCEDURE — 63600175 PHARM REV CODE 636 W HCPCS: Performed by: PHYSICIAN ASSISTANT

## 2025-03-11 PROCEDURE — 25000003 PHARM REV CODE 250

## 2025-03-11 PROCEDURE — 20600001 HC STEP DOWN PRIVATE ROOM

## 2025-03-11 RX ORDER — MAGNESIUM SULFATE HEPTAHYDRATE 40 MG/ML
2 INJECTION, SOLUTION INTRAVENOUS ONCE
Status: COMPLETED | OUTPATIENT
Start: 2025-03-11 | End: 2025-03-11

## 2025-03-11 RX ORDER — POTASSIUM CHLORIDE 20 MEQ/1
40 TABLET, EXTENDED RELEASE ORAL
Status: COMPLETED | OUTPATIENT
Start: 2025-03-11 | End: 2025-03-11

## 2025-03-11 RX ORDER — BENZONATATE 100 MG/1
100 CAPSULE ORAL 3 TIMES DAILY PRN
Status: DISCONTINUED | OUTPATIENT
Start: 2025-03-11 | End: 2025-03-14 | Stop reason: HOSPADM

## 2025-03-11 RX ORDER — MAGNESIUM SULFATE 1 G/100ML
1 INJECTION INTRAVENOUS ONCE
Status: COMPLETED | OUTPATIENT
Start: 2025-03-11 | End: 2025-03-11

## 2025-03-11 RX ORDER — MUPIROCIN 20 MG/G
OINTMENT TOPICAL 2 TIMES DAILY
Status: DISCONTINUED | OUTPATIENT
Start: 2025-03-11 | End: 2025-03-14 | Stop reason: HOSPADM

## 2025-03-11 RX ORDER — ASCORBIC ACID 500 MG
500 TABLET ORAL 2 TIMES DAILY
Status: DISCONTINUED | OUTPATIENT
Start: 2025-03-11 | End: 2025-03-14 | Stop reason: HOSPADM

## 2025-03-11 RX ADMIN — REMDESIVIR 200 MG: 100 INJECTION, POWDER, LYOPHILIZED, FOR SOLUTION INTRAVENOUS at 05:03

## 2025-03-11 RX ADMIN — POTASSIUM CHLORIDE 40 MEQ: 1500 TABLET, EXTENDED RELEASE ORAL at 08:03

## 2025-03-11 RX ADMIN — OXYCODONE HYDROCHLORIDE AND ACETAMINOPHEN 500 MG: 500 TABLET ORAL at 09:03

## 2025-03-11 RX ADMIN — FUROSEMIDE 80 MG: 10 INJECTION, SOLUTION INTRAVENOUS at 09:03

## 2025-03-11 RX ADMIN — FUROSEMIDE 80 MG: 10 INJECTION, SOLUTION INTRAVENOUS at 02:03

## 2025-03-11 RX ADMIN — DOXYCYCLINE HYCLATE 100 MG: 100 TABLET, COATED ORAL at 08:03

## 2025-03-11 RX ADMIN — INSULIN GLARGINE 8 UNITS: 100 INJECTION, SOLUTION SUBCUTANEOUS at 09:03

## 2025-03-11 RX ADMIN — DOXYCYCLINE HYCLATE 100 MG: 100 TABLET, COATED ORAL at 09:03

## 2025-03-11 RX ADMIN — MICONAZOLE NITRATE: 20 OINTMENT TOPICAL at 09:03

## 2025-03-11 RX ADMIN — MAGNESIUM SULFATE 1 G: 500 INJECTION, SOLUTION INTRAMUSCULAR; INTRAVENOUS at 08:03

## 2025-03-11 RX ADMIN — MAGNESIUM SULFATE HEPTAHYDRATE 2 G: 40 INJECTION, SOLUTION INTRAVENOUS at 10:03

## 2025-03-11 RX ADMIN — HEPARIN SODIUM 5000 UNITS: 5000 INJECTION INTRAVENOUS; SUBCUTANEOUS at 06:03

## 2025-03-11 RX ADMIN — FUROSEMIDE 80 MG: 10 INJECTION, SOLUTION INTRAVENOUS at 08:03

## 2025-03-11 RX ADMIN — INSULIN ASPART 1 UNITS: 100 INJECTION, SOLUTION INTRAVENOUS; SUBCUTANEOUS at 09:03

## 2025-03-11 RX ADMIN — HEPARIN SODIUM 5000 UNITS: 5000 INJECTION INTRAVENOUS; SUBCUTANEOUS at 09:03

## 2025-03-11 RX ADMIN — HYDRALAZINE HYDROCHLORIDE 75 MG: 25 TABLET ORAL at 09:03

## 2025-03-11 RX ADMIN — HYDRALAZINE HYDROCHLORIDE 75 MG: 25 TABLET ORAL at 06:03

## 2025-03-11 RX ADMIN — HYDRALAZINE HYDROCHLORIDE 75 MG: 25 TABLET ORAL at 02:03

## 2025-03-11 RX ADMIN — POTASSIUM CHLORIDE 40 MEQ: 1500 TABLET, EXTENDED RELEASE ORAL at 10:03

## 2025-03-11 RX ADMIN — HEPARIN SODIUM 5000 UNITS: 5000 INJECTION INTRAVENOUS; SUBCUTANEOUS at 02:03

## 2025-03-11 RX ADMIN — BENZONATATE 100 MG: 100 CAPSULE ORAL at 04:03

## 2025-03-11 NOTE — CONSULTS
[facility] - Cardiology Stepdown  Wound Care    Patient Name:  Cecil Clark   MRN:  [id number]  Date: 3/11/2025  Diagnosis: New onset of congestive heart failure    History:     Past Medical History:   Diagnosis Date    Diabetes mellitus     Hypertension     R thalmaic hypertensive ICH 04/04/2021    Tachycardia 4/4/2021       Social History[1]    Precautions:     Allergies as of 03/06/2025 - Reviewed 03/06/2025   Allergen Reaction Noted    Aspartame Nausea And Vomiting 04/04/2021    Shellfish containing products Shortness Of Breath 10/25/2023       [facility] Assessment Details/Treatment     Wound re-consult received on patient's bilateral lower legs.  Patient resting comfortably in bed, agreeable to wound evaluation. Independently repositions self.  Bilateral lower legs previously dressed yesterday, peeled back dressings to evaluate wound beds. Wounds appear to be improving slowly, medihoney assisting with autolytic debridement of fibrinous tissue, recommend continuing every other day, cleanse with vashe ointment. Vashe and medihoney at bedside, additional medihoney ordered. Patient deferred thigh evaluation of rash? Continue current orders for antifungal ointment. Patient denied any questions or concerns. Nursing to continue care.       03/11/25 0801        Wound 03/07/25 1000 Ulceration Left medial;lower Leg   Date First Assessed/Time First Assessed: 03/07/25 1000   Present on Original Admission: Yes  Primary Wound Type: Ulceration  Side: Left  Orientation: medial;lower  Location: Leg   Wound Image    Drainage Amount None   Drainage Characteristics/Odor No odor   Appearance Moist;Fibrin   Tissue loss description Full thickness   Periwound Area Edematous;Intact   Wound Edges Undefined   Dressing   (Medihoney/Foam)   Dressing Change Due 03/12/25        Wound 03/07/25 1000 Ulceration Right lower Leg   Date First Assessed/Time First Assessed: 03/07/25 1000   Present on Original Admission: Yes  Primary Wound Type: Ulceration  Side:  Right  Orientation: lower  Location: Leg   Wound Image    Drainage Amount None   Drainage Characteristics/Odor No odor   Appearance Moist;Fibrin   Periwound Area Edematous   Wound Edges Undefined   Dressing   (medihoney/foam)   Dressing Change Due 03/12/25        Wound 03/07/25 1000 Rash Left upper;medial Thigh   Date First Assessed/Time First Assessed: 03/07/25 1000   Present on Original Admission: Yes  Primary Wound Type: Rash  Side: Left  Orientation: upper;medial  Location: Thigh   Characteristics   (defered assessment)            [1]   Social History  Socioeconomic History    Marital status: Single   Tobacco Use    Smoking status: Never   Substance and Sexual Activity    Alcohol use: Not Currently    Drug use: Never     Social Drivers of Health     Financial Resource Strain: Low Risk  (3/7/2025)    Overall Financial Resource Strain (CARDIA)     Difficulty of Paying Living Expenses: Not very hard   Food Insecurity: Patient Declined (3/7/2025)    Hunger Vital Sign     Worried About Running Out of Food in the Last Year: Patient declined     Ran Out of Food in the Last Year: Patient declined   Physical Activity: Inactive (3/7/2025)    Exercise Vital Sign     Days of Exercise per Week: 0 days     Minutes of Exercise per Session: 0 min   Stress: Patient Declined (3/7/2025)    [employer] - Occupational Stress Questionnaire     Feeling of Stress : Patient declined   Housing Stability: Low Risk  (3/7/2025)    Housing Stability Vital Sign     Unable to Pay for Housing in the Last Year: No     Homeless in the Last Year: No

## 2025-03-11 NOTE — CARE UPDATE
COVID-19 infection.  No hypoxemia or resp failure. COVID risk of complications score = 4  - start RDV IV x 3 days. If progresses to severe COVID, extend to 5 days. Of note, if pt ready to be d/c-ed from hospital prior to the 3 or 5 days, do not keep here for RDV, ok to d/c home.  - MVI and Vit C 500 BID

## 2025-03-11 NOTE — SUBJECTIVE & OBJECTIVE
Interval History: diuresing great. New cough, dry however feels chest congestion, states feels a flu-like illness. Check CXR and COVID/flu/RSV    Review of Systems   All other systems reviewed and are negative.    Objective:     Vital Signs (Most Recent):  Temp: 97.1 °F (36.2 °C) (03/11/25 0729)  Pulse: 110 (03/11/25 0729)  Resp: 17 (03/11/25 0729)  BP: 134/71 (03/11/25 0729)  SpO2: 95 % (03/11/25 0604) Vital Signs (24h Range):  Temp:  [97.1 °F (36.2 °C)-98.9 °F (37.2 °C)] 97.1 °F (36.2 °C)  Pulse:  [106-116] 110  Resp:  [17-18] 17  SpO2:  [95 %-98 %] 95 %  BP: (134-167)/() 134/71     Weight: 66.4 kg (146 lb 6.2 oz)  Body mass index is 26.77 kg/m².    Intake/Output Summary (Last 24 hours) at 3/11/2025 0851  Last data filed at 3/11/2025 0604  Gross per 24 hour   Intake 978 ml   Output 3350 ml   Net -2372 ml         Physical Exam  Vitals and nursing note reviewed.   Constitutional:       General: He is not in acute distress.     Appearance: He is well-developed.   HENT:      Head: Normocephalic and atraumatic.      Mouth/Throat:      Pharynx: No oropharyngeal exudate.   Eyes:      General: No scleral icterus.     Conjunctiva/sclera: Conjunctivae normal.   Cardiovascular:      Rate and Rhythm: Normal rate and regular rhythm.      Heart sounds: Normal heart sounds.   Pulmonary:      Effort: Pulmonary effort is normal. No respiratory distress.      Breath sounds: Rales present. No wheezing.      Comments: Very mild crackles to BLL. Breathing comfortably on RA  Abdominal:      General: Bowel sounds are normal. There is no distension.      Palpations: Abdomen is soft.      Tenderness: There is no abdominal tenderness.   Musculoskeletal:         General: No tenderness. Normal range of motion.      Cervical back: Normal range of motion and neck supple.      Right lower leg: Edema present.      Left lower leg: Edema present.      Comments: 3+ pitting edema up to thigh/hip area   Lymphadenopathy:      Cervical: No  cervical adenopathy.   Skin:     General: Skin is warm and dry.      Capillary Refill: Capillary refill takes less than 2 seconds.      Findings: No rash.   Neurological:      Mental Status: He is alert and oriented to person, place, and time.      Cranial Nerves: No cranial nerve deficit.      Sensory: No sensory deficit.      Coordination: Coordination normal.   Psychiatric:         Behavior: Behavior normal.         Thought Content: Thought content normal.         Judgment: Judgment normal.               Significant Labs: All pertinent labs within the past 24 hours have been reviewed.    Significant Imaging: I have reviewed all pertinent imaging results/findings within the past 24 hours.

## 2025-03-11 NOTE — SUBJECTIVE & OBJECTIVE
Interval History: UOP: 3.35L, Net: -2.372L, and Since Admission -7.048 L. Cr stable at 1.9    ROS  Objective:     Vital Signs (Most Recent):  Temp: 98 °F (36.7 °C) (03/11/25 1051)  Pulse: 109 (03/11/25 1052)  Resp: 18 (03/11/25 1051)  BP: (!) 151/90 (03/11/25 1051)  SpO2: 95 % (03/11/25 0604) Vital Signs (24h Range):  Temp:  [97.1 °F (36.2 °C)-98.9 °F (37.2 °C)] 98 °F (36.7 °C)  Pulse:  [106-116] 109  Resp:  [17-18] 18  SpO2:  [95 %-98 %] 95 %  BP: (134-167)/() 151/90     Weight: 66.4 kg (146 lb 6.2 oz)  Body mass index is 26.77 kg/m².     SpO2: 95 %         Intake/Output Summary (Last 24 hours) at 3/11/2025 1140  Last data filed at 3/11/2025 0904  Gross per 24 hour   Intake 1098 ml   Output 3100 ml   Net -2002 ml       Lines/Drains/Airways       Peripheral Intravenous Line  Duration                  Peripheral IV - Single Lumen 03/06/25 2209 20 G Right Antecubital 4 days                       Physical Exam  Vitals and nursing note reviewed.   Constitutional:       Appearance: Normal appearance.   HENT:      Head: Normocephalic and atraumatic.   Cardiovascular:      Rate and Rhythm: Normal rate and regular rhythm.      Heart sounds: Normal heart sounds.   Pulmonary:      Effort: Pulmonary effort is normal.      Breath sounds: Normal breath sounds.   Abdominal:      General: Abdomen is flat.      Tenderness: There is no abdominal tenderness. There is no guarding or rebound.   Musculoskeletal:         General: Swelling present.      Right lower leg: Edema present.      Left lower leg: Edema present.   Neurological:      General: No focal deficit present.      Mental Status: He is alert and oriented to person, place, and time. Mental status is at baseline.   Psychiatric:         Mood and Affect: Mood normal.         Behavior: Behavior normal.            Significant Labs: All pertinent lab results from the last 24 hours have been reviewed.    Significant Imaging:  All pertinent imaging results from the last 24  hours have been reviewed.

## 2025-03-11 NOTE — PLAN OF CARE
[facility] Step[facility]  Discharge Reassessment    Primary Care Provider: No, Primary Doctor    Expected Discharge Date: 3/13/2025    Reassessment (most recent)       Discharge Reassessment - 03/11/25 1554          Discharge Reassessment    Assessment Type Discharge Planning Reassessment     Did the patient's condition or plan change since previous assessment? No     Discharge Plan A Home with family     Discharge Plan B Home     DME Needed Upon Discharge  none     Transition of Care Barriers None     Why the patient remains in the hospital Requires continued medical care                   Discharge Plan A and Plan B have been determined by review of patient's clinical status, future medical and therapeutic needs, and coverage/benefits for post-acute care in coordination with multidisciplinary team members.  Will continue to follow.      Sanjuanita White LMSW  [facility]  [id number]

## 2025-03-11 NOTE — ASSESSMENT & PLAN NOTE
"Pt presented with SOB and anasarca. Reported that it has been going on for few weeks now. Also reported decrease urinary output. Patient has Systolic (HFrEF) heart failure that is Acute. On presentation their CHF was decompensated. Evidence of decompensated CHF on presentation includes: edema, elevated JVD, weight gain, and orthopnea. The etiology of their decompensation is likely medication non compliance . Most recent BNP and echo results are listed below.  No results for input(s): "BNP" in the last 72 hours.      Intake/Output Summary (Last 24 hours) at 3/11/2025 1144  Last data filed at 3/11/2025 0904  Gross per 24 hour   Intake 1098 ml   Output 3100 ml   Net -2002 ml         Latest ECHO  Results for orders placed during the hospital encounter of 03/06/25    Echo    Interpretation Summary    Left Ventricle: The left ventricle is normal in size. Ventricular mass is normal. Normal wall thickness. There is concentric remodeling. Global hypokinesis present. There is severely reduced systolic function with a visually estimated ejection fraction of 25 - 30%. Global longitudinal strain is -8.3%. Global longitudinal strain is reduced. There is diastolic dysfunction.    Right Ventricle: The right ventricle is normal in size. Wall thickness is normal. Right ventricle wall motion has global hypokinesis. Systolic function is moderately to severely reduced.    Mitral Valve: There is mild regurgitation.    Pulmonary Artery: The estimated pulmonary artery systolic pressure is 42 mmHg.    IVC/SVC: Elevated venous pressure at 15 mmHg.    Current Heart Failure Medications  hydrALAZINE injection 10 mg, Every 6 hours PRN, Intravenous  furosemide injection 80 mg, 3 times daily, Intravenous  hydrALAZINE tablet 75 mg, Every 8 hours, Oral    - NHYA Class III  - ACC/AHA stage C      Recommendations   - Continue Lasix 80 mg IV TID  - Goal UOP of 2-3L   - Cr likely his baseline. Can consider starting Entresto (obtain price check to ensure " patient can afford)  - If not able to afford, can start ARB (Valsartan)  - Monitor strict I&Os and daily weights.    - Place on telemetry  - Low sodium diet  - Place on fluid restriction of 1.5 L.   - Monitor electrolytes and supplement as needed   - Will need outpatient PET  - Importance of medication compliance should be discussed

## 2025-03-11 NOTE — ASSESSMENT & PLAN NOTE
DM2 uncontrolled. A1C 10.8. Not on any orals or insulin at home  - LDSSI, FSQACHS  - basal insulin 8 units qhs  - avoid insulin stacking in low GFR/DIONTE  - considering Endo consult

## 2025-03-11 NOTE — PLAN OF CARE
Plan of care reviewed with patient. Patient is AOX4 and VS stable. Patient remained free of falls and trauma, fall precautions are in place. Patient is ambulating independently. Patient has no complaints of pain. Patient complained of cough, treated with prn cough medication. IVP lasix given per orders, diuresing well. Patient has no questions at this time. Wheels are locked and the bed is in lowest position. The call bell is within reach. Telemetry is on.       Problem: Adult Inpatient Plan of Care  Goal: Plan of Care Review  Outcome: Progressing  Flowsheets (Taken 3/11/2025 0422)  Plan of Care Reviewed With: patient  Goal: Optimal Comfort and Wellbeing  Outcome: Progressing  Intervention: Monitor Pain and Promote Comfort  Flowsheets (Taken 3/11/2025 0422)  Pain Management Interventions:   pillow support provided   relaxation techniques promoted   quiet environment facilitated  Intervention: Provide Person-Centered Care  Flowsheets (Taken 3/11/2025 0422)  Trust Relationship/Rapport:   care explained   choices provided   questions encouraged   questions answered     Problem: Diabetes Comorbidity  Goal: Blood Glucose Level Within Targeted Range  Outcome: Progressing  Intervention: Monitor and Manage Glycemia  Flowsheets (Taken 3/11/2025 0422)  Glycemic Management: blood glucose monitored     Problem: Acute Kidney Injury/Impairment  Goal: Effective Renal Function  Outcome: Progressing  Intervention: Monitor and Support Renal Function  Flowsheets (Taken 3/11/2025 0422)  Medication Review/Management: medications reviewed     Problem: Wound  Goal: Optimal Functional Ability  Outcome: Progressing  Intervention: Optimize Functional Ability  Flowsheets (Taken 3/11/2025 0422)  Activity Management: Ambulated to bathroom - L4  Activity Assistance Provided: independent

## 2025-03-11 NOTE — PROGRESS NOTES
"[facility] Medicine  Progress Note    Patient Name: Cecil Clark  MRN: [id number]  Patient Class: IP- Inpatient   Admission Date: 3/6/2025  Length of Stay: 5 days  Attending Physician: Nadiya Dale MD  Primary Care Provider: Mi, Primary Doctor    Principal Problem:New onset of congestive heart failure    HPI:  Cecil Clark is a 50 y.o. male with a PMHx of CVA x2, DM, HTN who [place] [facility] [place] of shortness of breath and anasarca. Patient reports worsening swelling beginning in just his feet but now extending to his bilateral lower extremities and into his thighs/ hips for the past few weeks. He now has swelling into his scrotum/ penis. Endorses associated shortness of breath, worse with exertion for the past few days. He's only urinated a very small amount over the last month. Denies any chest pain. He also reports open blisters formed on his lower extremities about 2 weeks ago. He's been "debriding" the wounds with tweezers, peroxide, and alcohol to try to clean the wounds, but now has yellow colored drainage and surrounding pain to the wound on the RLE. Denies fever, chills, N/V, abdominal pain or syncope. Patient hasn't taken any of his home medications in about 2 years. Does occasionally take insulin when he eats "big meals", last took insulin around Shane time.     ED: tachycardic to  and hypertensive to /125. Troponin normal. BNP elevated at 1427. CXR with small bilateral pleural effusions. Bedside echo showed moderately reduced ejection fraction with trace pericardial effusion, no significant right heart strain. Given lasix 40mg IVP.     Overview/Hospital Course:  Brief HPI:  50M w HTN, DM2 uncontrolled (10.8) w neuro manifestations,  CVA x 2 (R thalamic ICH 2/2 HTN 2021; R MCA stroke '23), nonadherence/no meds in 2 years, with recent viral illness 12/2024 (not tested for flu or covid), who presents [place] [facility] [place] for SOB and anasarca, w worsening swelling " "starting in his feet and now extending to his bilateral lower extremities up to the thighs/hips and scrotum x months.  SOB is worse with exertion.  He's only urinated a very small amount over the last month. He also reports open blisters on legs, "debriding"wounds with tweezers, peroxide, and alcohol, and now w yellow colored drainage and pain to the wound on the RLE. No CP/palp/F/C/N/V/abd pain.  Does occasionally take insulin when he eats "big meals," last took around Shane time. No EtOH or drugs. In ED: tachy 118 and hypertensive 194/125. Troponin normal. BNP elevated at 1427. CXR with small bilateral pleural effusions. Bedside echo showed moderately reduced ejection fraction with trace pericardial effusion, no significant right heart strain. Given lasix 40mg IV.     Admitted to [facility].  Diuresing on Lasix 40 IV q12.  Possible DIONTE, Cr 2.0 (unknown baseline and last labs 1.5 yrs ago), so avoided RAAS, also avoided BB initially in new onset CHF, started bidil (hyrdal 20 PO q8 and isosorbide 25 PO q8). TTE with biV failure.  TSH wnl. Started Doxy PO for LE wounds with surrounding cellulitis.  Reported shaking chills- BCx x 2 ngtd.  Wound care consulted, appreciate recs. Diuresing okay but not at goal -2-3L/day so upped Lasix to 80 IV BID (from 40 BID) per cards on 3/8.  Next day, IVC 1.7 cm, <50% collapsible, c/w CVP of 8.  Continued Lasix, stopped the isosobide and incerased hydralazine to 50 PO q8 for better afterload reduction.  Upped Lasix again to 80 IV q8 on 3/10, c/o severe HA 2/2 severe thirst (with dry MM) so liberalized fluid restrict temporarily    Dispo- after d/c, outpt PET for ischemic eval      Interval History: diuresing great. New cough, dry however feels chest congestion, states feels a flu-like illness. Check CXR and COVID/flu/RSV    Review of Systems   All other systems reviewed and are negative.    Objective:     Vital Signs (Most Recent):  Temp: 97.1 °F (36.2 °C) (03/11/25 0729)  Pulse: 110 " (03/11/25 0729)  Resp: 17 (03/11/25 0729)  BP: 134/71 (03/11/25 0729)  SpO2: 95 % (03/11/25 0604) Vital Signs (24h Range):  Temp:  [97.1 °F (36.2 °C)-98.9 °F (37.2 °C)] 97.1 °F (36.2 °C)  Pulse:  [106-116] 110  Resp:  [17-18] 17  SpO2:  [95 %-98 %] 95 %  BP: (134-167)/() 134/71     Weight: 66.4 kg (146 lb 6.2 oz)  Body mass index is 26.77 kg/m².    Intake/Output Summary (Last 24 hours) at 3/11/2025 0851  Last data filed at 3/11/2025 0604  Gross per 24 hour   Intake 978 ml   Output 3350 ml   Net -2372 ml         Physical Exam  Vitals and nursing note reviewed.   Constitutional:       General: He is not in acute distress.     Appearance: He is well-developed.   HENT:      Head: Normocephalic and atraumatic.      Mouth/Throat:      Pharynx: No oropharyngeal exudate.   Eyes:      General: No scleral icterus.     Conjunctiva/sclera: Conjunctivae normal.   Cardiovascular:      Rate and Rhythm: Normal rate and regular rhythm.      Heart sounds: Normal heart sounds.   Pulmonary:      Effort: Pulmonary effort is normal. No respiratory distress.      Breath sounds: Rales present. No wheezing.      Comments: Very mild crackles to BLL. Breathing comfortably on RA  Abdominal:      General: Bowel sounds are normal. There is no distension.      Palpations: Abdomen is soft.      Tenderness: There is no abdominal tenderness.   Musculoskeletal:         General: No tenderness. Normal range of motion.      Cervical back: Normal range of motion and neck supple.      Right lower leg: Edema present.      Left lower leg: Edema present.      Comments: 3+ pitting edema up to thigh/hip area   Lymphadenopathy:      Cervical: No cervical adenopathy.   Skin:     General: Skin is warm and dry.      Capillary Refill: Capillary refill takes less than 2 seconds.      Findings: No rash.   Neurological:      Mental Status: He is alert and oriented to person, place, and time.      Cranial Nerves: No cranial nerve deficit.      Sensory: No sensory  "deficit.      Coordination: Coordination normal.   Psychiatric:         Behavior: Behavior normal.         Thought Content: Thought content normal.         Judgment: Judgment normal.               Significant Labs: All pertinent labs within the past 24 hours have been reviewed.    Significant Imaging: I have reviewed all pertinent imaging results/findings within the past 24 hours.      Assessment & Plan  New onset of congestive heart failure  Acute biV heart failure with BLE edema to scrotum. RFs: uncontrolled DM, HTN. Viral illness.  TSH wnl. NO meds at home  - diuresing w Lasix 80 IV q8  - GDMT: discussed BP regimen w/ cardiology given unable to start ACE/ARB (DIONTE), BB (new decompensated CHF), or CCB  (new reduced EF)-- cards recommend starting Bidil until DIONTE resolved/ euvolemic  - cont hydralazine 50 PO q8 for afterload reduction  - likely start entresto after DIONTE resolves  - initially gave isosobide, stopped, not needed per cards  - no ACEi or ARB while DIONTE / renal function dynamic  - avoid BB for now per cards recs to admitting HM, start asap  - gen cards consulted, appreciate recs  - initially liberalized fluid restrict to 2L for now given thirst, tachycardia, and infectious w/u as below. Decreased back to 1500cc on 3/9, increased again 3/10 for severe thirst/HA  - continue education, Heart failure Pathway    Weights  3/10 pending  3/9 65.5 kg (144 lb 6.4 oz) ?accurate  3/8 73.4 kg (161 lb 13.1 oz)  3/7 78.4 kg (172 lb 13.5 oz)  3/6 72.6 kg (160 lb)  DRY weight 142-143 lbs (64.5 - 65 kg)  Essential hypertension  See CHF above  DIONTE (acute kidney injury)  DIONTE vs DIONTE on CKD vs CKD. Suspect cardiorenal. No prior labs sine 10/2023, a year and a half ago, so baseline unclear- Cr 1.0 at that time.  UA with 5 RBCs, no casts  - f/u FeNa (urine studies "add-on" to UA done in ED prior to Lasix)  - if no improvement or worsening, renal U/SCr 2.0, baseline ~1.1  - IV diuresis as above  - avoid nephrotoxins, renally dose " "meds  - trend daily BMP  Cellulitis of right lower extremity  Cellulitis LE (alonso-wounds 2/2 edema). erythema, possible scant purulent drainage from wound of RLE  - afebrile without leukocytosis  - cont doxy 100mg BID  - f/u wound care consult  - cont Doxy 100 BID for now   - Tachycardia and "Shivers" reported 3/7- possible rigors - f/u BCx x 2 - ngtd  - Continue to monitor closely, low threshold for broadening ABx to Vanc/CTX.    Type 2 diabetes mellitus with hyperglycemia, without long-term current use of insulin  DM2 uncontrolled. A1C 10.8. Not on any orals or insulin at home  - LDSSI, FSQACHS  - basal insulin 8 units qhs  - avoid insulin stacking in low GFR/DIONTE  - considering Endo consult  History of CVA with residual deficit  - mild L sided sensory deficits since prior stroke, no acute issues  - home ASA, statin  VTE Risk Mitigation (From admission, onward)           Ordered     heparin (porcine) injection 5,000 Units  Every 8 hours         03/07/25 0100     IP VTE HIGH RISK PATIENT  Once         03/07/25 0100     Place sequential compression device  Until discontinued         03/07/25 0029                    Discharge Planning   SUZANNE: 3/13/2025     Code Status: Full Code   Medical Readiness for Discharge Date:   Discharge Plan A: Home Health          Nadiya Dale MD  Department of Hospital Medicine   [facility]    "

## 2025-03-11 NOTE — PROGRESS NOTES
[facility]  Cardiology  Progress Note    Patient Name: Cecil Clark  MRN: [id number]  Admission Date: 3/6/2025  Hospital Length of Stay: 5 days  Code Status: Full Code   Attending Physician: Nadiya Dale MD   Primary Care Physician: Mi, Primary Doctor  Expected Discharge Date: 3/13/2025  Principal Problem:New onset of congestive heart failure    Subjective:     Hospital Course:   No notes on file    Interval History: UOP: 3.35L, Net: -2.372L, and Since Admission -7.048 L. Cr stable at 1.9    ROS  Objective:     Vital Signs (Most Recent):  Temp: 98 °F (36.7 °C) (03/11/25 1051)  Pulse: 109 (03/11/25 1052)  Resp: 18 (03/11/25 1051)  BP: (!) 151/90 (03/11/25 1051)  SpO2: 95 % (03/11/25 0604) Vital Signs (24h Range):  Temp:  [97.1 °F (36.2 °C)-98.9 °F (37.2 °C)] 98 °F (36.7 °C)  Pulse:  [106-116] 109  Resp:  [17-18] 18  SpO2:  [95 %-98 %] 95 %  BP: (134-167)/() 151/90     Weight: 66.4 kg (146 lb 6.2 oz)  Body mass index is 26.77 kg/m².     SpO2: 95 %         Intake/Output Summary (Last 24 hours) at 3/11/2025 1140  Last data filed at 3/11/2025 0904  Gross per 24 hour   Intake 1098 ml   Output 3100 ml   Net -2002 ml       Lines/Drains/Airways       Peripheral Intravenous Line  Duration                  Peripheral IV - Single Lumen 03/06/25 2209 20 G Right Antecubital 4 days                       Physical Exam  Vitals and nursing note reviewed.   Constitutional:       Appearance: Normal appearance.   HENT:      Head: Normocephalic and atraumatic.   Cardiovascular:      Rate and Rhythm: Normal rate and regular rhythm.      Heart sounds: Normal heart sounds.   Pulmonary:      Effort: Pulmonary effort is normal.      Breath sounds: Normal breath sounds.   Abdominal:      General: Abdomen is flat.      Tenderness: There is no abdominal tenderness. There is no guarding or rebound.   Musculoskeletal:         General: Swelling present.      Right lower leg: Edema present.      Left lower leg: Edema present.  "  Neurological:      General: No focal deficit present.      Mental Status: He is alert and oriented to person, place, and time. Mental status is at baseline.   Psychiatric:         Mood and Affect: Mood normal.         Behavior: Behavior normal.            Significant Labs: All pertinent lab results from the last 24 hours have been reviewed.    Significant Imaging:  All pertinent imaging results from the last 24 hours have been reviewed.   Assessment and Plan:     * New onset of congestive heart failure  Pt presented with SOB and anasarca. Reported that it has been going on for few weeks now. Also reported decrease urinary output. Patient has Systolic (HFrEF) heart failure that is Acute. On presentation their CHF was decompensated. Evidence of decompensated CHF on presentation includes: edema, elevated JVD, weight gain, and orthopnea. The etiology of their decompensation is likely medication non compliance . Most recent BNP and echo results are listed below.  No results for input(s): "BNP" in the last 72 hours.      Intake/Output Summary (Last 24 hours) at 3/11/2025 1144  Last data filed at 3/11/2025 0904  Gross per 24 hour   Intake 1098 ml   Output 3100 ml   Net -2002 ml         Latest ECHO  Results for orders placed during the hospital encounter of 03/06/25    Echo    Interpretation Summary    Left Ventricle: The left ventricle is normal in size. Ventricular mass is normal. Normal wall thickness. There is concentric remodeling. Global hypokinesis present. There is severely reduced systolic function with a visually estimated ejection fraction of 25 - 30%. Global longitudinal strain is -8.3%. Global longitudinal strain is reduced. There is diastolic dysfunction.    Right Ventricle: The right ventricle is normal in size. Wall thickness is normal. Right ventricle wall motion has global hypokinesis. Systolic function is moderately to severely reduced.    Mitral Valve: There is mild regurgitation.    Pulmonary Artery: " The estimated pulmonary artery systolic pressure is 42 mmHg.    IVC/SVC: Elevated venous pressure at 15 mmHg.    Current Heart Failure Medications  hydrALAZINE injection 10 mg, Every 6 hours PRN, Intravenous  furosemide injection 80 mg, 3 times daily, Intravenous  hydrALAZINE tablet 75 mg, Every 8 hours, Oral    - NHYA Class III  - ACC/AHA stage C      Recommendations   - Continue Lasix 80 mg IV TID  - Goal UOP of 2-3L   - Cr likely his baseline. Can consider starting Entresto (obtain price check to ensure patient can afford)  - If not able to afford, can start ARB (Valsartan)  - Monitor strict I&Os and daily weights.    - Place on telemetry  - Low sodium diet  - Place on fluid restriction of 1.5 L.   - Monitor electrolytes and supplement as needed   - Will need outpatient PET  - Importance of medication compliance should be discussed       Essential hypertension  Patient's blood pressure range in the last 24 hours was: BP  Min: 130/78  Max: 179/101.The patient's inpatient anti-hypertensive regimen is listed below:  Current Antihypertensives  furosemide injection 40 mg, Every 12 hours, Intravenous  isosorbide dinitrate tablet 20 mg, Every 8 hours, Oral  hydrALAZINE tablet 25 mg, Every 8 hours, Oral  hydrALAZINE injection 10 mg, Every 6 hours PRN, Intravenous    Plan  - Cr likely his baseline. Can consider starting Entresto (obtain price check to ensure patient can afford)  - If not able to afford, can start ARB (Valsartan)  - Continue with Hydralazine 75 mg q 8 and titrate up as needed           VTE Risk Mitigation (From admission, onward)           Ordered     heparin (porcine) injection 5,000 Units  Every 8 hours         03/07/25 0100     IP VTE HIGH RISK PATIENT  Once         03/07/25 0100     Place sequential compression device  Until discontinued         03/07/25 0029                    Mawadah Samad, MD  Cardiology  [facility] - [facility]

## 2025-03-11 NOTE — ASSESSMENT & PLAN NOTE
Acute biV heart failure with BLE edema to scrotum. RFs: uncontrolled DM, HTN. Viral illness.  TSH wnl. NO meds at home  - diuresing w Lasix 80 IV q8  - GDMT: discussed BP regimen w/ cardiology given unable to start ACE/ARB (DIOTNE), BB (new decompensated CHF), or CCB  (new reduced EF)-- cards recommend starting Bidil until DIONTE resolved/ euvolemic  - cont hydralazine 50 PO q8 for afterload reduction  - likely start entresto after DIONTE resolves  - initially gave isosobide, stopped, not needed per cards  - no ACEi or ARB while DIONTE / renal function dynamic  - avoid BB for now per cards recs to admitting HM, start asap  - gen cards consulted, appreciate recs  - initially liberalized fluid restrict to 2L for now given thirst, tachycardia, and infectious w/u as below. Decreased back to 1500cc on 3/9, increased again 3/10 for severe thirst/HA  - continue education, Heart failure Pathway    Weights  3/10 pending  3/9 65.5 kg (144 lb 6.4 oz) ?accurate  3/8 73.4 kg (161 lb 13.1 oz)  3/7 78.4 kg (172 lb 13.5 oz)  3/6 72.6 kg (160 lb)  DRY weight 142-143 lbs (64.5 - 65 kg)

## 2025-03-11 NOTE — ASSESSMENT & PLAN NOTE
Patient's blood pressure range in the last 24 hours was: BP  Min: 134/71  Max: 167/111.The patient's inpatient anti-hypertensive regimen is listed below:  Current Antihypertensives  hydrALAZINE injection 10 mg, Every 6 hours PRN, Intravenous  furosemide injection 80 mg, 3 times daily, Intravenous  hydrALAZINE tablet 75 mg, Every 8 hours, Oral    Plan  - Cr likely his baseline. Start Entresto (obtain price check to ensure patient can afford on discharge)  - If not able to afford, can start ARB (Valsartan)  - Continue with Hydralazine 75 mg q 8 and titrate up as needed

## 2025-03-12 LAB
ALBUMIN SERPL BCP-MCNC: 2.8 G/DL (ref 3.5–5.2)
ALP SERPL-CCNC: 87 U/L (ref 40–150)
ALT SERPL W/O P-5'-P-CCNC: 23 U/L (ref 10–44)
ANION GAP SERPL CALC-SCNC: 12 MMOL/L (ref 8–16)
AST SERPL-CCNC: 39 U/L (ref 10–40)
BACTERIA BLD CULT: NORMAL
BACTERIA BLD CULT: NORMAL
BILIRUB SERPL-MCNC: 0.6 MG/DL (ref 0.1–1)
BUN SERPL-MCNC: 31 MG/DL (ref 6–20)
CALCIUM SERPL-MCNC: 8.2 MG/DL (ref 8.7–10.5)
CHLORIDE SERPL-SCNC: 101 MMOL/L (ref 95–110)
CO2 SERPL-SCNC: 22 MMOL/L (ref 23–29)
CREAT SERPL-MCNC: 2 MG/DL (ref 0.5–1.4)
EST. GFR  (NO RACE VARIABLE): 39.4 ML/MIN/1.73 M^2
GLUCOSE SERPL-MCNC: 114 MG/DL (ref 70–110)
MAGNESIUM SERPL-MCNC: 2.3 MG/DL (ref 1.6–2.6)
PHOSPHATE SERPL-MCNC: 3 MG/DL (ref 2.7–4.5)
POCT GLUCOSE: 142 MG/DL (ref 70–110)
POCT GLUCOSE: 156 MG/DL (ref 70–110)
POCT GLUCOSE: 263 MG/DL (ref 70–110)
POCT GLUCOSE: 354 MG/DL (ref 70–110)
POTASSIUM SERPL-SCNC: 3.5 MMOL/L (ref 3.5–5.1)
PROT SERPL-MCNC: 6.6 G/DL (ref 6–8.4)
SODIUM SERPL-SCNC: 135 MMOL/L (ref 136–145)

## 2025-03-12 PROCEDURE — 84100 ASSAY OF PHOSPHORUS: CPT | Performed by: HOSPITALIST

## 2025-03-12 PROCEDURE — 27000207 HC ISOLATION

## 2025-03-12 PROCEDURE — 80053 COMPREHEN METABOLIC PANEL: CPT | Performed by: HOSPITALIST

## 2025-03-12 PROCEDURE — 99232 SBSQ HOSP IP/OBS MODERATE 35: CPT | Mod: ,,, | Performed by: STUDENT IN AN ORGANIZED HEALTH CARE EDUCATION/TRAINING PROGRAM

## 2025-03-12 PROCEDURE — 63600175 PHARM REV CODE 636 W HCPCS: Mod: JZ,TB | Performed by: HOSPITALIST

## 2025-03-12 PROCEDURE — 94761 N-INVAS EAR/PLS OXIMETRY MLT: CPT

## 2025-03-12 PROCEDURE — 63600175 PHARM REV CODE 636 W HCPCS

## 2025-03-12 PROCEDURE — 36415 COLL VENOUS BLD VENIPUNCTURE: CPT | Performed by: PHYSICIAN ASSISTANT

## 2025-03-12 PROCEDURE — 25000003 PHARM REV CODE 250: Performed by: PHYSICIAN ASSISTANT

## 2025-03-12 PROCEDURE — 63600175 PHARM REV CODE 636 W HCPCS: Performed by: PHYSICIAN ASSISTANT

## 2025-03-12 PROCEDURE — 20600001 HC STEP DOWN PRIVATE ROOM

## 2025-03-12 PROCEDURE — 83735 ASSAY OF MAGNESIUM: CPT | Performed by: PHYSICIAN ASSISTANT

## 2025-03-12 PROCEDURE — 25000003 PHARM REV CODE 250: Performed by: HOSPITALIST

## 2025-03-12 RX ORDER — HYDRALAZINE HYDROCHLORIDE 25 MG/1
25 TABLET, FILM COATED ORAL ONCE
Status: DISCONTINUED | OUTPATIENT
Start: 2025-03-12 | End: 2025-03-12

## 2025-03-12 RX ORDER — SACUBITRIL AND VALSARTAN 24; 26 MG/1; MG/1
1 TABLET, FILM COATED ORAL 2 TIMES DAILY
Qty: 180 TABLET | Refills: 0 | Status: SHIPPED | OUTPATIENT
Start: 2025-03-12

## 2025-03-12 RX ORDER — POTASSIUM CHLORIDE 20 MEQ/1
40 TABLET, EXTENDED RELEASE ORAL
Status: COMPLETED | OUTPATIENT
Start: 2025-03-12 | End: 2025-03-12

## 2025-03-12 RX ADMIN — POTASSIUM CHLORIDE 40 MEQ: 1500 TABLET, EXTENDED RELEASE ORAL at 08:03

## 2025-03-12 RX ADMIN — POTASSIUM CHLORIDE 40 MEQ: 1500 TABLET, EXTENDED RELEASE ORAL at 09:03

## 2025-03-12 RX ADMIN — INSULIN ASPART 3 UNITS: 100 INJECTION, SOLUTION INTRAVENOUS; SUBCUTANEOUS at 08:03

## 2025-03-12 RX ADMIN — THERA TABS 1 TABLET: TAB at 08:03

## 2025-03-12 RX ADMIN — MICONAZOLE NITRATE: 20 OINTMENT TOPICAL at 08:03

## 2025-03-12 RX ADMIN — HYDRALAZINE HYDROCHLORIDE 10 MG: 20 INJECTION, SOLUTION INTRAMUSCULAR; INTRAVENOUS at 12:03

## 2025-03-12 RX ADMIN — HEPARIN SODIUM 5000 UNITS: 5000 INJECTION INTRAVENOUS; SUBCUTANEOUS at 02:03

## 2025-03-12 RX ADMIN — FUROSEMIDE 80 MG: 10 INJECTION, SOLUTION INTRAVENOUS at 08:03

## 2025-03-12 RX ADMIN — DOXYCYCLINE HYCLATE 100 MG: 100 TABLET, COATED ORAL at 08:03

## 2025-03-12 RX ADMIN — INSULIN GLARGINE 8 UNITS: 100 INJECTION, SOLUTION SUBCUTANEOUS at 08:03

## 2025-03-12 RX ADMIN — REMDESIVIR 100 MG: 100 INJECTION, POWDER, LYOPHILIZED, FOR SOLUTION INTRAVENOUS at 09:03

## 2025-03-12 RX ADMIN — OXYCODONE HYDROCHLORIDE AND ACETAMINOPHEN 500 MG: 500 TABLET ORAL at 08:03

## 2025-03-12 RX ADMIN — SACUBITRIL AND VALSARTAN 1 TABLET: 24; 26 TABLET, FILM COATED ORAL at 08:03

## 2025-03-12 RX ADMIN — FUROSEMIDE 80 MG: 10 INJECTION, SOLUTION INTRAVENOUS at 02:03

## 2025-03-12 RX ADMIN — HYDRALAZINE HYDROCHLORIDE 75 MG: 25 TABLET ORAL at 02:03

## 2025-03-12 RX ADMIN — HEPARIN SODIUM 5000 UNITS: 5000 INJECTION INTRAVENOUS; SUBCUTANEOUS at 08:03

## 2025-03-12 RX ADMIN — HYDRALAZINE HYDROCHLORIDE 75 MG: 25 TABLET ORAL at 05:03

## 2025-03-12 RX ADMIN — INSULIN ASPART 3 UNITS: 100 INJECTION, SOLUTION INTRAVENOUS; SUBCUTANEOUS at 12:03

## 2025-03-12 RX ADMIN — HEPARIN SODIUM 5000 UNITS: 5000 INJECTION INTRAVENOUS; SUBCUTANEOUS at 05:03

## 2025-03-12 RX ADMIN — HYDRALAZINE HYDROCHLORIDE 75 MG: 25 TABLET ORAL at 08:03

## 2025-03-12 NOTE — ASSESSMENT & PLAN NOTE
Acute biV heart failure with BLE edema to scrotum. RFs: uncontrolled DM, HTN. Viral illness.  TSH wnl. NO meds at home  - diuresing w Lasix 80 IV q8  - GDMT: discussed BP regimen w/ cardiology given unable to start ACE/ARB (DIONTE), BB (new decompensated CHF), or CCB  (new reduced EF)-- cards recommend starting Bidil until DIONTE resolved/ euvolemic  - cont hydralazine 75 PO q8 for afterload reduction  - start low dose entresto 3/12 pm  - very close to dry weight (see weights below)  - initially gave isosobide, stopped, not needed per cards  - no ACEi or ARB while DIONTE / renal function dynamic  - avoid BB for now per cards recs to admitting HM, start asap  - gen cards consulted, appreciate recs  - initially liberalized fluid restrict to 2L for now given thirst, tachycardia, and infectious w/u as below. Decreased back to 1500cc on 3/9, increased again 3/10 for severe thirst/HA  - continue education, Heart failure Pathway    Weights  3/12 64.8 kg 142 lb 13.7 oz)  3/11 66.4 (146 lb 6 oz)  3/9 65.5 kg (144 lb 6.4 oz) ?accurate  3/8 73.4 kg (161 lb 13.1 oz)  3/7 78.4 kg (172 lb 13.5 oz)  3/6 72.6 kg (160 lb)  DRY weight 142-143 lbs (64.5 - 65 kg)

## 2025-03-12 NOTE — ASSESSMENT & PLAN NOTE
"Cellulitis LE (alonso-wounds 2/2 edema). erythema, possible scant purulent drainage from wound of RLE  - afebrile without leukocytosis  - cont doxy 100mg BID  - f/u wound care consult  - cont Doxy 100 BID for now   - Tachycardia and "Shivers" reported 3/7- possible rigors - f/u BCx x 2 - ngtd  - Continue to monitor closely w low threshold for broadening ABx to Vanc/CTX.  "

## 2025-03-12 NOTE — SUBJECTIVE & OBJECTIVE
Interval History: UOP 4.525L, Net: -3.3L, and Since Admission -10.37 L. Still with volume to mid-shin.     ROS  Objective:     Vital Signs (Most Recent):  Temp: 98.4 °F (36.9 °C) (03/12/25 0753)  Pulse: (!) 112 (03/12/25 1130)  Resp: 20 (03/12/25 0753)  BP: 132/80 (03/12/25 0753)  SpO2: 97 % (03/12/25 0753) Vital Signs (24h Range):  Temp:  [97.5 °F (36.4 °C)-98.4 °F (36.9 °C)] 98.4 °F (36.9 °C)  Pulse:  [110-120] 112  Resp:  [17-20] 20  SpO2:  [95 %-97 %] 97 %  BP: (128-167)/() 132/80     Weight: 64.8 kg (142 lb 13.7 oz)  Body mass index is 26.13 kg/m².     SpO2: 97 %         Intake/Output Summary (Last 24 hours) at 3/12/2025 1137  Last data filed at 3/12/2025 1042  Gross per 24 hour   Intake 1082.46 ml   Output 5475 ml   Net -4392.54 ml       Lines/Drains/Airways       Peripheral Intravenous Line  Duration                  Peripheral IV - Single Lumen 03/06/25 2209 20 G Right Antecubital 5 days                       Physical Exam  Vitals and nursing note reviewed.   Constitutional:       Appearance: Normal appearance.   HENT:      Head: Normocephalic and atraumatic.   Cardiovascular:      Rate and Rhythm: Normal rate and regular rhythm.      Heart sounds: Normal heart sounds.   Pulmonary:      Effort: Pulmonary effort is normal.      Breath sounds: Normal breath sounds.   Abdominal:      General: Abdomen is flat.      Tenderness: There is no abdominal tenderness. There is no guarding or rebound.   Musculoskeletal:         General: Swelling present.      Right lower leg: Edema present.      Left lower leg: Edema present.   Neurological:      General: No focal deficit present.      Mental Status: He is alert and oriented to person, place, and time. Mental status is at baseline.   Psychiatric:         Mood and Affect: Mood normal.         Behavior: Behavior normal.            Significant Labs: All pertinent lab results from the last 24 hours have been reviewed.    Significant Imaging:  All pertinent imaging  results from the last 24 hours have been reviewed.

## 2025-03-12 NOTE — PROGRESS NOTES
"[facility]  Progress Note    Patient Name: Cecil Clark  MRN: [id number]  Patient Class: IP- Inpatient   Admission Date: 3/6/2025  Length of Stay: 6 days  Attending Physician: Nadiya Dale MD  Primary Care Provider: Mi, Primary Doctor        Subjective     Principal Problem:New onset of congestive heart failure        HPI:  Cecil Clark is a 50 y.o. male with a PMHx of CVA x2, DM, HTN who [place] [facility] [place]ness of breath and anasarca. Patient reports worsening swelling beginning in just his feet but now extending to his bilateral lower extremities and into his thighs/ hips for the past few weeks. He now has swelling into his scrotum/ penis. Endorses associated shortness of breath, worse with exertion for the past few days. He's only urinated a very small amount over the last month. Denies any chest pain. He also reports open blisters formed on his lower extremities about 2 weeks ago. He's been "debriding" the wounds with tweezers, peroxide, and alcohol to try to clean the wounds, but now has yellow colored drainage and surrounding pain to the wound on the RLE. Denies fever, chills, N/V, abdominal pain or syncope. Patient hasn't taken any of his home medications in about 2 years. Does occasionally take insulin when he eats "big meals", last took insulin around Shane time.     ED: tachycardic to  and hypertensive to /125. Troponin normal. BNP elevated at 1427. CXR with small bilateral pleural effusions. Bedside echo showed moderately reduced ejection fraction with trace pericardial effusion, no significant right heart strain. Given lasix 40mg IVP.     Overview/Hospital Course:  Brief HPI:  50M w HTN, DM2 uncontrolled (10.8) w neuro manifestations,  CVA x 2 (R thalamic ICH 2/2 HTN 2021; R MCA stroke '23), nonadherence/no meds in 2 years, with recent viral illness 12/2024 (not tested for flu or covid), who [place] [facility] [place] SOB and anasarca, w " "worsening swelling starting in his feet and now extending to his bilateral lower extremities up to the thighs/hips and scrotum x months.  SOB is worse with exertion.  He's only urinated a very small amount over the last month. He also reports open blisters on legs, "debriding"wounds with tweezers, peroxide, and alcohol, and now w yellow colored drainage and pain to the wound on the RLE. No CP/palp/F/C/N/V/abd pain.  Does occasionally take insulin when he eats "big meals," last took around Shane time. No EtOH or drugs. In ED: tachy 118 and hypertensive 194/125. Troponin normal. BNP elevated at 1427. CXR with small bilateral pleural effusions. Bedside echo showed moderately reduced ejection fraction with trace pericardial effusion, no significant right heart strain. Given lasix 40mg IV.     [place] [facility] [place]  Diuresing on Lasix 40 IV q12.  Possible DIONTE, Cr 2.0 (unknown baseline and last labs 1.5 yrs ago), so avoided RAAS, also avoided BB initially in new onset CHF, started bidil (hyrdal 20 PO q8 and isosorbide 25 PO q8). TTE with biV failure.  TSH wnl. Started Doxy PO for LE wounds with surrounding cellulitis.  Reported shaking chills- BCx x 2 ngtd.  Wound care consulted, appreciate recs. Diuresing okay but not at goal -2-3L/day so upped Lasix to 80 IV BID (from 40 BID) per cards on 3/8.  Next day, IVC 1.7 cm, <50% collapsible, c/w CVP of 8.  Continued Lasix, stopped the isosobide and incerased hydralazine to 50 PO q8 for better afterload reduction.  Upped Lasix again to 80 IV q8 on 3/10, c/o severe HA 2/2 severe thirst (with dry MM) so liberalized fluid restrict temporarily    Dispo- after d/c, outpt PET for ischemic eval        Interval History: COVID, on RDV Day 2/3. Still diuresing great, net -3.3L.  SBP 180s-200s around noon, hydral IV x 1 given initially not much response then improved to SBP 150s.  Starting entresto low dose tonight. Pharmacy price check- entresto free for one month (not sure about cost " beyond that).     Review of Systems   All other systems reviewed and are negative.    Objective:     Vital Signs (Most Recent):  Temp: 98.4 °F (36.9 °C) (03/12/25 0753)  Pulse: (!) 114 (03/12/25 0753)  Resp: 20 (03/12/25 0753)  BP: 132/80 (03/12/25 0753)  SpO2: 97 % (03/12/25 0753) Vital Signs (24h Range):  Temp:  [97.5 °F (36.4 °C)-98.4 °F (36.9 °C)] 98.4 °F (36.9 °C)  Pulse:  [108-120] 114  Resp:  [17-20] 20  SpO2:  [95 %-97 %] 97 %  BP: (128-167)/() 132/80     Weight: 64.8 kg (142 lb 13.7 oz)  Body mass index is 26.13 kg/m².    Intake/Output Summary (Last 24 hours) at 3/12/2025 0833  Last data filed at 3/12/2025 0605  Gross per 24 hour   Intake 1202.46 ml   Output 4525 ml   Net -3322.54 ml         Physical Exam  Vitals and nursing note reviewed.   Constitutional:       General: He is not in acute distress.     Appearance: He is well-developed.   HENT:      Head: Normocephalic and atraumatic.      Mouth/Throat:      Pharynx: No oropharyngeal exudate.   Eyes:      General: No scleral icterus.     Conjunctiva/sclera: Conjunctivae normal.   Cardiovascular:      Rate and Rhythm: Normal rate and regular rhythm.      Heart sounds: Normal heart sounds.   Pulmonary:      Effort: Pulmonary effort is normal. No respiratory distress.      Breath sounds: Rales present. No wheezing.      Comments: Very mild crackles to BLL. Breathing comfortably on RA  Abdominal:      General: Bowel sounds are normal. There is no distension.      Palpations: Abdomen is soft.      Tenderness: There is no abdominal tenderness.   Musculoskeletal:         General: No tenderness. Normal range of motion.      Cervical back: Normal range of motion and neck supple.      Right lower leg: Edema present.      Left lower leg: Edema present.      Comments: 3+ pitting edema up to thigh/hip area   Lymphadenopathy:      Cervical: No cervical adenopathy.   Skin:     General: Skin is warm and dry.      Capillary Refill: Capillary refill takes less than 2  seconds.      Findings: No rash.   Neurological:      Mental Status: He is alert and oriented to person, place, and time.      Cranial Nerves: No cranial nerve deficit.      Sensory: No sensory deficit.      Coordination: Coordination normal.   Psychiatric:         Behavior: Behavior normal.         Thought Content: Thought content normal.         Judgment: Judgment normal.               Significant Labs: All pertinent labs within the past 24 hours have been reviewed.    Significant Imaging: I have reviewed all pertinent imaging results/findings within the past 24 hours.  Assessment & Plan  New onset of congestive heart failure  Acute biV heart failure with BLE edema to scrotum. RFs: uncontrolled DM, HTN. Viral illness.  TSH wnl. NO meds at home  - diuresing w Lasix 80 IV q8  - GDMT: discussed BP regimen w/ cardiology given unable to start ACE/ARB (DIONTE), BB (new decompensated CHF), or CCB  (new reduced EF)-- cards recommend starting Bidil until DIONTE resolved/ euvolemic  - cont hydralazine 75 PO q8 for afterload reduction  - start low dose entresto 3/12 pm  - very close to dry weight (see weights below)  - initially gave isosobide, stopped, not needed per cards  - no ACEi or ARB while DIONTE / renal function dynamic  - avoid BB for now per cards recs to admitting [facility], start asap  - gen cards consulted, appreciate recs  - initially liberalized fluid restrict to 2L for now given thirst, tachycardia, and infectious w/u as below. Decreased back to 1500cc on 3/9, increased again 3/10 for severe thirst/HA  - continue education, Heart failure Pathway    Weights  3/12 64.8 kg 142 lb 13.7 oz)  3/11 66.4 (146 lb 6 oz)  3/9 65.5 kg (144 lb 6.4 oz) ?accurate  3/8 73.4 kg (161 lb 13.1 oz)  3/7 78.4 kg (172 lb 13.5 oz)  3/6 72.6 kg (160 lb)  DRY weight 142-143 lbs (64.5 - 65 kg)  Essential hypertension  See CHF above  COVID-19 virus infection  RDV Day 2/3  DIONTE (acute kidney injury)  DIONTE vs DIONTE on CKD vs CKD. Suspect cardiorenal. No  "prior labs sine 10/2023, a year and a half ago, so baseline unclear- Cr 1.0 at that time.  UA with 5 RBCs, no casts  - f/u FeNa (urine studies "add-on" to UA done in ED prior to Lasix)  - if no improvement or worsening, renal U/SCr 2.0, baseline ~1.1  - IV diuresis as above  - avoid nephrotoxins, renally dose meds  - trend daily BMP  Cellulitis of right lower extremity  Cellulitis LE (alonso-wounds 2/2 edema). erythema, possible scant purulent drainage from wound of RLE  - afebrile without leukocytosis  - cont doxy 100mg BID  - f/u wound care consult  - cont Doxy 100 BID for now   - Tachycardia and "Shivers" reported 3/7- possible rigors - f/u BCx x 2 - ngtd  - Continue to monitor closely w low threshold for broadening ABx to Vanc/CTX.  Type 2 diabetes mellitus with hyperglycemia, without long-term current use of insulin  DM2 uncontrolled. A1C 10.8. Not on any orals or insulin at home  - LDSSI, FSQACHS  - basal insulin 8 units qhs  - avoid insulin stacking in low GFR/DIONTE  - considering Endo consult  VTE Risk Mitigation (From admission, onward)           Ordered     heparin (porcine) injection 5,000 Units  Every 8 hours         03/07/25 0100     IP VTE HIGH RISK PATIENT  Once         03/07/25 0100     Place sequential compression device  Until discontinued         03/07/25 0029                    Discharge Planning   SUZANNE: 3/18/2025     Code Status: Full Code   Medical Readiness for Discharge Date:   Discharge Plan A: Home with family                        Nadiya Dale MD  Department of Hospital Medicine   [facility] - Cardiology Stepdown    "

## 2025-03-12 NOTE — PLAN OF CARE
Plan of care reviewed with patient. Patient is AOX4 and VS stable. Patient remained free of falls and trauma, fall precautions are in place. Patient is ambulating independently. Patient has no complaints of pain. Patient has no questions at this time. Wheels are locked and the bed is in lowest position. The call bell is within reach. Telemetry is on.         Problem: Adult Inpatient Plan of Care  Goal: Plan of Care Review  Outcome: Progressing  Flowsheets (Taken 3/12/2025 0417)  Plan of Care Reviewed With: patient  Goal: Optimal Comfort and Wellbeing  Outcome: Progressing  Intervention: Monitor Pain and Promote Comfort  Flowsheets (Taken 3/12/2025 0417)  Pain Management Interventions:   quiet environment facilitated   relaxation techniques promoted   pillow support provided   care clustered  Intervention: Provide Person-Centered Care  Flowsheets (Taken 3/12/2025 0417)  Trust Relationship/Rapport:   care explained   choices provided   questions encouraged   questions answered     Problem: Skin Injury Risk Increased  Goal: Skin Health and Integrity  Outcome: Progressing  Intervention: Optimize Skin Protection  Flowsheets (Taken 3/12/2025 0417)  Pressure Reduction Techniques: frequent weight shift encouraged  Pressure Reduction Devices: positioning supports utilized  Skin Protection:   transparent dressing maintained   incontinence pads utilized  Activity Management: Ambulated -L4  Head of Bed (HOB) Positioning: HOB elevated  Intervention: Promote and Optimize Oral Intake  Flowsheets (Taken 3/12/2025 0417)  Oral Nutrition Promotion:   physical activity promoted   rest periods promoted   social interaction promoted  Nutrition Interventions: food preferences provided

## 2025-03-12 NOTE — HOSPITAL COURSE
Admitted for new-onset heart failure. ECHO with global hypokinesis and EF 25-30%. Will plan for ischemic w/u outpatient. Diuresed with IV Lasix 40 BID, increase to IV Lasix 80 BID, and then increased to IV Lasix 80 TID.

## 2025-03-12 NOTE — NURSING
Bp 178/119 map 143.  PRN 10mg Hydralazine administered.  Ending bp 153/92 map 119. ALISIA Dale MD notified. No orders given at this time, plan of care ongoing.

## 2025-03-12 NOTE — ASSESSMENT & PLAN NOTE
"Pt presented with SOB and anasarca. Reported that it has been going on for few weeks now. Also reported decrease urinary output. Patient has Systolic (HFrEF) heart failure that is Acute. On presentation their CHF was decompensated. Evidence of decompensated CHF on presentation includes: edema, elevated JVD, weight gain, and orthopnea. The etiology of their decompensation is likely medication non compliance . Most recent BNP and echo results are listed below.  No results for input(s): "BNP" in the last 72 hours.      Intake/Output Summary (Last 24 hours) at 3/12/2025 1139  Last data filed at 3/12/2025 1042  Gross per 24 hour   Intake 1082.46 ml   Output 5475 ml   Net -4392.54 ml         Latest ECHO  Results for orders placed during the hospital encounter of 03/06/25    Echo    Interpretation Summary    Left Ventricle: The left ventricle is normal in size. Ventricular mass is normal. Normal wall thickness. There is concentric remodeling. Global hypokinesis present. There is severely reduced systolic function with a visually estimated ejection fraction of 25 - 30%. Global longitudinal strain is -8.3%. Global longitudinal strain is reduced. There is diastolic dysfunction.    Right Ventricle: The right ventricle is normal in size. Wall thickness is normal. Right ventricle wall motion has global hypokinesis. Systolic function is moderately to severely reduced.    Mitral Valve: There is mild regurgitation.    Pulmonary Artery: The estimated pulmonary artery systolic pressure is 42 mmHg.    IVC/SVC: Elevated venous pressure at 15 mmHg.    Current Heart Failure Medications  hydrALAZINE injection 10 mg, Every 6 hours PRN, Intravenous  furosemide injection 80 mg, 3 times daily, Intravenous  hydrALAZINE tablet 75 mg, Every 8 hours, Oral    - NHYA Class III  - ACC/AHA stage C      Recommendations   - Continue Lasix 80 mg IV TID  - Goal UOP of 2-3L   - Cr likely his baseline. Start Entresto tonight (obtain price check to ensure " patient can afford on discharge)  - If not able to afford, can start ARB (Valsartan)  - Monitor strict I&Os and daily weights.    - Place on telemetry  - Low sodium diet  - Place on fluid restriction of 1.5 L.   - Monitor electrolytes and supplement as needed   - Will need outpatient PET  - Importance of medication compliance should be discussed

## 2025-03-12 NOTE — SUBJECTIVE & OBJECTIVE
Interval History: COVID, on RDV Day 2/3. Still diuresing great, net -3.3L.  SBP 180s-200s around noon, hydral IV x 1 given initially not much response then improved to SBP 150s.  Starting entresto low dose tonight. Pharmacy price check- entresto free for one month (not sure about cost beyond that).     Review of Systems   All other systems reviewed and are negative.    Objective:     Vital Signs (Most Recent):  Temp: 98.4 °F (36.9 °C) (03/12/25 0753)  Pulse: (!) 114 (03/12/25 0753)  Resp: 20 (03/12/25 0753)  BP: 132/80 (03/12/25 0753)  SpO2: 97 % (03/12/25 0753) Vital Signs (24h Range):  Temp:  [97.5 °F (36.4 °C)-98.4 °F (36.9 °C)] 98.4 °F (36.9 °C)  Pulse:  [108-120] 114  Resp:  [17-20] 20  SpO2:  [95 %-97 %] 97 %  BP: (128-167)/() 132/80     Weight: 64.8 kg (142 lb 13.7 oz)  Body mass index is 26.13 kg/m².    Intake/Output Summary (Last 24 hours) at 3/12/2025 0833  Last data filed at 3/12/2025 0605  Gross per 24 hour   Intake 1202.46 ml   Output 4525 ml   Net -3322.54 ml         Physical Exam  Vitals and nursing note reviewed.   Constitutional:       General: He is not in acute distress.     Appearance: He is well-developed.   HENT:      Head: Normocephalic and atraumatic.      Mouth/Throat:      Pharynx: No oropharyngeal exudate.   Eyes:      General: No scleral icterus.     Conjunctiva/sclera: Conjunctivae normal.   Cardiovascular:      Rate and Rhythm: Normal rate and regular rhythm.      Heart sounds: Normal heart sounds.   Pulmonary:      Effort: Pulmonary effort is normal. No respiratory distress.      Breath sounds: Rales present. No wheezing.      Comments: Very mild crackles to BLL. Breathing comfortably on RA  Abdominal:      General: Bowel sounds are normal. There is no distension.      Palpations: Abdomen is soft.      Tenderness: There is no abdominal tenderness.   Musculoskeletal:         General: No tenderness. Normal range of motion.      Cervical back: Normal range of motion and neck supple.       Right lower leg: Edema present.      Left lower leg: Edema present.      Comments: 3+ pitting edema up to thigh/hip area   Lymphadenopathy:      Cervical: No cervical adenopathy.   Skin:     General: Skin is warm and dry.      Capillary Refill: Capillary refill takes less than 2 seconds.      Findings: No rash.   Neurological:      Mental Status: He is alert and oriented to person, place, and time.      Cranial Nerves: No cranial nerve deficit.      Sensory: No sensory deficit.      Coordination: Coordination normal.   Psychiatric:         Behavior: Behavior normal.         Thought Content: Thought content normal.         Judgment: Judgment normal.               Significant Labs: All pertinent labs within the past 24 hours have been reviewed.    Significant Imaging: I have reviewed all pertinent imaging results/findings within the past 24 hours.

## 2025-03-12 NOTE — NURSING
Bp 166/110 map 132. Scheduled dose of 75mg Hydralazine administered. ALISIA Dale MD Notified. Will reassess bp in 45 min. -1 hour. Per MD just keep an eye on the bp. No additional orders given at this time, plan of care ongoing.

## 2025-03-12 NOTE — PROGRESS NOTES
[facility]  Cardiology  Progress Note    Patient Name: Cecil Clark  MRN: [id number]  Admission Date: 3/6/2025  Hospital Length of Stay: 6 days  Code Status: Full Code   Attending Physician: Nadiya Dale MD   Primary Care Physician: Mi, Primary Doctor  Expected Discharge Date: 3/13/2025  Principal Problem:New onset of congestive heart failure    Subjective:     Hospital Course:   Admitted for new-onset heart failure. ECHO with global hypokinesis and EF 25-30%. Will plan for ischemic w/u outpatient. Diuresed with IV Lasix 40 BID, increase to IV Lasix 80 BID, and then increased to IV Lasix 80 TID.     Interval History: UOP 4.525L, Net: -3.3L, and Since Admission -10.37 L. Still with volume to mid-shin.     ROS  Objective:     Vital Signs (Most Recent):  Temp: 98.4 °F (36.9 °C) (03/12/25 0753)  Pulse: (!) 112 (03/12/25 1130)  Resp: 20 (03/12/25 0753)  BP: 132/80 (03/12/25 0753)  SpO2: 97 % (03/12/25 0753) Vital Signs (24h Range):  Temp:  [97.5 °F (36.4 °C)-98.4 °F (36.9 °C)] 98.4 °F (36.9 °C)  Pulse:  [110-120] 112  Resp:  [17-20] 20  SpO2:  [95 %-97 %] 97 %  BP: (128-167)/() 132/80     Weight: 64.8 kg (142 lb 13.7 oz)  Body mass index is 26.13 kg/m².     SpO2: 97 %         Intake/Output Summary (Last 24 hours) at 3/12/2025 1137  Last data filed at 3/12/2025 1042  Gross per 24 hour   Intake 1082.46 ml   Output 5475 ml   Net -4392.54 ml       Lines/Drains/Airways       Peripheral Intravenous Line  Duration                  Peripheral IV - Single Lumen 03/06/25 2209 20 G Right Antecubital 5 days                       Physical Exam  Vitals and nursing note reviewed.   Constitutional:       Appearance: Normal appearance.   HENT:      Head: Normocephalic and atraumatic.   Cardiovascular:      Rate and Rhythm: Normal rate and regular rhythm.      Heart sounds: Normal heart sounds.   Pulmonary:      Effort: Pulmonary effort is normal.      Breath sounds: Normal breath sounds.   Abdominal:      General:  "Abdomen is flat.      Tenderness: There is no abdominal tenderness. There is no guarding or rebound.   Musculoskeletal:         General: Swelling present.      Right lower leg: Edema present.      Left lower leg: Edema present.   Neurological:      General: No focal deficit present.      Mental Status: He is alert and oriented to person, place, and time. Mental status is at baseline.   Psychiatric:         Mood and Affect: Mood normal.         Behavior: Behavior normal.            Significant Labs: All pertinent lab results from the last 24 hours have been reviewed.    Significant Imaging:  All pertinent imaging results from the last 24 hours have been reviewed.   Assessment and Plan:     * New onset of congestive heart failure  Pt presented with SOB and anasarca. Reported that it has been going on for few weeks now. Also reported decrease urinary output. Patient has Systolic (HFrEF) heart failure that is Acute. On presentation their CHF was decompensated. Evidence of decompensated CHF on presentation includes: edema, elevated JVD, weight gain, and orthopnea. The etiology of their decompensation is likely medication non compliance . Most recent BNP and echo results are listed below.  No results for input(s): "BNP" in the last 72 hours.      Intake/Output Summary (Last 24 hours) at 3/12/2025 1139  Last data filed at 3/12/2025 1042  Gross per 24 hour   Intake 1082.46 ml   Output 5475 ml   Net -4392.54 ml         Latest ECHO  Results for orders placed during the hospital encounter of 03/06/25    Echo    Interpretation Summary    Left Ventricle: The left ventricle is normal in size. Ventricular mass is normal. Normal wall thickness. There is concentric remodeling. Global hypokinesis present. There is severely reduced systolic function with a visually estimated ejection fraction of 25 - 30%. Global longitudinal strain is -8.3%. Global longitudinal strain is reduced. There is diastolic dysfunction.    Right Ventricle: The " right ventricle is normal in size. Wall thickness is normal. Right ventricle wall motion has global hypokinesis. Systolic function is moderately to severely reduced.    Mitral Valve: There is mild regurgitation.    Pulmonary Artery: The estimated pulmonary artery systolic pressure is 42 mmHg.    IVC/SVC: Elevated venous pressure at 15 mmHg.    Current Heart Failure Medications  hydrALAZINE injection 10 mg, Every 6 hours PRN, Intravenous  furosemide injection 80 mg, 3 times daily, Intravenous  hydrALAZINE tablet 75 mg, Every 8 hours, Oral    - NHYA Class III  - ACC/AHA stage C      Recommendations   - Continue Lasix 80 mg IV TID  - Goal UOP of 2-3L   - Cr likely his baseline. Start Entresto tonight (obtain price check to ensure patient can afford on discharge)  - If not able to afford, can start ARB (Valsartan)  - Monitor strict I&Os and daily weights.    - Place on telemetry  - Low sodium diet  - Place on fluid restriction of 1.5 L.   - Monitor electrolytes and supplement as needed   - Will need outpatient PET  - Importance of medication compliance should be discussed       Essential hypertension  Patient's blood pressure range in the last 24 hours was: BP  Min: 134/71  Max: 167/111.The patient's inpatient anti-hypertensive regimen is listed below:  Current Antihypertensives  hydrALAZINE injection 10 mg, Every 6 hours PRN, Intravenous  furosemide injection 80 mg, 3 times daily, Intravenous  hydrALAZINE tablet 75 mg, Every 8 hours, Oral    Plan  - Cr likely his baseline. Start Entresto (obtain price check to ensure patient can afford on discharge)  - If not able to afford, can start ARB (Valsartan)  - Continue with Hydralazine 75 mg q 8 and titrate up as needed         VTE Risk Mitigation (From admission, onward)           Ordered     heparin (porcine) injection 5,000 Units  Every 8 hours         03/07/25 0100     IP VTE HIGH RISK PATIENT  Once         03/07/25 0100     Place sequential compression device  Until  discontinued         03/07/25 0029                    Mawadah Samad, MD  Cardiology  [facility]

## 2025-03-13 PROBLEM — I10 ESSENTIAL HYPERTENSION: Status: RESOLVED | Noted: 2021-04-04 | Resolved: 2025-03-13

## 2025-03-13 LAB
ALBUMIN SERPL BCP-MCNC: 2.5 G/DL (ref 3.5–5.2)
ALP SERPL-CCNC: 95 U/L (ref 40–150)
ALT SERPL W/O P-5'-P-CCNC: 29 U/L (ref 10–44)
ANION GAP SERPL CALC-SCNC: 12 MMOL/L (ref 8–16)
AST SERPL-CCNC: 41 U/L (ref 10–40)
BILIRUB SERPL-MCNC: 0.5 MG/DL (ref 0.1–1)
BUN SERPL-MCNC: 45 MG/DL (ref 6–20)
CALCIUM SERPL-MCNC: 7.8 MG/DL (ref 8.7–10.5)
CHLORIDE SERPL-SCNC: 101 MMOL/L (ref 95–110)
CO2 SERPL-SCNC: 20 MMOL/L (ref 23–29)
CREAT SERPL-MCNC: 2 MG/DL (ref 0.5–1.4)
EST. GFR  (NO RACE VARIABLE): 39.4 ML/MIN/1.73 M^2
GLUCOSE SERPL-MCNC: 229 MG/DL (ref 70–110)
MAGNESIUM SERPL-MCNC: 2 MG/DL (ref 1.6–2.6)
PHOSPHATE SERPL-MCNC: 4.2 MG/DL (ref 2.7–4.5)
POCT GLUCOSE: 124 MG/DL (ref 70–110)
POCT GLUCOSE: 189 MG/DL (ref 70–110)
POCT GLUCOSE: 261 MG/DL (ref 70–110)
POCT GLUCOSE: 300 MG/DL (ref 70–110)
POTASSIUM SERPL-SCNC: 3.8 MMOL/L (ref 3.5–5.1)
PROT SERPL-MCNC: 6.2 G/DL (ref 6–8.4)
SODIUM SERPL-SCNC: 133 MMOL/L (ref 136–145)

## 2025-03-13 PROCEDURE — 63600175 PHARM REV CODE 636 W HCPCS: Performed by: STUDENT IN AN ORGANIZED HEALTH CARE EDUCATION/TRAINING PROGRAM

## 2025-03-13 PROCEDURE — 63600175 PHARM REV CODE 636 W HCPCS: Performed by: PHYSICIAN ASSISTANT

## 2025-03-13 PROCEDURE — 27000207 HC ISOLATION

## 2025-03-13 PROCEDURE — 63600175 PHARM REV CODE 636 W HCPCS

## 2025-03-13 PROCEDURE — 25000003 PHARM REV CODE 250

## 2025-03-13 PROCEDURE — 99232 SBSQ HOSP IP/OBS MODERATE 35: CPT | Mod: ,,, | Performed by: STUDENT IN AN ORGANIZED HEALTH CARE EDUCATION/TRAINING PROGRAM

## 2025-03-13 PROCEDURE — 36415 COLL VENOUS BLD VENIPUNCTURE: CPT | Performed by: HOSPITALIST

## 2025-03-13 PROCEDURE — 20600001 HC STEP DOWN PRIVATE ROOM

## 2025-03-13 PROCEDURE — 84100 ASSAY OF PHOSPHORUS: CPT | Performed by: HOSPITALIST

## 2025-03-13 PROCEDURE — 25000003 PHARM REV CODE 250: Performed by: PHYSICIAN ASSISTANT

## 2025-03-13 PROCEDURE — 63600175 PHARM REV CODE 636 W HCPCS: Mod: JZ,TB | Performed by: HOSPITALIST

## 2025-03-13 PROCEDURE — 25000003 PHARM REV CODE 250: Performed by: HOSPITALIST

## 2025-03-13 PROCEDURE — 25000003 PHARM REV CODE 250: Performed by: STUDENT IN AN ORGANIZED HEALTH CARE EDUCATION/TRAINING PROGRAM

## 2025-03-13 PROCEDURE — 80053 COMPREHEN METABOLIC PANEL: CPT | Performed by: HOSPITALIST

## 2025-03-13 PROCEDURE — 83735 ASSAY OF MAGNESIUM: CPT | Performed by: PHYSICIAN ASSISTANT

## 2025-03-13 RX ORDER — FLUTICASONE PROPIONATE 50 MCG
2 SPRAY, SUSPENSION (ML) NASAL DAILY
Status: DISCONTINUED | OUTPATIENT
Start: 2025-03-13 | End: 2025-03-14 | Stop reason: HOSPADM

## 2025-03-13 RX ORDER — POTASSIUM CHLORIDE 20 MEQ/1
40 TABLET, EXTENDED RELEASE ORAL ONCE
Status: COMPLETED | OUTPATIENT
Start: 2025-03-13 | End: 2025-03-13

## 2025-03-13 RX ORDER — FUROSEMIDE 40 MG/1
40 TABLET ORAL DAILY
Status: DISCONTINUED | OUTPATIENT
Start: 2025-03-13 | End: 2025-03-13

## 2025-03-13 RX ORDER — CARVEDILOL 3.12 MG/1
3.12 TABLET ORAL 2 TIMES DAILY
Status: DISCONTINUED | OUTPATIENT
Start: 2025-03-13 | End: 2025-03-14 | Stop reason: HOSPADM

## 2025-03-13 RX ORDER — FUROSEMIDE 40 MG/1
40 TABLET ORAL 2 TIMES DAILY
Status: DISCONTINUED | OUTPATIENT
Start: 2025-03-14 | End: 2025-03-14 | Stop reason: HOSPADM

## 2025-03-13 RX ORDER — HYDRALAZINE HYDROCHLORIDE 25 MG/1
25 TABLET, FILM COATED ORAL EVERY 8 HOURS
Status: DISCONTINUED | OUTPATIENT
Start: 2025-03-13 | End: 2025-03-14

## 2025-03-13 RX ORDER — INSULIN ASPART 100 [IU]/ML
1-10 INJECTION, SOLUTION INTRAVENOUS; SUBCUTANEOUS
Status: DISCONTINUED | OUTPATIENT
Start: 2025-03-13 | End: 2025-03-14 | Stop reason: HOSPADM

## 2025-03-13 RX ORDER — INSULIN GLARGINE 100 [IU]/ML
10 INJECTION, SOLUTION SUBCUTANEOUS NIGHTLY
Status: DISCONTINUED | OUTPATIENT
Start: 2025-03-13 | End: 2025-03-14 | Stop reason: HOSPADM

## 2025-03-13 RX ADMIN — CARVEDILOL 3.12 MG: 3.12 TABLET, FILM COATED ORAL at 09:03

## 2025-03-13 RX ADMIN — HYDRALAZINE HYDROCHLORIDE 25 MG: 25 TABLET ORAL at 09:03

## 2025-03-13 RX ADMIN — ACETAMINOPHEN 1000 MG: 500 TABLET ORAL at 09:03

## 2025-03-13 RX ADMIN — POTASSIUM CHLORIDE 40 MEQ: 1500 TABLET, EXTENDED RELEASE ORAL at 09:03

## 2025-03-13 RX ADMIN — DOXYCYCLINE HYCLATE 100 MG: 100 TABLET, COATED ORAL at 09:03

## 2025-03-13 RX ADMIN — INSULIN GLARGINE 10 UNITS: 100 INJECTION, SOLUTION SUBCUTANEOUS at 09:03

## 2025-03-13 RX ADMIN — SACUBITRIL AND VALSARTAN 1 TABLET: 24; 26 TABLET, FILM COATED ORAL at 12:03

## 2025-03-13 RX ADMIN — HEPARIN SODIUM 5000 UNITS: 5000 INJECTION INTRAVENOUS; SUBCUTANEOUS at 05:03

## 2025-03-13 RX ADMIN — BENZONATATE 100 MG: 100 CAPSULE ORAL at 09:03

## 2025-03-13 RX ADMIN — HEPARIN SODIUM 5000 UNITS: 5000 INJECTION INTRAVENOUS; SUBCUTANEOUS at 09:03

## 2025-03-13 RX ADMIN — SACUBITRIL AND VALSARTAN 1 TABLET: 24; 26 TABLET, FILM COATED ORAL at 09:03

## 2025-03-13 RX ADMIN — MICONAZOLE NITRATE: 20 OINTMENT TOPICAL at 09:03

## 2025-03-13 RX ADMIN — HYDRALAZINE HYDROCHLORIDE 75 MG: 25 TABLET ORAL at 05:03

## 2025-03-13 RX ADMIN — HYDRALAZINE HYDROCHLORIDE 25 MG: 25 TABLET ORAL at 02:03

## 2025-03-13 RX ADMIN — FUROSEMIDE 80 MG: 10 INJECTION, SOLUTION INTRAVENOUS at 02:03

## 2025-03-13 RX ADMIN — INSULIN ASPART 6 UNITS: 100 INJECTION, SOLUTION INTRAVENOUS; SUBCUTANEOUS at 12:03

## 2025-03-13 RX ADMIN — OXYCODONE HYDROCHLORIDE AND ACETAMINOPHEN 500 MG: 500 TABLET ORAL at 09:03

## 2025-03-13 RX ADMIN — HEPARIN SODIUM 5000 UNITS: 5000 INJECTION INTRAVENOUS; SUBCUTANEOUS at 02:03

## 2025-03-13 RX ADMIN — THERA TABS 1 TABLET: TAB at 09:03

## 2025-03-13 RX ADMIN — REMDESIVIR 100 MG: 100 INJECTION, POWDER, LYOPHILIZED, FOR SOLUTION INTRAVENOUS at 09:03

## 2025-03-13 RX ADMIN — INSULIN ASPART 3 UNITS: 100 INJECTION, SOLUTION INTRAVENOUS; SUBCUTANEOUS at 09:03

## 2025-03-13 RX ADMIN — MUPIROCIN: 20 OINTMENT TOPICAL at 09:03

## 2025-03-13 RX ADMIN — MUPIROCIN: 20 OINTMENT TOPICAL at 08:03

## 2025-03-13 RX ADMIN — FUROSEMIDE 80 MG: 10 INJECTION, SOLUTION INTRAVENOUS at 08:03

## 2025-03-13 RX ADMIN — OXYCODONE HYDROCHLORIDE AND ACETAMINOPHEN 500 MG: 500 TABLET ORAL at 08:03

## 2025-03-13 NOTE — SUBJECTIVE & OBJECTIVE
Interval History:     Review of Systems  Objective:     Vital Signs (Most Recent):  Temp: 98 °F (36.7 °C) (03/13/25 1153)  Pulse: 106 (03/13/25 1417)  Resp: 20 (03/13/25 1153)  BP: 138/80 (03/13/25 1417)  SpO2: 96 % (03/13/25 1153) Vital Signs (24h Range):  Temp:  [97.6 °F (36.4 °C)-99.2 °F (37.3 °C)] 98 °F (36.7 °C)  Pulse:  [101-118] 106  Resp:  [18-20] 20  SpO2:  [95 %-98 %] 96 %  BP: (106-158)/() 138/80     Weight: 62.5 kg (137 lb 12.6 oz)  Body mass index is 25.2 kg/m².    Intake/Output Summary (Last 24 hours) at 3/13/2025 1448  Last data filed at 3/13/2025 1250  Gross per 24 hour   Intake 1180 ml   Output 1850 ml   Net -670 ml         Physical Exam  Constitutional:       General: He is not in acute distress.  Cardiovascular:      Rate and Rhythm: Normal rate.   Pulmonary:      Effort: Pulmonary effort is normal. No respiratory distress.      Breath sounds: No wheezing.   Abdominal:      General: Bowel sounds are normal. There is no distension.      Palpations: Abdomen is soft.      Tenderness: There is no abdominal tenderness.   Musculoskeletal:      Right lower leg: Edema present.      Left lower leg: Edema present.   Neurological:      Mental Status: He is alert and oriented to person, place, and time. Mental status is at baseline.               Significant Labs: All pertinent labs within the past 24 hours have been reviewed.    Significant Imaging: I have reviewed all pertinent imaging results/findings within the past 24 hours.

## 2025-03-13 NOTE — SUBJECTIVE & OBJECTIVE
Interval History: Cr 2. Output 2.8L. Net -480 ml. Since Admit -10.85 L.     ROS  Objective:     Vital Signs (Most Recent):  Temp: 97.8 °F (36.6 °C) (03/13/25 1517)  Pulse: 107 (03/13/25 1517)  Resp: 20 (03/13/25 1517)  BP: (!) 142/89 (03/13/25 1517)  SpO2: 96 % (03/13/25 1517) Vital Signs (24h Range):  Temp:  [97.6 °F (36.4 °C)-99.2 °F (37.3 °C)] 97.8 °F (36.6 °C)  Pulse:  [101-118] 107  Resp:  [18-20] 20  SpO2:  [95 %-98 %] 96 %  BP: (106-148)/(58-98) 142/89     Weight: 62.5 kg (137 lb 12.6 oz)  Body mass index is 25.2 kg/m².     SpO2: 96 %         Intake/Output Summary (Last 24 hours) at 3/13/2025 1734  Last data filed at 3/13/2025 1250  Gross per 24 hour   Intake 1180 ml   Output 1850 ml   Net -670 ml       Lines/Drains/Airways       Peripheral Intravenous Line  Duration                  Peripheral IV - Single Lumen 03/12/25 1845 20 G Posterior;Right Wrist <1 day                       Physical Exam  Vitals and nursing note reviewed.   Constitutional:       Appearance: Normal appearance.   HENT:      Head: Normocephalic and atraumatic.   Cardiovascular:      Rate and Rhythm: Normal rate and regular rhythm.      Heart sounds: Normal heart sounds.   Pulmonary:      Effort: Pulmonary effort is normal.      Breath sounds: Normal breath sounds.   Abdominal:      General: Abdomen is flat.      Tenderness: There is no abdominal tenderness. There is no guarding or rebound.   Musculoskeletal:         General: No swelling.      Right lower leg: No edema.      Left lower leg: No edema.   Neurological:      General: No focal deficit present.      Mental Status: He is alert and oriented to person, place, and time. Mental status is at baseline.   Psychiatric:         Mood and Affect: Mood normal.         Behavior: Behavior normal.            Significant Labs: All pertinent lab results from the last 24 hours have been reviewed.    Significant Imaging:  All pertinent imaging results from the last 24 hours have been reviewed.

## 2025-03-13 NOTE — PLAN OF CARE
Plan of care reviewed with patient. Patient is AOX4 and VS stable on room air. Patient remained free of falls and trauma, fall precautions are in place. Patient is ambulating independently. Patient has no complaints of pain. Blood sugar elevated at beginning of shift (354), sliding scale insulin given. Patient has no questions at this time. Wheels are locked and the bed is in lowest position. The call bell is within reach. Telemetry is on.         Problem: Skin Injury Risk Increased  Goal: Skin Health and Integrity  Outcome: Progressing  Intervention: Optimize Skin Protection  Flowsheets (Taken 3/13/2025 0400)  Pressure Reduction Techniques: frequent weight shift encouraged  Pressure Reduction Devices: positioning supports utilized  Skin Protection:   transparent dressing maintained   incontinence pads utilized  Activity Management: Ambulated -L4  Head of Bed (HOB) Positioning: HOB flat  Intervention: Promote and Optimize Oral Intake  Flowsheets (Taken 3/13/2025 0400)  Oral Nutrition Promotion:   physical activity promoted   rest periods promoted   social interaction promoted   adaptive equipment use encouraged  Nutrition Interventions: food preferences provided     Problem: Fall Injury Risk  Goal: Absence of Fall and Fall-Related Injury  Outcome: Progressing  Intervention: Identify and Manage Contributors  Flowsheets (Taken 3/13/2025 0400)  Self-Care Promotion:   independence encouraged   BADL personal objects within reach   adaptive equipment use encouraged  Medication Review/Management: medications reviewed  Intervention: Promote Injury-Free Environment  Flowsheets (Taken 3/13/2025 0400)  Safety Promotion/Fall Prevention:   assistive device/personal item within reach   commode/urinal/bedpan at bedside   Fall Risk reviewed with patient/family   Fall Risk signage in place   medications reviewed   instructed to call staff for mobility   nonskid shoes/socks when out of bed   patient expresses understanding of fall  risk and prevention   side rails raised x 2   room near unit station     Problem: Diabetes Comorbidity  Goal: Blood Glucose Level Within Targeted Range  Outcome: Not Progressing  Intervention: Monitor and Manage Glycemia  Flowsheets (Taken 3/13/2025 0400)  Glycemic Management: blood glucose monitored

## 2025-03-13 NOTE — ASSESSMENT & PLAN NOTE
Admitted new-onset heart failure. ECHO with global hypokinesis and EF 25-30%. on IV Lasix 80 TID. Net negative 11L since admit  Cardiology plans for ischemic w/u outpatient PET stress.  Add GDMT as tolerated. Titrate down hydralazine to allow room for GDMT. DIONTE at 2.     Weights  3/12 64.8 kg 142 lb 13.7 oz)  3/11 66.4 (146 lb 6 oz)  3/9 65.5 kg (144 lb 6.4 oz) ?accurate  3/8 73.4 kg (161 lb 13.1 oz)  3/7 78.4 kg (172 lb 13.5 oz)  3/6 72.6 kg (160 lb)  DRY weight 142-143 lbs (64.5 - 65 kg)

## 2025-03-13 NOTE — ASSESSMENT & PLAN NOTE
DM2 uncontrolled. A1C 10.8. Not on any orals or insulin at home  - MDSSI, FSQACHS  - basal insulin 10 units qhs  -

## 2025-03-13 NOTE — PROGRESS NOTES
[facility]  Cardiology  Progress Note    Patient Name: Cecil Clark  MRN: [id number]  Admission Date: 3/6/2025  Hospital Length of Stay: 7 days  Code Status: Full Code   Attending Physician: Celeste Long,*   Primary Care Physician: Mi, Primary Doctor  Expected Discharge Date: 3/18/2025  Principal Problem:New onset of congestive heart failure    Subjective:     Hospital Course:   Admitted for new-onset heart failure. ECHO with global hypokinesis and EF 25-30%. Will plan for ischemic w/u outpatient. Diuresed with IV Lasix 40 BID, increase to IV Lasix 80 BID, and then increased to IV Lasix 80 TID.     Interval History: Cr 2. Output 2.8L. Net -480 ml. Since Admit -10.85 L.     ROS  Objective:     Vital Signs (Most Recent):  Temp: 97.8 °F (36.6 °C) (03/13/25 1517)  Pulse: 107 (03/13/25 1517)  Resp: 20 (03/13/25 1517)  BP: (!) 142/89 (03/13/25 1517)  SpO2: 96 % (03/13/25 1517) Vital Signs (24h Range):  Temp:  [97.6 °F (36.4 °C)-99.2 °F (37.3 °C)] 97.8 °F (36.6 °C)  Pulse:  [101-118] 107  Resp:  [18-20] 20  SpO2:  [95 %-98 %] 96 %  BP: (106-148)/(58-98) 142/89     Weight: 62.5 kg (137 lb 12.6 oz)  Body mass index is 25.2 kg/m².     SpO2: 96 %         Intake/Output Summary (Last 24 hours) at 3/13/2025 1734  Last data filed at 3/13/2025 1250  Gross per 24 hour   Intake 1180 ml   Output 1850 ml   Net -670 ml       Lines/Drains/Airways       Peripheral Intravenous Line  Duration                  Peripheral IV - Single Lumen 03/12/25 1845 20 G Posterior;Right Wrist <1 day                       Physical Exam  Vitals and nursing note reviewed.   Constitutional:       Appearance: Normal appearance.   HENT:      Head: Normocephalic and atraumatic.   Cardiovascular:      Rate and Rhythm: Normal rate and regular rhythm.      Heart sounds: Normal heart sounds.   Pulmonary:      Effort: Pulmonary effort is normal.      Breath sounds: Normal breath sounds.   Abdominal:      General: Abdomen is flat.       "Tenderness: There is no abdominal tenderness. There is no guarding or rebound.   Musculoskeletal:         General: No swelling.      Right lower leg: No edema.      Left lower leg: No edema.   Neurological:      General: No focal deficit present.      Mental Status: He is alert and oriented to person, place, and time. Mental status is at baseline.   Psychiatric:         Mood and Affect: Mood normal.         Behavior: Behavior normal.            Significant Labs: All pertinent lab results from the last 24 hours have been reviewed.    Significant Imaging:  All pertinent imaging results from the last 24 hours have been reviewed.   Assessment and Plan:     * New onset of congestive heart failure  Pt presented with SOB and anasarca. Reported that it has been going on for few weeks now. Also reported decrease urinary output. Patient has Systolic (HFrEF) heart failure that is Acute. On presentation their CHF was decompensated. Evidence of decompensated CHF on presentation includes: edema, elevated JVD, weight gain, and orthopnea. The etiology of their decompensation is likely medication non compliance . Most recent BNP and echo results are listed below.  No results for input(s): "BNP" in the last 72 hours.      Intake/Output Summary (Last 24 hours) at 3/13/2025 1737  Last data filed at 3/13/2025 1250  Gross per 24 hour   Intake 1180 ml   Output 1850 ml   Net -670 ml         Latest ECHO  Results for orders placed during the hospital encounter of 03/06/25    Echo    Interpretation Summary    Left Ventricle: The left ventricle is normal in size. Ventricular mass is normal. Normal wall thickness. There is concentric remodeling. Global hypokinesis present. There is severely reduced systolic function with a visually estimated ejection fraction of 25 - 30%. Global longitudinal strain is -8.3%. Global longitudinal strain is reduced. There is diastolic dysfunction.    Right Ventricle: The right ventricle is normal in size. Wall " thickness is normal. Right ventricle wall motion has global hypokinesis. Systolic function is moderately to severely reduced.    Mitral Valve: There is mild regurgitation.    Pulmonary Artery: The estimated pulmonary artery systolic pressure is 42 mmHg.    IVC/SVC: Elevated venous pressure at 15 mmHg.    Current Heart Failure Medications  hydrALAZINE injection 10 mg, Every 6 hours PRN, Intravenous  furosemide injection 80 mg, 3 times daily, Intravenous  , 2 times daily, Oral  sacubitriL-valsartan 24-26 mg per tablet 1 tablet, 2 times daily, Oral  hydrALAZINE tablet 25 mg, Every 8 hours, Oral    - NHYA Class III  - ACC/AHA stage C      Recommendations   - Transition IV Lasix to PO Lasix 40 mg BID   - Goal UOP of 2-3L   - Continue Entresto 24-26 mg   - Start Coreg 3.125 mg tonight  - Start Jardiance 10 mg tonight/tomorrow  - Can defer starting Spironolactone until outpatient f/u with cardiology  - Discharge f/u with cardiology, heart failure transitional clinic, and PET ordered.  - Decrease hydralazine dose as BP allows given need for likely further up-titration of GDMT  - Monitor strict I&Os and daily weights.    - Place on telemetry  - Low sodium diet  - Place on fluid restriction of 1.5 L.   - Monitor electrolytes and supplement as needed   - Importance of medication compliance should be discussed         Cardiology will sign off. Please re-consult if further questions/concerns arise. Thank you for involving us in the care of Cecil Clark.       VTE Risk Mitigation (From admission, onward)           Ordered     heparin (porcine) injection 5,000 Units  Every 8 hours         03/07/25 0100     IP VTE HIGH RISK PATIENT  Once         03/07/25 0100     Place sequential compression device  Until discontinued         03/07/25 0029                    Mawadah Samad, MD  Cardiology  [facility]

## 2025-03-13 NOTE — PROGRESS NOTES
"[facility]  Progress Note    Patient Name: Cecil Clark  MRN: [id number]  Patient Class: IP- Inpatient   Admission Date: 3/6/2025  Length of Stay: 7 days  Attending Physician: Celeste Long,*  Primary Care Provider: Mi, Primary Doctor        Subjective     Principal Problem:New onset of congestive heart failure        HPI:  Cecil Clark is a 50 y.o. male with a PMHx of CVA x2, DM, HTN [place] [facility] [place] breath and anasarca. Patient reports worsening swelling beginning in just his feet but now extending to his bilateral lower extremities and into his thighs/ hips for the past few weeks. He now has swelling into his scrotum/ penis. Endorses associated shortness of breath, worse with exertion for the past few days. He's only urinated a very small amount over the last month. Denies any chest pain. He also reports open blisters formed on his lower extremities about 2 weeks ago. He's been "debriding" the wounds with tweezers, peroxide, and alcohol to try to clean the wounds, but now has yellow colored drainage and surrounding pain to the wound on the RLE. Denies fever, chills, N/V, abdominal pain or syncope. Patient hasn't taken any of his home medications in about 2 years. Does occasionally take insulin when he eats "big meals", last took insulin around Shane time.     ED: tachycardic to  and hypertensive to /125. Troponin normal. BNP elevated at 1427. CXR with small bilateral pleural effusions. Bedside echo showed moderately reduced ejection fraction with trace pericardial effusion, no significant right heart strain. Given lasix 40mg IVP.     Overview/Hospital Course:  Admitted for new-onset heart failure. ECHO with global hypokinesis and EF 25-30%. on IV Lasix 80 TID. Net negative 11L since admit  Cardiology plans for ischemic w/u outpatient PET stress. Noted to have BLE cellulitis with wounds on doxy Wound care on board Also tested " positive for COVID 19 PNA. S/p 3/3 remdesvir, Add GDMT as tolerated. Titrate down hydralazine to allow room for GDMT. DIONTE at 2. Monitor with diuresis. Hyperglycemic, titrating basal bolus regimen A1C 10.8             Interval History:     Review of Systems  Objective:     Vital Signs (Most Recent):  Temp: 98 °F (36.7 °C) (03/13/25 1153)  Pulse: 106 (03/13/25 1417)  Resp: 20 (03/13/25 1153)  BP: 138/80 (03/13/25 1417)  SpO2: 96 % (03/13/25 1153) Vital Signs (24h Range):  Temp:  [97.6 °F (36.4 °C)-99.2 °F (37.3 °C)] 98 °F (36.7 °C)  Pulse:  [101-118] 106  Resp:  [18-20] 20  SpO2:  [95 %-98 %] 96 %  BP: (106-158)/() 138/80     Weight: 62.5 kg (137 lb 12.6 oz)  Body mass index is 25.2 kg/m².    Intake/Output Summary (Last 24 hours) at 3/13/2025 1448  Last data filed at 3/13/2025 1250  Gross per 24 hour   Intake 1180 ml   Output 1850 ml   Net -670 ml         Physical Exam  Constitutional:       General: He is not in acute distress.  Cardiovascular:      Rate and Rhythm: Normal rate.   Pulmonary:      Effort: Pulmonary effort is normal. No respiratory distress.      Breath sounds: No wheezing.   Abdominal:      General: Bowel sounds are normal. There is no distension.      Palpations: Abdomen is soft.      Tenderness: There is no abdominal tenderness.   Musculoskeletal:      Right lower leg: Edema present.      Left lower leg: Edema present.   Neurological:      Mental Status: He is alert and oriented to person, place, and time. Mental status is at baseline.               Significant Labs: All pertinent labs within the past 24 hours have been reviewed.    Significant Imaging: I have reviewed all pertinent imaging results/findings within the past 24 hours.      Assessment & Plan  New onset of congestive heart failure  Admitted new-onset heart failure. ECHO with global hypokinesis and EF 25-30%. on IV Lasix 80 TID. Net negative 11L since admit  Cardiology plans for ischemic w/u outpatient PET stress.  Add GDMT as  tolerated. Titrate down hydralazine to allow room for GDMT. DIONTE at 2.     Weights  3/12 64.8 kg 142 lb 13.7 oz)  3/11 66.4 (146 lb 6 oz)  3/9 65.5 kg (144 lb 6.4 oz) ?accurate  3/8 73.4 kg (161 lb 13.1 oz)  3/7 78.4 kg (172 lb 13.5 oz)  3/6 72.6 kg (160 lb)  DRY weight 142-143 lbs (64.5 - 65 kg)  Essential hypertension  See CHF above  COVID-19 virus infection  RDV Day 3/3  DIONTE (acute kidney injury)  DINOTE vs DIONTE on CKD vs CKD. Suspect cardiorenal. No prior labs sine 10/2023, a year and a half ago, so baseline unclear- Cr 1.0 at that time.  UA with 5 RBCs, no casts  - if no improvement or worsening, renal U/SCr 2.0, baseline ~1.1  - IV diuresis as above  - avoid nephrotoxins, renally dose meds  - trend daily BMP  Cellulitis of right lower extremity  Cellulitis LE (alonso-wounds 2/2 edema). erythema, possible scant purulent drainage from wound of RLE  - afebrile without leukocytosis  - cont doxy 100mg BID  - f/u wound care consult    Type 2 diabetes mellitus with hyperglycemia, without long-term current use of insulin  DM2 uncontrolled. A1C 10.8. Not on any orals or insulin at home  - MDSSI, FSQACHS  - basal insulin 10 units qhs  -   VTE Risk Mitigation (From admission, onward)           Ordered     heparin (porcine) injection 5,000 Units  Every 8 hours         03/07/25 0100     IP VTE HIGH RISK PATIENT  Once         03/07/25 0100     Place sequential compression device  Until discontinued         03/07/25 0029                    Discharge Planning   SUZANNE: 3/18/2025     Code Status: Full Code   Medical Readiness for Discharge Date:   Discharge Plan A: Home with family                        Celeste Long MD  Department of Hospital Medicine   [facility]

## 2025-03-13 NOTE — ASSESSMENT & PLAN NOTE
DIONTE vs DIONTE on CKD vs CKD. Suspect cardiorenal. No prior labs sine 10/2023, a year and a half ago, so baseline unclear- Cr 1.0 at that time.  UA with 5 RBCs, no casts  - if no improvement or worsening, renal U/SCr 2.0, baseline ~1.1  - IV diuresis as above  - avoid nephrotoxins, renally dose meds  - trend daily BMP

## 2025-03-13 NOTE — NURSING
Patient's bp 110/64 map 83. Cardiology Intern notified and asked if there parameters they would like to placed on the Entresto while expressing the fact that Entresto can be very powerful. MD instructed me to consult with the primary team adding the patient's Hydralazine dose/ frequency may have to be decreased to allow the Entresto to be given. Primary Team notified of bp and discussion with Cardiology intern also informing the last dose of 75mg Hydralazine was given at approximately 5 am. Per MAIK Long MD Hydralazine dose decreased to 25 mg t.I.d. Morning dose of Entresto held per MD's approval.

## 2025-03-13 NOTE — ASSESSMENT & PLAN NOTE
Patient's blood pressure range in the last 24 hours was: BP  Min: 106/58  Max: 148/98.The patient's inpatient anti-hypertensive regimen is listed below:  Current Antihypertensives  hydrALAZINE injection 10 mg, Every 6 hours PRN, Intravenous  hydrALAZINE tablet 25 mg, Every 8 hours, Oral  furosemide tablet 40 mg, Daily, Oral  carvediloL tablet 3.125 mg, 2 times daily, Oral    Plan  - Uptitrat  - Continue with Hydralazine 75 mg q 8 and titrate up as needed

## 2025-03-13 NOTE — ASSESSMENT & PLAN NOTE
Cellulitis LE (alonso-wounds 2/2 edema). erythema, possible scant purulent drainage from wound of RLE  - afebrile without leukocytosis  - cont doxy 100mg BID  - f/u wound care consult

## 2025-03-13 NOTE — PLAN OF CARE
Recommendations     1. Continue low Na diet as tolerated   - Recommend adding diabetic diet as a modifier per PMHx and high BGL    2. Encourage good intake    3. RD to monitor weight, labs, intake     Goals:   1. % nutritional needs met with diet     2. Maintain weight during admission  Nutrition Goal Status: new  Communication of RD Recs: other (comment) (POC)     Nutrition Discharge Planning     Nutrition Discharge Planning: Therapeutic diet (comments)  Therapeutic diet (comments): cardiac diabetic diet

## 2025-03-13 NOTE — PLAN OF CARE
AAOX4,VSS,O2 sats 97% on room air. Patient covid+, droplet and contact precaution. Fall precautions in place.Patient remains free of falls/ injury.Plan of care discussed with patient.Patient has no complaints of pain/SOB,palpitations, dizziness, lightheadedness, headache or n/v. Complaints of nasal congestion reported. Discussed medications and care.Remdesivir x3 doses completed today.Patient started on [id number] today and Hydralazine decreased from 75mg to 25 mg tid. Patient will be transitioned from 80mg IVP Lasix to 40mg p.o. Lasix today, and Farxiga being started tomorrow morning.Education provided to patient on his diet and glucose control as well as 2000-ml fluid restriction.Patient has no questions at this time.Pt visualised and stable, with no acute distress noted.Call light within reach.Pt resting,bed at lowest position.No family by the bedside.Will continue to monitor through the shift.       Problem: Adult Inpatient Plan of Care  Goal: Plan of Care Review  Outcome: Progressing  Goal: Patient-Specific Goal (Individualized)  Outcome: Progressing  Goal: Absence of Hospital-Acquired Illness or Injury  Outcome: Progressing  Goal: Optimal Comfort and Wellbeing  Outcome: Progressing  Goal: Readiness for Transition of Care  Outcome: Progressing     Problem: Skin Injury Risk Increased  Goal: Skin Health and Integrity  Outcome: Progressing     Problem: Infection  Goal: Absence of Infection Signs and Symptoms  Outcome: Progressing     Problem: Diabetes Comorbidity  Goal: Blood Glucose Level Within Targeted Range  Outcome: Progressing     Problem: Acute Kidney Injury/Impairment  Goal: Fluid and Electrolyte Balance  Outcome: Progressing  Goal: Improved Oral Intake  Outcome: Progressing  Goal: Effective Renal Function  Outcome: Progressing     Problem: Wound  Goal: Optimal Coping  Outcome: Progressing  Goal: Optimal Functional Ability  Outcome: Progressing  Goal: Absence of Infection Signs and Symptoms  Outcome:  Progressing  Goal: Improved Oral Intake  Outcome: Progressing  Goal: Optimal Pain Control and Function  Outcome: Progressing  Goal: Skin Health and Integrity  Outcome: Progressing  Goal: Optimal Wound Healing  Outcome: Progressing     Problem: Fall Injury Risk  Goal: Absence of Fall and Fall-Related Injury  Outcome: Progressing

## 2025-03-13 NOTE — PROGRESS NOTES
[facility] - Cardiology Stepdown  Adult Nutrition  Progress Note    SUMMARY       Recommendations    1. Continue low Na diet as tolerated   - Recommend adding diabetic diet as a modifier per PMHx and high BGL    2. Encourage good intake    3. RD to monitor weight, labs, intake    Goals:   1. % nutritional needs met with diet     2. Maintain weight during admission  Nutrition Goal Status: new  Communication of RD Recs: other (comment) (POC)    Nutrition Discharge Planning    Nutrition Discharge Planning: Therapeutic diet (comments)  Therapeutic diet (comments): cardiac diabetic diet    Assessment and Plan    Nutrition Problem  Excessive carbohydrate intake    Related to (etiology):   Physiological causes requiring modified carbohydrate intake (diabetes mellitus)    Signs and Symptoms (as evidenced by):   Elevated A1C - 10.8% (education provided)     Interventions/Recommendations (treatment strategy):  Collaboration with other providers   Diabetic diet modifier     Nutrition Diagnosis Status:   New    Reason for Assessment    Reason For Assessment: RD follow-up  Diagnosis: cardiac disease (new onset of congestive heart failure)  General Information Comments: Pt admitted with new onset of congestive heart failure. PMHx: DM, HTN, R thalamic hypertensive, tachycardia. No recent surgical hx. 2+ and 3+ edema. Wound: ulceration on left and right leg, rash on left thigh. No GI s/s - BM;: 3/12. Pt having a good intake - PO: %. No wt hx.    Nutrition/Diet History    Spiritual, Cultural Beliefs, Alevism Practices, Values that Affect Care: no  Food Allergies: shellfish  Factors Affecting Nutritional Intake: None identified at this time    Nutrition Related Social Determinants of Health: SDOH: None Identified    Food Insecurity: Patient Declined (3/7/2025)    Hunger Vital Sign     Worried About Running Out of Food in the Last Year: Patient declined     Ran Out of Food in the Last Year: Patient declined  "    Anthropometrics    Height: 5' 2" (157.5 cm)  Height (inches): 62 in  Height Method: Stated  Weight: 62.5 kg (137 lb 12.6 oz)  Weight (lb): 137.79 lb  Weight Method: Standard Scale  Ideal Body Weight (IBW), Male: 118 lb  % Ideal Body Weight, Male (lb): 145.73 %  BMI (Calculated): 25.2  BMI Grade: 25 - 29.9 - overweight    Lab/Procedures/Meds    Pertinent Labs Reviewed: reviewed  Pertinent Labs Comments: Na 133 low, BUN 45 high, creatinine 2.0 high, GFR 39.4 low, glucose 229 high, Ca 7.8 low, albumin 2.5 low, AST 41 high  Pertinent Medications Reviewed: reviewed  Pertinent Medications Comments: vit C, doxycycline, furosemide, heparin, hydralazine, insulin lantus, MVI, potassium chloride, valsartan    Estimated/Assessed Needs    Weight Used For Calorie Calculations: 62.5 kg (137 lb 12.6 oz)  Energy Calorie Requirements (kcal): 1760 kcal  Energy Need Method: [place] For (* 1.3)  Protein Requirements: 50-63 g/kg (0.8-1.0 g/kg)  Weight Used For Protein Calculations: 62.5 kg (137 lb 12.6 oz)        RDA Method (mL): 1760  CHO Requirement: 220 g    Nutrition Prescription Ordered    Current Diet Order: low Na 2000 ml    Evaluation of Received Nutrient/Fluid Intake    Energy Calories Required: meeting needs  Protein Required: meeting needs  Tolerance: tolerating  % Intake of Estimated Energy Needs: 75 - 100 %  % Meal Intake: 75 - 100 %    Nutrition Risk    Level of Risk/Frequency of Follow-up: low     Monitor and Evaluation    Monitor and Evaluation: Energy intake, Food and beverage intake, Protein intake, Carbohydrate intake, Food and nutrition knowledge, Weight, Electrolyte and renal panel, Gastrointestinal profile, Glucose/endocrine profile, Inflammatory profile, Lipid profile, Nutrition focused physical findings     Nutrition Follow-Up    RD Follow-up?: Yes      "

## 2025-03-14 ENCOUNTER — TELEPHONE (OUTPATIENT)
Dept: CARDIAC REHAB | Facility: CLINIC | Age: 50
End: 2025-03-14
Payer: MEDICAID

## 2025-03-14 VITALS
HEIGHT: 62 IN | RESPIRATION RATE: 18 BRPM | WEIGHT: 137.81 LBS | OXYGEN SATURATION: 96 % | HEART RATE: 96 BPM | SYSTOLIC BLOOD PRESSURE: 134 MMHG | BODY MASS INDEX: 25.36 KG/M2 | DIASTOLIC BLOOD PRESSURE: 90 MMHG | TEMPERATURE: 98 F

## 2025-03-14 DIAGNOSIS — U07.1 COVID-19 VIRUS DETECTED: ICD-10-CM

## 2025-03-14 LAB
ALBUMIN SERPL BCP-MCNC: 2.4 G/DL (ref 3.5–5.2)
ALP SERPL-CCNC: 84 U/L (ref 40–150)
ALT SERPL W/O P-5'-P-CCNC: 29 U/L (ref 10–44)
ANION GAP SERPL CALC-SCNC: 12 MMOL/L (ref 8–16)
AST SERPL-CCNC: 37 U/L (ref 10–40)
BILIRUB SERPL-MCNC: 0.4 MG/DL (ref 0.1–1)
BUN SERPL-MCNC: 52 MG/DL (ref 6–20)
CALCIUM SERPL-MCNC: 8.3 MG/DL (ref 8.7–10.5)
CHLORIDE SERPL-SCNC: 101 MMOL/L (ref 95–110)
CO2 SERPL-SCNC: 22 MMOL/L (ref 23–29)
CREAT SERPL-MCNC: 2.1 MG/DL (ref 0.5–1.4)
EST. GFR  (NO RACE VARIABLE): 37.2 ML/MIN/1.73 M^2
GLUCOSE SERPL-MCNC: 241 MG/DL (ref 70–110)
MAGNESIUM SERPL-MCNC: 2.1 MG/DL (ref 1.6–2.6)
PHOSPHATE SERPL-MCNC: 4.9 MG/DL (ref 2.7–4.5)
POCT GLUCOSE: 189 MG/DL (ref 70–110)
POCT GLUCOSE: 202 MG/DL (ref 70–110)
POTASSIUM SERPL-SCNC: 4.1 MMOL/L (ref 3.5–5.1)
PROT SERPL-MCNC: 5.9 G/DL (ref 6–8.4)
SODIUM SERPL-SCNC: 135 MMOL/L (ref 136–145)

## 2025-03-14 PROCEDURE — 25000003 PHARM REV CODE 250

## 2025-03-14 PROCEDURE — 84100 ASSAY OF PHOSPHORUS: CPT | Performed by: HOSPITALIST

## 2025-03-14 PROCEDURE — 25000003 PHARM REV CODE 250: Performed by: STUDENT IN AN ORGANIZED HEALTH CARE EDUCATION/TRAINING PROGRAM

## 2025-03-14 PROCEDURE — 25000003 PHARM REV CODE 250: Performed by: HOSPITALIST

## 2025-03-14 PROCEDURE — 25000003 PHARM REV CODE 250: Performed by: PHYSICIAN ASSISTANT

## 2025-03-14 PROCEDURE — 63600175 PHARM REV CODE 636 W HCPCS: Performed by: PHYSICIAN ASSISTANT

## 2025-03-14 PROCEDURE — 80053 COMPREHEN METABOLIC PANEL: CPT | Performed by: HOSPITALIST

## 2025-03-14 PROCEDURE — 83735 ASSAY OF MAGNESIUM: CPT | Performed by: PHYSICIAN ASSISTANT

## 2025-03-14 RX ORDER — INSULIN ASPART 100 [IU]/ML
1-10 INJECTION, SOLUTION INTRAVENOUS; SUBCUTANEOUS
Qty: 12 ML | Refills: 0 | Status: SHIPPED | OUTPATIENT
Start: 2025-03-14 | End: 2025-04-13

## 2025-03-14 RX ORDER — FUROSEMIDE 40 MG/1
40 TABLET ORAL 2 TIMES DAILY
Qty: 60 TABLET | Refills: 2 | Status: SHIPPED | OUTPATIENT
Start: 2025-03-14 | End: 2025-06-12

## 2025-03-14 RX ORDER — PEN NEEDLE, DIABETIC 30 GX3/16"
1 NEEDLE, DISPOSABLE MISCELLANEOUS
Qty: 200 EACH | Refills: 0 | Status: SHIPPED | OUTPATIENT
Start: 2025-03-14

## 2025-03-14 RX ORDER — DOXYCYCLINE HYCLATE 100 MG
100 TABLET ORAL EVERY 12 HOURS
Qty: 12 TABLET | Refills: 0 | Status: SHIPPED | OUTPATIENT
Start: 2025-03-14 | End: 2025-03-20

## 2025-03-14 RX ORDER — DEXTROSE 4 G
TABLET,CHEWABLE ORAL
Qty: 1 EACH | Refills: 0 | Status: SHIPPED | OUTPATIENT
Start: 2025-03-14

## 2025-03-14 RX ORDER — ASPIRIN 81 MG/1
81 TABLET ORAL DAILY
Qty: 90 TABLET | Refills: 0 | Status: SHIPPED | OUTPATIENT
Start: 2025-03-14 | End: 2025-06-12

## 2025-03-14 RX ORDER — BLOOD-GLUCOSE CONTROL, NORMAL
1 EACH MISCELLANEOUS
Qty: 100 EACH | Refills: 0 | Status: SHIPPED | OUTPATIENT
Start: 2025-03-14

## 2025-03-14 RX ORDER — CARVEDILOL 3.12 MG/1
3.12 TABLET ORAL 2 TIMES DAILY
Qty: 180 TABLET | Refills: 0 | Status: SHIPPED | OUTPATIENT
Start: 2025-03-14 | End: 2025-06-12

## 2025-03-14 RX ORDER — ATORVASTATIN CALCIUM 40 MG/1
40 TABLET, FILM COATED ORAL DAILY
Qty: 30 TABLET | Refills: 2 | Status: SHIPPED | OUTPATIENT
Start: 2025-03-14 | End: 2025-06-12

## 2025-03-14 RX ADMIN — MICONAZOLE NITRATE: 20 OINTMENT TOPICAL at 09:03

## 2025-03-14 RX ADMIN — DOXYCYCLINE HYCLATE 100 MG: 100 TABLET, COATED ORAL at 09:03

## 2025-03-14 RX ADMIN — SACUBITRIL AND VALSARTAN 1 TABLET: 24; 26 TABLET, FILM COATED ORAL at 09:03

## 2025-03-14 RX ADMIN — CARVEDILOL 3.12 MG: 3.12 TABLET, FILM COATED ORAL at 09:03

## 2025-03-14 RX ADMIN — HYDRALAZINE HYDROCHLORIDE 25 MG: 25 TABLET ORAL at 06:03

## 2025-03-14 RX ADMIN — OXYCODONE HYDROCHLORIDE AND ACETAMINOPHEN 500 MG: 500 TABLET ORAL at 09:03

## 2025-03-14 RX ADMIN — EMPAGLIFLOZIN 10 MG: 10 TABLET, FILM COATED ORAL at 09:03

## 2025-03-14 RX ADMIN — MUPIROCIN: 20 OINTMENT TOPICAL at 09:03

## 2025-03-14 RX ADMIN — HEPARIN SODIUM 5000 UNITS: 5000 INJECTION INTRAVENOUS; SUBCUTANEOUS at 06:03

## 2025-03-14 RX ADMIN — INSULIN ASPART 4 UNITS: 100 INJECTION, SOLUTION INTRAVENOUS; SUBCUTANEOUS at 11:03

## 2025-03-14 RX ADMIN — THERA TABS 1 TABLET: TAB at 09:03

## 2025-03-14 RX ADMIN — FUROSEMIDE 40 MG: 40 TABLET ORAL at 09:03

## 2025-03-14 NOTE — DISCHARGE SUMMARY
"[facility] - Cardiology [facility] Medicine  Discharge Summary      Patient Name: Cecil Clark  MRN: [id number]  MILIND: [id number]  Patient Class: IP- Inpatient  Admission Date: 3/6/2025  Hospital Length of Stay: 8 days  Discharge Date and Time: No discharge date for patient encounter.  Attending Physician: Celeste Long,*   Discharging Provider: Celeste Long MD  Primary Care Provider: Mi, Primary Doctor  [facility] Medicine Team: [facility] Celeste Long MD  Primary Care Team: [facility]    HPI:   Cecil Clark is a 50 y.o. male with a PMHx of CVA x2, DM, HTN who presents to [facility] for evaluation of shortness of breath and anasarca. Patient reports worsening swelling beginning in just his feet but now extending to his bilateral lower extremities and into his thighs/ hips for the past few weeks. He now has swelling into his scrotum/ penis. Endorses associated shortness of breath, worse with exertion for the past few days. He's only urinated a very small amount over the last month. Denies any chest pain. He also reports open blisters formed on his lower extremities about 2 weeks ago. He's been "debriding" the wounds with tweezers, peroxide, and alcohol to try to clean the wounds, but now has yellow colored drainage and surrounding pain to the wound on the RLE. Denies fever, chills, N/V, abdominal pain or syncope. Patient hasn't taken any of his home medications in about 2 years. Does occasionally take insulin when he eats "big meals", last took insulin around Shane time.     ED: tachycardic to  and hypertensive to /125. Troponin normal. BNP elevated at 1427. CXR with small bilateral pleural effusions. Bedside echo showed moderately reduced ejection fraction with trace pericardial effusion, no significant right heart strain. Given lasix 40mg IVP.     * No surgery found *      Hospital Course:   Admitted for new-onset heart failure. ECHO with global hypokinesis and EF 25-30%. " Initiated on IV Lasix 80 TID. Net negative 11L this admit  Cardiology plans for ischemic w/u outpatient PET stress. Added GDMT( entresto/coreg/jardiance) . Plan fro spironolactone at OP cardiology follow up visit Noted to have BLE cellulitis with wounds. Plan to complete 10 days of doxycycline  Also tested positive for COVID 19 PNA. S/p 3/3 remdesvir, Not hypoxic   Creatinine stable at 2 likely new baseline. Hyperglycemic,initiated on basal bolus regimen A1C 10.8 Not taking home anti DM regimen. Patient is discharged on lantus 10 units at night and moderate dose sliding scale/jardiance. Educated on importance to maintain blood glucose logs and compliance with meds To follow up with PCP in 1 week OP. Repeat BMP at that visit and uptitrate DM regimen based on BG logs              Goals of Care Treatment Preferences:  Code Status: Full Code      SDOH Screening:  The patient declined to be screened for utility difficulties, food insecurity, transport difficulties, housing insecurity, and interpersonal safety, so no concerns could be identified this admission.     Consults:   Consults (From admission, onward)          Status Ordering Provider     Inpatient consult to Cardiology  Once        Provider:  (Not yet assigned)    Completed MICHEAL SÁNCHEZ     Inpatient consult to Registered Dietitian/Nutritionist  Once        Provider:  (Not yet assigned)    Completed MICHEAL SÁNCHEZ     Inpatient consult to Social Work/Case Management  Once        Provider:  (Not yet assigned)    Acknowledged BLAKE CASAS     Inpatient consult to Registered Dietitian/Nutritionist  Once        Provider:  (Not yet assigned)    Completed BLAKE CASAS            Assessment & Plan  New onset of congestive heart failure  Admitted new-onset heart failure. ECHO with global hypokinesis and EF 25-30%. on IV Lasix 80 TID. Net negative 11L since admit  Cardiology plans for ischemic w/u outpatient PET stress.  Add GDMT as tolerated. Titrate  down hydralazine to allow room for GDMT. DIONTE at 2.     Weights  3/12 64.8 kg 142 lb 13.7 oz)  3/11 66.4 (146 lb 6 oz)  3/9 65.5 kg (144 lb 6.4 oz) ?accurate  3/8 73.4 kg (161 lb 13.1 oz)  3/7 78.4 kg (172 lb 13.5 oz)  3/6 72.6 kg (160 lb)  DRY weight 142-143 lbs (64.5 - 65 kg)  COVID-19 virus infection  RDV Day 3/3  DIONTE (acute kidney injury)  DIONTE vs DIONTE on CKD vs CKD. Suspect cardiorenal. No prior labs sine 10/2023, a year and a half ago, so baseline unclear- Cr 1.0 at that time.  UA with 5 RBCs, no casts  - if no improvement or worsening, renal U/SCr 2.0, baseline ~1.1  - IV diuresis as above  - avoid nephrotoxins, renally dose meds  - trend daily BMP  Cellulitis of right lower extremity  Cellulitis LE (alonso-wounds 2/2 edema). erythema, possible scant purulent drainage from wound of RLE  - afebrile without leukocytosis  - cont doxy 100mg BID  - f/u wound care consult    Type 2 diabetes mellitus with hyperglycemia, without long-term current use of insulin  DM2 uncontrolled. A1C 10.8. Not on any orals or insulin at home  - MDSSI, FSQACHS  - basal insulin 10 units qhs  -   Final Active Diagnoses:    Diagnosis Date Noted POA    PRINCIPAL PROBLEM:  New onset of congestive heart failure [I50.9] 03/07/2025 Yes    COVID-19 virus infection [U07.1] 03/11/2025 No    DIONTE (acute kidney injury) [N17.9] 03/07/2025 Yes    Cellulitis of right lower extremity [L03.115] 03/07/2025 Yes    Type 2 diabetes mellitus with hyperglycemia, without long-term current use of insulin [E11.65] 04/04/2021 Yes      Problems Resolved During this Admission:    Diagnosis Date Noted Date Resolved POA    History of CVA with residual deficit [I69.30] 03/07/2025 03/11/2025 Not Applicable    Essential hypertension [I10] 04/04/2021 03/13/2025 Yes       Discharged Condition: good    Disposition: Home or Self Care    Follow Up:   Follow-up Information       No, Primary Doctor Follow up.                           Patient Instructions:      Ambulatory  "referral/consult to Heart Failure Transitional Care Clinic   Standing Status: Future   Referral Priority: Routine Referral Type: Consultation   Referral Reason: Specialty Services Required   Requested Specialty: Cardiology   Number of Visits Requested: 1     Ambulatory referral/consult to Cardiology   Standing Status: Future   Referral Priority: Routine Referral Type: Consultation   Referral Reason: Specialty Services Required   Requested Specialty: Cardiology   Number of Visits Requested: 1     Ambulatory referral/consult to Cardiac Rehab   Standing Status: Future   Referral Priority: Routine Referral Type: Consultation   Referral Reason: Specialty Services Required   Requested Specialty: Cardiac Rehabilitation   Number of Visits Requested: 1     Diet Cardiac   Order Comments: <2gm sodium fluid restricted <1500cc cardiac diet     Cardiac PET Scan Stress   Standing Status: Future Standing Exp. Date: 03/13/26     Order Specific Question Answer Comments   Which medicaton for the stress procedure? Regadenoson    Release to patient Immediate      Activity as tolerated       Significant Diagnostic Studies: Labs: CMP   Recent Labs   Lab 03/13/25  0355 03/14/25  0528   * 135*   K 3.8 4.1    101   CO2 20* 22*   * 241*   BUN 45* 52*   CREATININE 2.0* 2.1*   CALCIUM 7.8* 8.3*   PROT 6.2 5.9*   ALBUMIN 2.5* 2.4*   BILITOT 0.5 0.4   ALKPHOS 95 84   AST 41* 37   ALT 29 29   ANIONGAP 12 12    and CBC No results for input(s): "WBC", "HGB", "HCT", "PLT" in the last 48 hours.    Pending Diagnostic Studies:       None           Medications:  Reconciled Home Medications:      Medication List        START taking these medications      carvediloL 3.125 MG tablet  Commonly known as: COREG  Take 1 tablet (3.125 mg total) by mouth 2 (two) times daily.     doxycycline 100 MG tablet  Commonly known as: VIBRA-TABS  Take 1 tablet (100 mg total) by mouth every 12 (twelve) hours. for 6 days     empagliflozin 10 mg " "tablet  Commonly known as: Jardiance  Take 1 tablet (10 mg total) by mouth once daily.  Start taking on: March 15, 2025     ENTRESTO 24-26 mg per tablet  Generic drug: sacubitriL-valsartan  Take 1 tablet by mouth 2 (two) times daily.     furosemide 40 MG tablet  Commonly known as: LASIX  Take 1 tablet (40 mg total) by mouth 2 (two) times daily.     pulse oximeter device  Commonly known as: pulse oximeter  by Apply Externally route 2 (two) times a day. Use twice daily at 8 AM and 3 PM and record the value in MyChart as directed.            CHANGE how you take these medications      blood sugar diagnostic Strp  Use to check blood glucose 5 (five) times daily.  What changed: when to take this     blood-glucose meter Misc  Use to check blood glucose 5 times daily  What changed: additional instructions     insulin aspart U-100 100 unit/mL (3 mL) Inpn pen  Commonly known as: NovoLOG  Inject 1-10 Units before meals and at bedtime for Hyperglycemia. Per sliding scale:Blood Glucose: 151-200    201-250   251-300   301-350  >350 Pre-meal:          2 units      4 units      6 units     8 units     10 units  10 pm:              1 units      2 units      3 units     4 units      5 units  What changed:   how much to take  when to take this  reasons to take this     lancets 30 gauge Misc  Commonly known as: TRUEPLUS LANCETS  Use to check blood glucose 5 (five) times daily.  What changed:   how much to take  how to take this  when to take this            CONTINUE taking these medications      aspirin 81 MG EC tablet  Commonly known as: ECOTRIN  Take 1 tablet (81 mg total) by mouth once daily.     atorvastatin 40 MG tablet  Commonly known as: LIPITOR  Take 1 tablet (40 mg total) by mouth once daily.     clopidogreL 75 mg tablet  Commonly known as: PLAVIX  Take 1 tablet (75 mg total) by mouth once daily.     pen needle, diabetic 32 gauge x 5/32" Ndle  Use for insulin adminstration up to 5 times daily            STOP taking these " medications      amLODIPine 10 MG tablet  Commonly known as: NORVASC     lancing device with lancets Kit     LEVEMIR FLEXPEN 100 unit/mL (3 mL) Inpn pen  Generic drug: insulin detemir U-100 (Levemir)     lisinopriL 20 MG tablet  Commonly known as: PRINIVIL,ZESTRIL              Indwelling Lines/Drains at time of discharge:   Lines/Drains/Airways       None                   Time spent on the discharge of patient: 35 minutes         Celeste Long MD  Department of Hospital Medicine  [facility] - Cardiology Stepdown

## 2025-03-14 NOTE — PLAN OF CARE
[facility] - Cardiology Stepdown  Discharge Final Note    Primary Care Provider: No, Primary Doctor    Expected Discharge Date: 3/14/2025    Final Discharge Note (most recent)       Final Note - 03/14/25 1321          Final Note    Assessment Type Final Discharge Note     Anticipated Discharge Disposition Home or Self Care                 Sanjuanita White LMSW [facility]  [id number]      Future Appointments   Date Time Provider Department Center   3/19/2025  9:20 AM Elliot Liu MD [facility]   3/21/2025  2:00 PM Reji Allan MD [facility] CARDIO [facility]

## 2025-03-14 NOTE — PLAN OF CARE
Patient is ready for discharge. Patient stable alert and oriented. IVs removed. No complaints of pain. Discussed discharge plan. Educated patient on sliding scale for [place] log and wound care and cleaning. Also educated patient about wearing mask and isolating at home and when around roommates. Reviewed medications and side effects, appointments, and answered questions with patient and family.

## 2025-03-14 NOTE — PLAN OF CARE
Future Appointments   Date Time Provider Department Center   3/19/2025  9:20 AM Elliot Liu MD [facility] PRICAR [facility] [place]   3/21/2025  2:00 PM Reji Allan MD [facility] CARDIO [facility]         Hosp f/u and Gen Cards appointments scheduled.          Maureen Little, ANANDW, RSW, BSW  Case Management  [id number]

## 2025-03-19 ENCOUNTER — OFFICE VISIT (OUTPATIENT)
Dept: PRIMARY CARE CLINIC | Facility: CLINIC | Age: 50
End: 2025-03-19
Payer: MEDICAID

## 2025-03-19 VITALS
WEIGHT: 139.13 LBS | DIASTOLIC BLOOD PRESSURE: 106 MMHG | TEMPERATURE: 99 F | BODY MASS INDEX: 25.6 KG/M2 | HEIGHT: 62 IN | SYSTOLIC BLOOD PRESSURE: 148 MMHG | OXYGEN SATURATION: 99 % | HEART RATE: 97 BPM

## 2025-03-19 DIAGNOSIS — L03.115 CELLULITIS OF RIGHT LOWER EXTREMITY: ICD-10-CM

## 2025-03-19 DIAGNOSIS — Z09 HOSPITAL DISCHARGE FOLLOW-UP: Primary | ICD-10-CM

## 2025-03-19 DIAGNOSIS — E11.65 TYPE 2 DIABETES MELLITUS WITH HYPERGLYCEMIA, WITHOUT LONG-TERM CURRENT USE OF INSULIN: ICD-10-CM

## 2025-03-19 DIAGNOSIS — I10 PRIMARY HYPERTENSION: ICD-10-CM

## 2025-03-19 DIAGNOSIS — N17.9 AKI (ACUTE KIDNEY INJURY): ICD-10-CM

## 2025-03-19 PROCEDURE — 3046F HEMOGLOBIN A1C LEVEL >9.0%: CPT | Mod: CPTII,,, | Performed by: FAMILY MEDICINE

## 2025-03-19 PROCEDURE — 3008F BODY MASS INDEX DOCD: CPT | Mod: CPTII,,, | Performed by: FAMILY MEDICINE

## 2025-03-19 PROCEDURE — 3077F SYST BP >= 140 MM HG: CPT | Mod: CPTII,,, | Performed by: FAMILY MEDICINE

## 2025-03-19 PROCEDURE — 4010F ACE/ARB THERAPY RXD/TAKEN: CPT | Mod: CPTII,,, | Performed by: FAMILY MEDICINE

## 2025-03-19 PROCEDURE — 99205 OFFICE O/P NEW HI 60 MIN: CPT | Mod: S$PBB,,, | Performed by: FAMILY MEDICINE

## 2025-03-19 PROCEDURE — 1111F DSCHRG MED/CURRENT MED MERGE: CPT | Mod: CPTII,,, | Performed by: FAMILY MEDICINE

## 2025-03-19 PROCEDURE — 1159F MED LIST DOCD IN RCRD: CPT | Mod: CPTII,,, | Performed by: FAMILY MEDICINE

## 2025-03-19 PROCEDURE — G2211 COMPLEX E/M VISIT ADD ON: HCPCS | Mod: S$PBB,,, | Performed by: FAMILY MEDICINE

## 2025-03-19 PROCEDURE — 3080F DIAST BP >= 90 MM HG: CPT | Mod: CPTII,,, | Performed by: FAMILY MEDICINE

## 2025-03-19 PROCEDURE — 99214 OFFICE O/P EST MOD 30 MIN: CPT | Mod: PBBFAC,PN | Performed by: FAMILY MEDICINE

## 2025-03-19 PROCEDURE — 99999 PR PBB SHADOW E&M-EST. PATIENT-LVL IV: CPT | Mod: PBBFAC,,, | Performed by: FAMILY MEDICINE

## 2025-03-19 NOTE — PROGRESS NOTES
"Subjective:       Patient ID: Cecil Clark is a 50 y.o. male.    Chief Complaint: Follow-up    50 y.o. male with a PMHx of CVA x2, DM, HTN here for post hospital follow up for new onset heart failure found to have Covid infection. ECHO with global hypokinesis and EF 25-30%.  Also found to have bl LE cellulitis taking doxicycline. On GDMT for his heart failure and he states he has been adherent to. Feeling overall much better overall. No chest pain but lingering cough. His LE edema has resolved.    He admits to neglecting his health for the past year and taking insulin when he felt appropriate however never checking his BG. He has been taking short acting insulin from hospital based on sliding scale but did not bring in log and only checking once per day.         Review of Systems    Objective:      Vitals:    03/19/25 1119   BP: (!) 148/106   BP Location: Right arm   Patient Position: Sitting   Pulse: 97   Temp: 98.6 °F (37 °C)   TempSrc: Oral   SpO2: 99%   Weight: 63.1 kg (139 lb 1.8 oz)   Height: 5' 2" (1.575 m)     Physical Exam  Vitals and nursing note reviewed.   Constitutional:       General: He is in acute distress.      Appearance: He is well-developed and normal weight. He is not ill-appearing.   HENT:      Right Ear: Tympanic membrane normal.      Left Ear: Tympanic membrane normal.      Nose: Nose normal.      Mouth/Throat:      Pharynx: No oropharyngeal exudate or posterior oropharyngeal erythema.   Eyes:      Extraocular Movements: Extraocular movements intact.      Conjunctiva/sclera: Conjunctivae normal.   Cardiovascular:      Rate and Rhythm: Normal rate and regular rhythm.      Heart sounds: Normal heart sounds.   Pulmonary:      Effort: Pulmonary effort is normal. No respiratory distress.      Breath sounds: Normal breath sounds. No wheezing or rales.   Abdominal:      General: There is no distension.      Palpations: Abdomen is soft.      Tenderness: There is no abdominal tenderness.   Skin:     " Comments: BL LE dressings c/d/i   Neurological:      Mental Status: He is alert.      Cranial Nerves: No cranial nerve deficit.   Psychiatric:         Mood and Affect: Mood is anxious.             Lab Results   Component Value Date     (L) 03/14/2025    K 4.1 03/14/2025     03/14/2025    CO2 22 (L) 03/14/2025    BUN 52 (H) 03/14/2025    CREATININE 2.1 (H) 03/14/2025    ANIONGAP 12 03/14/2025     Lab Results   Component Value Date    HGBA1C 10.8 (H) 03/07/2025     Lab Results   Component Value Date    BNP 1,427 (H) 03/06/2025       Lab Results   Component Value Date    WBC 8.76 03/06/2025    HGB 16.6 03/06/2025    HCT 51.8 03/06/2025     03/06/2025    GRAN 6.0 03/06/2025    GRAN 68.0 03/06/2025     Lab Results   Component Value Date    CHOL 154 03/07/2025    HDL 57 03/07/2025    LDLCALC 78.2 03/07/2025    TRIG 94 03/07/2025        Current Medications[1]        Assessment:       1. Hospital discharge follow-up    2. DIONTE (acute kidney injury)    3. Type 2 diabetes mellitus with hyperglycemia, without long-term current use of insulin    4. Primary hypertension    5. Cellulitis of right lower extremity           Plan:       Hospital discharge follow-up  Patient arriving over an hour late and needing extensive education about his disease processes which he has neglected for years. Symptoms much improved with no signs of fluid overload today. No LE edema. On GDMT. Has upcoming cardiology appt which he is encouraged to keep.       DIONTE (acute kidney injury)  -     Comprehensive Metabolic Panel; Future; Expected date: 03/19/2025  Repeat cmp today. Denies urinary complaints.     Type 2 diabetes mellitus with hyperglycemia, without long-term current use of insulin  -     Ambulatory referral/consult to Diabetes Education; Future; Expected date: 03/26/2025  A1c of 10.8 this past week. Did not bring in BG log today. To follow up in 1 week with log. Only taking Sliding Scale insulin a this time. Educated at  length about risks and referred to DM education.    Primary hypertension  Uncontrolled currently but took bp meds late this am and agreeable to follow up with cardiology for further review. He is reluctant to make medication changes at this time. Encouraged to get arm BP cuff.     Cellulitis of right lower extremity  LE edema resolved. Meds reviewed having been taking doxicycline. Has follow up with wound care.          Follow up in 1 week after seeing cardiology            [1]   Current Outpatient Medications:     aspirin (ECOTRIN) 81 MG EC tablet, Take 1 tablet (81 mg total) by mouth once daily., Disp: 90 tablet, Rfl: 0    atorvastatin (LIPITOR) 40 MG tablet, Take 1 tablet (40 mg total) by mouth once daily., Disp: 30 tablet, Rfl: 2    blood sugar diagnostic Strp, Use to check blood glucose 5 (five) times daily., Disp: 100 strip, Rfl: 1    blood-glucose meter Misc, Use to check blood glucose 5 times daily, Disp: 1 each, Rfl: 0    carvediloL (COREG) 3.125 MG tablet, Take 1 tablet (3.125 mg total) by mouth 2 (two) times daily., Disp: 180 tablet, Rfl: 0    doxycycline (VIBRA-TABS) 100 MG tablet, Take 1 tablet (100 mg total) by mouth every 12 (twelve) hours. for 6 days, Disp: 12 tablet, Rfl: 0    empagliflozin (JARDIANCE) 10 mg tablet, Take 1 tablet (10 mg total) by mouth once daily., Disp: 30 tablet, Rfl: 2    furosemide (LASIX) 40 MG tablet, Take 1 tablet (40 mg total) by mouth 2 (two) times daily., Disp: 60 tablet, Rfl: 2    insulin aspart U-100 (NOVOLOG) 100 unit/mL (3 mL) InPn pen, Inject 1-10 Units before meals and at bedtime for Hyperglycemia. Per sliding scale:Blood Glucose: 151-200    201-250   251-300   301-350  >350 Pre-meal:          2 units      4 units      6 units     8 units     10 units 10 pm:              1 units      2 units      3 units     4 units      5 units, Disp: 12 mL, Rfl: 0    lancets (TRUEPLUS LANCETS) 30 gauge Misc, Use to check blood glucose 5 (five) times daily., Disp: 100 each, Rfl: 0    " pen needle, diabetic 32 gauge x 5/32" Paula, Use for insulin adminstration up to 5 times daily, Disp: 200 each, Rfl: 0    pulse oximeter (PULSE OXIMETER) device, by Apply Externally route 2 (two) times a day. Use twice daily at 8 AM and 3 PM and [facility]., Disp: 1 each, Rfl: 0    sacubitriL-valsartan (ENTRESTO) 24-26 mg per tablet, Take 1 tablet by mouth 2 (two) times daily., Disp: 180 tablet, Rfl: 0    clopidogreL (PLAVIX) 75 mg tablet, Take 1 tablet (75 mg total) by mouth once daily., Disp: 30 tablet, Rfl: 0    "

## 2025-03-21 ENCOUNTER — OFFICE VISIT (OUTPATIENT)
Dept: CARDIOLOGY | Facility: CLINIC | Age: 50
End: 2025-03-21
Payer: MEDICAID

## 2025-03-21 VITALS
WEIGHT: 135.38 LBS | SYSTOLIC BLOOD PRESSURE: 120 MMHG | BODY MASS INDEX: 24.91 KG/M2 | HEART RATE: 105 BPM | DIASTOLIC BLOOD PRESSURE: 82 MMHG | HEIGHT: 62 IN | OXYGEN SATURATION: 98 %

## 2025-03-21 DIAGNOSIS — I16.1 HYPERTENSIVE EMERGENCY: ICD-10-CM

## 2025-03-21 DIAGNOSIS — I50.9 NEW ONSET OF CONGESTIVE HEART FAILURE: Primary | ICD-10-CM

## 2025-03-21 DIAGNOSIS — E11.65 TYPE 2 DIABETES MELLITUS WITH HYPERGLYCEMIA, WITHOUT LONG-TERM CURRENT USE OF INSULIN: ICD-10-CM

## 2025-03-21 PROCEDURE — 99999 PR PBB SHADOW E&M-EST. PATIENT-LVL IV: CPT | Mod: PBBFAC,,, | Performed by: INTERNAL MEDICINE

## 2025-03-21 PROCEDURE — 99214 OFFICE O/P EST MOD 30 MIN: CPT | Mod: PBBFAC | Performed by: INTERNAL MEDICINE

## 2025-03-21 NOTE — PROGRESS NOTES
"Subjective   Patient ID:  Cecil Clark is a 50 y.o. male who presents for evaluation of No chief complaint on file.      HPI    Systolic CHF, CM - EF 25-30%, HTN, DM, CVA    Admitted [facility] 3/6/25  Cecil Clark is a 50 y.o. male with a PMHx of CVA x2, DM, HTN who presents to [facility] for evaluation of shortness of breath and anasarca. Patient reports worsening swelling beginning in just his feet but now extending to his bilateral lower extremities and into his thighs/ hips for the past few weeks. He now has swelling into his scrotum/ penis. Endorses associated shortness of breath, worse with exertion for the past few days. He's only urinated a very small amount over the last month. Denies any chest pain. He also reports open blisters formed on his lower extremities about 2 weeks ago. He's been "debriding" the wounds with tweezers, peroxide, and alcohol to try to clean the wounds, but now has yellow colored drainage and surrounding pain to the wound on the RLE. Denies fever, chills, N/V, abdominal pain or syncope. Patient hasn't taken any of his home medications in about 2 years. Does occasionally take insulin when he eats "big meals", last took insulin around [place] time.     ED: tachycardic to  and hypertensive to /125. Troponin normal. BNP elevated at 1427. CXR with small bilateral pleural effusions. Bedside echo showed moderately reduced ejection fraction with trace pericardial effusion, no significant right heart strain. Given lasix 40mg IVP.      Hospital Course:   Admitted for new-onset heart failure. ECHO with global hypokinesis and EF 25-30%. Initiated on IV Lasix 80 TID. Net negative 11L this admit  Cardiology plans for ischemic w/u outpatient PET stress. Added GDMT( entresto/coreg/jardiance) . Plan fro spironolactone at OP cardiology follow up visit Noted to have BLE cellulitis with wounds. Plan to complete 10 days of doxycycline  Also tested positive for COVID 19 PNA. S/p 3/3 remdesvir, Not hypoxic   " Creatinine stable at 2 likely new baseline. Hyperglycemic,initiated on basal bolus regimen A1C 10.8 Not taking home anti DM regimen. Patient is discharged on lantus 10 units at night and moderate dose sliding scale/jardiance. Educated on importance to maintain blood glucose logs and compliance with meds To follow up with PCP in 1 week OP. Repeat BMP at that visit and uptitrate DM regimen based on BG logs     Echo 3/7/25    Left Ventricle: The left ventricle is normal in size. Ventricular mass is normal. Normal wall thickness. There is concentric remodeling. Global hypokinesis present. There is severely reduced systolic function with a visually estimated ejection fraction of 25 - 30%. Global longitudinal strain is -8.3%. Global longitudinal strain is reduced. There is diastolic dysfunction.    Right Ventricle: The right ventricle is normal in size. Wall thickness is normal. Right ventricle wall motion has global hypokinesis. Systolic function is moderately to severely reduced.    Mitral Valve: There is mild regurgitation.    Pulmonary Artery: The estimated pulmonary artery systolic pressure is 42 mmHg.    IVC/SVC: Elevated venous pressure at 15 mmHg.    Echo 10/26/23    Left Ventricle: The left ventricle is normal in size. Normal wall thickness. There is concentric remodeling. Normal wall motion. There is normal systolic function with a visually estimated ejection fraction of 60 - 65%. There is normal diastolic function.    Left Atrium: Agitated saline study of the atrial septum is negative after vasalva maneuver, suggesting absence of intracardiac shunt at the atrial level.    Right Ventricle: Normal right ventricular cavity size. Wall thickness is normal. Right ventricle wall motion  is normal. Systolic function is normal.    IVC/SVC: Normal venous pressure at 3 mmHg.    Labs 3/14/25  K 4.1  Cr 2.1     3/21/25 PET stress ordered after recent [facility] admission - not yet done  Still with cough and SOB since discharge.  Does not live on [place] and would like f/u at [facility]  BP controlled    Review of Systems   Constitutional: Negative for decreased appetite.   HENT:  Negative for ear discharge.    Eyes:  Negative for blurred vision.   Endocrine: Negative for polyphagia.   Skin:  Negative for nail changes.   Genitourinary:  Negative for bladder incontinence.   Neurological:  Negative for aphonia.   Psychiatric/Behavioral:  Negative for hallucinations.    Allergic/Immunologic: Negative for hives.          Objective     Physical Exam  Constitutional:       Appearance: He is well-developed.   HENT:      Head: Normocephalic and atraumatic.   Eyes:      Conjunctiva/sclera: Conjunctivae normal.      Pupils: Pupils are equal, round, and reactive to light.   Cardiovascular:      Rate and Rhythm: Normal rate.      Pulses: Intact distal pulses.      Heart sounds: Normal heart sounds.   Pulmonary:      Effort: Pulmonary effort is normal.      Breath sounds: Normal breath sounds.   Abdominal:      General: Bowel sounds are normal.      Palpations: Abdomen is soft.   Musculoskeletal:         General: Normal range of motion.      Cervical back: Normal range of motion and neck supple.   Skin:     General: Skin is warm and dry.   Neurological:      Mental Status: He is alert and oriented to person, place, and time.            Assessment and Plan     1. New onset of congestive heart failure    2. Hypertensive emergency    3. Type 2 diabetes mellitus with hyperglycemia, without long-term current use of insulin        Plan:     Agree with PET stress to r/o ischemia as cause figueredo new CHF  Will refer to CHF clinic at [facility] for f/u  Patient prefers to f/u at [facility]  Continue Rx for CHF, HTN, DM    Advance Care Planning     Date: 03/21/2025  Patient did not wish or was not able to name a surrogate decision maker or provide an Advance Care Plan.

## 2025-04-03 RX ORDER — INSULIN ASPART 100 [IU]/ML
1-10 INJECTION, SOLUTION INTRAVENOUS; SUBCUTANEOUS
Qty: 12 ML | Refills: 0 | Status: CANCELLED | OUTPATIENT
Start: 2025-04-03 | End: 2025-05-03

## 2025-04-15 RX ORDER — INSULIN ASPART 100 [IU]/ML
1-10 INJECTION, SOLUTION INTRAVENOUS; SUBCUTANEOUS
Qty: 12 ML | Refills: 0 | OUTPATIENT
Start: 2025-04-15 | End: 2025-05-15

## 2025-06-16 ENCOUNTER — HOSPITAL ENCOUNTER (INPATIENT)
Facility: HOSPITAL | Age: 50
LOS: 5 days | Discharge: HOME | DRG: 065 | End: 2025-06-21
Attending: EMERGENCY MEDICINE | Admitting: PSYCHIATRY & NEUROLOGY
Payer: MEDICAID

## 2025-06-16 DIAGNOSIS — I61.9 ICH (INTRACEREBRAL HEMORRHAGE): ICD-10-CM

## 2025-06-16 DIAGNOSIS — R41.0 CONFUSION: ICD-10-CM

## 2025-06-16 DIAGNOSIS — I10 HYPERTENSION, UNSPECIFIED TYPE: ICD-10-CM

## 2025-06-16 DIAGNOSIS — R41.0 DISORIENTATION, UNSPECIFIED: ICD-10-CM

## 2025-06-16 DIAGNOSIS — I61.0 NONTRAUMATIC SUBCORTICAL HEMORRHAGE OF LEFT CEREBRAL HEMISPHERE: ICD-10-CM

## 2025-06-16 DIAGNOSIS — I61.9 LEFT-SIDED NONTRAUMATIC INTRACEREBRAL HEMORRHAGE, UNSPECIFIED CEREBRAL LOCATION: Primary | ICD-10-CM

## 2025-06-16 DIAGNOSIS — I69.30 HISTORY OF CVA WITH RESIDUAL DEFICIT: ICD-10-CM

## 2025-06-16 PROBLEM — E78.5 HYPERLIPIDEMIA: Status: ACTIVE | Noted: 2025-06-16

## 2025-06-16 PROBLEM — E11.9 TYPE 2 DIABETES MELLITUS: Status: ACTIVE | Noted: 2025-06-16

## 2025-06-16 LAB
ABSOLUTE EOSINOPHIL (OHS): 0.1 K/UL
ABSOLUTE MONOCYTE (OHS): 0.45 K/UL (ref 0.3–1)
ABSOLUTE NEUTROPHIL COUNT (OHS): 4.64 K/UL (ref 1.8–7.7)
ALBUMIN SERPL BCP-MCNC: 3.5 G/DL (ref 3.5–5.2)
ALP SERPL-CCNC: 69 UNIT/L (ref 40–150)
ALT SERPL W/O P-5'-P-CCNC: 17 UNIT/L (ref 10–44)
AMMONIA PLAS-SCNC: 22 UMOL/L (ref 10–50)
ANION GAP (OHS): 12 MMOL/L (ref 8–16)
APTT PPP: 27.3 SECONDS (ref 21–32)
AST SERPL-CCNC: 27 UNIT/L (ref 11–45)
BASOPHILS # BLD AUTO: 0.06 K/UL
BASOPHILS NFR BLD AUTO: 0.9 %
BILIRUB SERPL-MCNC: 1.6 MG/DL (ref 0.1–1)
BUN SERPL-MCNC: 14 MG/DL (ref 6–20)
CALCIUM SERPL-MCNC: 8.8 MG/DL (ref 8.7–10.5)
CHLORIDE SERPL-SCNC: 101 MMOL/L (ref 95–110)
CHOLEST SERPL-MCNC: 248 MG/DL (ref 120–199)
CHOLEST/HDLC SERPL: 4.1 {RATIO} (ref 2–5)
CO2 SERPL-SCNC: 28 MMOL/L (ref 23–29)
CREAT SERPL-MCNC: 1.4 MG/DL (ref 0.5–1.4)
EAG (OHS): 206 MG/DL (ref 68–131)
ERYTHROCYTE [DISTWIDTH] IN BLOOD BY AUTOMATED COUNT: 13.2 % (ref 11.5–14.5)
ETHANOL SERPL-MCNC: <10 MG/DL
GFR SERPLBLD CREATININE-BSD FMLA CKD-EPI: 60 ML/MIN/1.73/M2
GLUCOSE SERPL-MCNC: 142 MG/DL (ref 70–110)
HBA1C MFR BLD: 8.8 % (ref 4–5.6)
HCT VFR BLD AUTO: 50.6 % (ref 40–54)
HDLC SERPL-MCNC: 60 MG/DL (ref 40–75)
HDLC SERPL: 24.2 % (ref 20–50)
HGB BLD-MCNC: 17.4 GM/DL (ref 14–18)
HOLD SPECIMEN: NORMAL
IMM GRANULOCYTES # BLD AUTO: 0.02 K/UL (ref 0–0.04)
IMM GRANULOCYTES NFR BLD AUTO: 0.3 % (ref 0–0.5)
INDIRECT COOMBS: NORMAL
INR PPP: 1 (ref 0.8–1.2)
LACTATE SERPL-SCNC: 1 MMOL/L (ref 0.5–2.2)
LDLC SERPL CALC-MCNC: 148.8 MG/DL (ref 63–159)
LYMPHOCYTES # BLD AUTO: 1.23 K/UL (ref 1–4.8)
MAGNESIUM SERPL-MCNC: 1.8 MG/DL (ref 1.6–2.6)
MCH RBC QN AUTO: 30.3 PG (ref 27–31)
MCHC RBC AUTO-ENTMCNC: 34.4 G/DL (ref 32–36)
MCV RBC AUTO: 88 FL (ref 82–98)
NONHDLC SERPL-MCNC: 188 MG/DL
NUCLEATED RBC (/100WBC) (OHS): 0 /100 WBC
PHOSPHATE SERPL-MCNC: 3 MG/DL (ref 2.7–4.5)
PLATELET # BLD AUTO: 194 K/UL (ref 150–450)
PMV BLD AUTO: 10.8 FL (ref 9.2–12.9)
POTASSIUM SERPL-SCNC: 3.3 MMOL/L (ref 3.5–5.1)
PROT SERPL-MCNC: 6.9 GM/DL (ref 6–8.4)
PROTHROMBIN TIME: 10.8 SECONDS (ref 9–12.5)
RBC # BLD AUTO: 5.74 M/UL (ref 4.6–6.2)
RELATIVE EOSINOPHIL (OHS): 1.5 %
RELATIVE LYMPHOCYTE (OHS): 18.9 % (ref 18–48)
RELATIVE MONOCYTE (OHS): 6.9 % (ref 4–15)
RELATIVE NEUTROPHIL (OHS): 71.5 % (ref 38–73)
RH BLD: NORMAL
SODIUM SERPL-SCNC: 141 MMOL/L (ref 136–145)
SPECIMEN OUTDATE: NORMAL
TRIGL SERPL-MCNC: 196 MG/DL (ref 30–150)
TSH SERPL-ACNC: 1.62 UIU/ML (ref 0.4–4)
WBC # BLD AUTO: 6.5 K/UL (ref 3.9–12.7)

## 2025-06-16 PROCEDURE — 83735 ASSAY OF MAGNESIUM: CPT | Performed by: EMERGENCY MEDICINE

## 2025-06-16 PROCEDURE — 99285 EMERGENCY DEPT VISIT HI MDM: CPT | Mod: 25

## 2025-06-16 PROCEDURE — 84100 ASSAY OF PHOSPHORUS: CPT | Performed by: EMERGENCY MEDICINE

## 2025-06-16 PROCEDURE — 85025 COMPLETE CBC W/AUTO DIFF WBC: CPT | Performed by: EMERGENCY MEDICINE

## 2025-06-16 PROCEDURE — 99900035 HC TECH TIME PER 15 MIN (STAT)

## 2025-06-16 PROCEDURE — 94799 UNLISTED PULMONARY SVC/PX: CPT

## 2025-06-16 PROCEDURE — 20000000 HC ICU ROOM

## 2025-06-16 PROCEDURE — 96365 THER/PROPH/DIAG IV INF INIT: CPT

## 2025-06-16 PROCEDURE — 82077 ASSAY SPEC XCP UR&BREATH IA: CPT | Performed by: EMERGENCY MEDICINE

## 2025-06-16 PROCEDURE — 85730 THROMBOPLASTIN TIME PARTIAL: CPT | Performed by: EMERGENCY MEDICINE

## 2025-06-16 PROCEDURE — 93005 ELECTROCARDIOGRAM TRACING: CPT

## 2025-06-16 PROCEDURE — 25000003 PHARM REV CODE 250: Performed by: REGISTERED NURSE

## 2025-06-16 PROCEDURE — 84443 ASSAY THYROID STIM HORMONE: CPT | Performed by: EMERGENCY MEDICINE

## 2025-06-16 PROCEDURE — 94761 N-INVAS EAR/PLS OXIMETRY MLT: CPT

## 2025-06-16 PROCEDURE — 63600175 PHARM REV CODE 636 W HCPCS: Mod: TB | Performed by: REGISTERED NURSE

## 2025-06-16 PROCEDURE — 83605 ASSAY OF LACTIC ACID: CPT | Performed by: EMERGENCY MEDICINE

## 2025-06-16 PROCEDURE — 63600175 PHARM REV CODE 636 W HCPCS

## 2025-06-16 PROCEDURE — 99291 CRITICAL CARE FIRST HOUR: CPT | Mod: ,,, | Performed by: REGISTERED NURSE

## 2025-06-16 PROCEDURE — 82140 ASSAY OF AMMONIA: CPT | Performed by: EMERGENCY MEDICINE

## 2025-06-16 PROCEDURE — 83036 HEMOGLOBIN GLYCOSYLATED A1C: CPT | Performed by: REGISTERED NURSE

## 2025-06-16 PROCEDURE — 80061 LIPID PANEL: CPT | Performed by: REGISTERED NURSE

## 2025-06-16 PROCEDURE — 86850 RBC ANTIBODY SCREEN: CPT | Performed by: EMERGENCY MEDICINE

## 2025-06-16 PROCEDURE — 93010 ELECTROCARDIOGRAM REPORT: CPT | Mod: ,,, | Performed by: INTERNAL MEDICINE

## 2025-06-16 PROCEDURE — 85610 PROTHROMBIN TIME: CPT | Performed by: EMERGENCY MEDICINE

## 2025-06-16 PROCEDURE — 80053 COMPREHEN METABOLIC PANEL: CPT | Performed by: EMERGENCY MEDICINE

## 2025-06-16 PROCEDURE — 99233 SBSQ HOSP IP/OBS HIGH 50: CPT | Mod: ,,, | Performed by: STUDENT IN AN ORGANIZED HEALTH CARE EDUCATION/TRAINING PROGRAM

## 2025-06-16 RX ORDER — CARVEDILOL 3.12 MG/1
3.12 TABLET ORAL 2 TIMES DAILY
Status: DISCONTINUED | OUTPATIENT
Start: 2025-06-16 | End: 2025-06-18

## 2025-06-16 RX ORDER — ONDANSETRON HYDROCHLORIDE 2 MG/ML
4 INJECTION, SOLUTION INTRAVENOUS EVERY 8 HOURS PRN
Status: DISCONTINUED | OUTPATIENT
Start: 2025-06-16 | End: 2025-06-21 | Stop reason: HOSPADM

## 2025-06-16 RX ORDER — AMOXICILLIN 250 MG
1 CAPSULE ORAL 2 TIMES DAILY
Status: DISCONTINUED | OUTPATIENT
Start: 2025-06-16 | End: 2025-06-18

## 2025-06-16 RX ORDER — INSULIN ASPART 100 [IU]/ML
0-10 INJECTION, SOLUTION INTRAVENOUS; SUBCUTANEOUS EVERY 6 HOURS PRN
Status: DISCONTINUED | OUTPATIENT
Start: 2025-06-16 | End: 2025-06-18

## 2025-06-16 RX ORDER — GLUCAGON 1 MG
1 KIT INJECTION
Status: DISCONTINUED | OUTPATIENT
Start: 2025-06-16 | End: 2025-06-18

## 2025-06-16 RX ORDER — LABETALOL HCL 20 MG/4 ML
10 SYRINGE (ML) INTRAVENOUS EVERY 4 HOURS PRN
Status: DISCONTINUED | OUTPATIENT
Start: 2025-06-16 | End: 2025-06-16

## 2025-06-16 RX ORDER — NICARDIPINE HYDROCHLORIDE 0.2 MG/ML
0-15 INJECTION INTRAVENOUS CONTINUOUS
Status: DISCONTINUED | OUTPATIENT
Start: 2025-06-16 | End: 2025-06-18

## 2025-06-16 RX ORDER — LABETALOL HCL 20 MG/4 ML
10 SYRINGE (ML) INTRAVENOUS EVERY 4 HOURS PRN
Status: DISCONTINUED | OUTPATIENT
Start: 2025-06-16 | End: 2025-06-18

## 2025-06-16 RX ORDER — ATORVASTATIN CALCIUM 40 MG/1
40 TABLET, FILM COATED ORAL DAILY
Status: DISCONTINUED | OUTPATIENT
Start: 2025-06-17 | End: 2025-06-21 | Stop reason: HOSPADM

## 2025-06-16 RX ORDER — SODIUM CHLORIDE 0.9 % (FLUSH) 0.9 %
10 SYRINGE (ML) INJECTION
Status: DISCONTINUED | OUTPATIENT
Start: 2025-06-16 | End: 2025-06-21 | Stop reason: HOSPADM

## 2025-06-16 RX ORDER — NICARDIPINE HYDROCHLORIDE 0.2 MG/ML
INJECTION INTRAVENOUS
Status: COMPLETED
Start: 2025-06-16 | End: 2025-06-16

## 2025-06-16 RX ADMIN — NICARDIPINE HYDROCHLORIDE 2.5 MG/HR: 0.2 INJECTION, SOLUTION INTRAVENOUS at 08:06

## 2025-06-16 RX ADMIN — SACUBITRIL AND VALSARTAN 1 TABLET: 24; 26 TABLET, FILM COATED ORAL at 10:06

## 2025-06-16 RX ADMIN — NICARDIPINE HYDROCHLORIDE 2.5 MG/HR: 0.2 INJECTION INTRAVENOUS at 08:06

## 2025-06-16 RX ADMIN — SENNOSIDES AND DOCUSATE SODIUM 1 TABLET: 50; 8.6 TABLET ORAL at 10:06

## 2025-06-16 RX ADMIN — DESMOPRESSIN ACETATE 26.4 MCG: 4 SOLUTION INTRAVENOUS at 10:06

## 2025-06-16 RX ADMIN — CARVEDILOL 3.12 MG: 3.12 TABLET, FILM COATED ORAL at 10:06

## 2025-06-16 NOTE — FIRST PROVIDER EVALUATION
"Medical screening examination initiated.  I have conducted a focused provider triage encounter, findings are as follows:    Brief history of present illness:  51 y/o man presents due to reported "disorientation" for several days.    Vitals:    06/16/25 1825   BP: (!) 185/139   Pulse: (!) 119   Resp: 16   Temp: 98.1 °F (36.7 °C)   TempSrc: Oral   SpO2: 99%   Weight: 65.8 kg (145 lb)   Height: 5' 2" (1.575 m)       Pertinent physical exam:  A&O x's 3, ambulatory unassisted, answers questions appropriately    Brief workup plan:  labs, brain imaging    Preliminary workup initiated; this workup will be continued and followed by the physician or advanced practice provider that is assigned to the patient when roomed.  "

## 2025-06-17 PROBLEM — N18.2 STAGE 2 CHRONIC KIDNEY DISEASE: Status: ACTIVE | Noted: 2025-06-17

## 2025-06-17 PROBLEM — I50.42 CHRONIC COMBINED SYSTOLIC AND DIASTOLIC CONGESTIVE HEART FAILURE: Status: ACTIVE | Noted: 2025-06-17

## 2025-06-17 LAB
ABSOLUTE EOSINOPHIL (OHS): 0.2 K/UL
ABSOLUTE MONOCYTE (OHS): 0.63 K/UL (ref 0.3–1)
ABSOLUTE NEUTROPHIL COUNT (OHS): 7.02 K/UL (ref 1.8–7.7)
ALBUMIN SERPL BCP-MCNC: 2.9 G/DL (ref 3.5–5.2)
ALP SERPL-CCNC: 52 UNIT/L (ref 40–150)
ALT SERPL W/O P-5'-P-CCNC: 14 UNIT/L (ref 10–44)
AMPHET UR QL SCN: NEGATIVE
ANION GAP (OHS): 10 MMOL/L (ref 8–16)
AORTIC SIZE INDEX (SOV): 2.2 CM/M2
AORTIC SIZE INDEX: 1.8 CM/M2
ASCENDING AORTA: 2.8 CM
AST SERPL-CCNC: 23 UNIT/L (ref 11–45)
AV AREA BY CONTINUOUS VTI: 1.4 CM2
AV INDEX (PROSTH): 0.46
AV LVOT MEAN GRADIENT: 1 MMHG
AV LVOT PEAK GRADIENT: 1 MMHG
AV MEAN GRADIENT: 3 MMHG
AV PEAK GRADIENT: 5 MMHG
AV VALVE AREA BY VELOCITY RATIO: 1.3 CM²
AV VALVE AREA: 1.3 CM2
AV VELOCITY RATIO: 0.45
BACTERIA #/AREA URNS AUTO: ABNORMAL /HPF
BARBITURATE SCN PRESENT UR: NEGATIVE
BASOPHILS # BLD AUTO: 0.06 K/UL
BASOPHILS NFR BLD AUTO: 0.6 %
BENZODIAZ UR QL SCN: NEGATIVE
BILIRUB SERPL-MCNC: 1.2 MG/DL (ref 0.1–1)
BILIRUB UR QL STRIP.AUTO: NEGATIVE
BNP SERPL-MCNC: 1076 PG/ML (ref 0–99)
BSA FOR ECHO PROCEDURE: 1.59 M2
BUN SERPL-MCNC: 13 MG/DL (ref 6–20)
CALCIUM SERPL-MCNC: 8 MG/DL (ref 8.7–10.5)
CANNABINOIDS UR QL SCN: NEGATIVE
CHLORIDE SERPL-SCNC: 103 MMOL/L (ref 95–110)
CLARITY UR: CLEAR
CO2 SERPL-SCNC: 25 MMOL/L (ref 23–29)
COCAINE UR QL SCN: NEGATIVE
COLOR UR AUTO: YELLOW
CREAT SERPL-MCNC: 1.5 MG/DL (ref 0.5–1.4)
CREAT UR-MCNC: 105 MG/DL (ref 23–375)
CV ECHO LV RWT: 0.55 CM
DOP CALC AO PEAK VEL: 1.1 M/S
DOP CALC AO VTI: 17.7 CM
DOP CALC LVOT AREA: 2.8 CM2
DOP CALC LVOT DIAMETER: 1.9 CM
DOP CALC LVOT PEAK VEL: 0.5 M/S
DOP CALC LVOT STROKE VOLUME: 23 CM3
DOP CALCLVOT PEAK VEL VTI: 8.1 CM
E WAVE DECELERATION TIME: 200 MS
E WAVE DECELERATION TIME: 201 MS
E/A RATIO: 1.8
E/E' RATIO: 11 M/S
ECHO EF ESTIMATED: 50 %
ECHO LV POSTERIOR WALL: 1.2 CM (ref 0.6–1.1)
ERYTHROCYTE [DISTWIDTH] IN BLOOD BY AUTOMATED COUNT: 13.2 % (ref 11.5–14.5)
ETHANOL UR-MCNC: <10 MG/DL
FRACTIONAL SHORTENING: 25 % (ref 28–44)
GFR SERPLBLD CREATININE-BSD FMLA CKD-EPI: 56 ML/MIN/1.73/M2
GLOBAL LONGITUIDAL STRAIN: 10.8 %
GLUCOSE SERPL-MCNC: 129 MG/DL (ref 70–110)
GLUCOSE UR QL STRIP: ABNORMAL
HCT VFR BLD AUTO: 42.5 % (ref 40–54)
HGB BLD-MCNC: 14.4 GM/DL (ref 14–18)
HGB UR QL STRIP: ABNORMAL
HOLD SPECIMEN: NORMAL
HYALINE CASTS UR QL AUTO: 18 /LPF (ref 0–1)
IMM GRANULOCYTES # BLD AUTO: 0.04 K/UL (ref 0–0.04)
IMM GRANULOCYTES NFR BLD AUTO: 0.4 % (ref 0–0.5)
INTERVENTRICULAR SEPTUM: 1.2 CM (ref 0.6–1.1)
KETONES UR QL STRIP: NEGATIVE
LA MAJOR: 4.9 CM
LA MINOR: 5.2 CM
LA WIDTH: 3.9 CM
LEFT ATRIUM SIZE: 3.5 CM
LEFT ATRIUM VOLUME INDEX: 37 ML/M2
LEFT ATRIUM VOLUME: 59 CM3
LEFT INTERNAL DIMENSION IN SYSTOLE: 3.3 CM (ref 2.1–4)
LEFT VENTRICLE DIASTOLIC VOLUME INDEX: 56.33 ML/M2
LEFT VENTRICLE DIASTOLIC VOLUME: 89 ML
LEFT VENTRICLE MASS INDEX: 121.1 G/M2
LEFT VENTRICLE SYSTOLIC VOLUME INDEX: 27.8 ML/M2
LEFT VENTRICLE SYSTOLIC VOLUME: 44 ML
LEFT VENTRICULAR INTERNAL DIMENSION IN DIASTOLE: 4.4 CM (ref 3.5–6)
LEFT VENTRICULAR MASS: 191.3 G
LEUKOCYTE ESTERASE UR QL STRIP: NEGATIVE
LV LATERAL E/E' RATIO: 8.8
LV SEPTAL E/E' RATIO: 13.2
LYMPHOCYTES # BLD AUTO: 1.63 K/UL (ref 1–4.8)
MAGNESIUM SERPL-MCNC: 1.7 MG/DL (ref 1.6–2.6)
MCH RBC QN AUTO: 29.6 PG (ref 27–31)
MCHC RBC AUTO-ENTMCNC: 33.9 G/DL (ref 32–36)
MCV RBC AUTO: 87 FL (ref 82–98)
METHADONE UR QL SCN: NEGATIVE
MICROSCOPIC COMMENT: ABNORMAL
MV PEAK A VEL: 0.44 M/S
MV PEAK E VEL: 0.79 M/S
NITRITE UR QL STRIP: NEGATIVE
NUCLEATED RBC (/100WBC) (OHS): 0 /100 WBC
OHS CV RV/LV RATIO: 0.66 CM
OHS LV EJECTION FRACTION SIMPSONS BIPLANE MOD: 32 %
OHS QRS DURATION: 84 MS
OHS QRS DURATION: 90 MS
OHS QTC CALCULATION: 441 MS
OHS QTC CALCULATION: 471 MS
OPIATES UR QL SCN: NEGATIVE
PCP UR QL: NEGATIVE
PH UR STRIP: 7 [PH]
PHOSPHATE SERPL-MCNC: 2.9 MG/DL (ref 2.7–4.5)
PLATELET # BLD AUTO: 168 K/UL (ref 150–450)
PMV BLD AUTO: 10.9 FL (ref 9.2–12.9)
POCT GLUCOSE: 133 MG/DL (ref 70–110)
POCT GLUCOSE: 138 MG/DL (ref 70–110)
POCT GLUCOSE: 161 MG/DL (ref 70–110)
POCT GLUCOSE: 166 MG/DL (ref 70–110)
POTASSIUM SERPL-SCNC: 2.7 MMOL/L (ref 3.5–5.1)
POTASSIUM SERPL-SCNC: 3.9 MMOL/L (ref 3.5–5.1)
PROT SERPL-MCNC: 5.5 GM/DL (ref 6–8.4)
PROT UR QL STRIP: ABNORMAL
RA MAJOR: 4.72 CM
RA PRESSURE ESTIMATED: 3 MMHG
RA WIDTH: 3.26 CM
RBC # BLD AUTO: 4.87 M/UL (ref 4.6–6.2)
RBC #/AREA URNS AUTO: 8 /HPF (ref 0–4)
RELATIVE EOSINOPHIL (OHS): 2.1 %
RELATIVE LYMPHOCYTE (OHS): 17 % (ref 18–48)
RELATIVE MONOCYTE (OHS): 6.6 % (ref 4–15)
RELATIVE NEUTROPHIL (OHS): 73.3 % (ref 38–73)
RIGHT VENTRICLE DIASTOLIC BASEL DIMENSION: 2.9 CM
SINUS: 3.41 CM
SODIUM SERPL-SCNC: 138 MMOL/L (ref 136–145)
SP GR UR STRIP: 1.01
STJ: 2.5 CM
TDI LATERAL: 0.09 M/S
TDI SEPTAL: 0.06 M/S
TDI: 0.08 M/S
TRICUSPID ANNULAR PLANE SYSTOLIC EXCURSION: 2.4 CM
UROBILINOGEN UR STRIP-ACNC: NEGATIVE EU/DL
WBC # BLD AUTO: 9.58 K/UL (ref 3.9–12.7)
WBC #/AREA URNS AUTO: 3 /HPF (ref 0–5)
Z-SCORE OF LEFT VENTRICULAR DIMENSION IN END DIASTOLE: -0.28
Z-SCORE OF LEFT VENTRICULAR DIMENSION IN END SYSTOLE: 1.28

## 2025-06-17 PROCEDURE — 92523 SPEECH SOUND LANG COMPREHEN: CPT

## 2025-06-17 PROCEDURE — 99233 SBSQ HOSP IP/OBS HIGH 50: CPT | Mod: ,,, | Performed by: NURSE PRACTITIONER

## 2025-06-17 PROCEDURE — 84100 ASSAY OF PHOSPHORUS: CPT | Performed by: REGISTERED NURSE

## 2025-06-17 PROCEDURE — 97161 PT EVAL LOW COMPLEX 20 MIN: CPT

## 2025-06-17 PROCEDURE — 25000003 PHARM REV CODE 250: Performed by: REGISTERED NURSE

## 2025-06-17 PROCEDURE — 63600175 PHARM REV CODE 636 W HCPCS: Performed by: REGISTERED NURSE

## 2025-06-17 PROCEDURE — 83880 ASSAY OF NATRIURETIC PEPTIDE: CPT

## 2025-06-17 PROCEDURE — 92610 EVALUATE SWALLOWING FUNCTION: CPT

## 2025-06-17 PROCEDURE — 83735 ASSAY OF MAGNESIUM: CPT | Performed by: REGISTERED NURSE

## 2025-06-17 PROCEDURE — 97535 SELF CARE MNGMENT TRAINING: CPT

## 2025-06-17 PROCEDURE — 97166 OT EVAL MOD COMPLEX 45 MIN: CPT

## 2025-06-17 PROCEDURE — 99233 SBSQ HOSP IP/OBS HIGH 50: CPT | Mod: ,,, | Performed by: PSYCHIATRY & NEUROLOGY

## 2025-06-17 PROCEDURE — 25000003 PHARM REV CODE 250: Performed by: INTERNAL MEDICINE

## 2025-06-17 PROCEDURE — 85025 COMPLETE CBC W/AUTO DIFF WBC: CPT | Performed by: REGISTERED NURSE

## 2025-06-17 PROCEDURE — 20000000 HC ICU ROOM

## 2025-06-17 PROCEDURE — 99223 1ST HOSP IP/OBS HIGH 75: CPT | Mod: ,,, | Performed by: STUDENT IN AN ORGANIZED HEALTH CARE EDUCATION/TRAINING PROGRAM

## 2025-06-17 PROCEDURE — 81003 URINALYSIS AUTO W/O SCOPE: CPT | Performed by: EMERGENCY MEDICINE

## 2025-06-17 PROCEDURE — 84132 ASSAY OF SERUM POTASSIUM: CPT | Performed by: REGISTERED NURSE

## 2025-06-17 PROCEDURE — 80307 DRUG TEST PRSMV CHEM ANLYZR: CPT | Performed by: EMERGENCY MEDICINE

## 2025-06-17 PROCEDURE — 82310 ASSAY OF CALCIUM: CPT | Performed by: REGISTERED NURSE

## 2025-06-17 PROCEDURE — 94761 N-INVAS EAR/PLS OXIMETRY MLT: CPT

## 2025-06-17 RX ORDER — CALCIUM GLUCONATE 20 MG/ML
1 INJECTION, SOLUTION INTRAVENOUS
Status: DISCONTINUED | OUTPATIENT
Start: 2025-06-17 | End: 2025-06-17

## 2025-06-17 RX ORDER — MAGNESIUM SULFATE HEPTAHYDRATE 40 MG/ML
2 INJECTION, SOLUTION INTRAVENOUS
Status: DISCONTINUED | OUTPATIENT
Start: 2025-06-17 | End: 2025-06-17

## 2025-06-17 RX ORDER — MUPIROCIN 20 MG/G
OINTMENT TOPICAL 2 TIMES DAILY
Status: DISCONTINUED | OUTPATIENT
Start: 2025-06-17 | End: 2025-06-21 | Stop reason: HOSPADM

## 2025-06-17 RX ORDER — POTASSIUM CHLORIDE 7.45 MG/ML
10 INJECTION INTRAVENOUS
Status: DISCONTINUED | OUTPATIENT
Start: 2025-06-17 | End: 2025-06-17

## 2025-06-17 RX ORDER — POTASSIUM CHLORIDE 7.45 MG/ML
40 INJECTION INTRAVENOUS
Status: DISCONTINUED | OUTPATIENT
Start: 2025-06-17 | End: 2025-06-17

## 2025-06-17 RX ORDER — CALCIUM GLUCONATE 20 MG/ML
3 INJECTION, SOLUTION INTRAVENOUS
Status: DISCONTINUED | OUTPATIENT
Start: 2025-06-17 | End: 2025-06-17

## 2025-06-17 RX ORDER — HYDRALAZINE HYDROCHLORIDE 20 MG/ML
10 INJECTION INTRAMUSCULAR; INTRAVENOUS EVERY 4 HOURS PRN
Status: DISCONTINUED | OUTPATIENT
Start: 2025-06-17 | End: 2025-06-18

## 2025-06-17 RX ORDER — CALCIUM GLUCONATE 20 MG/ML
2 INJECTION, SOLUTION INTRAVENOUS
Status: DISCONTINUED | OUTPATIENT
Start: 2025-06-17 | End: 2025-06-17

## 2025-06-17 RX ADMIN — NICARDIPINE HYDROCHLORIDE 2.5 MG/HR: 0.2 INJECTION, SOLUTION INTRAVENOUS at 12:06

## 2025-06-17 RX ADMIN — MUPIROCIN: 20 OINTMENT TOPICAL at 11:06

## 2025-06-17 RX ADMIN — NICARDIPINE HYDROCHLORIDE 2.5 MG/HR: 0.2 INJECTION, SOLUTION INTRAVENOUS at 02:06

## 2025-06-17 RX ADMIN — POTASSIUM BICARBONATE 25 MEQ: 977.5 TABLET, EFFERVESCENT ORAL at 03:06

## 2025-06-17 RX ADMIN — POTASSIUM BICARBONATE 50 MEQ: 977.5 TABLET, EFFERVESCENT ORAL at 02:06

## 2025-06-17 RX ADMIN — CARVEDILOL 3.12 MG: 3.12 TABLET, FILM COATED ORAL at 08:06

## 2025-06-17 RX ADMIN — SACUBITRIL AND VALSARTAN 1 TABLET: 24; 26 TABLET, FILM COATED ORAL at 09:06

## 2025-06-17 RX ADMIN — SACUBITRIL AND VALSARTAN 1 TABLET: 24; 26 TABLET, FILM COATED ORAL at 08:06

## 2025-06-17 RX ADMIN — HYDRALAZINE HYDROCHLORIDE 10 MG: 20 INJECTION, SOLUTION INTRAMUSCULAR; INTRAVENOUS at 10:06

## 2025-06-17 RX ADMIN — CARVEDILOL 3.12 MG: 3.12 TABLET, FILM COATED ORAL at 09:06

## 2025-06-17 RX ADMIN — LABETALOL HYDROCHLORIDE 10 MG: 5 INJECTION, SOLUTION INTRAVENOUS at 08:06

## 2025-06-17 RX ADMIN — INSULIN ASPART 2 UNITS: 100 INJECTION, SOLUTION INTRAVENOUS; SUBCUTANEOUS at 04:06

## 2025-06-17 RX ADMIN — MUPIROCIN: 20 OINTMENT TOPICAL at 09:06

## 2025-06-17 RX ADMIN — ATORVASTATIN CALCIUM 40 MG: 40 TABLET, FILM COATED ORAL at 08:06

## 2025-06-17 NOTE — PROGRESS NOTES
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 He reports being unable to remember where he parked his car, difficulty with timelines, and inability to remember waking up in the mornings. CTH obtained in the ED revealed an acute L thalamic IPH. Questionable use of DAPT as ASA/Plavix listed on home med list, but patient unable to state which meds he is currently taking. Given recent disorientation/confusion and concern for possible AP usage, DDAVP administered. Also unclear if patient taking any home meds as /139 upon ED arrival, requiring initiation of Cardene. He will be admitted to [facility] for further management.    Hospital Course: 06/17/2025: patient was admitted to the [facility] overnight. No acute intervals. BP controlled on Cardene gtt with goals <140 sys. Restarted entresto. Repeat CTH stable. MRI today shows evolving recent to subacute aged left thalamic parenchymal hemorrhage.      6/17: No acute intervals. BP controlled on Cardene gtt with goals <140 sys. Restarted entresto. MRI today shows evolving recent to subacute aged left thalamic parenchymal hemorrhage.      Prescriptions Prior to Admission[1]    Review of patient's allergies indicates:   Allergen Reactions    Aspartame Nausea And Vomiting     Violent emesis.    Shellfish containing products Shortness Of Breath     Has received iodinated contrast in the past with no reaction       Past Medical History:   Diagnosis Date    Diabetes mellitus     Hypertension     R thalmaic hypertensive ICH 04/04/2021    Tachycardia 4/4/2021      Past Surgical History:   Procedure Laterality Date    ANTERIOR CRUCIATE LIGAMENT REPAIR Right     2002     Family History       Problem Relation (Age of Onset)    Diabetes Mother    Hypertension Mother, Father    Stroke Father          Tobacco Use    Smoking status: Never    Smokeless tobacco: Not on file   Substance and Sexual Activity    Alcohol use: Not Currently    Drug use: Never    Sexual activity: Not on file     Review of Systems   All other systems reviewed and are negative.    Objective:     Weight: 58.1 kg (128 lb)  Body mass index is 23.41 kg/m².  Vital Signs (Most Recent):  Temp: 98.3 °F (36.8 °C) (06/17/25 1105)  Pulse: 88 (06/17/25 1305)  Resp: 20 (06/17/25 1305)  BP: 124/79 (06/17/25 1305)  SpO2: 97 % (06/17/25 1305) Vital Signs (24h Range):  Temp:  [97.5 °F (36.4 °C)-98.3 °F (36.8 °C)] 98.3 °F (36.8 °C)  Pulse:  [] 88  Resp:  [6-32] 20  SpO2:  [94 %-100 %] 97 %  BP: (118-210)/() 124/79     Date 06/17/25 0700 - 06/18/25 0659   Shift 7393-7424 1844-4247 0143-0990 24 Hour Total   INTAKE   P.O. 240   240   I.V.(mL/kg) 10.5(0.2)   10.5(0.2)   Shift Total(mL/kg) 250.5(4.3)   250.5(4.3)   OUTPUT   Shift Total(mL/kg)       Weight (kg) 58.1 58.1 58.1 58.1                               Physical Exam     GENERAL: resting comfortably  HEENT: NCAT, PERRL, mucous membranes moist  NECK: supple, trachea midline  CV: normal capillary refill  PULM: aerating well, symmetric expansion, no distress  ABD: soft, NT, ND  EXT: no c/c/e    NEURO:    AAO x 3  CN II-XII grossly intact except mild L FD  Fc x 4 antigravity  SILT    Mild LUE drift      Neurosurgery Physical Exam    Significant Labs:  Recent Labs   Lab 06/16/25  1859 06/17/25  0210 06/17/25  0849   * 129*  --     138  --    K 3.3* 2.7* 3.9    103  --    CO2 28 25  --    BUN 14 13  --    CREATININE 1.4 1.5*  --    CALCIUM 8.8 8.0*  --    MG 1.8 1.7  --      Recent Labs   Lab 06/16/25  1859 06/17/25  0210   WBC 6.50 9.58   HGB 17.4  [General Appearance - Well Developed] : well developed 14.4   HCT 50.6 42.5    168     Recent Labs   Lab 06/16/25  2019   INR 1.0   APTT 27.3     Microbiology Results (last 7 days)       ** No results found for the last 168 hours. **          All pertinent labs from the last 24 hours have been reviewed.    Significant Diagnostics:  I have reviewed all pertinent imaging results/findings within the past 24 hours.  I have reviewed and interpreted all pertinent imaging results/findings within the past 24 hours.  CT Head Without Contrast  Result Date: 6/16/2025  1. Small acute parenchymal hemorrhage centered in the left thalamus, likely hypertensive in etiology.  There is local mass effect on the adjacent left lateral ventricle without significant midline shift. 2. Mild prominence of the lateral ventricles when compared to prior.  This may relate to interval volume loss, however early acute hydrocephalus may present similarly.  Recommend attention on short-term follow-up. 3. Chronic microvascular ischemic change and sequela of remote hemorrhage, as detailed above.  Right caudate suspected remote tiny lacunar type infarct but new from 10/25/2023 imaging. This report was flagged in Epic as abnormal. These findings were discovered at 20:00 on 06/16/2025 and relayed to Basia Felton MD via telephone by Sam Gray MD at 20:00 on 06/16/2025. Electronically signed by resident: Sam Gray Date:    06/16/2025 Time:    20:02 Electronically signed by: Aramis Hernandez MD Date:    06/16/2025 Time:    20:32      MRI Brain Without Contrast  Result Date: 6/17/2025  MRI brain: Evolving recent to subacute aged left thalamic parenchymal hemorrhage.  Please note evaluation for underlying mass lesion limited by noncontrast technique.  Clinical correlation and follow-up recommended. Patchy and confluent T2 FLAIR signal abnormality supratentorial white matter elsewhere while nonspecific concerning for underlying chronic ischemic change Ventricles stable without hydrocephalus Remote  [Normal Appearance] : normal appearance [Well Groomed] : well groomed hemorrhage right basal ganglia/thalamus with ipsilateral wallerian degeneration. MRA head: Unremarkable MRA head as detailed above specifically without focal stenosis or proximal large vessel occlusion. Please note there is no hypertrophied vasculature associated with left thalamic hemorrhage although only partially included in the field of view.  Further evaluation with repeat MRA imaging to include the entirety of the thalamic hemorrhage advised. Electronically signed by: Ricardo Humphrey DO Date:    06/17/2025 Time:    11:52    MRA Brain without contrast  Result Date: 6/17/2025  MRI brain: Evolving recent to subacute aged left thalamic parenchymal hemorrhage.  Please note evaluation for underlying mass lesion limited by noncontrast technique.  Clinical correlation and follow-up recommended. Patchy and confluent T2 FLAIR signal abnormality supratentorial white matter elsewhere while nonspecific concerning for underlying chronic ischemic change Ventricles stable without hydrocephalus Remote hemorrhage right basal ganglia/thalamus with ipsilateral wallerian degeneration. MRA head: Unremarkable MRA head as detailed above specifically without focal stenosis or proximal large vessel occlusion. Please note there is no hypertrophied vasculature associated with left thalamic hemorrhage although only partially included in the field of view.  Further evaluation with repeat MRA imaging to include the entirety of the thalamic hemorrhage advised. Electronically signed by: Ricardo Humphrey DO Date:    06/17/2025 Time:    11:52    CT Head Without Contrast  Result Date: 6/17/2025  Stable CT appearance of the small acute parenchymal hematoma centered in the left thalamus.  No new intracranial hemorrhage. Ventricles are stable in size. Recommendations include clinical correlation and continued imaging follow-up. Electronically signed by resident: Sam Gray Date:    06/17/2025 Time:    04:53 Electronically signed by: Cosme Almazan  Date:    06/17/2025 Time:    08:20    X-Ray Chest AP Single View  Result Date: 6/16/2025  No acute finding identified on this single view. Electronically signed by: Alfredito Howe MD Date:    06/16/2025 Time:    23:40    CT Head Without Contrast  Result Date: 6/16/2025  1. Small acute parenchymal hemorrhage centered in the left thalamus, likely hypertensive in etiology.  There is local mass effect on the adjacent left lateral ventricle without significant midline shift. 2. Mild prominence of the lateral ventricles when compared to prior.  This may relate to interval volume loss, however early acute hydrocephalus may present similarly.  Recommend attention on short-term follow-up. 3. Chronic microvascular ischemic change and sequela of remote hemorrhage, as detailed above.  Right caudate suspected remote tiny lacunar type infarct but new from 10/25/2023 imaging. This report was flagged in Epic as abnormal. These findings were discovered at 20:00 on 06/16/2025 and relayed to Basia Felton MD via telephone by Sam Gray MD at 20:00 on 06/16/2025. Electronically signed by resident: Sam Gray Date:    06/16/2025 Time:    20:02 Electronically signed by: Aramis Hernandez MD Date:    06/16/2025 Time:    20:32        Assessment/Plan:     Neuro  * ICH (intracerebral hemorrhage)  51 y/o M presenting with disorientation and confusion for the past several days (exact time of onset unclear). He reports being unable to remember where he parked his car, difficulty with timelines, and inability to remember waking up in the mornings. CTH obtained in the ED revealed an acute L thalamic IPH.    Antithrombotics for secondary stroke prevention: Antiplatelets: None: Intracerebral Hemorrhage    Statins for secondary stroke prevention and hyperlipidemia, if present:   Statins: Atorvastatin- 40 mg daily - not indicated for ICH; however, patient has PMH of hyperlipidemia    Aggressive risk factor modification: HTN, DM, HLD     Rehab  [General Appearance - Well Nourished] : well nourished [No Deformities] : no deformities efforts: The patient has been evaluated by a stroke team provider and the therapy needs have been fully considered based off the presenting complaints and exam findings. The following therapy evaluations are needed: PT evaluate and treat, OT evaluate and treat, SLP evaluate and treat, PM&R evaluate for appropriate placement    Diagnostics ordered/pending: CT scan of head without contrast to asses brain parenchyma, HgbA1C to assess blood glucose levels, Lipid Profile to assess cholesterol levels, MRA head to assess vasculature, MRI head without contrast to assess brain parenchyma, TTE to assess cardiac function/status , TSH to assess thyroid function    VTE prophylaxis: Mechanical prophylaxis: Place SCDs  None: Reason for No Pharmacological VTE Prophylaxis: History of systemic or intracranial bleeding    BP parameters: ICH: SBP < 150    -Admit to NCC  -VS/Neuro checks q1  -NSGY, Vascular Neurology consulted  -No acute surgical intervention indicated at this time  -HOB >/= 30  -Repeat CTH in 6h for stability  -MRI/MRA brain today  -SBP goal < 150  -NPO for now  -Monitor I/O  -CBC, CMP, Mag, Phos daily  -SCDs for DVT PPX; hold chemical PPX for now in setting of acute IPH  -PT/OT/SLP as appropriate    History of CVA with residual deficit  -R internal capsule stroke in 2023 w/ residual L-sided weakness    History of intracerebral hemorrhage without residual deficit  -R thalamic IPH in 2021    Cardiac/Vascular  Chronic combined systolic and diastolic congestive heart failure  -Last ECHO on 3/7/25 w/ global hypokinesis, severely reduced systolic function w/ estimated EF 25-30%, and diastolic dysfunction  -No sign of fluid overload on CXR  -Continue home Entresto   -Hold home Lasix for now, resume as appropriate    Hyperlipidemia  -Atorvastatin daily    Hypertension  -SBP goal < 150  -Coreg BID  -Labetalol, hydralazine, Cardene PRN    Renal/  Stage 2 chronic kidney disease  -No documented history of CKD; however, GFR 60  [General Appearance - In No Acute Distress] : no acute distress on admission  -Creatinine 1.4, baseline appears to be 1-1.5 per chart review  -GFR 37-45, creatinine 1.8-2.1 during recent admission (3/7-3/14)  -Avoid nephrotoxins  -Renally dose meds  -CMP daily    Hypokalemia  -K+ 3.3 on admission  -CMP daily, replace PRN    Endocrine  Type 2 diabetes mellitus  -Hgb A1c 8.8  -Accuchecks q6 w/ SSI  -Diabetic diet once no longer NPO          The patient is being Prophylaxed for:  Venous Thromboembolism with: Mechanical or Chemical  Stress Ulcer with: H2B or PPI  Ventilator Pneumonia with: none    Activity Orders            Progressive Mobility Protocol (mobilize patient to their highest level of functioning at least twice daily) starting at 06/17 0800    Diet Consistent Carbohydrate 2000 Calories (up to 75 gm per meal); Thin; Standard Tray: Carb Control starting at 06/17 0633    Turn patient starting at 06/16 2200    Elevate HOB 30 starting at 06/16 2105    Bed rest starting at 06/16 2028    Elevate HOB 30 (30-45 Degrees) starting at 06/16 2028          Full Code    Candi Fields MD ([facility] , PGYII)  Neurocritical Care  [facility] - Neuro Critical Care           [1]   Medications Prior to Admission   Medication Sig Dispense Refill Last Dose/Taking    aspirin (ECOTRIN) 81 MG EC tablet Take 1 tablet (81 mg total) by mouth once daily. 90 tablet 0     atorvastatin (LIPITOR) 40 MG tablet Take 1 tablet (40 mg total) by mouth once daily. 30 tablet 2     blood sugar diagnostic Strp Use to check blood glucose 5 (five) times daily. 100 strip 1     blood-glucose meter Misc Use to check blood glucose 5 times daily 1 each 0     carvediloL (COREG) 3.125 MG tablet Take 1 tablet (3.125 mg total) by mouth 2 (two) times daily. 180 tablet 0     clopidogreL (PLAVIX) 75 mg tablet Take 1 tablet (75 mg total) by mouth once daily. 30 tablet 0     furosemide (LASIX) 40 MG tablet Take 1 tablet (40 mg total) by mouth 2 (two) times daily. 60 tablet 2     insulin aspart U-100 (NOVOLOG) 100 unit/mL (3 mL)  "InPn pen Inject 1-10 Units before meals and at bedtime for Hyperglycemia. Per sliding scale:Blood Glucose: 151-200    201-250   251-300   301-350  >350 Pre-meal:          2 units      4 units      6 units     8 units     10 units  10 pm:              1 units      2 units      3 units     4 units      5 units 12 mL 0     lancets (TRUEPLUS LANCETS) 30 gauge Misc Use to check blood glucose 5 (five) times daily. 100 each 0     pen needle, diabetic 32 gauge x 5/32" Ndle Use for insulin adminstration up to 5 times daily 200 each 0     pulse oximeter (PULSE OXIMETER) device by Apply Externally route 2 (two) times a day. Use twice daily at 8 AM and 3 PM and record the value in [facility] as directed. 1 each 0     sacubitriL-valsartan (ENTRESTO) 24-26 mg per tablet Take 1 tablet by mouth 2 (two) times daily. 180 tablet 0      " [Heart Rate And Rhythm] : heart rate and rhythm were normal [Heart Sounds] : normal S1 and S2 [Murmurs] : no murmurs present [FreeTextEntry1] : Decreased breath sounds [Abdomen Soft] : soft [Abdomen Tenderness] : non-tender [Abdomen Mass (___ Cm)] : no abdominal mass palpated [Nail Clubbing] : no clubbing of the fingernails [Cyanosis, Localized] : no localized cyanosis [Petechial Hemorrhages (___cm)] : no petechial hemorrhages [] : no ischemic changes

## 2025-06-17 NOTE — HOSPITAL COURSE
06/17/2025: patient was admitted to the [facility] overnight. No acute intervals. BP controlled on Cardene gtt with goals <140 sys. Restarted entresto. Repeat CTH stable. MRI today shows evolving recent to subacute aged left thalamic parenchymal hemorrhage.    6/18/2025: Increased coreg to 6.25; will continue to wean Cardene as tolerated. Heparin DVT prophylaxis started. Anticipate SD if patient maintains BP goal off of Cardene.  6/19/2025: Increased coreg to 12.5 bid. Added lasix 20mg. No additional Cardene needed. SD to vascular neurology for monitoring of BP and renal function.  6/20/2025: Increased entresto as patient again became hypertensive overnight.   06/21/2025 BP well controlled. NAEON. Stable for discharge home with family.

## 2025-06-17 NOTE — ED NOTES
Bed: [facility]  Expected date:   Expected time:   Means of arrival:   Comments:  Eduardo  
Neuro CC provider at bedside  
Neurosurgery at bedside   
Took report from Lazara DAVILA, and assumed care of pt at this time with Hortensia DAVILA. Pt resting comfortably and independently repositioned in stretcher with bed locked in lowest position for safety. NAD noted at this time. Respirations even and unlabored and visible chest rise noted.  Patient offered bathroom assistance and denies need at this time. Pt instructed to call if assistance is needed. Pt on continuous cardiac, BP, and O2 monitoring. Call light within reach. No needs at this time. Will continue to monitor.    
WDL

## 2025-06-17 NOTE — NURSING
Lab notified RN of critical K 2.7; lab was drawn before administration of 50mEq at 0206. JOSELITO Lara made aware of lab value, ordered an additional 25 mEq PO. Med administered. Will recheck K at 0730

## 2025-06-17 NOTE — ASSESSMENT & PLAN NOTE
BNP 1.427 (3/6)  Last echo 3/7/25 with EF 25-30% with global hypokinesis and severely reduced systolic function.  On home Lasix 40. Will defer to primary team when to resume.   Would recommend addition of Jardiance 10 for GDMT pathway, which usually tends to improve hypertension on HF patients. Will defer to primary.

## 2025-06-17 NOTE — CONSULTS
"[facility]  Vascular Neurology  Comprehensive Stroke Center  Consult Note    Inpatient consult to Neurology Services (Vascular Neurology)  Consult performed by: Dragan Bain MD  Consult ordered by: Surekha Lara NP  Reason for consult: "ICH"        Assessment/Plan:     Patient is a 50 y.o. year old male with:    * ICH (intracerebral hemorrhage)  Mr. Cecil Clark is a 50 y.o. patient here for mental status changes. Complex prior neurovascular history with right thalamic ICH (2021) and R MCA stroke (2023) with residual LSW. Patient has been feeling more confused and disoriented on the last 3 days. No evidence of headaches or sensory changes. Hypertensive on admission on the 210s range, started on cardene gtt. Initial NIHSS 3 for L arm/leg drift from residual LSW with LUE ataxia/dysmetria with action and intention tremor. CTH with new small left thalamic parenchymal hemorrhage with mass effect on the left lateral ventricle without MLS. No hydrocephalus noted. No acute interventions per neurosurgery.     Suspected initial etiology of L thalamic ICH: likely hypertensive.     Antithrombotics for secondary stroke prevention: Not recommending any AP in the setting of acute L thalamic ICH.     Statins for secondary stroke prevention and hyperlipidemia, if present: Recommending holding statins in the acute setting of L thalamic ICH on a patient with complex prior neurovascular history for the high risk of bleeding.     Aggressive risk factor modification: HTN     Rehab efforts: The patient has been evaluated by a stroke team provider and the therapy needs have been fully considered based off the presenting complaints and exam findings. The following therapy evaluations are needed: PT evaluate and treat, OT evaluate and treat, SLP evaluate and treat, PM&R evaluate for appropriate placement    Diagnostics ordered/pending:   Per Vascular Neurology would hold on MRI brain since etiology of ICH likely " hypertensive with new findings on CT. However, will evaluate further imaging placed by NCC on admission (MRI brain and MRA brain without contrast).   Pending echo from this admission.     VTE prophylaxis: None: Reason for No Pharmacological VTE Prophylaxis: History of systemic or intracranial bleeding    BP parameters: ICH: SBP Goal Range 130-150 - currently on cardene gtt.     Hyperlipidemia  Currently on atorvastatin per NCC.   Would recommend holding statins in the setting of ICH with complex neurovascular history.   Recommend trial of low dose Zetia 10. Will defer to NCC.     Type 2 diabetes mellitus  ICH risk factor.   Admission A1c 8.8.   Currently on MDSSI per NCC.     New onset of congestive heart failure  BNP 1.427 (3/6)  Last echo 3/7/25 with EF 25-30% with global hypokinesis and severely reduced systolic function.  On home Lasix 40. Will defer to primary team when to resume.   Would recommend addition of Jardiance 10 for GDMT pathway, which usually tends to improve hypertension on HF patients. Will defer to primary.     Hypokalemia  Replace per NCC protocol.     Hypertension  ICH risk factor.   Admission SBP 180s-210s.  Started on cardene gtt in the ED - SBP 130s while in the room.  Will defer to primary to resume home coreg 3.125 BID and Entresto 24-26 BID.        STROKE DOCUMENTATION     Acute Stroke Times   Last Known Normal Date: 06/14/25  Unknown Normal Time: Unknown Time  Unknown Symptom Onset Date: Unknown Date  Unknown Symptom Onset Time: Unknown Time    NIH Scale:  1a. Level of Consciousness: 0-->Alert, keenly responsive  1b. LOC Questions: 0-->Answers both questions correctly  1c. LOC Commands: 0-->Performs both tasks correctly  2. Best Gaze: 0-->Normal  3. Visual: 0-->No visual loss  4. Facial Palsy: 0-->Normal symmetrical movements  5a. Motor Arm, Left: 1-->Drift, limb holds 90 (or 45) degrees, but drifts down before full 10 seconds, does not hit bed or other support  5b. Motor Arm, Right:  0-->No drift, limb holds 90 (or 45) degrees for full 10 secs  6a. Motor Leg, Left: 1-->Drift, leg falls by the end of the 5-sec period but does not hit bed  6b. Motor Leg, Right: 0-->No drift, leg holds 30 degree position for full 5 secs  7. Limb Ataxia: 1-->Present in one limb (L dysmetria.)  8. Sensory: 0-->Normal, no sensory loss  9. Best Language: 0-->No aphasia, normal  10. Dysarthria: 0-->Normal  11. Extinction and Inattention (formerly Neglect): 0-->No abnormality  Total (NIH Stroke Scale): 3    Modified Hipolito Score: 0  Glenda Coma Scale:    ABCD2 Score:    [id number] Score:   HAS -BLED Score:   ICH Score:0  Hunt & Gonzalez Classification:       Thrombolysis Candidate? No, CT findings (ICH, SAH, Large core infarct)     Delays to Thrombolysis?  Not Applicable    Interventional Revascularization Candidate?   Is the patient eligible for mechanical endovascular reperfusion (SONYA)?  No. ICH.     Delays to Thrombectomy? Not Applicable    Hemorrhagic change of an Ischemic Stroke: Does this patient have an ischemic stroke with hemorrhagic changes? No   Subjective:     History of Present Illness:  Mr. Cecil Clark is a 50 y.o. patient here for mental status changes. History of DM2, HTN, complex prior neurovascular history with right thalamic ICH (2021) and R MCA stroke (2023) with residual LSW. Per patient, he commented about not feeling himself and more confused and disoriented for the past 3 days. No headaches or sensory changes. Hypertensive in the ED with SBP 180s-210s. Per patient he was at his baseline when I examined him in [facility]. On my initial evaluation, NIHSS 3 for L arm and L leg drift (residual) with LUE ataxia/dysmetria with action and intention tremor, which the patient comments about the latter being new finding for him. CTH on this admission with new small left thalamic parenchymal hemorrhage with mass effect on the left lateral ventricle without MLS. No hydrocephalus.             Past Medical History:    Diagnosis Date    Diabetes mellitus     Hypertension     R thalmaic hypertensive ICH 04/04/2021    Tachycardia 4/4/2021     Past Surgical History:   Procedure Laterality Date    ANTERIOR CRUCIATE LIGAMENT REPAIR Right     2002     Social History[1]  Review of patient's allergies indicates:   Allergen Reactions    Aspartame Nausea And Vomiting     Violent emesis.    Shellfish containing products Shortness Of Breath     Has received iodinated contrast in the past with no reaction       Medications: I have reviewed the current medication administration record.    Prescriptions Prior to Admission[2]    Review of Systems   Constitutional:  Negative for activity change.   Neurological:  Positive for tremors and weakness (Residual LSW.). Negative for speech difficulty and headaches.   Psychiatric/Behavioral:  Negative for confusion and decreased concentration.      Objective:     Vital Signs (Most Recent):  Temp: 98.1 °F (36.7 °C) (06/16/25 2230)  Pulse: (!) 115 (06/16/25 2232)  Resp: 20 (06/16/25 2232)  BP: 134/86 (06/16/25 2230)  SpO2: 97 % (06/16/25 2232)    Vital Signs Range (Last 24H):  Temp:  [97.6 °F (36.4 °C)-98.2 °F (36.8 °C)]   Pulse:  []   Resp:  [12-32]   BP: (127-210)/()   SpO2:  [95 %-100 %]        Physical Exam  Vitals reviewed.   Musculoskeletal:      Right lower leg: No edema.      Left lower leg: No edema.   Neurological:      Mental Status: He is alert.      Comments: See Neurological Exam and NIHSS.               Neurological Exam:   LOC: alert  Attention Span: Good   Language: No aphasia  Articulation: No dysarthria  Orientation: Person, Place, Time   Visual Fields: Full  EOM (CN III, IV, VI): Full/intact  Pupils (CN II, III): PERRL  Facial Sensation (CN V): Normal  Facial Movement (CN VII): Symmetric facial expression    Motor: Arm left  Paresis: 4/5  Leg left  Paresis: 4/5  Arm right  Normal 5/5  Leg right Normal 5/5  Cerebellum: Upper Extremity Appendicular Ataxia (Finger Nose Finger)   Left  Sensation: Intact to light touch, temperature and vibration  Tone: Normal tone throughout      Laboratory:  CMP:   Recent Labs   Lab 06/16/25  1859   CALCIUM 8.8   ALBUMIN 3.5   PROT 6.9      K 3.3*   CO2 28      BUN 14   CREATININE 1.4   ALKPHOS 69   ALT 17   AST 27   BILITOT 1.6*     CBC:   Recent Labs   Lab 06/16/25  1859   WBC 6.50   RBC 5.74   HGB 17.4   HCT 50.6      MCV 88   MCH 30.3   MCHC 34.4       Diagnostic Results:    Brain imaging:  Counts include [facility] beds [facility] the [facility] 6/16/25  New small left thalamic parenchymal hemorrhage with mass effect on the left lateral ventricle without MLS. No hydrocephalus.       Dragan Bain MD  [facility]  Department of Vascular Neurology   [facility] - Neuro Critical Care        [1]   Social History  Tobacco Use    Smoking status: Never   Substance Use Topics    Alcohol use: Not Currently    Drug use: Never   [2]   Medications Prior to Admission   Medication Sig Dispense Refill Last Dose/Taking    aspirin (ECOTRIN) 81 MG EC tablet Take 1 tablet (81 mg total) by mouth once daily. 90 tablet 0     atorvastatin (LIPITOR) 40 MG tablet Take 1 tablet (40 mg total) by mouth once daily. 30 tablet 2     blood sugar diagnostic Strp Use to check blood glucose 5 (five) times daily. 100 strip 1     blood-glucose meter Misc Use to check blood glucose 5 times daily 1 each 0     carvediloL (COREG) 3.125 MG tablet Take 1 tablet (3.125 mg total) by mouth 2 (two) times daily. 180 tablet 0     clopidogreL (PLAVIX) 75 mg tablet Take 1 tablet (75 mg total) by mouth once daily. 30 tablet 0     furosemide (LASIX) 40 MG tablet Take 1 tablet (40 mg total) by mouth 2 (two) times daily. 60 tablet 2     insulin aspart U-100 (NOVOLOG) 100 unit/mL (3 mL) InPn pen Inject 1-10 Units before meals and at bedtime for Hyperglycemia. Per sliding scale:Blood Glucose: 151-200    201-250   251-300   301-350  >350 Pre-meal:          2 units      4 units      6 units     8 units     10 units  10 pm:    "           1 units      2 units      3 units     4 units      5 units 12 mL 0     lancets (TRUEPLUS LANCETS) 30 gauge Misc Use to check blood glucose 5 (five) times daily. 100 each 0     pen needle, diabetic 32 gauge x 5/32" Ndle Use for insulin adminstration up to 5 times daily 200 each 0     pulse oximeter (PULSE OXIMETER) device by Apply Externally route 2 (two) times a day. Use twice daily at 8 AM and 3 PM and record the value in [facility] as directed. 1 each 0     sacubitriL-valsartan (ENTRESTO) 24-26 mg per tablet Take 1 tablet by mouth 2 (two) times daily. 180 tablet 0      "

## 2025-06-17 NOTE — ASSESSMENT & PLAN NOTE
Mr. Cecil Clark is a 50 y.o. patient here for mental status changes. Complex prior neurovascular history with right thalamic ICH (2021) and R MCA stroke (2023) with residual LSW. Patient has been feeling more confused and disoriented on the last 3 days. No evidence of headaches or sensory changes. Hypertensive on admission on the 210s range, started on cardene gtt. Initial NIHSS 3 for L arm/leg drift from residual LSW with LUE ataxia/dysmetria with action and intention tremor. CTH with new small left thalamic parenchymal hemorrhage with mass effect on the left lateral ventricle without MLS. No hydrocephalus noted. No acute interventions per neurosurgery.     Suspected initial etiology of L thalamic ICH: likely hypertensive.     Antithrombotics for secondary stroke prevention: Not recommending any AP in the setting of acute L thalamic ICH.     Statins for secondary stroke prevention and hyperlipidemia, if present: Recommending holding statins in the acute setting of L thalamic ICH on a patient with complex prior neurovascular history for the high risk of bleeding.     Aggressive risk factor modification: HTN     Rehab efforts: The patient has been evaluated by a stroke team provider and the therapy needs have been fully considered based off the presenting complaints and exam findings. The following therapy evaluations are needed: PT evaluate and treat, OT evaluate and treat, SLP evaluate and treat, PM&R evaluate for appropriate placement    Diagnostics ordered/pending:   Per Vascular Neurology would hold on MRI brain since etiology of ICH likely hypertensive with new findings on CT. However, will evaluate further imaging placed by NCC on admission (MRI brain and MRA brain without contrast).   Pending echo from this admission.     VTE prophylaxis: None: Reason for No Pharmacological VTE Prophylaxis: History of systemic or intracranial bleeding    BP parameters: ICH: SBP Goal Range 130-150 - currently on cardene gtt.

## 2025-06-17 NOTE — ASSESSMENT & PLAN NOTE
51 y/o M presenting with disorientation and confusion for the past several days (exact time of onset unclear). He reports being unable to remember where he parked his car, difficulty with timelines, and inability to remember waking up in the mornings. CTH obtained in the ED revealed an acute L thalamic IPH.    Antithrombotics for secondary stroke prevention: Antiplatelets: None: Intracerebral Hemorrhage    Statins for secondary stroke prevention and hyperlipidemia, if present:   Statins: Atorvastatin- 40 mg daily - not indicated for ICH; however, patient has PMH of hyperlipidemia    Aggressive risk factor modification: HTN, DM, HLD     Rehab efforts: The patient has been evaluated by a stroke team provider and the therapy needs have been fully considered based off the presenting complaints and exam findings. The following therapy evaluations are needed: PT evaluate and treat, OT evaluate and treat, SLP evaluate and treat, PM&R evaluate for appropriate placement    Diagnostics ordered/pending: CT scan of head without contrast to asses brain parenchyma, HgbA1C to assess blood glucose levels, Lipid Profile to assess cholesterol levels, MRA head to assess vasculature, MRI head without contrast to assess brain parenchyma, TTE to assess cardiac function/status , TSH to assess thyroid function    VTE prophylaxis: Mechanical prophylaxis: Place SCDs  None: Reason for No Pharmacological VTE Prophylaxis: History of systemic or intracranial bleeding    BP parameters: ICH: SBP < 150    -Admit to NCC  -VS/Neuro checks q1  -NSGY, Vascular Neurology consulted  -No acute surgical intervention indicated at this time  -HOB >/= 30  -Repeat CTH in 6h for stability  -MRI/MRA brain today  -SBP goal < 150  -NPO for now  -Monitor I/O  -CBC, CMP, Mag, Phos daily  -SCDs for DVT PPX; hold chemical PPX for now in setting of acute IPH  -PT/OT/SLP as appropriate

## 2025-06-17 NOTE — ASSESSMENT & PLAN NOTE
Mr. Cecil Clark is a 50 y.o. patient here for mental status changes. Complex prior neurovascular history with right thalamic ICH (2021) and R MCA stroke (2023) with residual LSW. Patient has been feeling more confused and disoriented on the last 3 days. No evidence of headaches or sensory changes. Hypertensive on admission on the 210s range, started on cardene gtt. Initial NIHSS 3 for L arm/leg drift from residual LSW with LUE ataxia/dysmetria with action and intention tremor. CTH with new small left thalamic parenchymal hemorrhage with mass effect on the left lateral ventricle without MLS. No hydrocephalus noted. No acute interventions per neurosurgery. Suspected initial etiology of L thalamic ICH: likely hypertensive.     06/17/2025 Admitted to [facility] last night after CTH revealed a new L thalamic IPH. No acute events overnight. Repeat CTH stable. Cardene drip retarted this morning for SBP goal<140. MRI today shows evolving recent to subacute aged left thalamic parenchymal hemorrhage, MRA unremarkable. TTE completed and slightly improved from 3/2025.     Antithrombotics for secondary stroke prevention: Not recommending any AP in the setting of acute L thalamic ICH.     Statins for secondary stroke prevention and hyperlipidemia, if present: Recommending holding statins in the acute setting of L thalamic ICH on a patient with complex prior neurovascular history for the high risk of bleeding.     Aggressive risk factor modification: HTN     Rehab efforts: The patient has been evaluated by a stroke team provider and the therapy needs have been fully considered based off the presenting complaints and exam findings. The following therapy evaluations are needed: PT evaluate and treat, OT evaluate and treat, SLP evaluate and treat, PM&R evaluate for appropriate placement    Diagnostics ordered/pending: None     VTE prophylaxis: None: Reason for No Pharmacological VTE Prophylaxis: History of systemic or intracranial  bleeding    BP parameters: ICH: SBP Goal Range 130-150 - currently on cardene gtt.

## 2025-06-17 NOTE — EICU
"Virtual ICU Admission    Admit Date: 2025  LOS: 1  Code Status: Full Code   : 1975  Bed: 9079/9079 A:     Diagnosis: ICH (intracerebral hemorrhage)    Patient  has a past medical history of Diabetes mellitus, Hypertension, R thalmaic hypertensive ICH, and Tachycardia.    Last VS: BP (!) 141/82   Pulse 86   Temp 97.5 °F (36.4 °C) (Oral)   Resp 16   Ht 5' 2" (1.575 m)   Wt 58.1 kg (128 lb)   SpO2 97%   BMI 23.41 kg/m²       VICU Review    VICU nurse assessment :  [place] completed, LDA documentation reconciliation completed, Skin care/wounds LDA reconciliation, and VTE prophylaxis review    Skin breakdown present on admission              "

## 2025-06-17 NOTE — PLAN OF CARE
[facility] Care Plan  POC reviewed with Cecil Clark and family at 1400. Patient and Family (sister via phone) verbalized understanding. Patient requires reinforcement.  Questions and concerns addressed. No acute events today. Pt progressing toward goals. Will continue to monitor. See below and flowsheets for full assessment and VS info.     - SBP<150- requiring PRNs this morning and started on cardene gtt this afternoon  - MRI/MRA completed  - ADA diet ordered- tolerating well but decreased appetite  - Ambulates to bathroom with x1 assist- uses call light when needing help  - MD given sister name and number and will call with MRI results.       Is this a stroke patient? yes- Stroke booklet reviewed with patient and is at bedside.   Care Plan Individualization:     Neuro:  [place] Coma Scale  Best Eye Response: 4-->(E4) spontaneous  Best Motor Response: 6-->(M6) obeys commands  Best Verbal Response: 5-->(V5) oriented  [place]ell Coma Scale Score: 15  Assessment Qualifiers: No eye obstruction present, Patient not sedated/intubated  Pupil PERRLA: yes     24 hr Temp:  [97.5 °F (36.4 °C)-98.3 °F (36.8 °C)]     CV:   Rhythm: normal sinus rhythm  BP goals:   SBP < 150  MAP > 65    Resp:           Plan: N/A    GI/:     Diet/Nutrition Received: regular  Last Bowel Movement: 06/17/25       Intake/Output Summary (Last 24 hours) at 6/17/2025 1736  Last data filed at 6/17/2025 1305  Gross per 24 hour   Intake 639.64 ml   Output 350 ml   Net 289.64 ml     Unmeasured Output  Unmeasured Urine Occurrence: 1  Unmeasured Stool Occurrence: 1    Labs/Accuchecks:  Recent Labs   Lab 06/17/25  0210   WBC 9.58   RBC 4.87   HGB 14.4   HCT 42.5         Recent Labs   Lab 06/17/25  0210 06/17/25  0849     --    K 2.7* 3.9   CO2 25  --      --    BUN 13  --    CREATININE 1.5*  --    ALKPHOS 52  --    ALT 14  --    AST 23  --    BILITOT 1.2*  --       Recent Labs   Lab 06/16/25  2019   PROTIME 10.8   INR 1.0   APTT 27.3    No results for  "input(s): "CPK", "CPKMB", "TROPONINI", "MB" in the last 168 hours.    Electrolytes: Electrolytes replaced  Accuchecks: ACHS    Gtts:   nicardipine  0-15 mg/hr Intravenous Continuous 12.5 mL/hr at 06/17/25 1450 2.5 mg/hr at 06/17/25 1450       LDA/Wounds:    Juan Risk Assessment  Sensory Perception: 3-->slightly limited  Moisture: 4-->rarely moist  Activity: 3-->walks occasionally  Mobility: 3-->slightly limited  Nutrition: 3-->adequate  Friction and Shear: 3-->no apparent problem  Juan Score: 19    Is your juan score 12 or less? no            Restraints:        WCTM    "

## 2025-06-17 NOTE — ASSESSMENT & PLAN NOTE
-Last ECHO on 3/7/25 w/ global hypokinesis, severely reduced systolic function w/ estimated EF 25-30%, and diastolic dysfunction  -No sign of fluid overload on CXR  -Continue home Entresto   -Hold home Lasix for now, resume as appropriate

## 2025-06-17 NOTE — PROGRESS NOTES
[facility] - Neuro Critical Care  Vascular Neurology  Comprehensive Stroke Center  Progress Note    Assessment/Plan:     * ICH (intracerebral hemorrhage)  Mr. Cecil Clark is a 50 y.o. patient here for mental status changes. Complex prior neurovascular history with right thalamic ICH (2021) and R MCA stroke (2023) with residual LSW. Patient has been feeling more confused and disoriented on the last 3 days. No evidence of headaches or sensory changes. Hypertensive on admission on the 210s range, started on cardene gtt. Initial NIHSS 3 for L arm/leg drift from residual LSW with LUE ataxia/dysmetria with action and intention tremor. CTH with new small left thalamic parenchymal hemorrhage with mass effect on the left lateral ventricle without MLS. No hydrocephalus noted. No acute interventions per neurosurgery. Suspected initial etiology of L thalamic ICH: likely hypertensive.     06/17/2025 Admitted to [facility] last night after CTH revealed a new L thalamic IPH. No acute events overnight. Repeat CTH stable. Cardene drip retarted this morning for SBP goal<140. MRI today shows evolving recent to subacute aged left thalamic parenchymal hemorrhage, MRA unremarkable. TTE completed and slightly improved from 3/2025.     Antithrombotics for secondary stroke prevention: Not recommending any AP in the setting of acute L thalamic ICH.     Statins for secondary stroke prevention and hyperlipidemia, if present: Recommending holding statins in the acute setting of L thalamic ICH on a patient with complex prior neurovascular history for the high risk of bleeding.     Aggressive risk factor modification: HTN     Rehab efforts: The patient has been evaluated by a stroke team provider and the therapy needs have been fully considered based off the presenting complaints and exam findings. The following therapy evaluations are needed: PT evaluate and treat, OT evaluate and treat, SLP evaluate and treat, PM&R evaluate for appropriate  placement    Diagnostics ordered/pending: None     VTE prophylaxis: None: Reason for No Pharmacological VTE Prophylaxis: History of systemic or intracranial bleeding    BP parameters: ICH: SBP Goal Range 130-150 - currently on cardene gtt.     Chronic combined systolic and diastolic congestive heart failure  Recent EF 35-40%, improved from 3/2025  Home entresto restarted today    Hyperlipidemia  Currently on atorvastatin per NCC.   Would recommend holding statins in the setting of ICH with complex neurovascular history.   Recommend trial of low dose Zetia 10. Will defer to NCC.     Type 2 diabetes mellitus  ICH risk factor.   Admission A1c 8.8.   Currently on MDSSI per NCC.     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  On home Lasix 40. Will defer to primary team when to resume.   Would recommend addition of Jardiance 10 for GDMT pathway, which usually tends to improve hypertension on HF patients. Will defer to primary.     Hypokalemia  Replacement per NCC protocol.   Normalized post repletion     Hypertension  ICH risk factor.   Admission SBP 180s-210s.  Started on cardene gtt in the ED, was off for a couple of hours but required drip back on today for SBP goal<140  Home Entresto and coreg resumed today    History of intracerebral hemorrhage without residual deficit  -R thalamic IPH in 2021 06/17/2025 Admitted to [facility] last night after CTH revealed a new L thalamic IPH. No acute events overnight. Repeat CTH stable. Cardene drip retarted this morning for SBP goal<140. MRI today shows evolving recent to subacute aged left thalamic parenchymal hemorrhage, MRA unremarkable.     STROKE DOCUMENTATION   Acute Stroke Times   Last Known Normal Date: 06/14/25  Unknown Normal Time: Unknown Time  Unknown  Symptom Onset Date: Unknown Date  Unknown Symptom Onset Time: Unknown Time    NIH Scale:  1a. Level of Consciousness: 0-->Alert, keenly responsive  1b. LOC Questions: 0-->Answers both questions correctly  1c. LOC Commands: 0-->Performs both tasks correctly  2. Best Gaze: 0-->Normal  3. Visual: 0-->No visual loss  4. Facial Palsy: 0-->Normal symmetrical movements  5a. Motor Arm, Left: 1-->Drift, limb holds 90 (or 45) degrees, but drifts down before full 10 seconds, does not hit bed or other support  5b. Motor Arm, Right: 0-->No drift, limb holds 90 (or 45) degrees for full 10 secs  6a. Motor Leg, Left: 1-->Drift, leg falls by the end of the 5-sec period but does not hit bed  6b. Motor Leg, Right: 0-->No drift, leg holds 30 degree position for full 5 secs  7. Limb Ataxia: 1-->Present in one limb  8. Sensory: 0-->Normal, no sensory loss  9. Best Language: 0-->No aphasia, normal  10. Dysarthria: 0-->Normal  11. Extinction and Inattention (formerly Neglect): 0-->No abnormality  Total (NIH Stroke Scale): 3       Modified [place] Score: 0  [place] Coma Scale:    ABCD2 Score:    [id number] Score:   HAS -BLED Score:   ICH Score:0  Hunt & Gonzalez Classification:      Hemorrhagic change of an Ischemic Stroke: Does this patient have an ischemic stroke with hemorrhagic changes? No     Neurologic Chief Complaint: L thalamus ICH    Subjective:     Interval History: Patient is seen for follow-up neurological assessment and treatment recommendations: See hospital course.     HPI, Past Medical, Family, and Social History remains the same as documented in the initial encounter.     Review of Systems   Constitutional:  Positive for chills. Negative for fever.     Scheduled Meds:   atorvastatin  40 mg Oral Daily    carvediloL  3.125 mg Oral BID    mupirocin   Nasal BID    sacubitriL-valsartan  1 tablet Oral BID    senna-docusate  1 tablet Oral BID     Continuous Infusions:   nicardipine  0-15 mg/hr Intravenous Continuous 12.5 mL/hr at  06/17/25 1450 2.5 mg/hr at 06/17/25 1450     PRN Meds:  Current Facility-Administered Medications:     dextrose 50%, 12.5 g, Intravenous, PRN    glucagon (human recombinant), 1 mg, Intramuscular, PRN    hydrALAZINE, 10 mg, Intravenous, Q4H PRN    insulin aspart U-100, 0-10 Units, Subcutaneous, Q6H PRN    labetalol, 10 mg, Intravenous, Q4H PRN    ondansetron, 4 mg, Intravenous, Q8H PRN    sodium chloride 0.9%, 10 mL, Intravenous, PRN    Objective:     Vital Signs (Most Recent):  Temp: 98.3 °F (36.8 °C) (06/17/25 1105)  Pulse: 88 (06/17/25 1305)  Resp: 20 (06/17/25 1305)  BP: 124/79 (06/17/25 1305)  SpO2: 97 % (06/17/25 1305)  BP Location: Left arm    Vital Signs Range (Last 24H):  Temp:  [97.5 °F (36.4 °C)-98.3 °F (36.8 °C)]   Pulse:  []   Resp:  [6-32]   BP: (118-210)/()   SpO2:  [94 %-100 %]   BP Location: Left arm       Physical Exam  Vitals and nursing note reviewed.   Constitutional:       General: He is not in acute distress.  HENT:      Head: Normocephalic and atraumatic.      Mouth/Throat:      Mouth: Mucous membranes are dry.   Eyes:      Extraocular Movements: Extraocular movements intact.      Pupils: Pupils are equal, round, and reactive to light.   Cardiovascular:      Rate and Rhythm: Normal rate.   Pulmonary:      Effort: Pulmonary effort is normal. No respiratory distress.   Abdominal:      Tenderness: There is no guarding.   Skin:     General: Skin is warm and dry.   Neurological:      Mental Status: He is alert and oriented to person, place, and time.      Cranial Nerves: No cranial nerve deficit.      Sensory: No sensory deficit.      Motor: Weakness present.   Psychiatric:         Mood and Affect: Mood normal.         Behavior: Behavior normal.              Neurological Exam:   LOC: alert  Attention Span: Good   Language: No aphasia  Articulation: No dysarthria  Orientation: Person, Place, Time   Visual Fields: Full  EOM (CN III, IV, VI): Full/intact  Pupils (CN II, III): PERRL  Facial  "Sensation (CN V): Normal  Facial Movement (CN VII): Symmetric facial expression    Motor: Arm left  Paresis: 4/5  Leg left  Paresis: 4/5  Arm right  Normal 5/5  Leg right Normal 5/5  Cerebellum: Upper Extremity Appendicular Ataxia (Finger Nose Finger)  Left  Sensation: Intact to light touch, temperature and vibration    Laboratory:  CMP:   Recent Labs   Lab 06/17/25  0210 06/17/25  0849   CALCIUM 8.0*  --    ALBUMIN 2.9*  --    PROT 5.5*  --      --    K 2.7* 3.9   CO2 25  --      --    BUN 13  --    CREATININE 1.5*  --    ALKPHOS 52  --    ALT 14  --    AST 23  --    BILITOT 1.2*  --      BMP:   Recent Labs   Lab 06/17/25 0210 06/17/25  0849     --    K 2.7* 3.9     --    CO2 25  --    BUN 13  --    CREATININE 1.5*  --    CALCIUM 8.0*  --      CBC:   Recent Labs   Lab 06/17/25 0210   WBC 9.58   RBC 4.87   HGB 14.4   HCT 42.5      MCV 87   MCH 29.6   MCHC 33.9     Lipid Panel:   Recent Labs   Lab 06/16/25 1859   CHOL 248*   LDLCALC 148.8   HDL 60   TRIG 196*     Coagulation:   Recent Labs   Lab 06/16/25  2019   INR 1.0   APTT 27.3     Platelet Aggregation Study: No results for input(s): "PLTAGG", "PLTAGINTERP", "PLTAGREGLACO", "ADPPLTAGGREG" in the last 168 hours.  Hgb A1C:   Recent Labs   Lab 06/16/25 1859   HGBA1C 8.8*     TSH:   Recent Labs   Lab 06/16/25  1859   TSH 1.616       Diagnostic Results     Brain Imaging/Vessel Imaging     MRA/MRI Brain WO - 6/17/2025    Impression:     MRI brain: Evolving recent to subacute aged left thalamic parenchymal hemorrhage.  Please note evaluation for underlying mass lesion limited by noncontrast technique.  Clinical correlation and follow-up recommended.     Patchy and confluent T2 FLAIR signal abnormality supratentorial white matter elsewhere while nonspecific concerning for underlying chronic ischemic change     Ventricles stable without hydrocephalus     Remote hemorrhage right basal ganglia/thalamus with ipsilateral wallerian " degeneration.     MRA head: Unremarkable MRA head as detailed above specifically without focal stenosis or proximal large vessel occlusion.     Please note there is no hypertrophied vasculature associated with left thalamic hemorrhage although only partially included in the field of view.  Further evaluation with repeat MRA imaging to include the entirety of the thalamic hemorrhage advised.    [facility] WO - 6/17/2025    Impression:     Stable CT appearance of the small acute parenchymal hematoma centered in the left thalamus.  No new intracranial hemorrhage.     Ventricles are stable in size.     Recommendations include clinical correlation and continued imaging follow-up.    [facility] WO - 6/16/2025    Impression:     1. Small acute parenchymal hemorrhage centered in the left thalamus, likely hypertensive in etiology.  There is local mass effect on the adjacent left lateral ventricle without significant midline shift.  2. Mild prominence of the lateral ventricles when compared to prior.  This may relate to interval volume loss, however early acute hydrocephalus may present similarly.  Recommend attention on short-term follow-up.  3. Chronic microvascular ischemic change and sequela of remote hemorrhage, as detailed above.  Right caudate suspected remote tiny lacunar type infarct but new from 10/25/2023 imaging.    Cardiac Imaging     TTE - 6/17/2025  Summary      Left Ventricle: The left ventricle is normal in size. Mildly increased ventricular mass. Mildly increased wall thickness. There is mild concentric hypertrophy. Mild global hypokinesis present. There is moderately reduced systolic function with a visually estimated ejection fraction of 35 - 40%. Quantitated ejection fraction is 32%. Global longitudinal strain is reduced moderately reduced measuring -10.8% There is diastolic dysfunction but grade cannot be determined.    Right Ventricle: The right ventricle is normal in size Systolic function is normal.    Left Atrium:  The left atrium is mildly dilated    Mitral Valve: There is mild regurgitation.    IVC/SVC: Normal venous pressure at 3 mmHg.    Fariba Rodriguez DNP  [facility]  Department of Vascular Neurology   [facility] - [facility]

## 2025-06-17 NOTE — SUBJECTIVE & OBJECTIVE
6/17: No acute intervals. BP controlled on Cardene gtt with goals <140 sys. Restarted entresto. MRI today shows evolving recent to subacute aged left thalamic parenchymal hemorrhage.      Prescriptions Prior to Admission[1]    Review of patient's allergies indicates:   Allergen Reactions    Aspartame Nausea And Vomiting     Violent emesis.    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    Objective:     Weight: 58.1 kg (128 lb)  Body mass index is 23.41 kg/m².  Vital Signs (Most Recent):  Temp: 98.3 °F (36.8 °C) (06/17/25 1105)  Pulse: 88 (06/17/25 1305)  Resp: 20 (06/17/25 1305)  BP: 124/79 (06/17/25 1305)  SpO2: 97 % (06/17/25 1305) Vital Signs (24h Range):  Temp:  [97.5 °F (36.4 °C)-98.3 °F (36.8 °C)] 98.3 °F (36.8 °C)  Pulse:  [] 88  Resp:  [6-32] 20  SpO2:  [94 %-100 %] 97 %  BP: (118-210)/() 124/79     Date 06/17/25 0700 - 06/18/25 0659   Shift 4841-4957 0133-9864 6220-6160 24 Hour Total   INTAKE   P.O. 240   240   I.V.(mL/kg) 10.5(0.2)   10.5(0.2)   Shift Total(mL/kg) 250.5(4.3)   250.5(4.3)   OUTPUT   Shift Total(mL/kg)       Weight (kg) 58.1 58.1 58.1 58.1                               Physical Exam     GENERAL: resting comfortably  HEENT: NCAT, PERRL, mucous membranes moist  NECK: supple,  trachea midline  CV: normal capillary refill  PULM: aerating well, symmetric expansion, no distress  ABD: soft, NT, ND  EXT: no c/c/e    NEURO:    AAO x 3  CN II-XII grossly intact except mild L FD  Fc x 4 antigravity  SILT    Mild LUE drift      Neurosurgery Physical Exam    Significant Labs:  Recent Labs   Lab 06/16/25 1859 06/17/25  0210 06/17/25  0849   * 129*  --     138  --    K 3.3* 2.7* 3.9    103  --    CO2 28 25  --    BUN 14 13  --    CREATININE 1.4 1.5*  --    CALCIUM 8.8 8.0*  --    MG 1.8 1.7  --      Recent Labs   Lab 06/16/25 1859 06/17/25  0210   WBC 6.50 9.58   HGB 17.4 14.4   HCT 50.6 42.5    168     Recent Labs   Lab 06/16/25  2019   INR 1.0   APTT 27.3     Microbiology Results (last 7 days)       ** No results found for the last 168 hours. **          All pertinent labs from the last 24 hours have been reviewed.    Significant Diagnostics:  I have reviewed all pertinent imaging results/findings within the past 24 hours.  I have reviewed and interpreted all pertinent imaging results/findings within the past 24 hours.  CT Head Without Contrast  Result Date: 6/16/2025  1. Small acute parenchymal hemorrhage centered in the left thalamus, likely hypertensive in etiology.  There is local mass effect on the adjacent left lateral ventricle without significant midline shift. 2. Mild prominence of the lateral ventricles when compared to prior.  This may relate to interval volume loss, however early acute hydrocephalus may present similarly.  Recommend attention on short-term follow-up. 3. Chronic microvascular ischemic change and sequela of remote hemorrhage, as detailed above.  Right caudate suspected remote tiny lacunar type infarct but new from 10/25/2023 imaging. This report was flagged in Epic as abnormal. These findings were discovered at 20:00 on 06/16/2025 and relayed to Basia Felton MD via telephone by Sam Gray MD at 20:00 on 06/16/2025. Electronically signed by  resident: Sam Gray Date:    06/16/2025 Time:    20:02 Electronically signed by: Aramis Hernandez MD Date:    06/16/2025 Time:    20:32      MRI Brain Without Contrast  Result Date: 6/17/2025  MRI brain: Evolving recent to subacute aged left thalamic parenchymal hemorrhage.  Please note evaluation for underlying mass lesion limited by noncontrast technique.  Clinical correlation and follow-up recommended. Patchy and confluent T2 FLAIR signal abnormality supratentorial white matter elsewhere while nonspecific concerning for underlying chronic ischemic change Ventricles stable without hydrocephalus Remote hemorrhage right basal ganglia/thalamus with ipsilateral wallerian degeneration. MRA head: Unremarkable MRA head as detailed above specifically without focal stenosis or proximal large vessel occlusion. Please note there is no hypertrophied vasculature associated with left thalamic hemorrhage although only partially included in the field of view.  Further evaluation with repeat MRA imaging to include the entirety of the thalamic hemorrhage advised. Electronically signed by: Ricardo Humphrey DO Date:    06/17/2025 Time:    11:52    MRA Brain without contrast  Result Date: 6/17/2025  MRI brain: Evolving recent to subacute aged left thalamic parenchymal hemorrhage.  Please note evaluation for underlying mass lesion limited by noncontrast technique.  Clinical correlation and follow-up recommended. Patchy and confluent T2 FLAIR signal abnormality supratentorial white matter elsewhere while nonspecific concerning for underlying chronic ischemic change Ventricles stable without hydrocephalus Remote hemorrhage right basal ganglia/thalamus with ipsilateral wallerian degeneration. MRA head: Unremarkable MRA head as detailed above specifically without focal stenosis or proximal large vessel occlusion. Please note there is no hypertrophied vasculature associated with left thalamic hemorrhage although only partially included in the  field of view.  Further evaluation with repeat MRA imaging to include the entirety of the thalamic hemorrhage advised. Electronically signed by: Ricardo Humphrey DO Date:    06/17/2025 Time:    11:52    CT Head Without Contrast  Result Date: 6/17/2025  Stable CT appearance of the small acute parenchymal hematoma centered in the left thalamus.  No new intracranial hemorrhage. Ventricles are stable in size. Recommendations include clinical correlation and continued imaging follow-up. Electronically signed by resident: Sam Gray Date:    06/17/2025 Time:    04:53 Electronically signed by: Cosme Almazan Date:    06/17/2025 Time:    08:20    X-Ray Chest AP Single View  Result Date: 6/16/2025  No acute finding identified on this single view. Electronically signed by: Alfredito Howe MD Date:    06/16/2025 Time:    23:40    CT Head Without Contrast  Result Date: 6/16/2025  1. Small acute parenchymal hemorrhage centered in the left thalamus, likely hypertensive in etiology.  There is local mass effect on the adjacent left lateral ventricle without significant midline shift. 2. Mild prominence of the lateral ventricles when compared to prior.  This may relate to interval volume loss, however early acute hydrocephalus may present similarly.  Recommend attention on short-term follow-up. 3. Chronic microvascular ischemic change and sequela of remote hemorrhage, as detailed above.  Right caudate suspected remote tiny lacunar type infarct but new from 10/25/2023 imaging. This report was flagged in Epic as abnormal. These findings were discovered at 20:00 on 06/16/2025 and relayed to Basia Felton MD via telephone by Sam Gray MD at 20:00 on 06/16/2025. Electronically signed by resident: Sam Gray Date:    06/16/2025 Time:    20:02 Electronically signed by: Aramis Hernandez MD Date:    06/16/2025 Time:    20:32             [1]   Medications Prior to Admission   Medication Sig Dispense Refill Last Dose/Taking    aspirin  "(ECOTRIN) 81 MG EC tablet Take 1 tablet (81 mg total) by mouth once daily. 90 tablet 0     atorvastatin (LIPITOR) 40 MG tablet Take 1 tablet (40 mg total) by mouth once daily. 30 tablet 2     blood sugar diagnostic Strp Use to check blood glucose 5 (five) times daily. 100 strip 1     blood-glucose meter Misc Use to check blood glucose 5 times daily 1 each 0     carvediloL (COREG) 3.125 MG tablet Take 1 tablet (3.125 mg total) by mouth 2 (two) times daily. 180 tablet 0     clopidogreL (PLAVIX) 75 mg tablet Take 1 tablet (75 mg total) by mouth once daily. 30 tablet 0     furosemide (LASIX) 40 MG tablet Take 1 tablet (40 mg total) by mouth 2 (two) times daily. 60 tablet 2     insulin aspart U-100 (NOVOLOG) 100 unit/mL (3 mL) InPn pen Inject 1-10 Units before meals and at bedtime for Hyperglycemia. Per sliding scale:Blood Glucose: 151-200    201-250   251-300   301-350  >350 Pre-meal:          2 units      4 units      6 units     8 units     10 units  10 pm:              1 units      2 units      3 units     4 units      5 units 12 mL 0     lancets (TRUEPLUS LANCETS) 30 gauge Misc Use to check blood glucose 5 (five) times daily. 100 each 0     pen needle, diabetic 32 gauge x 5/32" Ndle Use for insulin adminstration up to 5 times daily 200 each 0     pulse oximeter (PULSE OXIMETER) device by Apply Externally route 2 (two) times a day. Use twice daily at 8 AM and 3 PM and record the value in [facility] as directed. 1 each 0     sacubitriL-valsartan (ENTRESTO) 24-26 mg per tablet Take 1 tablet by mouth 2 (two) times daily. 180 tablet 0      "

## 2025-06-17 NOTE — CONSULTS
[facility] - Emergency Dept  Neurosurgery  Consult Note    Inpatient consult to Neurosurgery  Consult performed by: Carlito Cheek MD  Consult ordered by: Basia Felton DO        Subjective:     Chief Complaint/Reason for Admission: confusion    History of Present Illness: 50M past history of R thalamic stroke 4 years ago presenting with disorientation for several days and imaging revealing L thalamic ICH (ICHS 0). He is unable to recall why he was brought to the ED. He is at his neuro baseline per himself, residual LSW from prior stroke.     Prescriptions Prior to Admission[1]    Review of patient's allergies indicates:   Allergen Reactions    Aspartame Nausea And Vomiting     Violent emesis.    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    Objective:     Weight: 65.8 kg (145 lb)  Body mass index is 26.52 kg/m².  Vital Signs (Most Recent):  Temp: 98.2 °F (36.8 °C) (06/16/25 2015)  Pulse: 110 (06/16/25 2047)  Resp: 20 (06/16/25 2047)  BP: (!) 164/107 (06/16/25 2047)  SpO2: 100 % (06/16/25 2047) Vital Signs (24h Range):  Temp:  [98.1 °F (36.7 °C)-98.2 °F (36.8 °C)] 98.2 °F (36.8 °C)  Pulse:  [108-119] 110  Resp:  [12-29] 20  SpO2:  [96 %-100 %] 100 %  BP: (164-210)/(102-141) 164/107                                "  Physical Exam     GENERAL: resting comfortably  HEENT: NCAT, PERRL, mucous membranes moist  NECK: supple, trachea midline  CV: normal capillary refill  PULM: aerating well, symmetric expansion, no distress  ABD: soft, NT, ND  EXT: no c/c/e    NEURO:    AAO x 3  CN II-XII grossly intact except mild L FD  Fc x 4 antigravity  SILT    Mild LUE drift      Neurosurgery Physical Exam    Significant Labs:  Recent Labs   Lab 06/16/25  1859   *      K 3.3*      CO2 28   BUN 14   CREATININE 1.4   CALCIUM 8.8   MG 1.8     Recent Labs   Lab 06/16/25  1859   WBC 6.50   HGB 17.4   HCT 50.6        No results for input(s): "LABPT", "INR", "APTT" in the last 48 hours.  Microbiology Results (last 7 days)       ** No results found for the last 168 hours. **          All pertinent labs from the last 24 hours have been reviewed.    Significant Diagnostics:  I have reviewed all pertinent imaging results/findings within the past 24 hours.  I have reviewed and interpreted all pertinent imaging results/findings within the past 24 hours.  CT Head Without Contrast  Result Date: 6/16/2025  1. Small acute parenchymal hemorrhage centered in the left thalamus, likely hypertensive in etiology.  There is local mass effect on the adjacent left lateral ventricle without significant midline shift. 2. Mild prominence of the lateral ventricles when compared to prior.  This may relate to interval volume loss, however early acute hydrocephalus may present similarly.  Recommend attention on short-term follow-up. 3. Chronic microvascular ischemic change and sequela of remote hemorrhage, as detailed above.  Right caudate suspected remote tiny lacunar type infarct but new from 10/25/2023 imaging. This report was flagged in Epic as abnormal. These findings were discovered at 20:00 on 06/16/2025 and relayed to Basia Felton MD via telephone by Sam Gray MD at 20:00 on 06/16/2025. Electronically signed by resident: Sam Gray " Date:    06/16/2025 Time:    20:02 Electronically signed by: Aramis Hernandez MD Date:    06/16/2025 Time:    20:32      Assessment/Plan:     ICH (intracerebral hemorrhage)  50M past history of R thalamic stroke 4 years ago presenting with disorientation for several days and imaging revealing L thalamic ICH (ICHS 0). There is no hydrocephalus. Etiology likely hypertensive    Plan  Repeat CTH 6hrs. CTA per NCC if desired but likely hypertensive  No acute neurosurgical intervention  -150  NPO until repeat scan            Carlito Cheek MD  Neurosurgery  [facility] - Emergency Dept       [1] (Not in a hospital admission)

## 2025-06-17 NOTE — NURSING
Patient arrived to [facility] from ED 4    Type of stroke/diagnosis:  ICH    Current symptoms: AAO x 4, moving everything spontaneously. Following commands. Tremors noted.    Skin Assessment done: Yes  Wounds noted: none  *If wounds noted, was Wound Care consulted? no  *If wounds noted, LDA placed? no  Skin Assessment Verified by:  Nestor Epps Completed? Pending    Patient Belongings on Admit: flip flops, green tshirt, brown belt, pants, underwear, boxers, key ring, phone, wallet    NCC notified: Surekha Lara NP

## 2025-06-17 NOTE — PLAN OF CARE
Recommendations  Continue Consistent carbohydrate diet  Nursing, continue documenting PO intake via flowsheets  Monitor labs, meds, weight, skin    Goals: meet % een/epn by next RD f/u  Nutrition Goal Status: new  Communication of RD Recs: other (comment) (poc)

## 2025-06-17 NOTE — HPI
Mr. Cecil Clark is a 50 y.o. patient here for mental status changes. History of DM2, HTN, complex prior neurovascular history with right thalamic ICH (2021) and R MCA stroke (2023) with residual LSW. Per patient, he commented about not feeling himself and more confused and disoriented for the past 3 days. No headaches or sensory changes. Hypertensive in the ED with SBP 180s-210s. Per patient he was at his baseline when I examined him in [facility]. On my initial evaluation, NIHSS 3 for L arm and L leg drift (residual) with LUE ataxia/dysmetria with action and intention tremor, which the patient comments about the latter being new finding for him. CTH on this admission with new small left thalamic parenchymal hemorrhage with mass effect on the left lateral ventricle without MLS. No hydrocephalus.

## 2025-06-17 NOTE — CONSULTS
Inpatient consult to Physical Medicine Rehab  Consult performed by: Tamara Becker NP  Consult ordered by: Surekha Lara NP  Reason for consult: Rehab      Consult received.     PHILL Saavedra, FNP-C  Physical Medicine & Rehabilitation   06/17/2025

## 2025-06-17 NOTE — PT/OT/SLP EVAL
"Speech Language Pathology Evaluation  Cognitive/Bedside Swallow    Patient Name:  Cecil Clark   MRN:  [id number]  Admitting Diagnosis: ICH (intracerebral hemorrhage)    Recommendations:                  General Recommendations:  Speech/language therapy and Cognitive-linguistic therapy  Diet recommendations:  Regular Diet - IDDSI Level 7, Thin liquids - IDDSI Level 0   Aspiration Precautions: Strict aspiration precautions   General Precautions: Standard, aspiration, fall  Communication strategies:  provide increased time to answer and go to room if call light pushed    Assessment:     Cecil Clark is a 50 y.o. male with an SLP diagnosis of Cognitive-Linguistic Impairment.  He presents with some hesitations/interjections in connected speech.  SLP to follow for ongoing assessment of higher level word finding.    History:     Past Medical History:   Diagnosis Date    Diabetes mellitus     Hypertension     R thalmaic hypertensive ICH 04/04/2021    Tachycardia 4/4/2021       Past Surgical History:   Procedure Laterality Date    ANTERIOR CRUCIATE LIGAMENT REPAIR Right     2002       Social History: Patient lives with a roommate.  He reports he has not worked in several months with increased confusion. Pt when denied ongoing difficulties.  He denied dysphagia.     Prior Intubation HX:  none this admission    Modified Barium Swallow: none reported    Chest X-Rays: 6/16: "No acute finding identified on this single view "    Prior diet: reg/thin.    Subjective     "Oh I don't keep up with the date. That has been an issue."     Pain/Comfort:  Pain Rating 1: 0/10  Pain Rating Post-Intervention 1: 0/10    Respiratory Status: Room air    Objective:     Cognitive Status:    Attention Sustained attention deficit    Orientation Person, Place, and Situation  Memory Immediate Recall 100%  and Delayed 0/3 unassociated words recalled after delay   Problem Solving Categories x1/2, 2/2 with min cues, Sequencing x1/1, Solutions x2/2, and " Compare/contrast x2/2, increased time with redirectional cues required with tasks      Receptive Language:   Comprehension:   WFL given increased processing time  Questions Complex yes/no 75%  Commands  multistep basic commands x2/2 with self correction    Pragmatics:    Occasional off topic response    Expressive Language:  Verbal:    Increased response time with hesitations and interjections noted in conversation, cont to assess  Naming Confrontation x4/4, Divergent responsive 8 items stated in concrete cat in 1 min, 15-20 is WFL, and Single word responsive naming 100%      Motor Speech:  WFL    Voice:   WFL    Visual-Spatial:  tba    Reading:   tba glasses not present     Written Expression:   tba    Oral Musculature Evaluation  Oral Musculature: WFL  Dentition: present and adequate  Secretion Management: adequate  Mucosal Quality: adequate  Mandibular Strength and Mobility: WFL  Oral Labial Strength and Mobility: WFL  Lingual Strength and Mobility: WFL  Volitional Cough: WFL  Volitional Swallow: WFL  Voice Prior to PO Intake: clear    Bedside Swallow Eval:   Consistencies Assessed:  Thin liquids    Solids       Oral Phase:   WFL    Pharyngeal Phase:   no overt clinical signs/symptoms of aspiration  no overt clinical signs/symptoms of pharyngeal dysphagia    Compensatory Strategies  None    Treatment: Education provided re: role of SLP, diet recs, swallow precs, s/s aspiration, results of evaluation,. Effect of cognitive linguistic impairment on independence and safety, and POC.  Pt verbalized understanding and agreement. Pt would benefit from reinforcement.       Goals:   Multidisciplinary Problems       SLP Goals          Problem: SLP    Goal Priority Disciplines Outcome   SLP Goal     SLP Progressing   Description: Speech Language Pathology Goals  Goals expected to be met by 7/1  1.Pt will Ox4 utilizing external aids IND.   2. Pt will demonstrate understanding of external/internal memory strategies given min  cues.   3. Pt will complete assessment of higher level word finding to determine need for tx.   4. Pt will complete mod complex reasoning/problem solving tasks with 90% accy given min cues.   5. Pt will complete assessment of reading, writing, and visual spatial skills to determine need for tx.                                 Plan:     Patient to be seen:  3 x/week   Plan of Care expires:  07/17/25  Plan of Care reviewed with:  patient   SLP Follow-Up:  Yes       Discharge recommendations:  Therapy Intensity Recommendations at Discharge: Low Intensity Therapy   Barriers to Discharge:  Level of Skilled Assistance Needed      Time Tracking:     SLP Treatment Date:   06/17/25  Speech Start Time:  0715  Speech Stop Time:  0734     Speech Total Time (min):  19 min    Billable Minutes: Eval 11  and Eval Swallow and Oral Function 8    06/17/2025

## 2025-06-17 NOTE — HOSPITAL COURSE
06/17/2025 Admitted to [facility] last night after CTH revealed a new L thalamic IPH. No acute events overnight. Repeat CTH stable. Cardene drip retarted this morning for SBP goal<140. MRI today shows evolving recent to subacute aged left thalamic parenchymal hemorrhage, MRA unremarkable.   6-18-25 off of cardene at 1400, no acute event overnight, neuro stbale  06/19/2025 off cardene and resumed on home medications. Unclear whether patient is compliant with medications at home. Will s/d to NPU for BP optimization but if tolerating tomorrow, then can discharge home   6-20-25 no acute events overnight, Entresto increased stable to step down or be discharged home  06/21/2025 BP better controlled. Patient to discharge today with close follow up. Medications sent to [facility] pharmacy for patient to go home with. Will follow up w Dr. Barros for small vessel disease pathology, 1 stroke and 2 hemorrhages

## 2025-06-17 NOTE — ASSESSMENT & PLAN NOTE
ICH risk factor.   Admission SBP 180s-210s.  Started on cardene gtt in the ED - SBP 130s while in the room.  Will defer to primary to resume home coreg 3.125 BID and Entresto 24-26 BID.

## 2025-06-17 NOTE — ASSESSMENT & PLAN NOTE
Currently on atorvastatin per NCC.   Would recommend holding statins in the setting of ICH with complex neurovascular history.   Recommend trial of low dose Zetia 10. Will defer to NCC.

## 2025-06-17 NOTE — ASSESSMENT & PLAN NOTE
50M past history of R thalamic stroke 4 years ago presenting with disorientation for several days and imaging revealing L thalamic ICH (ICHS 0). There is no hydrocephalus. Etiology likely hypertensive    Plan  Repeat CTH 6hrs. CTA per NCC if desired but likely hypertensive  No acute neurosurgical intervention  -150  NPO until repeat scan

## 2025-06-17 NOTE — PROGRESS NOTES
[facility] - Neuro Critical Care  Neurosurgery  Progress Note    Subjective:     History of Present Illness: 50M past history of R thalamic stroke 4 years ago presenting with disorientation for several days and imaging revealing L thalamic ICH (ICHS 0). He is unable to recall why he was brought to the ED. He is at his neuro baseline per himself, residual LSW from prior stroke.     Post-Op Info:  * No surgery found *       6/17: Repeat CTH stable. Neuro stable    Prescriptions Prior to Admission[1]    Review of patient's allergies indicates:   Allergen Reactions    Aspartame Nausea And Vomiting     Violent emesis.    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    Objective:     Weight: 58.1 kg (128 lb)  Body mass index is 23.41 kg/m².  Vital Signs (Most Recent):  Temp: 98.3 °F (36.8 °C) (06/17/25 0705)  Pulse: 93 (06/17/25 1137)  Resp: 20 (06/17/25 1137)  BP: (!) 155/92 (06/17/25 1137)  SpO2: 98 % (06/17/25 1137) Vital Signs (24h Range):  Temp:  [97.5 °F (36.4 °C)-98.3 °F (36.8 °C)] 98.3 °F (36.8 °C)  Pulse:  [] 93  Resp:  [6-32] 20  SpO2:  [94 %-100 %] 98 %  BP: (118-210)/() 155/92     Date 06/17/25 0700 - 06/18/25 0659   Shift 1185-6319 3712-3431 4719-5996 24 Hour Total   INTAKE   P.O. 240   240   Shift  Total(mL/kg) 240(4.1)   240(4.1)   OUTPUT   Shift Total(mL/kg)       Weight (kg) 58.1 58.1 58.1 58.1                               Physical Exam     GENERAL: resting comfortably  HEENT: NCAT, PERRL, mucous membranes moist  NECK: supple, trachea midline  CV: normal capillary refill  PULM: aerating well, symmetric expansion, no distress  ABD: soft, NT, ND  EXT: no c/c/e    NEURO:    AAO x 3  CN II-XII grossly intact except mild L FD  Fc x 4 antigravity  SILT    Mild LUE drift      Neurosurgery Physical Exam    Significant Labs:  Recent Labs   Lab 06/16/25 1859 06/17/25  0210 06/17/25  0849   * 129*  --     138  --    K 3.3* 2.7* 3.9    103  --    CO2 28 25  --    BUN 14 13  --    CREATININE 1.4 1.5*  --    CALCIUM 8.8 8.0*  --    MG 1.8 1.7  --      Recent Labs   Lab 06/16/25 1859 06/17/25 0210   WBC 6.50 9.58   HGB 17.4 14.4   HCT 50.6 42.5    168     Recent Labs   Lab 06/16/25  2019   INR 1.0   APTT 27.3     Microbiology Results (last 7 days)       ** No results found for the last 168 hours. **          All pertinent labs from the last 24 hours have been reviewed.    Significant Diagnostics:  I have reviewed all pertinent imaging results/findings within the past 24 hours.  I have reviewed and interpreted all pertinent imaging results/findings within the past 24 hours.  CT Head Without Contrast  Result Date: 6/16/2025  1. Small acute parenchymal hemorrhage centered in the left thalamus, likely hypertensive in etiology.  There is local mass effect on the adjacent left lateral ventricle without significant midline shift. 2. Mild prominence of the lateral ventricles when compared to prior.  This may relate to interval volume loss, however early acute hydrocephalus may present similarly.  Recommend attention on short-term follow-up. 3. Chronic microvascular ischemic change and sequela of remote hemorrhage, as detailed above.  Right caudate suspected remote tiny lacunar type infarct but new  from 10/25/2023 imaging. This report was flagged in Epic as abnormal. These findings were discovered at 20:00 on 06/16/2025 and relayed to Basia Felton MD via telephone by Sam Gray MD at 20:00 on 06/16/2025. Electronically signed by resident: Sam Gray Date:    06/16/2025 Time:    20:02 Electronically signed by: Aramis Hernandez MD Date:    06/16/2025 Time:    20:32      MRI Brain Without Contrast  Result Date: 6/17/2025  MRI brain: Evolving recent to subacute aged left thalamic parenchymal hemorrhage.  Please note evaluation for underlying mass lesion limited by noncontrast technique.  Clinical correlation and follow-up recommended. Patchy and confluent T2 FLAIR signal abnormality supratentorial white matter elsewhere while nonspecific concerning for underlying chronic ischemic change Ventricles stable without hydrocephalus Remote hemorrhage right basal ganglia/thalamus with ipsilateral wallerian degeneration. MRA head: Unremarkable MRA head as detailed above specifically without focal stenosis or proximal large vessel occlusion. Please note there is no hypertrophied vasculature associated with left thalamic hemorrhage although only partially included in the field of view.  Further evaluation with repeat MRA imaging to include the entirety of the thalamic hemorrhage advised. Electronically signed by: Ricardo Humphrey DO Date:    06/17/2025 Time:    11:52    MRA Brain without contrast  Result Date: 6/17/2025  MRI brain: Evolving recent to subacute aged left thalamic parenchymal hemorrhage.  Please note evaluation for underlying mass lesion limited by noncontrast technique.  Clinical correlation and follow-up recommended. Patchy and confluent T2 FLAIR signal abnormality supratentorial white matter elsewhere while nonspecific concerning for underlying chronic ischemic change Ventricles stable without hydrocephalus Remote hemorrhage right basal ganglia/thalamus with ipsilateral wallerian degeneration. MRA head:  Unremarkable MRA head as detailed above specifically without focal stenosis or proximal large vessel occlusion. Please note there is no hypertrophied vasculature associated with left thalamic hemorrhage although only partially included in the field of view.  Further evaluation with repeat MRA imaging to include the entirety of the thalamic hemorrhage advised. Electronically signed by: Ricardo Humphrey DO Date:    06/17/2025 Time:    11:52    CT Head Without Contrast  Result Date: 6/17/2025  Stable CT appearance of the small acute parenchymal hematoma centered in the left thalamus.  No new intracranial hemorrhage. Ventricles are stable in size. Recommendations include clinical correlation and continued imaging follow-up. Electronically signed by resident: Sam Gray Date:    06/17/2025 Time:    04:53 Electronically signed by: Cosme Almazan Date:    06/17/2025 Time:    08:20    X-Ray Chest AP Single View  Result Date: 6/16/2025  No acute finding identified on this single view. Electronically signed by: Alfredito Howe MD Date:    06/16/2025 Time:    23:40    CT Head Without Contrast  Result Date: 6/16/2025  1. Small acute parenchymal hemorrhage centered in the left thalamus, likely hypertensive in etiology.  There is local mass effect on the adjacent left lateral ventricle without significant midline shift. 2. Mild prominence of the lateral ventricles when compared to prior.  This may relate to interval volume loss, however early acute hydrocephalus may present similarly.  Recommend attention on short-term follow-up. 3. Chronic microvascular ischemic change and sequela of remote hemorrhage, as detailed above.  Right caudate suspected remote tiny lacunar type infarct but new from 10/25/2023 imaging. This report was flagged in Epic as abnormal. These findings were discovered at 20:00 on 06/16/2025 and relayed to Basia Felton MD via telephone by Sam Gray MD at 20:00 on 06/16/2025. Electronically signed by  "resident: Sam Gray Date:    06/16/2025 Time:    20:02 Electronically signed by: Aramis Hernandez MD Date:    06/16/2025 Time:    20:32      Assessment/Plan:     * ICH (intracerebral hemorrhage)  50M past history of R thalamic stroke 4 years ago presenting with disorientation for several days and imaging revealing L thalamic ICH (ICHS 0). There is no hydrocephalus. Etiology likely hypertensive    Plan  No neurosurgical intervention  -150  NSGY signing off  Okay for dvt ppx / diet        Carlito Cheek MD  Neurosurgery  [facility] - Neuro Critical Care       [1]   Medications Prior to Admission   Medication Sig Dispense Refill Last Dose/Taking    aspirin (ECOTRIN) 81 MG EC tablet Take 1 tablet (81 mg total) by mouth once daily. 90 tablet 0     atorvastatin (LIPITOR) 40 MG tablet Take 1 tablet (40 mg total) by mouth once daily. 30 tablet 2     blood sugar diagnostic Strp Use to check blood glucose 5 (five) times daily. 100 strip 1     blood-glucose meter Misc Use to check blood glucose 5 times daily 1 each 0     carvediloL (COREG) 3.125 MG tablet Take 1 tablet (3.125 mg total) by mouth 2 (two) times daily. 180 tablet 0     clopidogreL (PLAVIX) 75 mg tablet Take 1 tablet (75 mg total) by mouth once daily. 30 tablet 0     furosemide (LASIX) 40 MG tablet Take 1 tablet (40 mg total) by mouth 2 (two) times daily. 60 tablet 2     insulin aspart U-100 (NOVOLOG) 100 unit/mL (3 mL) InPn pen Inject 1-10 Units before meals and at bedtime for Hyperglycemia. Per sliding scale:Blood Glucose: 151-200    201-250   251-300   301-350  >350 Pre-meal:          2 units      4 units      6 units     8 units     10 units  10 pm:              1 units      2 units      3 units     4 units      5 units 12 mL 0     lancets (TRUEPLUS LANCETS) 30 gauge Misc Use to check blood glucose 5 (five) times daily. 100 each 0     pen needle, diabetic 32 gauge x 5/32" Ndle Use for insulin adminstration up to 5 times daily 200 each 0     pulse oximeter " (PULSE OXIMETER) device by Apply Externally route 2 (two) times a day. Use twice daily at 8 AM and 3 PM and record the value in MyChart as directed. 1 each 0     sacubitriL-valsartan (ENTRESTO) 24-26 mg per tablet Take 1 tablet by mouth 2 (two) times daily. 180 tablet 0

## 2025-06-17 NOTE — ASSESSMENT & PLAN NOTE
-No documented history of CKD; however, GFR 60 on admission  -Creatinine 1.4, baseline appears to be 1-1.5 per chart review  -GFR 37-45, creatinine 1.8-2.1 during recent admission (3/7-3/14)  -Avoid nephrotoxins  -Renally dose meds  -CMP daily

## 2025-06-17 NOTE — SUBJECTIVE & OBJECTIVE
6/17: Repeat CTH stable. Neuro stable    Prescriptions Prior to Admission[1]    Review of patient's allergies indicates:   Allergen Reactions    Aspartame Nausea And Vomiting     Violent emesis.    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    Objective:     Weight: 58.1 kg (128 lb)  Body mass index is 23.41 kg/m².  Vital Signs (Most Recent):  Temp: 98.3 °F (36.8 °C) (06/17/25 0705)  Pulse: 93 (06/17/25 1137)  Resp: 20 (06/17/25 1137)  BP: (!) 155/92 (06/17/25 1137)  SpO2: 98 % (06/17/25 1137) Vital Signs (24h Range):  Temp:  [97.5 °F (36.4 °C)-98.3 °F (36.8 °C)] 98.3 °F (36.8 °C)  Pulse:  [] 93  Resp:  [6-32] 20  SpO2:  [94 %-100 %] 98 %  BP: (118-210)/() 155/92     Date 06/17/25 0700 - 06/18/25 0659   Shift 9849-7158 4231-7385 8032-0526 24 Hour Total   INTAKE   P.O. 240   240   Shift Total(mL/kg) 240(4.1)   240(4.1)   OUTPUT   Shift Total(mL/kg)       Weight (kg) 58.1 58.1 58.1 58.1                               Physical Exam     GENERAL: resting comfortably  HEENT: NCAT, PERRL, mucous membranes moist  NECK: supple, trachea midline  CV: normal capillary refill  PULM: aerating well, symmetric expansion, no distress  ABD: soft, NT, ND  EXT: no c/c/e    NEURO:    AAO x 3  CN II-XII grossly intact except  mild L FD  Fc x 4 antigravity  SILT    Mild LUE drift      Neurosurgery Physical Exam    Significant Labs:  Recent Labs   Lab 06/16/25  1859 06/17/25  0210 06/17/25  0849   * 129*  --     138  --    K 3.3* 2.7* 3.9    103  --    CO2 28 25  --    BUN 14 13  --    CREATININE 1.4 1.5*  --    CALCIUM 8.8 8.0*  --    MG 1.8 1.7  --      Recent Labs   Lab 06/16/25  1859 06/17/25  0210   WBC 6.50 9.58   HGB 17.4 14.4   HCT 50.6 42.5    168     Recent Labs   Lab 06/16/25  2019   INR 1.0   APTT 27.3     Microbiology Results (last 7 days)       ** No results found for the last 168 hours. **          All pertinent labs from the last 24 hours have been reviewed.    Significant Diagnostics:  I have reviewed all pertinent imaging results/findings within the past 24 hours.  I have reviewed and interpreted all pertinent imaging results/findings within the past 24 hours.  CT Head Without Contrast  Result Date: 6/16/2025  1. Small acute parenchymal hemorrhage centered in the left thalamus, likely hypertensive in etiology.  There is local mass effect on the adjacent left lateral ventricle without significant midline shift. 2. Mild prominence of the lateral ventricles when compared to prior.  This may relate to interval volume loss, however early acute hydrocephalus may present similarly.  Recommend attention on short-term follow-up. 3. Chronic microvascular ischemic change and sequela of remote hemorrhage, as detailed above.  Right caudate suspected remote tiny lacunar type infarct but new from 10/25/2023 imaging. This report was flagged in Epic as abnormal. These findings were discovered at 20:00 on 06/16/2025 and relayed to Basia Felton MD via telephone by Sam Gray MD at 20:00 on 06/16/2025. Electronically signed by resident: Sam Gray Date:    06/16/2025 Time:    20:02 Electronically signed by: Aramis Hernandez MD Date:    06/16/2025 Time:    20:32      MRI Brain Without Contrast  Result Date:  6/17/2025  MRI brain: Evolving recent to subacute aged left thalamic parenchymal hemorrhage.  Please note evaluation for underlying mass lesion limited by noncontrast technique.  Clinical correlation and follow-up recommended. Patchy and confluent T2 FLAIR signal abnormality supratentorial white matter elsewhere while nonspecific concerning for underlying chronic ischemic change Ventricles stable without hydrocephalus Remote hemorrhage right basal ganglia/thalamus with ipsilateral wallerian degeneration. MRA head: Unremarkable MRA head as detailed above specifically without focal stenosis or proximal large vessel occlusion. Please note there is no hypertrophied vasculature associated with left thalamic hemorrhage although only partially included in the field of view.  Further evaluation with repeat MRA imaging to include the entirety of the thalamic hemorrhage advised. Electronically signed by: Ricardo Humphrey,  Date:    06/17/2025 Time:    11:52    MRA Brain without contrast  Result Date: 6/17/2025  MRI brain: Evolving recent to subacute aged left thalamic parenchymal hemorrhage.  Please note evaluation for underlying mass lesion limited by noncontrast technique.  Clinical correlation and follow-up recommended. Patchy and confluent T2 FLAIR signal abnormality supratentorial white matter elsewhere while nonspecific concerning for underlying chronic ischemic change Ventricles stable without hydrocephalus Remote hemorrhage right basal ganglia/thalamus with ipsilateral wallerian degeneration. MRA head: Unremarkable MRA head as detailed above specifically without focal stenosis or proximal large vessel occlusion. Please note there is no hypertrophied vasculature associated with left thalamic hemorrhage although only partially included in the field of view.  Further evaluation with repeat MRA imaging to include the entirety of the thalamic hemorrhage advised. Electronically signed by: Ricardo Humphrey,  Date:    06/17/2025  Time:    11:52    CT Head Without Contrast  Result Date: 6/17/2025  Stable CT appearance of the small acute parenchymal hematoma centered in the left thalamus.  No new intracranial hemorrhage. Ventricles are stable in size. Recommendations include clinical correlation and continued imaging follow-up. Electronically signed by resident: Sam Gray Date:    06/17/2025 Time:    04:53 Electronically signed by: Cosme Almazan Date:    06/17/2025 Time:    08:20    X-Ray Chest AP Single View  Result Date: 6/16/2025  No acute finding identified on this single view. Electronically signed by: Alfredito Howe MD Date:    06/16/2025 Time:    23:40    CT Head Without Contrast  Result Date: 6/16/2025  1. Small acute parenchymal hemorrhage centered in the left thalamus, likely hypertensive in etiology.  There is local mass effect on the adjacent left lateral ventricle without significant midline shift. 2. Mild prominence of the lateral ventricles when compared to prior.  This may relate to interval volume loss, however early acute hydrocephalus may present similarly.  Recommend attention on short-term follow-up. 3. Chronic microvascular ischemic change and sequela of remote hemorrhage, as detailed above.  Right caudate suspected remote tiny lacunar type infarct but new from 10/25/2023 imaging. This report was flagged in Epic as abnormal. These findings were discovered at 20:00 on 06/16/2025 and relayed to Basia Felton MD via telephone by Sam Gray MD at 20:00 on 06/16/2025. Electronically signed by resident: Sam Gray Date:    06/16/2025 Time:    20:02 Electronically signed by: Aramis Hernandez MD Date:    06/16/2025 Time:    20:32           [1]   Medications Prior to Admission   Medication Sig Dispense Refill Last Dose/Taking    aspirin (ECOTRIN) 81 MG EC tablet Take 1 tablet (81 mg total) by mouth once daily. 90 tablet 0     atorvastatin (LIPITOR) 40 MG tablet Take 1 tablet (40 mg total) by mouth once daily. 30 tablet 2   "   blood sugar diagnostic Strp Use to check blood glucose 5 (five) times daily. 100 strip 1     blood-glucose meter Misc Use to check blood glucose 5 times daily 1 each 0     carvediloL (COREG) 3.125 MG tablet Take 1 tablet (3.125 mg total) by mouth 2 (two) times daily. 180 tablet 0     clopidogreL (PLAVIX) 75 mg tablet Take 1 tablet (75 mg total) by mouth once daily. 30 tablet 0     furosemide (LASIX) 40 MG tablet Take 1 tablet (40 mg total) by mouth 2 (two) times daily. 60 tablet 2     insulin aspart U-100 (NOVOLOG) 100 unit/mL (3 mL) InPn pen Inject 1-10 Units before meals and at bedtime for Hyperglycemia. Per sliding scale:Blood Glucose: 151-200    201-250   251-300   301-350  >350 Pre-meal:          2 units      4 units      6 units     8 units     10 units  10 pm:              1 units      2 units      3 units     4 units      5 units 12 mL 0     lancets (TRUEPLUS LANCETS) 30 gauge Misc Use to check blood glucose 5 (five) times daily. 100 each 0     pen needle, diabetic 32 gauge x 5/32" Ndle Use for insulin adminstration up to 5 times daily 200 each 0     pulse oximeter (PULSE OXIMETER) device by Apply Externally route 2 (two) times a day. Use twice daily at 8 AM and 3 PM and record the value in [facility] as directed. 1 each 0     sacubitriL-valsartan (ENTRESTO) 24-26 mg per tablet Take 1 tablet by mouth 2 (two) times daily. 180 tablet 0      "

## 2025-06-17 NOTE — H&P
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 He reports being unable to remember where he parked his car, difficulty with timelines, and inability to remember waking up in the mornings. CTH obtained in the ED revealed an acute L thalamic IPH. Questionable use of DAPT as ASA/Plavix listed on home med list, but patient unable to state which meds he is currently taking. Given recent disorientation/confusion and concern for possible AP usage, DDAVP administered. Also unclear if patient taking any home meds as /139 upon ED arrival, requiring initiation of Cardene. He will be admitted to [facility] for further management.      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    Cardiovascular:  Negative for chest pain.   Gastrointestinal:  Negative for abdominal pain, nausea and vomiting.   Neurological:  Positive for weakness (LUE; baseline). Negative for dizziness, speech difficulty and numbness.   Psychiatric/Behavioral:  Positive for  confusion.      Objective:     Vitals:    Temp: 98.2 °F (36.8 °C)  Pulse: 108  BP: (!) 171/107  MAP (mmHg): 133  Resp: 20  SpO2: 97 %    Temp  Min: 98.1 °F (36.7 °C)  Max: 98.2 °F (36.8 °C)  Pulse  Min: 106  Max: 119  BP  Min: 164/107  Max: 210/141  MAP (mmHg)  Min: 126  Max: 169  Resp  Min: 12  Max: 29  SpO2  Min: 96 %  Max: 100 %    No intake/output data recorded.            Physical Exam  Vitals and nursing note reviewed.   Constitutional:       General: He is not in acute distress.     Appearance: Normal appearance. He is not ill-appearing.   HENT:      Head: Normocephalic and atraumatic.   Eyes:      Extraocular Movements: Extraocular movements intact.      Pupils: Pupils are equal, round, and reactive to light.   Cardiovascular:      Rate and Rhythm: Normal rate and regular rhythm.      Pulses: Normal pulses.      Comments: Trace BLE edema  Pulmonary:      Effort: Pulmonary effort is normal.   Abdominal:      General: There is no distension.      Palpations: Abdomen is soft.      Tenderness: There is no abdominal tenderness.   Musculoskeletal:         General: Normal range of motion.      Cervical back: Normal range of motion.   Skin:     General: Skin is warm and dry.      Capillary Refill: Capillary refill takes less than 2 seconds.   Neurological:      Mental Status: He is alert. He is disoriented.      GCS: GCS eye subscore is 4. GCS verbal subscore is 4. GCS motor subscore is 6.      Cranial Nerves: Cranial nerves 2-12 are intact.      Sensory: Sensation is intact.      Motor: Weakness (LUE; baseline) and tremor present.      Coordination: Coordination is intact.      Comments:   -E4 V4 M6  -PERRL  -Oriented to person, place, situation; able to state year, but disoriented to month  -Follows commands w/ all extremities  -RUE 5/5  -LUE 4/5  -LLE 5/5  -RLE 5/5  -SILT  -Gait deferred            Today I personally reviewed pertinent medications, lines/drains/airways, imaging, cardiology results, laboratory  results, notably:    Results for orders placed or performed during the hospital encounter of 06/16/25   CT Head Without Contrast    Narrative    EXAMINATION:  CT HEAD WITHOUT CONTRAST    CLINICAL HISTORY:  Mental status change, unknown cause    TECHNIQUE:  Low dose axial CT images obtained throughout the head without the use of intravenous contrast.  Axial, sagittal and coronal reconstructions were performed.    COMPARISON:  CTA stroke multiphase 10/25/2023, MR brain 10/25/2023    FINDINGS:  Intracranial compartment:    Mild prominence of the lateral ventricles when compared to CTA stroke multiphase 10/25/2023.  Third and 4th ventricles are normal size.    High density material in the left thalamus with adjacent hypoattenuation, new from prior, most likely reflects a small parenchymal hemorrhage with surrounding vasogenic edema.  Local mass effect with effacement of the adjacent lateral ventricle is noted without significant midline shift.  Small hypodensity in the right thalamus, unchanged, likely relates to sequela of prior hemorrhage is better detailed on prior MRI.  Patchy hypoattenuation throughout the supratentorial white matter, nonspecific and similar to prior but may reflect sequela of chronic microvascular ischemic change.  Remote appearing tiny lacunar type infarct at the right caudate, but appears new from 10/25/2023 MRI brain and CTA stroke multiphase studies.  No major vascular distribution infarct.    No extra-axial blood or fluid collections.    Skull/extracranial contents (limited evaluation):    No displaced calvarial fracture.    Mastoid air cells are clear.  Patchy mucosal thickening of the ethmoid air cells, similar to prior.  Imaged portions of the orbits are within normal limits.      Impression    1. Small acute parenchymal hemorrhage centered in the left thalamus, likely hypertensive in etiology.  There is local mass effect on the adjacent left lateral ventricle without significant midline  shift.  2. Mild prominence of the lateral ventricles when compared to prior.  This may relate to interval volume loss, however early acute hydrocephalus may present similarly.  Recommend attention on short-term follow-up.  3. Chronic microvascular ischemic change and sequela of remote hemorrhage, as detailed above.  Right caudate suspected remote tiny lacunar type infarct but new from 10/25/2023 imaging.  This report was flagged in Epic as abnormal.    These findings were discovered at 20:00 on 06/16/2025 and relayed to Basia Felton MD via telephone by Sam Gray MD at 20:00 on 06/16/2025.    Electronically signed by resident: Sam Gray  Date:    06/16/2025  Time:    20:02    Electronically signed by: Aramis Hernandez MD  Date:    06/16/2025  Time:    20:32     CMP  Sodium   Date Value Ref Range Status   06/16/2025 141 136 - 145 mmol/L Final   03/14/2025 135 (L) 136 - 145 mmol/L Final     Potassium   Date Value Ref Range Status   06/16/2025 3.3 (L) 3.5 - 5.1 mmol/L Final   03/14/2025 4.1 3.5 - 5.1 mmol/L Final     Chloride   Date Value Ref Range Status   06/16/2025 101 95 - 110 mmol/L Final   03/14/2025 101 95 - 110 mmol/L Final     CO2   Date Value Ref Range Status   06/16/2025 28 23 - 29 mmol/L Final   03/14/2025 22 (L) 23 - 29 mmol/L Final     Glucose   Date Value Ref Range Status   06/16/2025 142 (H) 70 - 110 mg/dL Final   03/14/2025 241 (H) 70 - 110 mg/dL Final     BUN   Date Value Ref Range Status   06/16/2025 14 6 - 20 mg/dL Final     Creatinine   Date Value Ref Range Status   06/16/2025 1.4 0.5 - 1.4 mg/dL Final     Calcium   Date Value Ref Range Status   06/16/2025 8.8 8.7 - 10.5 mg/dL Final   03/14/2025 8.3 (L) 8.7 - 10.5 mg/dL Final     Protein Total   Date Value Ref Range Status   06/16/2025 6.9 6.0 - 8.4 gm/dL Final     Total Protein   Date Value Ref Range Status   03/14/2025 5.9 (L) 6.0 - 8.4 g/dL Final     Albumin   Date Value Ref Range Status   06/16/2025 3.5 3.5 - 5.2 g/dL Final   03/14/2025 2.4  (L) 3.5 - 5.2 g/dL Final     Total Bilirubin   Date Value Ref Range Status   03/14/2025 0.4 0.1 - 1.0 mg/dL Final     Comment:     For infants and newborns, interpretation of results should be based  on gestational age, weight and in agreement with clinical  observations.    Premature Infant recommended reference ranges:  Up to 24 hours.............<8.0 mg/dL  Up to 48 hours............<12.0 mg/dL  3-5 days..................<15.0 mg/dL  6-29 days.................<15.0 mg/dL       Bilirubin Total   Date Value Ref Range Status   06/16/2025 1.6 (H) 0.1 - 1.0 mg/dL Final     Comment:     For infants and newborns, interpretation of results should be based   on gestational age, weight and in agreement with clinical   observations.    Premature Infant recommended reference ranges:   0-24 hours:  <8.0 mg/dL   24-48 hours: <12.0 mg/dL   3-5 days:    <15.0 mg/dL   6-29 days:   <15.0 mg/dL     Alkaline Phosphatase   Date Value Ref Range Status   03/14/2025 84 40 - 150 U/L Final     ALP   Date Value Ref Range Status   06/16/2025 69 40 - 150 unit/L Final     AST   Date Value Ref Range Status   06/16/2025 27 11 - 45 unit/L Final   03/14/2025 37 10 - 40 U/L Final     ALT   Date Value Ref Range Status   06/16/2025 17 10 - 44 unit/L Final   03/14/2025 29 10 - 44 U/L Final     Anion Gap   Date Value Ref Range Status   06/16/2025 12 8 - 16 mmol/L Final     eGFR   Date Value Ref Range Status   06/16/2025 60 (L) >60 mL/min/1.73/m2 Final     Comment:     Estimated GFR calculated using the CKD-EPI creatinine (2021) equation.   03/14/2025 37.2 (A) >60 mL/min/1.73 m^2 Final     Recent Labs   Lab 06/16/25  1859   WBC 6.50   RBC 5.74   HGB 17.4   HCT 50.6      MCV 88   MCH 30.3   MCHC 34.4       Assessment/Plan:     Neuro  * ICH (intracerebral hemorrhage)  49 y/o M presenting with disorientation and confusion for the past several days (exact time of onset unclear). He reports being unable to remember where he parked his car, difficulty  with timelines, and inability to remember waking up in the mornings. CTH obtained in the ED revealed an acute L thalamic IPH.    Antithrombotics for secondary stroke prevention: Antiplatelets: None: Intracerebral Hemorrhage    Statins for secondary stroke prevention and hyperlipidemia, if present:   Statins: Atorvastatin- 40 mg daily - not indicated for ICH; however, patient has PMH of hyperlipidemia    Aggressive risk factor modification: HTN, DM, HLD     Rehab efforts: The patient has been evaluated by a stroke team provider and the therapy needs have been fully considered based off the presenting complaints and exam findings. The following therapy evaluations are needed: PT evaluate and treat, OT evaluate and treat, SLP evaluate and treat, PM&R evaluate for appropriate placement    Diagnostics ordered/pending: CT scan of head without contrast to asses brain parenchyma, HgbA1C to assess blood glucose levels, Lipid Profile to assess cholesterol levels, MRA head to assess vasculature, MRI head without contrast to assess brain parenchyma, TTE to assess cardiac function/status , TSH to assess thyroid function    VTE prophylaxis: Mechanical prophylaxis: Place SCDs  None: Reason for No Pharmacological VTE Prophylaxis: History of systemic or intracranial bleeding    BP parameters: ICH: SBP < 150    -Admit to [facility]CC  -VS/Neuro checks q1  -NSGY, Vascular Neurology consulted  -No acute surgical intervention indicated at this time  -HOB >/= 30  -Repeat CTH in 6h for stability  -MRI/MRA brain today  -SBP goal < 150  -NPO for now  -Monitor I/O  -CBC, CMP, Mag, Phos daily  -SCDs for DVT PPX; hold chemical PPX for now in setting of acute IPH  -PT/OT/SLP as appropriate    History of CVA with residual deficit  -R internal capsule stroke in 2023 w/ residual L-sided weakness    History of intracerebral hemorrhage without residual deficit  -R thalamic IPH in 2021    Cardiac/Vascular  Chronic combined systolic and diastolic congestive  heart failure  -Last ECHO on 3/7/25 w/ global hypokinesis, severely reduced systolic function w/ estimated EF 25-30%, and diastolic dysfunction  -No sign of fluid overload on CXR  -Continue home Entresto   -Hold home Lasix for now, resume as appropriate    Hyperlipidemia  -Atorvastatin daily    Hypertension  -SBP goal < 150  -Coreg BID  -Labetalol, hydralazine, Cardene PRN    Renal/  Stage 2 chronic kidney disease  -No documented history of CKD; however, GFR 60 on admission  -Creatinine 1.4, baseline appears to be 1-1.5 per chart review  -GFR 37-45, creatinine 1.8-2.1 during recent admission (3/7-3/14)  -Avoid nephrotoxins  -Renally dose meds  -CMP daily    Hypokalemia  -K+ 3.3 on admission  -CMP daily, replace PRN    Endocrine  Type 2 diabetes mellitus  -Hgb A1c 8.8  -Accuchecks q6 w/ SSI  -Diabetic diet once no longer NPO      The patient is being Prophylaxed for:  Venous Thromboembolism with: Mechanical  Stress Ulcer with: Not Applicable   Ventilator Pneumonia with: not applicable    Activity Orders            Progressive Mobility Protocol (mobilize patient to their highest level of functioning at least twice daily) starting at 06/17 0800    Turn patient starting at 06/16 2200    Elevate HOB 30 starting at 06/16 2105    Diet NPO: NPO starting at 06/16 2105    Bed rest starting at 06/16 2028    Elevate HOB 30 (30-45 Degrees) starting at 06/16 2028          Full Code    Critical care time spent on the evaluation and treatment of severe organ dysfunction, review of pertinent labs and imaging studies, discussions with consulting providers and discussions with patient/family: 37 minutes    Surekha Lara NP  Neurocritical Care  [facility]       [1]   No current facility-administered medications on file prior to encounter.     Current Outpatient Medications on File Prior to Encounter   Medication Sig Dispense Refill    aspirin (ECOTRIN) 81 MG EC tablet Take 1 tablet (81 mg total) by mouth once  "daily. 90 tablet 0    atorvastatin (LIPITOR) 40 MG tablet Take 1 tablet (40 mg total) by mouth once daily. 30 tablet 2    blood sugar diagnostic Strp Use to check blood glucose 5 (five) times daily. 100 strip 1    blood-glucose meter Misc Use to check blood glucose 5 times daily 1 each 0    carvediloL (COREG) 3.125 MG tablet Take 1 tablet (3.125 mg total) by mouth 2 (two) times daily. 180 tablet 0    clopidogreL (PLAVIX) 75 mg tablet Take 1 tablet (75 mg total) by mouth once daily. 30 tablet 0    furosemide (LASIX) 40 MG tablet Take 1 tablet (40 mg total) by mouth 2 (two) times daily. 60 tablet 2    insulin aspart U-100 (NOVOLOG) 100 unit/mL (3 mL) InPn pen Inject 1-10 Units before meals and at bedtime for Hyperglycemia. Per sliding scale:Blood Glucose: 151-200    201-250   251-300   301-350  >350 Pre-meal:          2 units      4 units      6 units     8 units     10 units  10 pm:              1 units      2 units      3 units     4 units      5 units 12 mL 0    lancets (TRUEPLUS LANCETS) 30 gauge Misc Use to check blood glucose 5 (five) times daily. 100 each 0    pen needle, diabetic 32 gauge x 5/32" Ndle Use for insulin adminstration up to 5 times daily 200 each 0    pulse oximeter (PULSE OXIMETER) device by Apply Externally route 2 (two) times a day. Use twice daily at 8 AM and 3 PM and record the value in [facility] as directed. 1 each 0    sacubitriL-valsartan (ENTRESTO) 24-26 mg per tablet Take 1 tablet by mouth 2 (two) times daily. 180 tablet 0   [2]   Social History  Tobacco Use    Smoking status: Never   Substance Use Topics    Alcohol use: Not Currently    Drug use: Never     "

## 2025-06-17 NOTE — ED PROVIDER NOTES
Encounter Date: 6/16/2025       History     Chief Complaint   Patient presents with    Altered Mental Status     Arrived to Ed with complaint of feeling disoriented. Pt states that he cannot remember where he parked the car.      HPI    Patient is a 50-year-old male with a history of diabetes, hypertension, CHF, right thalamic ICH presenting to the ED with confusion.  Patient presents with a family friend, although she has not been able to see him for the last several months.  Unclear timeline of onset, however he is reporting disorientation.  Does not remember where he parked his car, unclear timelines, etc..  He is oriented to self and place and situation, however not oriented to time.    Denies chest pain, shortness of breath, nausea, vomiting, stomach pain.    Review of patient's allergies indicates:   Allergen Reactions    Aspartame Nausea And Vomiting     Violent emesis.    Shellfish containing products Shortness Of Breath     Has received iodinated contrast in the past with no reaction     Past Medical History:   Diagnosis Date    Diabetes mellitus     Hypertension     R thalmaic hypertensive ICH 04/04/2021    Tachycardia 4/4/2021     Past Surgical History:   Procedure Laterality Date    ANTERIOR CRUCIATE LIGAMENT REPAIR Right     2002     Family History   Problem Relation Name Age of Onset    Hypertension Mother      Diabetes Mother      Stroke Father      Hypertension Father       Social History[1]    Physical Exam     Initial Vitals [06/16/25 1825]   BP Pulse Resp Temp SpO2   (!) 185/139 (!) 119 16 98.1 °F (36.7 °C) 99 %      MAP       --         Physical Exam    Constitutional: He appears well-developed and well-nourished. He is not diaphoretic. No distress.   HENT:   Head: Normocephalic and atraumatic.   Eyes: Conjunctivae and EOM are normal.   Cardiovascular:  Normal rate and regular rhythm.           Pulmonary/Chest: Breath sounds normal. No respiratory distress. He has no wheezes.   Abdominal: Abdomen  is soft. He exhibits no distension. There is no abdominal tenderness.   Musculoskeletal:         General: Edema (BLE) present. No tenderness.     Neurological: He is alert. A cranial nerve deficit (mild L sided droop, chronic) is present. No sensory deficit.   Mild L pronator drift   Skin: Skin is warm and dry.   Psychiatric: He has a normal mood and affect. Thought content normal.         ED Course   Procedures  Labs Reviewed   COMPREHENSIVE METABOLIC PANEL - Abnormal       Result Value    Sodium 141      Potassium 3.3 (*)     Chloride 101      CO2 28      Glucose 142 (*)     BUN 14      Creatinine 1.4      Calcium 8.8      Protein Total 6.9      Albumin 3.5      Bilirubin Total 1.6 (*)     ALP 69      AST 27      ALT 17      Anion Gap 12      eGFR 60 (*)    LIPID PANEL - Abnormal    Cholesterol Total 248 (*)     Triglyceride 196 (*)     HDL Cholesterol 60      LDL Cholesterol 148.8      HDL/Cholesterol Ratio 24.2      Cholesterol/HDL Ratio 4.1      Non HDL Cholesterol 188     HEMOGLOBIN A1C - Abnormal    Hemoglobin A1c 8.8 (*)     Estimated Average Glucose 206 (*)    ALCOHOL,MEDICAL (ETHANOL) - Normal    Alcohol, Serum <10     LACTIC ACID, PLASMA - Normal    Lactic Acid Level 1.0      Narrative:     Falsely low lactic acid results can be found in samples containing >=13.0 mg/dL total bilirubin and/or >=3.5 mg/dL direct bilirubin.    TSH - Normal    TSH 1.616     AMMONIA - Normal    Ammonia 22     MAGNESIUM - Normal    Magnesium  1.8     PHOSPHORUS - Normal    Phosphorus Level 3.0     CBC WITH DIFFERENTIAL - Normal    WBC 6.50      RBC 5.74      HGB 17.4      HCT 50.6      MCV 88      MCH 30.3      MCHC 34.4      RDW 13.2      Platelet Count 194      MPV 10.8      Nucleated RBC 0      Neut % 71.5      Lymph % 18.9      Mono % 6.9      Eos % 1.5      Basophil % 0.9      Imm Grans % 0.3      Neut # 4.64      Lymph # 1.23      Mono # 0.45      Eos # 0.10      Baso # 0.06      Imm Grans # 0.02     APTT - Normal    PTT  27.3     PROTIME-INR - Normal    PT 10.8      INR 1.0     CBC W/ AUTO DIFFERENTIAL    Narrative:     The following orders were created for panel order CBC auto differential.  Procedure                               Abnormality         Status                     ---------                               -----------         ------                     CBC with Differential[[id number]]       Normal              Final result                 Please view results for these tests on the individual orders.   EXTRA TUBES    Narrative:     The following orders were created for panel order EXTRA TUBES.  Procedure                               Abnormality         Status                     ---------                               -----------         ------                     Lavender Top Hold[[id number]]                               Final result                 Please view results for these tests on the individual orders.   LAVENDER TOP HOLD    Extra Tube Hold for add-ons.     URINALYSIS, REFLEX TO URINE CULTURE   TOXICOLOGY SCREEN, URINE, RANDOM (COMPLIANCE)   TYPE & SCREEN    Specimen Outdate 06/19/2025 23:59      Group & Rh O POS      Indirect Francesco NEG       EKG Readings: (Independently Interpreted)   Initial Reading: No STEMI. Rhythm: Sinus Tachycardia. Heart Rate: 124. Ectopy: No Ectopy. Conduction: Normal. T Waves Flipped: AVR. Axis: Normal. Clinical Impression: Sinus Tachycardia       Imaging Results              X-Ray Chest AP Single View (In process)                       CT Head Without Contrast (Final result)  Result time 06/16/25 20:32:11      Final result by Aramis Hernandez MD (06/16/25 20:32:11)                   Impression:      1. Small acute parenchymal hemorrhage centered in the left thalamus, likely hypertensive in etiology.  There is local mass effect on the adjacent left lateral ventricle without significant midline shift.  2. Mild prominence of the lateral ventricles when compared to prior.  This may relate  to interval volume loss, however early acute hydrocephalus may present similarly.  Recommend attention on short-term follow-up.  3. Chronic microvascular ischemic change and sequela of remote hemorrhage, as detailed above.  Right caudate suspected remote tiny lacunar type infarct but new from 10/25/2023 imaging.  This report was flagged in Epic as abnormal.    These findings were discovered at 20:00 on 06/16/2025 and relayed to Basia Felton MD via telephone by Sam Gray MD at 20:00 on 06/16/2025.    Electronically signed by resident: Sam Gray  Date:    06/16/2025  Time:    20:02    Electronically signed by: Aramis Hernandez MD  Date:    06/16/2025  Time:    20:32               Narrative:    EXAMINATION:  CT HEAD WITHOUT CONTRAST    CLINICAL HISTORY:  Mental status change, unknown cause    TECHNIQUE:  Low dose axial CT images obtained throughout the head without the use of intravenous contrast.  Axial, sagittal and coronal reconstructions were performed.    COMPARISON:  CTA stroke multiphase 10/25/2023, MR brain 10/25/2023    FINDINGS:  Intracranial compartment:    Mild prominence of the lateral ventricles when compared to CTA stroke multiphase 10/25/2023.  Third and 4th ventricles are normal size.    High density material in the left thalamus with adjacent hypoattenuation, new from prior, most likely reflects a small parenchymal hemorrhage with surrounding vasogenic edema.  Local mass effect with effacement of the adjacent lateral ventricle is noted without significant midline shift.  Small hypodensity in the right thalamus, unchanged, likely relates to sequela of prior hemorrhage is better detailed on prior MRI.  Patchy hypoattenuation throughout the supratentorial white matter, nonspecific and similar to prior but may reflect sequela of chronic microvascular ischemic change.  Remote appearing tiny lacunar type infarct at the right caudate, but appears new from 10/25/2023 MRI brain and CTA stroke multiphase  studies.  No major vascular distribution infarct.    No extra-axial blood or fluid collections.    Skull/extracranial contents (limited evaluation):    No displaced calvarial fracture.    Mastoid air cells are clear.  Patchy mucosal thickening of the ethmoid air cells, similar to prior.  Imaged portions of the orbits are within normal limits.                        Preliminary result by Sam Gray MD (06/16/25 20:16:39)                   Impression:      1. Small acute parenchymal hemorrhage centered in the left thalamus, likely hypertensive in etiology.  There is local mass effect on the adjacent left lateral ventricle without significant midline shift.  2. Mild prominence of the lateral ventricles when compared to prior.  This may relate to interval volume loss, however early acute hydrocephalus may present similarly.  Recommend attention on short-term follow-up.  3. Chronic microvascular ischemic change and sequela of remote hemorrhage, as detailed above.  This report was flagged in Epic as abnormal.    These findings were discovered at 20:00 on 06/16/2025 and relayed to Basia Felton MD via telephone by Sam Gray MD at 20:00 on 06/16/2025.    Electronically signed by resident: Sam Gray  Date:    06/16/2025  Time:    20:02                 Narrative:    EXAMINATION:  CT HEAD WITHOUT CONTRAST    CLINICAL HISTORY:  Mental status change, unknown cause;    TECHNIQUE:  Low dose axial CT images obtained throughout the head without the use of intravenous contrast.  Axial, sagittal and coronal reconstructions were performed.    COMPARISON:  CTA stroke multiphase 10/25/2023, MR brain 10/25/2023    FINDINGS:  Intracranial compartment:    Mild prominence of the lateral ventricles when compared to CTA stroke multiphase 10/25/2023.  Third and 4th ventricles are normal size.    High density material in the left thalamus with adjacent hypoattenuation, new from prior, most likely reflects a small parenchymal  hemorrhage with surrounding vasogenic edema.  Local mass effect with effacement of the adjacent lateral ventricle is noted without significant midline shift.  Small hypodensity in the right thalamus, unchanged, likely relates to sequela of prior hemorrhage is better detailed on prior MRI.  Patchy hypoattenuation throughout the supratentorial white matter, nonspecific and similar to prior but may reflect sequela of chronic microvascular ischemic change.  No major vascular distribution infarct.    No extra-axial blood or fluid collections.    Skull/extracranial contents (limited evaluation):    No displaced calvarial fracture.    Mastoid air cells are clear.  Patchy mucosal thickening of the ethmoid air cells, similar to prior.                                       Medications   niCARdipine 40 mg/200 mL (0.2 mg/mL) infusion (12.5 mg/hr Intravenous Verify Only 6/16/25 2129)   sodium chloride 0.9% flush 10 mL (has no administration in time range)   senna-docusate 8.6-50 mg per tablet 1 tablet (1 tablet Oral Given 6/16/25 2206)   ondansetron injection 4 mg (has no administration in time range)   atorvastatin tablet 40 mg (has no administration in time range)   carvediloL tablet 3.125 mg (3.125 mg Oral Given 6/16/25 2206)   dextrose 50% injection 12.5 g (has no administration in time range)   glucagon (human recombinant) injection 1 mg (has no administration in time range)   insulin aspart U-100 pen 0-10 Units (has no administration in time range)   sacubitriL-valsartan 24-26 mg per tablet 1 tablet (1 tablet Oral Given 6/16/25 2206)   desmopressin 26.4 mcg in 0.9% NaCl 50 mL IVPB (has no administration in time range)   labetalol 20 mg/4 mL (5 mg/mL) IV syring (has no administration in time range)     Medical Decision Making  Amount and/or Complexity of Data Reviewed  Labs: ordered.    Risk  Decision regarding hospitalization.    Patient is a 50-year-old male with a history of diabetes, hypertension, CHF, right thalamic  ICH presenting to the ED with confusion.     On presentation, patient is hypertensive and tachycardic, however overall well-appearing.    Differential diagnosis including ICH, tox, metabolic encephalopathy, cranial mass, etc..    Exam showing some mild left facial droop and pronator drift, presumed to be chronic given history of right-sided ICH.  With the unclear timeline, we will not stroke code, but we will order AMS workup including labs, tox screen, and CT head.    Patient's CT head revealing new left thalamic each.  Cardene drip was ordered for blood pressure control.  Neurosurgery neuro critical Care were also consulted.  Patient was admitted to neuro critical care unit for further management.                                  Clinical Impression:  Final diagnoses:  [R41.0] Disorientation, unspecified  [R41.0] Confusion  [I61.9] Left-sided nontraumatic intracerebral hemorrhage, unspecified cerebral location (Primary)  [I10] Hypertension, unspecified type          ED Disposition Condition    Admit                       [1]   Social History  Tobacco Use    Smoking status: Never   Substance Use Topics    Alcohol use: Not Currently    Drug use: Never        Basia Felton DO  Resident  06/16/25 [id number]

## 2025-06-17 NOTE — HPI
49 y/o M with PMH of HTN, HLD, combined systolic and diastolic CHF, R thalamic ICH (2021), R internal capsule stroke (2023) w/ residual LSW, and T2DM presenting with disorientation and confusion for the past several days, exact time of onset unclear. He reports being unable to remember where he parked his car, difficulty with timelines, and inability to remember waking up in the mornings. CTH obtained in the ED revealed an acute L thalamic IPH. Questionable use of DAPT as ASA/Plavix listed on home med list, but patient unable to state which meds he is currently taking. Given recent disorientation/confusion and concern for possible AP usage, DDAVP administered. Also unclear if patient taking any home meds as /139 upon ED arrival, requiring initiation of Cardene. He will be admitted to [facility] for further management.

## 2025-06-17 NOTE — PT/OT/SLP EVAL
"Physical Therapy Evaluation and Discharge Note    Patient Name:  Cecil Clark   MRN:  [id number]    Co-treatment with OT performed due to patient complexity and deficits, requiring two skilled therapists to appropriately and safely assess patient's strength and endurance while facilitating functional tasks in addition to accommodating for patient's activity tolerance.    Recommendations:     Discharge Recommendations: No Therapy Indicated  Discharge Equipment Recommendations: none   Barriers to discharge: Inaccessible home and Decreased caregiver support    Assessment:     Cecil Clark is a 50 y.o. male admitted with a medical diagnosis of ICH (intracerebral hemorrhage). At this time, patient is functioning at their prior level of function from a mobility perspective and does not require further acute PT services. Patient performed all mobility and transfers with no assistance, no new acute strength, sensation, or balance deficits noted. No gait deficits observed, patient able to perform dynamic challenges (marches, stooping to ground for objects) with no assistance. Currently patient is limited by higher level cognitive impairment.   Patient is safe to ambulate/transfer with nursing (assist of 1), encouraged to sit up in chair when able.      Recent Surgery: * No surgery found *      Plan:     During this hospitalization, patient does not require further acute PT services.  Please re-consult if situation changes.      Subjective     Chief Complaint: confusion  Patient/Family Comments/goals: "I didn't expect PT to be so physical" following MMT.   Pain/Comfort:  Pain Rating 1: 0/10  Pain Rating Post-Intervention 1: 0/10    Patients cultural, spiritual, Methodist conflicts given the current situation: no    *the following information may need to be confirmed as patient was somewhat unsure of all details.   Living Environment: Patient lives with roommate in apartment (might have recently moved in), with 3-5 TED.   Prior Level of " Function: independent. Driving.  DME used: none  DME owned (not currently used): none  Assistance upon Discharge: not specified.      Objective:     Communicated with RN prior to session.  Patient found on toilet with OT with telemetry, blood pressure cuff, pulse ox (continuous) upon PT entry to room.    General Precautions: Standard, fall    Orthopedic Precautions:N/A   Braces: N/A  Respiratory Status: Room air    Exams:  Cognitive Exam:  Patient is oriented to Person, Place, and Time  Sensation:    -       Intact  RLE ROM: WNL  RLE Strength: WNL  LLE ROM: WNL  LLE Strength: WNL    Functional Mobility:  Bed Mobility: patient found on toilet with OT.   Transfers:     Sit to Stand:  stand by assistance with no AD  Gait: patient ambulated 35' with SBA no AD  Deviations Noted: none  Balance:   Sitting static: independent  Sitting dynamic: independent  Standing static: Supervision  Standing dynamic: SBA   Activity performed: marches, stooping to ground for objects     AM-PAC 6 CLICK MOBILITY  Total Score:23       Treatment and Education:  Education: patient educated on POC and discharge from therapy, safety with mobility upon return home, discharge recommendations and equipment recommendations. Patient verbalized understanding of all topics.     AM-PAC 6 CLICK MOBILITY  Total Score:23     Patient left up in chair with all lines intact, call button in reach, chair alarm on, and RN notified.    GOALS:   Multidisciplinary Problems       Physical Therapy Goals       Not on file              Multidisciplinary Problems (Resolved)          Problem: Physical Therapy    Goal Priority Disciplines Outcome Interventions   Physical Therapy Goal   (Resolved)     PT, PT/OT Met    Description: Patient evaluated and discharged, no goals or care plan created.                         History:     Past Medical History:   Diagnosis Date    Diabetes mellitus     Hypertension     R thalmaic hypertensive ICH 04/04/2021    Tachycardia 4/4/2021        Past Surgical History:   Procedure Laterality Date    ANTERIOR CRUCIATE LIGAMENT REPAIR Right     2002       Time Tracking:     PT Received On: 06/17/25  PT Start Time: 1145     PT Stop Time: 1204  PT Total Time (min): 19 min     Billable Minutes: Evaluation 19 minutes      06/17/2025

## 2025-06-17 NOTE — SUBJECTIVE & OBJECTIVE
Neurologic Chief Complaint: L thalamus ICH    Subjective:     Interval History: Patient is seen for follow-up neurological assessment and treatment recommendations: See hospital course.     HPI, Past Medical, Family, and Social History remains the same as documented in the initial encounter.     Review of Systems   Constitutional:  Positive for chills. Negative for fever.     Scheduled Meds:   atorvastatin  40 mg Oral Daily    carvediloL  3.125 mg Oral BID    mupirocin   Nasal BID    sacubitriL-valsartan  1 tablet Oral BID    senna-docusate  1 tablet Oral BID     Continuous Infusions:   nicardipine  0-15 mg/hr Intravenous Continuous 12.5 mL/hr at 06/17/25 1450 2.5 mg/hr at 06/17/25 1450     PRN Meds:  Current Facility-Administered Medications:     dextrose 50%, 12.5 g, Intravenous, PRN    glucagon (human recombinant), 1 mg, Intramuscular, PRN    hydrALAZINE, 10 mg, Intravenous, Q4H PRN    insulin aspart U-100, 0-10 Units, Subcutaneous, Q6H PRN    labetalol, 10 mg, Intravenous, Q4H PRN    ondansetron, 4 mg, Intravenous, Q8H PRN    sodium chloride 0.9%, 10 mL, Intravenous, PRN    Objective:     Vital Signs (Most Recent):  Temp: 98.3 °F (36.8 °C) (06/17/25 1105)  Pulse: 88 (06/17/25 1305)  Resp: 20 (06/17/25 1305)  BP: 124/79 (06/17/25 1305)  SpO2: 97 % (06/17/25 1305)  BP Location: Left arm    Vital Signs Range (Last 24H):  Temp:  [97.5 °F (36.4 °C)-98.3 °F (36.8 °C)]   Pulse:  []   Resp:  [6-32]   BP: (118-210)/()   SpO2:  [94 %-100 %]   BP Location: Left arm       Physical Exam  Vitals and nursing note reviewed.   Constitutional:       General: He is not in acute distress.  HENT:      Head: Normocephalic and atraumatic.      Mouth/Throat:      Mouth: Mucous membranes are dry.   Eyes:      Extraocular Movements: Extraocular movements intact.      Pupils: Pupils are equal, round, and reactive to light.   Cardiovascular:      Rate and Rhythm: Normal rate.   Pulmonary:      Effort: Pulmonary effort is  "normal. No respiratory distress.   Abdominal:      Tenderness: There is no guarding.   Skin:     General: Skin is warm and dry.   Neurological:      Mental Status: He is alert and oriented to person, place, and time.      Cranial Nerves: No cranial nerve deficit.      Sensory: No sensory deficit.      Motor: Weakness present.   Psychiatric:         Mood and Affect: Mood normal.         Behavior: Behavior normal.              Neurological Exam:   LOC: alert  Attention Span: Good   Language: No aphasia  Articulation: No dysarthria  Orientation: Person, Place, Time   Visual Fields: Full  EOM (CN III, IV, VI): Full/intact  Pupils (CN II, III): PERRL  Facial Sensation (CN V): Normal  Facial Movement (CN VII): Symmetric facial expression    Motor: Arm left  Paresis: 4/5  Leg left  Paresis: 4/5  Arm right  Normal 5/5  Leg right Normal 5/5  Cerebellum: Upper Extremity Appendicular Ataxia (Finger Nose Finger)  Left  Sensation: Intact to light touch, temperature and vibration    Laboratory:  CMP:   Recent Labs   Lab 06/17/25 0210 06/17/25  0849   CALCIUM 8.0*  --    ALBUMIN 2.9*  --    PROT 5.5*  --      --    K 2.7* 3.9   CO2 25  --      --    BUN 13  --    CREATININE 1.5*  --    ALKPHOS 52  --    ALT 14  --    AST 23  --    BILITOT 1.2*  --      BMP:   Recent Labs   Lab 06/17/25 0210 06/17/25  0849     --    K 2.7* 3.9     --    CO2 25  --    BUN 13  --    CREATININE 1.5*  --    CALCIUM 8.0*  --      CBC:   Recent Labs   Lab 06/17/25 0210   WBC 9.58   RBC 4.87   HGB 14.4   HCT 42.5      MCV 87   MCH 29.6   MCHC 33.9     Lipid Panel:   Recent Labs   Lab 06/16/25 1859   CHOL 248*   LDLCALC 148.8   HDL 60   TRIG 196*     Coagulation:   Recent Labs   Lab 06/16/25  2019   INR 1.0   APTT 27.3     Platelet Aggregation Study: No results for input(s): "PLTAGG", "PLTAGINTERP", "PLTAGREGLACO", "ADPPLTAGGREG" in the last 168 hours.  Hgb A1C:   Recent Labs   Lab 06/16/25 1859   HGBA1C 8.8*     TSH: "   Recent Labs   Lab 06/16/25  1859   TSH 1.616       Diagnostic Results     Brain Imaging/Vessel Imaging     MRA/MRI Brain WO - 6/17/2025    Impression:     MRI brain: Evolving recent to subacute aged left thalamic parenchymal hemorrhage.  Please note evaluation for underlying mass lesion limited by noncontrast technique.  Clinical correlation and follow-up recommended.     Patchy and confluent T2 FLAIR signal abnormality supratentorial white matter elsewhere while nonspecific concerning for underlying chronic ischemic change     Ventricles stable without hydrocephalus     Remote hemorrhage right basal ganglia/thalamus with ipsilateral wallerian degeneration.     MRA head: Unremarkable MRA head as detailed above specifically without focal stenosis or proximal large vessel occlusion.     Please note there is no hypertrophied vasculature associated with left thalamic hemorrhage although only partially included in the field of view.  Further evaluation with repeat MRA imaging to include the entirety of the thalamic hemorrhage advised.    CT WO - 6/17/2025    Impression:     Stable CT appearance of the small acute parenchymal hematoma centered in the left thalamus.  No new intracranial hemorrhage.     Ventricles are stable in size.     Recommendations include clinical correlation and continued imaging follow-up.    CT WO - 6/16/2025    Impression:     1. Small acute parenchymal hemorrhage centered in the left thalamus, likely hypertensive in etiology.  There is local mass effect on the adjacent left lateral ventricle without significant midline shift.  2. Mild prominence of the lateral ventricles when compared to prior.  This may relate to interval volume loss, however early acute hydrocephalus may present similarly.  Recommend attention on short-term follow-up.  3. Chronic microvascular ischemic change and sequela of remote hemorrhage, as detailed above.  Right caudate suspected remote tiny lacunar type infarct but  new from 10/25/2023 imaging.    Cardiac Imaging     TTE - 6/17/2025  Summary      Left Ventricle: The left ventricle is normal in size. Mildly increased ventricular mass. Mildly increased wall thickness. There is mild concentric hypertrophy. Mild global hypokinesis present. There is moderately reduced systolic function with a visually estimated ejection fraction of 35 - 40%. Quantitated ejection fraction is 32%. Global longitudinal strain is reduced moderately reduced measuring -10.8% There is diastolic dysfunction but grade cannot be determined.    Right Ventricle: The right ventricle is normal in size Systolic function is normal.    Left Atrium: The left atrium is mildly dilated    Mitral Valve: There is mild regurgitation.    IVC/SVC: Normal venous pressure at 3 mmHg.

## 2025-06-17 NOTE — NURSING
Pt arrived back to room following CT        Pt was escorted by RN and PCT on cardiac monitoring.        Patient placed back on bedside monitor with alarms audible, bed in low position with bed alarm on, call light within reach. MARGY.

## 2025-06-17 NOTE — EICU
Virtual ICU Quality Rounds    Admit Date: 6/16/2025  Hospital Day: 1    ICU Day: 12h    24H Vital Sign Range:  Temp:  [97.5 °F (36.4 °C)-98.3 °F (36.8 °C)]   Pulse:  []   Resp:  [6-32]   BP: (118-210)/()   SpO2:  [94 %-100 %]     VICU Surveillance Screening                    LDA reconciliation : Yes    Nursing orders placed : IP IVELISSE Peripheral IV Access

## 2025-06-17 NOTE — CONSULTS
[facility]  Adult Nutrition  Consult Note    SUMMARY     Recommendations  Continue Consistent carbohydrate diet  Nursing, continue documenting PO intake via flowsheets  Monitor labs, meds, weight, skin    Goals: meet % een/epn by next RD f/u  Nutrition Goal Status: new  Communication of RD Recs: other (comment) (poc)    Nutrition Discharge Planning     Nutrition Discharge Planning: Therapeutic diet (comments)  Therapeutic diet (comments): Carb controlled diet      Malnutrition Assessment     Skin (Micronutrient): none  Nails (Micronutrient): none  Hair/Scalp (Micronutrient): none  Eyes (Micronutrient): none  Extraoral (Micronutrient): none  Gums (Micronutrient): none  Teeth (Micronutrient): none  Tongue (Micronutrient): none  Neck/Chest (Micronutrient): none  Musculoskeletal/Lower Extremities: none       Energy Intake (Malnutrition): less than or equal to 50% for greater than or equal to 1 month   Orbital Region (Subcutaneous Fat Loss): well nourished  Upper Arm Region (Subcutaneous Fat Loss): well nourished   [place] Region (Muscle Loss): well nourished  Clavicle Bone Region (Muscle Loss): well nourished  Clavicle and Acromion Bone Region (Muscle Loss): well nourished  Scapular Bone Region (Muscle Loss): well nourished  Dorsal Hand (Muscle Loss): well nourished  Patellar Region (Muscle Loss): well nourished  Anterior Thigh Region (Muscle Loss): well nourished  Posterior Calf Region (Muscle Loss): well nourished                 Reason for Assessment    Reason For Assessment: consult  Diagnosis: hemorrhage (intracerebral hemorrhage)  General Information Comments: 49 y/o M with PMH of HTN, HLD, combined systolic and diastolic CHF, R thalamic ICH (2021), 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 RD consulted to assess dietary needs. No significant wounds noted. 9/6.5% wt loss x 3 months. RD endorses fair appetite and 50% of meals. Denies NV/D/C. Endorses poor appetite for  "nearly 1 month. NFPE completed w/ limited depletion noted. RD to f/u and monitor.    Nutrition/Diet History    Nutrition Intake History: poor intake x 1 month  Spiritual, Cultural Beliefs, Islam Practices, Values that Affect Care: no  Food Allergies: shellfish  Nutrition-related SDOH: Adequate food in home environment    Anthropometrics    Height: 5' 2" (157.5 cm)  Height (inches): 62 in  Height Method: Stated  Weight: 58.1 kg (128 lb)  Weight (lb): 128 lb  Weight Method: Bed Scale  Ideal Body Weight (IBW), Male: 118 lb  % Ideal Body Weight, Male (lb): 108.47 %  BMI (Calculated): 23.4  BMI Grade: 18.5-24.9 - normal       Lab/Procedures/Meds    Pertinent Labs Reviewed: reviewed  Pertinent Labs Comments: BUN 1.5 (H), GFR 56 (L), Glu 129 (H)  Pertinent Medications Reviewed: reviewed  Pertinent Medications Comments: Atorvastatin, carvedilol, bowl reg    Estimated/Assessed Needs    Weight Used For Calorie Calculations: 58.1 kg (128 lb 1.4 oz)  Energy Calorie Requirements (kcal): 1572 (msj * 1.2 PAL)  Energy Need Method: [facility]  Protein Requirements: 58-69 (1-1.2 g/kg)  Weight Used For Protein Calculations: 58.1 kg (128 lb 1.4 oz)     Estimated Fluid Requirement Method: RDA Method  RDA Method (mL): 1572  CHO Requirement: 176-216 (45-55%)      Nutrition Prescription Ordered    Current Diet Order: consistent carbohydrate diet    Evaluation of Received Nutrient/Fluid Intake    I/O: -  Comments: LBM 6/17 (liquid)  % Intake of Estimated Energy Needs: 25 - 50 %  % Meal Intake: 25 - 50 %    PES Statement  Inadequate protein energy intake related to Acute illness as evidenced by Other (comment), Intake <75% estimated needs (poor appetite)  Status: New    Nutrition Risk    Level of Risk/Frequency of Follow-up: moderate (f/u 1-2x/week)       Monitor and Evaluation    Monitor and Evaluation: Food and beverage intake, Diet order, Weight, Electrolyte and renal panel, Gastrointestinal profile, Glucose/endocrine profile, " Inflammatory profile, Lipid profile       Nutrition Follow-Up    RD Follow-up?: Yes

## 2025-06-17 NOTE — ASSESSMENT & PLAN NOTE
Health Maintenance Due   Topic Date Due   • COVID-19 Vaccine (1) Never done       Patient is due for the topics as listed above                 Clinic hours for [facility]:  Monday 7 am - 5 pm  Tuesday 7 am - 5 pm  Wednesday 7 am - 5 pm  Thursday 7 am - 5 pm  Friday 7 am - 4 pm      If you need a refill on your prescription please call your pharmacy and let them know. Please be proactive and call before your medication runs out. The pharmacy will then contact us for the refill. Please allow 24-48 hours for the refill to be processed.     If your physician has ordered additional laboratory or radiology testing as part of your ongoing plan of care, please allow 7-10 business days from the day of your lab draw or test for the results to be sent and reviewed by your provider. If your results are critical and require more immediate intervention, you will be contacted sooner. Your results will be conveyed to you via a phone call or letter.    You may be receiving a patient satisfaction survey in the mail. Please take the time to complete, as your feedback is very important to us. We strive to make your experience exceptional and your comments help us with that goal. We look forward to hearing from you.       Replacement per [facility] protocol.   Normalized post repletion

## 2025-06-17 NOTE — PLAN OF CARE
Problem: SLP  Goal: SLP Goal  Description: Speech Language Pathology Goals  Goals expected to be met by 7/1  1.Pt will Ox4 utilizing external aids IND.   2. Pt will demonstrate understanding of external/internal memory strategies given min cues.   3. Pt will complete assessment of higher level word finding to determine need for tx.   4. Pt will complete mod complex reasoning/problem solving tasks with 90% accy given min cues.   5. Pt will complete assessment of reading, writing, and visual spatial skills to determine need for tx.    Outcome: Progressing     Evaluation completed. Rec regular diet with thin liquids with strict aspiration precautions.

## 2025-06-17 NOTE — SUBJECTIVE & OBJECTIVE
Prescriptions Prior to Admission[1]    Review of patient's allergies indicates:   Allergen Reactions    Aspartame Nausea And Vomiting     Violent emesis.    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    Objective:     Weight: 65.8 kg (145 lb)  Body mass index is 26.52 kg/m².  Vital Signs (Most Recent):  Temp: 98.2 °F (36.8 °C) (06/16/25 2015)  Pulse: 110 (06/16/25 2047)  Resp: 20 (06/16/25 2047)  BP: (!) 164/107 (06/16/25 2047)  SpO2: 100 % (06/16/25 2047) Vital Signs (24h Range):  Temp:  [98.1 °F (36.7 °C)-98.2 °F (36.8 °C)] 98.2 °F (36.8 °C)  Pulse:  [108-119] 110  Resp:  [12-29] 20  SpO2:  [96 %-100 %] 100 %  BP: (164-210)/(102-141) 164/107                                 Physical Exam     GENERAL: resting comfortably  HEENT: NCAT, PERRL, mucous membranes moist  NECK: supple, trachea midline  CV: normal capillary refill  PULM: aerating well, symmetric expansion, no distress  ABD: soft, NT, ND  EXT: no c/c/e    NEURO:    AAO x 3  CN II-XII grossly intact except mild L FD  Fc x 4 antigravity  SILT    Mild LUE drift      Neurosurgery Physical Exam    Significant Labs:  Recent Labs   Lab 06/16/25  1859   *      K 3.3*      CO2 28   BUN 14   CREATININE 1.4   CALCIUM 8.8   MG 1.8     Recent Labs  "  Lab 06/16/25  1859   WBC 6.50   HGB 17.4   HCT 50.6        No results for input(s): "LABPT", "INR", "APTT" in the last 48 hours.  Microbiology Results (last 7 days)       ** No results found for the last 168 hours. **          All pertinent labs from the last 24 hours have been reviewed.    Significant Diagnostics:  I have reviewed all pertinent imaging results/findings within the past 24 hours.  I have reviewed and interpreted all pertinent imaging results/findings within the past 24 hours.  CT Head Without Contrast  Result Date: 6/16/2025  1. Small acute parenchymal hemorrhage centered in the left thalamus, likely hypertensive in etiology.  There is local mass effect on the adjacent left lateral ventricle without significant midline shift. 2. Mild prominence of the lateral ventricles when compared to prior.  This may relate to interval volume loss, however early acute hydrocephalus may present similarly.  Recommend attention on short-term follow-up. 3. Chronic microvascular ischemic change and sequela of remote hemorrhage, as detailed above.  Right caudate suspected remote tiny lacunar type infarct but new from 10/25/2023 imaging. This report was flagged in Epic as abnormal. These findings were discovered at 20:00 on 06/16/2025 and relayed to Basia Felton MD via telephone by Sam Gray MD at 20:00 on 06/16/2025. Electronically signed by resident: Sam Gray Date:    06/16/2025 Time:    20:02 Electronically signed by: Aramis Hernandez MD Date:    06/16/2025 Time:    20:32           [1] (Not in a hospital admission)    "

## 2025-06-17 NOTE — PLAN OF CARE
[facility]  Initial Discharge Assessment       Primary Care Provider: No, Primary Doctor    Admission Diagnosis: Confusion [R41.0]  ICH (intracerebral hemorrhage) [I61.9]  Disorientation, unspecified [R41.0]  Left-sided nontraumatic intracerebral hemorrhage, unspecified cerebral location [I61.9]  Hypertension, unspecified type [I10]    Admission Date: 6/16/2025  Expected Discharge Date:     Transition of Care Barriers: Other (see comments)    Payor: MEDICAID / Plan: [employer] [facility] / Product Type: Managed Medicaid /     Extended Emergency Contact Information  Primary Emergency Contact: Luz ElenaKacie  Mobile Phone: [phone]  Relation: Friend  Secondary Emergency Contact: Efe Chaidez  Mobile Phone: [phone]  Relation: Sister    Discharge Plan A: Home  Discharge Plan B: Home      [id number] [place] - [address]  AT [facility] [address]  [address] [place] [address]  Phone: [phone] Fax: [phone]      Initial Assessment (most recent)       Adult Discharge Assessment - 06/17/25 1530          Discharge Assessment    Assessment Type Discharge Planning Assessment     Confirmed/corrected address, phone number and insurance Yes     Confirmed Demographics Correct on Facesheet     Source of Information family     Communicated SUZANNE with patient/caregiver Yes     People in Home friend(s)     Do you expect to return to your current living situation? Yes     Do you have help at home or someone to help you manage your care at home? No     Prior to hospitilization cognitive status: Unable to Assess     Current cognitive status: Unable to Assess     Walking or Climbing Stairs Difficulty no     Dressing/Bathing Difficulty no     Home Layout Able to live on 1st floor     Equipment Currently Used at Home none     Readmission within 30 days? No     Patient currently being followed by outpatient case management? No     Do you currently  have service(s) that help you manage your care at home? No     Do you take prescription medications? Yes     Do you have prescription coverage? Yes     Do you have any problems affording any of your prescribed medications? No     Is the patient taking medications as prescribed? no     If no, which medications is patient not taking? Blood pressure medication     Who is going to help you get home at discharge? Kacie hamm     How do you get to doctors appointments? car, drives self     Are you on dialysis? No     Do you take coumadin? No     Discharge Plan A Home     Discharge Plan B Home     DME Needed Upon Discharge  none     Discharge Plan discussed with: Sibling     Name(s) and Number(s) Efe Chaidez      Transition of Care Barriers Other (see comments)        Physical Activity    On average, how many days per week do you engage in moderate to strenuous exercise (like a brisk walk)? 0 days     On average, how many minutes do you engage in exercise at this level? 0 min        Financial Resource Strain    How hard is it for you to pay for the very basics like food, housing, medical care, and heating? Not very hard        Housing Stability    In the last 12 months, was there a time when you were not able to pay the mortgage or rent on time? No     At any time in the past 12 months, were you homeless or living in a shelter (including now)? No        Transportation Needs    In the past 12 months, has lack of transportation kept you from medical appointments or from getting medications? No     In the past 12 months, has lack of transportation kept you from meetings, work, or from getting things needed for daily living? No        Food Insecurity    Within the past 12 months, you worried that your food would run out before you got the money to buy more. Never true     Within the past 12 months, the food you bought just didn't last and you didn't have money to get more. Never true        Stress    Do  you feel stress - tense, restless, nervous, or anxious, or unable to sleep at night because your mind is troubled all the time - these days? Patient unable to answer        Social Isolation    How often do you feel lonely or isolated from those around you?  Patient unable to answer        Alcohol Use    Q1: How often do you have a drink containing alcohol? Never     Q2: How many drinks containing alcohol do you have on a typical day when you are drinking? Patient does not drink     Q3: How often do you have six or more drinks on one occasion? Never        Utilities    In the past 12 months has the electric, gas, oil, or water company threatened to shut off services in your home? No        Health Literacy    How often do you need to have someone help you when you read instructions, pamphlets, or other written material from your doctor or pharmacy? Rarely                   The SW spoke with pt sister Efe Chaidez  via phone. Pt sister indicates that she has concerns with pt mental health due to his last episode of a stroke.   Pt sister indicates that they do  not know where his car , phone, glasses are , and how he got to the [employer] store [facility].   Pt reports that pt mother has dementia, he loss his job a least a year ago, father passed away and  lost the family home.  Pt currently lives with roommates and sisters pays for the rent and his needs from money from the mother.   Pt sister would like pt to be assessed mental because she is unsure if the deficits is due to the stroke.       The SW placed name and contact information on the blackboard in the patient's room. Use preferred pharmacy / bedside delivery for any necessary medications at the time of discharge. The patient is independent with all ADLs. The patient is not on Dialysis or Coumadin. The Patient does not use DME or home oxygen, The patient's best friend mother Kacie Laguna  [phone] will provide assistance to the patient upon  discharge. The patient's best friend mother  will provide transportation upon discharge. SW will continue to follow for course of hospitalization.        Discharge Plan A and Plan B have been determined by review of patient's clinical status, future medical and therapeutic needs, and coverage/benefits for post-acute care in coordination with multidisciplinary team members.    Alice Duron MSW, KARENAW  [facility]  Case Management Dept.

## 2025-06-17 NOTE — HPI
50M past history of R thalamic stroke 4 years ago presenting with disorientation for several days and imaging revealing L thalamic ICH (ICHS 0). He is unable to recall why he was brought to the ED. He is at his neuro baseline per himself, residual LSW from prior stroke.

## 2025-06-17 NOTE — PT/OT/SLP EVAL
"Occupational Therapy  Co- Evaluation and Treatment    Name: Cecil Clark  MRN: [id number]  Admitting Diagnosis: ICH (intracerebral hemorrhage)  Recent Surgery: * No surgery found *      Recommendations:     Discharge Recommendations: Moderate Intensity Therapy  Discharge Equipment Recommendations:  none  Barriers to discharge:  None    Assessment:     Cecil Clark is a 50 y.o. male with a medical diagnosis of ICH (intracerebral hemorrhage).  He presents with decrease functional status secondary to medical diagnosis. Performance deficits affecting function: impaired self care skills, decreased safety awareness, impaired cognition. Patient with fair tolerance to OT session limited by AMS. When taking patient PLOF patient stating "I don't know I can't remember" to questions regarding prior mobility status, ADL completion, date, and home living environment. Patient stating this confusion is what brought him to hospital. In addition, patient looking out hospital window stating he does not recognize the city.  Patient observed with LUE tremors and slight weakness which patient is able to report is baseline. Patient scoring 10/15 on BIMS cognitive assessment indicating moderate impairment. In addition to short term/long term memory deficits patient with noted increased impulsivity at this christina. Patient is therefore appropriate for acute OT services to increase patient self care performance and functional mobility. Following DC from [facility] patient should continue with a moderate intensity therapy to ensure patient safety and promote return to independence.       Rehab Prognosis: Good; patient would benefit from acute skilled OT services to address these deficits and reach maximum level of function.       Plan:     Patient to be seen 4 x/week to address the above listed problems via self-care/home management, therapeutic activities, therapeutic exercises, neuromuscular re-education  Plan of Care Expires: 07/17/25  Plan of Care Reviewed " "with: patient    Subjective     Chief Complaint: AMS   Patient/Family Comments/goals: DC    Occupational Profile:  Living Environment: Patient stating he believes he lives in single story Apartment with 3-5 straits to enter. Patient unable to recall other details regarding home.   Previous level of function: Patient believes he was independent- states he is unsure   Roles and Routines: unknown   Equipment Used at Home: none  Assistance upon Discharge: none- patient unsure     Pain/Comfort:  Pain Rating 1: 0/10    Patients cultural, spiritual, Christianity conflicts given the current situation: no    Objective:     Communicated with: RN prior to session.  Patient found supine with blood pressure cuff, telemetry, pulse ox (continuous) upon OT entry to room.    General Precautions: Standard, fall, aspiration  Orthopedic Precautions: N/A  Braces: N/A  Respiratory Status: Room air    Occupational Performance:    Bed Mobility:    Patient completed Scooting/Bridging with minimum assistance  Patient completed Supine to Sit with minimum assistance    Functional Mobility/Transfers:  Patient completed Sit <> Stand Transfer with contact guard assistance  with  no assistive device from bed  Standby assistance from toilet   Functional Mobility: patient ambulated 35' with SBA and no AD     Activities of Daily Living:  Grooming: stand by assistance for hand hygiene at sink   Toileting: stand by assistance at toilet for completion of BM     Cognitive/Visual Perceptual:  Cognitive/Psychosocial Skills:     -       Oriented to: Person   -       Follows Commands/attention:Follows one-step commands  -       Communication: clear/fluent  -       Safety awareness/insight to disability: impaired   Visual/Perceptual:      -Intact      Brief Interview of Mental Status (BIMS):   Repetition of 3 words: "Sock, Blue, Bed": 3/3  3- Three  Temporal Orientation: 4/6         Able to report correct year: 3- Correct  Able to report correct month: 1- " "Missed by 6 days to 1 month   Able to report correct day of the week: 0- Incorrect or no answer   Recall: 3/6  Able to recall "Sock": 2- Yes, no cue required  Able to recall "Blue": 1- Yes, after cueing "a color"  Able to recall "Bed": 0- No, could not recall  Summary score: 10/15  13-15: Cognitively Intact  8-12: Moderately Impaired   0-7: Severe Impairment     Physical Exam:  Sensation:    -       Intact  Upper Extremity Range of Motion:     -       Right Upper Extremity: WFL  -       Left Upper Extremity: WFL  Upper Extremity Strength:    -       Right Upper Extremity: WFL  -       Left Upper Extremity: WFL   Strength:    -       Right Upper Extremity: WFL  -       Left Upper Extremity: WFL  Fine Motor Coordination:    -       Intact    AMPAC 6 Click ADL:  AMPAC Total Score: 24    Treatment & Education:  Co-evaluation completed due to patient medical instability and to ensure patient safety. Provided education regarding role of OT, POC, & discharge recommendations.  Pt with no further questions/concerns at this time. OT provided education on home recommendations and fall prevention in preparation for D/C.     Patient left supine with all lines intact and call button in reach    GOALS:   Multidisciplinary Problems       Occupational Therapy Goals          Problem: Occupational Therapy    Goal Priority Disciplines Outcome Interventions   Occupational Therapy Goal     OT, PT/OT Progressing    Description: Goals to be met by: 7/17/25     Patient will increase functional independence with ADLs by performing:    UE Dressing with [place].  LE Dressing with [place].  Grooming while standing with [place].  Toileting from toilet with [place] for hygiene and clothing management.   Supine to sit with [place].  Toilet transfer to toilet with [place].                         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

## 2025-06-17 NOTE — ASSESSMENT & PLAN NOTE
ICH risk factor.   Admission SBP 180s-210s.  Started on cardene gtt in the ED, was off for a couple of hours but required drip back on today for SBP goal<140  Home Entresto and coreg resumed today

## 2025-06-17 NOTE — ASSESSMENT & PLAN NOTE
50M past history of R thalamic stroke 4 years ago presenting with disorientation for several days and imaging revealing L thalamic ICH (ICHS 0). There is no hydrocephalus. Etiology likely hypertensive    Plan  No neurosurgical intervention  -150  NSGY signing off

## 2025-06-17 NOTE — PLAN OF CARE
Problem: Occupational Therapy  Goal: Occupational Therapy Goal  Description: Goals to be met by: 7/17/25     Patient will increase functional independence with ADLs by performing:    UE Dressing with [place].  LE Dressing with [place].  Grooming while standing with [place].  Toileting from toilet with [place] for hygiene and clothing management.   Supine to sit with [place].  Toilet transfer to toilet with [place].    Outcome: Progressing

## 2025-06-17 NOTE — SUBJECTIVE & OBJECTIVE
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    Shellfish containing products Shortness Of Breath     Has received iodinated contrast in the past with no reaction       Medications: I have reviewed the current medication administration record.    Prescriptions Prior to Admission[2]    Review of Systems   Constitutional:  Negative for activity change.   Neurological:  Positive for tremors and weakness (Residual LSW.). Negative for speech difficulty and headaches.   Psychiatric/Behavioral:  Negative for confusion and decreased concentration.      Objective:     Vital Signs (Most Recent):  Temp: 98.1 °F (36.7 °C) (06/16/25 2230)  Pulse: (!) 115 (06/16/25 2232)  Resp: 20 (06/16/25 2232)  BP: 134/86 (06/16/25 2230)  SpO2: 97 % (06/16/25 2232)    Vital Signs Range (Last 24H):  Temp:  [97.6 °F (36.4 °C)-98.2 °F (36.8 °C)]   Pulse:  []   Resp:  [12-32]   BP: (127-210)/()   SpO2:  [95 %-100 %]        Physical Exam  Vitals reviewed.   Musculoskeletal:      Right lower leg: No edema.      Left lower leg: No edema.   Neurological:      Mental Status: He is alert.      Comments: See Neurological Exam and NIHSS.               Neurological Exam:   LOC: alert  Attention Span: Good   Language: No aphasia  Articulation: No dysarthria  Orientation: Person, Place, Time   Visual Fields: Full  EOM (CN III, IV, VI): Full/intact  Pupils (CN II, III): PERRL  Facial Sensation (CN V): Normal  Facial Movement (CN VII): Symmetric facial expression    Motor: Arm left  Paresis: 4/5  Leg left  Paresis: 4/5  Arm right  Normal 5/5  Leg right Normal 5/5  Cerebellum: Upper Extremity Appendicular  Ataxia (Finger Nose Finger)  Left  Sensation: Intact to light touch, temperature and vibration  Tone: Normal tone throughout      Laboratory:  CMP:   Recent Labs   Lab 06/16/25  1859   CALCIUM 8.8   ALBUMIN 3.5   PROT 6.9      K 3.3*   CO2 28      BUN 14   CREATININE 1.4   ALKPHOS 69   ALT 17   AST 27   BILITOT 1.6*     CBC:   Recent Labs   Lab 06/16/25  1859   WBC 6.50   RBC 5.74   HGB 17.4   HCT 50.6      MCV 88   MCH 30.3   MCHC 34.4       Diagnostic Results:    Brain imaging:  [facility] 6/16/25  New small left thalamic parenchymal hemorrhage with mass effect on the left lateral ventricle without MLS. No hydrocephalus.          [1]   Social History  Tobacco Use    Smoking status: Never   Substance Use Topics    Alcohol use: Not Currently    Drug use: Never   [2]   Medications Prior to Admission   Medication Sig Dispense Refill Last Dose/Taking    aspirin (ECOTRIN) 81 MG EC tablet Take 1 tablet (81 mg total) by mouth once daily. 90 tablet 0     atorvastatin (LIPITOR) 40 MG tablet Take 1 tablet (40 mg total) by mouth once daily. 30 tablet 2     blood sugar diagnostic Strp Use to check blood glucose 5 (five) times daily. 100 strip 1     blood-glucose meter Misc Use to check blood glucose 5 times daily 1 each 0     carvediloL (COREG) 3.125 MG tablet Take 1 tablet (3.125 mg total) by mouth 2 (two) times daily. 180 tablet 0     clopidogreL (PLAVIX) 75 mg tablet Take 1 tablet (75 mg total) by mouth once daily. 30 tablet 0     furosemide (LASIX) 40 MG tablet Take 1 tablet (40 mg total) by mouth 2 (two) times daily. 60 tablet 2     insulin aspart U-100 (NOVOLOG) 100 unit/mL (3 mL) InPn pen Inject 1-10 Units before meals and at bedtime for Hyperglycemia. Per sliding scale:Blood Glucose: 151-200    201-250   251-300   301-350  >350 Pre-meal:          2 units      4 units      6 units     8 units     10 units  10 pm:              1 units      2 units      3 units     4 units      5 units 12 mL 0     lancets  "(TRUEPLUS LANCETS) 30 gauge Misc Use to check blood glucose 5 (five) times daily. 100 each 0     pen needle, diabetic 32 gauge x 5/32" Ndle Use for insulin adminstration up to 5 times daily 200 each 0     pulse oximeter (PULSE OXIMETER) device by Apply Externally route 2 (two) times a day. Use twice daily at 8 AM and 3 PM and record the value in [facility] as directed. 1 each 0     sacubitriL-valsartan (ENTRESTO) 24-26 mg per tablet Take 1 tablet by mouth 2 (two) times daily. 180 tablet 0      "

## 2025-06-17 NOTE — SUBJECTIVE & OBJECTIVE
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    Cardiovascular:  Negative for chest pain.   Gastrointestinal:  Negative for abdominal pain, nausea and vomiting.   Neurological:  Positive for weakness (LUE; baseline). Negative for dizziness, speech difficulty and numbness.   Psychiatric/Behavioral:  Positive for confusion.      Objective:     Vitals:    Temp: 98.2 °F (36.8 °C)  Pulse: 108  BP: (!) 171/107  MAP (mmHg): 133  Resp: 20  SpO2: 97 %    Temp  Min: 98.1 °F (36.7 °C)  Max: 98.2 °F (36.8 °C)  Pulse  Min: 106  Max: 119  BP  Min: 164/107  Max: 210/141  MAP (mmHg)  Min: 126  Max: 169  Resp  Min: 12  Max: 29  SpO2  Min: 96 %  Max: 100 %    No intake/output data recorded.            Physical Exam  Vitals and nursing note reviewed.   Constitutional:       General: He is not in acute distress.     Appearance: Normal appearance. He is not ill-appearing.   HENT:      Head: Normocephalic and atraumatic.   Eyes:      Extraocular Movements: Extraocular movements intact.      Pupils: Pupils are equal, round, and reactive to light.   Cardiovascular:      Rate and Rhythm: Normal rate and regular rhythm.      Pulses: Normal pulses.      Comments: Trace BLE edema  Pulmonary:      Effort: Pulmonary effort is normal.   Abdominal:      General: There is no distension.      Palpations: Abdomen is soft.      Tenderness: There is no abdominal tenderness.   Musculoskeletal:          General: Normal range of motion.      Cervical back: Normal range of motion.   Skin:     General: Skin is warm and dry.      Capillary Refill: Capillary refill takes less than 2 seconds.   Neurological:      Mental Status: He is alert. He is disoriented.      GCS: GCS eye subscore is 4. GCS verbal subscore is 4. GCS motor subscore is 6.      Cranial Nerves: Cranial nerves 2-12 are intact.      Sensory: Sensation is intact.      Motor: Weakness (LUE; baseline) and tremor present.      Coordination: Coordination is intact.      Comments:   -E4 V4 M6  -PERRL  -Oriented to person, place, situation; able to state year, but disoriented to month  -Follows commands w/ all extremities  -RUE 5/5  -LUE 4/5  -LLE 5/5  -RLE 5/5  -SILT  -Gait deferred            Today I personally reviewed pertinent medications, lines/drains/airways, imaging, cardiology results, laboratory results, notably:    Results for orders placed or performed during the hospital encounter of 06/16/25   CT Head Without Contrast    Narrative    EXAMINATION:  CT HEAD WITHOUT CONTRAST    CLINICAL HISTORY:  Mental status change, unknown cause    TECHNIQUE:  Low dose axial CT images obtained throughout the head without the use of intravenous contrast.  Axial, sagittal and coronal reconstructions were performed.    COMPARISON:  CTA stroke multiphase 10/25/2023, MR brain 10/25/2023    FINDINGS:  Intracranial compartment:    Mild prominence of the lateral ventricles when compared to CTA stroke multiphase 10/25/2023.  Third and 4th ventricles are normal size.    High density material in the left thalamus with adjacent hypoattenuation, new from prior, most likely reflects a small parenchymal hemorrhage with surrounding vasogenic edema.  Local mass effect with effacement of the adjacent lateral ventricle is noted without significant midline shift.  Small hypodensity in the right thalamus, unchanged, likely relates to sequela of prior hemorrhage is better detailed on  prior MRI.  Patchy hypoattenuation throughout the supratentorial white matter, nonspecific and similar to prior but may reflect sequela of chronic microvascular ischemic change.  Remote appearing tiny lacunar type infarct at the right caudate, but appears new from 10/25/2023 MRI brain and CTA stroke multiphase studies.  No major vascular distribution infarct.    No extra-axial blood or fluid collections.    Skull/extracranial contents (limited evaluation):    No displaced calvarial fracture.    Mastoid air cells are clear.  Patchy mucosal thickening of the ethmoid air cells, similar to prior.  Imaged portions of the orbits are within normal limits.      Impression    1. Small acute parenchymal hemorrhage centered in the left thalamus, likely hypertensive in etiology.  There is local mass effect on the adjacent left lateral ventricle without significant midline shift.  2. Mild prominence of the lateral ventricles when compared to prior.  This may relate to interval volume loss, however early acute hydrocephalus may present similarly.  Recommend attention on short-term follow-up.  3. Chronic microvascular ischemic change and sequela of remote hemorrhage, as detailed above.  Right caudate suspected remote tiny lacunar type infarct but new from 10/25/2023 imaging.  This report was flagged in Epic as abnormal.    These findings were discovered at 20:00 on 06/16/2025 and relayed to Basia Felton MD via telephone by Sam Gray MD at 20:00 on 06/16/2025.    Electronically signed by resident: Sam Gray  Date:    06/16/2025  Time:    20:02    Electronically signed by: Aramis Hernandez MD  Date:    06/16/2025  Time:    20:32     CMP  Sodium   Date Value Ref Range Status   06/16/2025 141 136 - 145 mmol/L Final   03/14/2025 135 (L) 136 - 145 mmol/L Final     Potassium   Date Value Ref Range Status   06/16/2025 3.3 (L) 3.5 - 5.1 mmol/L Final   03/14/2025 4.1 3.5 - 5.1 mmol/L Final     Chloride   Date Value Ref Range Status    06/16/2025 101 95 - 110 mmol/L Final   03/14/2025 101 95 - 110 mmol/L Final     CO2   Date Value Ref Range Status   06/16/2025 28 23 - 29 mmol/L Final   03/14/2025 22 (L) 23 - 29 mmol/L Final     Glucose   Date Value Ref Range Status   06/16/2025 142 (H) 70 - 110 mg/dL Final   03/14/2025 241 (H) 70 - 110 mg/dL Final     BUN   Date Value Ref Range Status   06/16/2025 14 6 - 20 mg/dL Final     Creatinine   Date Value Ref Range Status   06/16/2025 1.4 0.5 - 1.4 mg/dL Final     Calcium   Date Value Ref Range Status   06/16/2025 8.8 8.7 - 10.5 mg/dL Final   03/14/2025 8.3 (L) 8.7 - 10.5 mg/dL Final     Protein Total   Date Value Ref Range Status   06/16/2025 6.9 6.0 - 8.4 gm/dL Final     Total Protein   Date Value Ref Range Status   03/14/2025 5.9 (L) 6.0 - 8.4 g/dL Final     Albumin   Date Value Ref Range Status   06/16/2025 3.5 3.5 - 5.2 g/dL Final   03/14/2025 2.4 (L) 3.5 - 5.2 g/dL Final     Total Bilirubin   Date Value Ref Range Status   03/14/2025 0.4 0.1 - 1.0 mg/dL Final     Comment:     For infants and newborns, interpretation of results should be based  on gestational age, weight and in agreement with clinical  observations.    Premature Infant recommended reference ranges:  Up to 24 hours.............<8.0 mg/dL  Up to 48 hours............<12.0 mg/dL  3-5 days..................<15.0 mg/dL  6-29 days.................<15.0 mg/dL       Bilirubin Total   Date Value Ref Range Status   06/16/2025 1.6 (H) 0.1 - 1.0 mg/dL Final     Comment:     For infants and newborns, interpretation of results should be based   on gestational age, weight and in agreement with clinical   observations.    Premature Infant recommended reference ranges:   0-24 hours:  <8.0 mg/dL   24-48 hours: <12.0 mg/dL   3-5 days:    <15.0 mg/dL   6-29 days:   <15.0 mg/dL     Alkaline Phosphatase   Date Value Ref Range Status   03/14/2025 84 40 - 150 U/L Final     ALP   Date Value Ref Range Status   06/16/2025 69 40 - 150 unit/L Final     AST   Date  Value Ref Range Status   06/16/2025 27 11 - 45 unit/L Final   03/14/2025 37 10 - 40 U/L Final     ALT   Date Value Ref Range Status   06/16/2025 17 10 - 44 unit/L Final   03/14/2025 29 10 - 44 U/L Final     Anion Gap   Date Value Ref Range Status   06/16/2025 12 8 - 16 mmol/L Final     eGFR   Date Value Ref Range Status   06/16/2025 60 (L) >60 mL/min/1.73/m2 Final     Comment:     Estimated GFR calculated using the CKD-EPI creatinine (2021) equation.   03/14/2025 37.2 (A) >60 mL/min/1.73 m^2 Final     Recent Labs   Lab 06/16/25  1859   WBC 6.50   RBC 5.74   HGB 17.4   HCT 50.6      MCV 88   MCH 30.3   MCHC 34.4            [1]   No current facility-administered medications on file prior to encounter.     Current Outpatient Medications on File Prior to Encounter   Medication Sig Dispense Refill    aspirin (ECOTRIN) 81 MG EC tablet Take 1 tablet (81 mg total) by mouth once daily. 90 tablet 0    atorvastatin (LIPITOR) 40 MG tablet Take 1 tablet (40 mg total) by mouth once daily. 30 tablet 2    blood sugar diagnostic Strp Use to check blood glucose 5 (five) times daily. 100 strip 1    blood-glucose meter Misc Use to check blood glucose 5 times daily 1 each 0    carvediloL (COREG) 3.125 MG tablet Take 1 tablet (3.125 mg total) by mouth 2 (two) times daily. 180 tablet 0    clopidogreL (PLAVIX) 75 mg tablet Take 1 tablet (75 mg total) by mouth once daily. 30 tablet 0    furosemide (LASIX) 40 MG tablet Take 1 tablet (40 mg total) by mouth 2 (two) times daily. 60 tablet 2    insulin aspart U-100 (NOVOLOG) 100 unit/mL (3 mL) InPn pen Inject 1-10 Units before meals and at bedtime for Hyperglycemia. Per sliding scale:Blood Glucose: 151-200    201-250   251-300   301-350  >350 Pre-meal:          2 units      4 units      6 units     8 units     10 units  10 pm:              1 units      2 units      3 units     4 units      5 units 12 mL 0    lancets (TRUEPLUS LANCETS) 30 gauge Misc Use to check blood glucose 5 (five)  "times daily. 100 each 0    pen needle, diabetic 32 gauge x 5/32" Ndle Use for insulin adminstration up to 5 times daily 200 each 0    pulse oximeter (PULSE OXIMETER) device by Apply Externally route 2 (two) times a day. Use twice daily at 8 AM and 3 PM and [facility] as [facility]. 1 each 0    sacubitriL-valsartan (ENTRESTO) 24-26 mg per tablet Take 1 tablet by mouth 2 (two) times daily. 180 tablet 0   [2]   Social History  Tobacco Use    Smoking status: Never   Substance Use Topics    Alcohol use: Not Currently    Drug use: Never     "

## 2025-06-17 NOTE — PLAN OF CARE
"[facility] Care Plan    POC reviewed with Cecil Clark at 0230. Patient verbalized understanding. Questions and concerns addressed. No acute events today. Pt progressing toward goals. Will continue to monitor. See below and flowsheets for full assessment and VS info.   - CTH completed   - K 2.7 - replaced with 75 mEq of oral K total; redraw ordered for 0700  - MRI/MRA scheduled for 0800            Is this a stroke patient? yes- Stroke booklet reviewed with patient and is at bedside.   Care Plan Individualization:     Neuro:  [place] Coma Scale  Best Eye Response: 4-->(E4) spontaneous  Best Motor Response: 6-->(M6) obeys commands  Best Verbal Response: 5-->(V5) oriented  [place] Coma Scale Score: 15  Assessment Qualifiers: Patient not sedated/intubated  Pupil PERRLA: yes     24 hr Temp:  [97.5 °F (36.4 °C)-98.2 °F (36.8 °C)]     CV:   Rhythm: normal sinus rhythm  BP goals:   SBP < 150  MAP > 65    Resp:           Plan: N/A    GI/:     Diet/Nutrition Received: NPO  Last Bowel Movement: 06/15/25       Intake/Output Summary (Last 24 hours) at 6/17/2025 0408  Last data filed at 6/17/2025 0301  Gross per 24 hour   Intake 389.19 ml   Output 350 ml   Net 39.19 ml          Labs/Accuchecks:  Recent Labs   Lab 06/17/25  0210   WBC 9.58   RBC 4.87   HGB 14.4   HCT 42.5         Recent Labs   Lab 06/17/25  0210      K 2.7*   CO2 25      BUN 13   CREATININE 1.5*   ALKPHOS 52   ALT 14   AST 23   BILITOT 1.2*      Recent Labs   Lab 06/16/25  2019   PROTIME 10.8   INR 1.0   APTT 27.3    No results for input(s): "CPK", "CPKMB", "TROPONINI", "MB" in the last 168 hours.    Electrolytes: Electrolytes replaced  Accuchecks: Q6H    Gtts:   nicardipine  0-15 mg/hr Intravenous Continuous   Stopped at 06/17/25 0013       LDA/Wounds:    Juan Risk Assessment  Sensory Perception: 4-->no impairment  Moisture: 4-->rarely moist  Activity: 3-->walks occasionally  Mobility: 3-->slightly limited  Nutrition: 3-->adequate  Friction and " Shear: 3-->no apparent problem  Juan Score: 20  Is your ujan score 12 or less? no          Restraints:        TM

## 2025-06-18 PROBLEM — N28.9 ACUTE ON CHRONIC RENAL INSUFFICIENCY: Status: ACTIVE | Noted: 2025-06-18

## 2025-06-18 PROBLEM — N18.9 ACUTE ON CHRONIC RENAL INSUFFICIENCY: Status: ACTIVE | Noted: 2025-06-18

## 2025-06-18 LAB
ABSOLUTE EOSINOPHIL (OHS): 0.19 K/UL
ABSOLUTE MONOCYTE (OHS): 0.55 K/UL (ref 0.3–1)
ABSOLUTE NEUTROPHIL COUNT (OHS): 4.67 K/UL (ref 1.8–7.7)
ALBUMIN SERPL BCP-MCNC: 3 G/DL (ref 3.5–5.2)
ALP SERPL-CCNC: 60 UNIT/L (ref 40–150)
ALT SERPL W/O P-5'-P-CCNC: 13 UNIT/L (ref 10–44)
ANION GAP (OHS): 11 MMOL/L (ref 8–16)
AST SERPL-CCNC: 22 UNIT/L (ref 11–45)
BASOPHILS # BLD AUTO: 0.06 K/UL
BASOPHILS NFR BLD AUTO: 0.9 %
BILIRUB SERPL-MCNC: 1.3 MG/DL (ref 0.1–1)
BUN SERPL-MCNC: 22 MG/DL (ref 6–20)
CALCIUM SERPL-MCNC: 8.3 MG/DL (ref 8.7–10.5)
CHLORIDE SERPL-SCNC: 106 MMOL/L (ref 95–110)
CO2 SERPL-SCNC: 23 MMOL/L (ref 23–29)
CREAT SERPL-MCNC: 1.7 MG/DL (ref 0.5–1.4)
ERYTHROCYTE [DISTWIDTH] IN BLOOD BY AUTOMATED COUNT: 13.2 % (ref 11.5–14.5)
GFR SERPLBLD CREATININE-BSD FMLA CKD-EPI: 48 ML/MIN/1.73/M2
GLUCOSE SERPL-MCNC: 149 MG/DL (ref 70–110)
HCT VFR BLD AUTO: 43.8 % (ref 40–54)
HGB BLD-MCNC: 14.6 GM/DL (ref 14–18)
IMM GRANULOCYTES # BLD AUTO: 0.03 K/UL (ref 0–0.04)
IMM GRANULOCYTES NFR BLD AUTO: 0.4 % (ref 0–0.5)
LYMPHOCYTES # BLD AUTO: 1.31 K/UL (ref 1–4.8)
MAGNESIUM SERPL-MCNC: 1.9 MG/DL (ref 1.6–2.6)
MCH RBC QN AUTO: 29.6 PG (ref 27–31)
MCHC RBC AUTO-ENTMCNC: 33.3 G/DL (ref 32–36)
MCV RBC AUTO: 89 FL (ref 82–98)
NUCLEATED RBC (/100WBC) (OHS): 0 /100 WBC
PHOSPHATE SERPL-MCNC: 2.5 MG/DL (ref 2.7–4.5)
PLATELET # BLD AUTO: 186 K/UL (ref 150–450)
PMV BLD AUTO: 10.4 FL (ref 9.2–12.9)
POCT GLUCOSE: 115 MG/DL (ref 70–110)
POCT GLUCOSE: 127 MG/DL (ref 70–110)
POCT GLUCOSE: 139 MG/DL (ref 70–110)
POCT GLUCOSE: 141 MG/DL (ref 70–110)
POTASSIUM SERPL-SCNC: 3.2 MMOL/L (ref 3.5–5.1)
POTASSIUM SERPL-SCNC: 3.3 MMOL/L (ref 3.5–5.1)
PROT SERPL-MCNC: 5.7 GM/DL (ref 6–8.4)
RBC # BLD AUTO: 4.94 M/UL (ref 4.6–6.2)
RELATIVE EOSINOPHIL (OHS): 2.8 %
RELATIVE LYMPHOCYTE (OHS): 19.2 % (ref 18–48)
RELATIVE MONOCYTE (OHS): 8.1 % (ref 4–15)
RELATIVE NEUTROPHIL (OHS): 68.6 % (ref 38–73)
SODIUM SERPL-SCNC: 140 MMOL/L (ref 136–145)
WBC # BLD AUTO: 6.81 K/UL (ref 3.9–12.7)

## 2025-06-18 PROCEDURE — 20000000 HC ICU ROOM

## 2025-06-18 PROCEDURE — 25000003 PHARM REV CODE 250: Performed by: REGISTERED NURSE

## 2025-06-18 PROCEDURE — 63600175 PHARM REV CODE 636 W HCPCS

## 2025-06-18 PROCEDURE — 85025 COMPLETE CBC W/AUTO DIFF WBC: CPT | Performed by: REGISTERED NURSE

## 2025-06-18 PROCEDURE — 84100 ASSAY OF PHOSPHORUS: CPT | Performed by: REGISTERED NURSE

## 2025-06-18 PROCEDURE — 99291 CRITICAL CARE FIRST HOUR: CPT | Mod: ,,, | Performed by: PSYCHIATRY & NEUROLOGY

## 2025-06-18 PROCEDURE — 25000003 PHARM REV CODE 250

## 2025-06-18 PROCEDURE — 83735 ASSAY OF MAGNESIUM: CPT | Performed by: REGISTERED NURSE

## 2025-06-18 PROCEDURE — 63600175 PHARM REV CODE 636 W HCPCS: Performed by: REGISTERED NURSE

## 2025-06-18 PROCEDURE — 80053 COMPREHEN METABOLIC PANEL: CPT | Performed by: REGISTERED NURSE

## 2025-06-18 PROCEDURE — 94761 N-INVAS EAR/PLS OXIMETRY MLT: CPT

## 2025-06-18 PROCEDURE — 25000003 PHARM REV CODE 250: Performed by: INTERNAL MEDICINE

## 2025-06-18 PROCEDURE — 84132 ASSAY OF SERUM POTASSIUM: CPT | Performed by: PSYCHIATRY & NEUROLOGY

## 2025-06-18 RX ORDER — LABETALOL HCL 20 MG/4 ML
10 SYRINGE (ML) INTRAVENOUS EVERY 4 HOURS PRN
Status: DISCONTINUED | OUTPATIENT
Start: 2025-06-18 | End: 2025-06-21 | Stop reason: HOSPADM

## 2025-06-18 RX ORDER — CARVEDILOL 6.25 MG/1
12.5 TABLET ORAL 2 TIMES DAILY
Status: DISCONTINUED | OUTPATIENT
Start: 2025-06-18 | End: 2025-06-21 | Stop reason: HOSPADM

## 2025-06-18 RX ORDER — HEPARIN SODIUM 5000 [USP'U]/ML
5000 INJECTION, SOLUTION INTRAVENOUS; SUBCUTANEOUS EVERY 8 HOURS
Status: DISCONTINUED | OUTPATIENT
Start: 2025-06-18 | End: 2025-06-21 | Stop reason: HOSPADM

## 2025-06-18 RX ORDER — IBUPROFEN 200 MG
16 TABLET ORAL
Status: DISCONTINUED | OUTPATIENT
Start: 2025-06-18 | End: 2025-06-21 | Stop reason: HOSPADM

## 2025-06-18 RX ORDER — CARVEDILOL 3.12 MG/1
3.12 TABLET ORAL ONCE
Status: COMPLETED | OUTPATIENT
Start: 2025-06-18 | End: 2025-06-18

## 2025-06-18 RX ORDER — HYDRALAZINE HYDROCHLORIDE 20 MG/ML
10 INJECTION INTRAMUSCULAR; INTRAVENOUS EVERY 4 HOURS PRN
Status: DISCONTINUED | OUTPATIENT
Start: 2025-06-18 | End: 2025-06-21 | Stop reason: HOSPADM

## 2025-06-18 RX ORDER — GLUCAGON 1 MG
1 KIT INJECTION
Status: DISCONTINUED | OUTPATIENT
Start: 2025-06-18 | End: 2025-06-21 | Stop reason: HOSPADM

## 2025-06-18 RX ORDER — HYDRALAZINE HYDROCHLORIDE 20 MG/ML
10 INJECTION INTRAMUSCULAR; INTRAVENOUS EVERY 6 HOURS PRN
Status: DISCONTINUED | OUTPATIENT
Start: 2025-06-18 | End: 2025-06-18

## 2025-06-18 RX ORDER — LABETALOL HCL 20 MG/4 ML
20 SYRINGE (ML) INTRAVENOUS ONCE
Status: COMPLETED | OUTPATIENT
Start: 2025-06-18 | End: 2025-06-18

## 2025-06-18 RX ORDER — CARVEDILOL 6.25 MG/1
6.25 TABLET ORAL 2 TIMES DAILY
Status: DISCONTINUED | OUTPATIENT
Start: 2025-06-18 | End: 2025-06-18

## 2025-06-18 RX ORDER — IBUPROFEN 200 MG
24 TABLET ORAL
Status: DISCONTINUED | OUTPATIENT
Start: 2025-06-18 | End: 2025-06-21 | Stop reason: HOSPADM

## 2025-06-18 RX ORDER — SODIUM,POTASSIUM PHOSPHATES 280-250MG
2 POWDER IN PACKET (EA) ORAL ONCE
Status: COMPLETED | OUTPATIENT
Start: 2025-06-18 | End: 2025-06-18

## 2025-06-18 RX ORDER — INSULIN ASPART 100 [IU]/ML
0-10 INJECTION, SOLUTION INTRAVENOUS; SUBCUTANEOUS
Status: DISCONTINUED | OUTPATIENT
Start: 2025-06-18 | End: 2025-06-21 | Stop reason: HOSPADM

## 2025-06-18 RX ORDER — LABETALOL HCL 20 MG/4 ML
SYRINGE (ML) INTRAVENOUS
Status: COMPLETED
Start: 2025-06-18 | End: 2025-06-18

## 2025-06-18 RX ADMIN — CARVEDILOL 3.12 MG: 3.12 TABLET, FILM COATED ORAL at 10:06

## 2025-06-18 RX ADMIN — NICARDIPINE HYDROCHLORIDE 2.5 MG/HR: 0.2 INJECTION, SOLUTION INTRAVENOUS at 02:06

## 2025-06-18 RX ADMIN — HYDRALAZINE HYDROCHLORIDE 10 MG: 20 INJECTION, SOLUTION INTRAMUSCULAR; INTRAVENOUS at 04:06

## 2025-06-18 RX ADMIN — CARVEDILOL 12.5 MG: 6.25 TABLET, FILM COATED ORAL at 08:06

## 2025-06-18 RX ADMIN — CARVEDILOL 3.12 MG: 3.12 TABLET, FILM COATED ORAL at 08:06

## 2025-06-18 RX ADMIN — SACUBITRIL AND VALSARTAN 1 TABLET: 24; 26 TABLET, FILM COATED ORAL at 08:06

## 2025-06-18 RX ADMIN — HEPARIN SODIUM 5000 UNITS: 5000 INJECTION INTRAVENOUS; SUBCUTANEOUS at 02:06

## 2025-06-18 RX ADMIN — LABETALOL HYDROCHLORIDE 10 MG: 5 INJECTION, SOLUTION INTRAVENOUS at 03:06

## 2025-06-18 RX ADMIN — POTASSIUM BICARBONATE 35 MEQ: 391 TABLET, EFFERVESCENT ORAL at 05:06

## 2025-06-18 RX ADMIN — LABETALOL HYDROCHLORIDE 10 MG: 5 INJECTION, SOLUTION INTRAVENOUS at 07:06

## 2025-06-18 RX ADMIN — ATORVASTATIN CALCIUM 40 MG: 40 TABLET, FILM COATED ORAL at 08:06

## 2025-06-18 RX ADMIN — LABETALOL HYDROCHLORIDE 20 MG: 5 INJECTION, SOLUTION INTRAVENOUS at 04:06

## 2025-06-18 RX ADMIN — HEPARIN SODIUM 5000 UNITS: 5000 INJECTION INTRAVENOUS; SUBCUTANEOUS at 09:06

## 2025-06-18 RX ADMIN — HYDRALAZINE HYDROCHLORIDE 10 MG: 20 INJECTION, SOLUTION INTRAMUSCULAR; INTRAVENOUS at 08:06

## 2025-06-18 RX ADMIN — MUPIROCIN: 20 OINTMENT TOPICAL at 08:06

## 2025-06-18 RX ADMIN — SENNOSIDES AND DOCUSATE SODIUM 1 TABLET: 50; 8.6 TABLET ORAL at 08:06

## 2025-06-18 RX ADMIN — POTASSIUM & SODIUM PHOSPHATES POWDER PACK 280-160-250 MG 2 PACKET: 280-160-250 PACK at 10:06

## 2025-06-18 NOTE — ASSESSMENT & PLAN NOTE
ICH risk factor.   Admission SBP 180s-210s.  Started on cardene gtt in the ED, was off for a couple of hours but required drip back on today for SBP goal<140  Home Entresto and coreg resumed and started on lasix

## 2025-06-18 NOTE — PLAN OF CARE
"[facility] Care Plan    POC reviewed with Cecil Clark and family at 0300. Patient and Family verbalized understanding. Questions and concerns addressed. No acute events today. Pt progressing toward goals. Will continue to monitor. See below and flowsheets for full assessment and VS info.     -cardene gtt con   -replaced K       Is this a stroke patient? yes- Stroke booklet reviewed with patient and is at bedside.   Care Plan Individualization: phones at bedside, clothes from home     Neuro:  [place] Coma Scale  Best Eye Response: 4-->(E4) spontaneous  Best Motor Response: 6-->(M6) obeys commands  Best Verbal Response: 5-->(V5) oriented  [place] Coma Scale Score: 15  Assessment Qualifiers: Patient not sedated/intubated, No eye obstruction present  Pupil PERRLA: yes     24 hr Temp:  [97.5 °F (36.4 °C)-98.4 °F (36.9 °C)]     CV:   Rhythm: sinus tachycardia  BP goals:   SBP < 150  MAP > 65    Resp:           Plan: N/A    GI/:     Diet/Nutrition Received: regular  Last Bowel Movement: 06/17/25       Intake/Output Summary (Last 24 hours) at 6/18/2025 0613  Last data filed at 6/18/2025 0611  Gross per 24 hour   Intake 1190.35 ml   Output 400 ml   Net 790.35 ml     Unmeasured Output  Unmeasured Urine Occurrence: 1  Unmeasured Stool Occurrence: 1    Labs/Accuchecks:  Recent Labs   Lab 06/18/25 0226   WBC 6.81   RBC 4.94   HGB 14.6   HCT 43.8         Recent Labs   Lab 06/18/25 0226      K 3.2*   CO2 23      BUN 22*   CREATININE 1.7*   ALKPHOS 60   ALT 13   AST 22   BILITOT 1.3*      Recent Labs   Lab 06/16/25  2019   PROTIME 10.8   INR 1.0   APTT 27.3    No results for input(s): "CPK", "CPKMB", "TROPONINI", "MB" in the last 168 hours.    Electrolytes: Contraindicated  Accuchecks: ACHS    Gtts:   nicardipine  0-15 mg/hr Intravenous Continuous 12.5 mL/hr at 06/18/25 0229 2.5 mg/hr at 06/18/25 0229       LDA/Wounds:    Juan Risk Assessment  Sensory Perception: 4-->no impairment  Moisture: 4-->rarely " moist  Activity: 3-->walks occasionally  Mobility: 3-->slightly limited  Nutrition: 2-->probably inadequate  Friction and Shear: 3-->no apparent problem  Juan Score: 19  Is your juan score 12 or less? no          Restraints:        WCTM     Problem: Stroke, Intracerebral Hemorrhage  Goal: Optimal Coping  Outcome: Progressing  Intervention: Support Psychosocial Response to Stroke  Flowsheets (Taken 6/18/2025 0238)  Supportive Measures:   active listening utilized   positive reinforcement provided   relaxation techniques promoted  Family/Support System Care: support provided     Problem: Adult Inpatient Plan of Care  Goal: Plan of Care Review  Outcome: Met  Flowsheets (Taken 6/18/2025 0238)  Plan of Care Reviewed With: patient     Problem: Stroke, Intracerebral Hemorrhage  Goal: Optimal Cerebral Tissue Perfusion  Outcome: Met  Intervention: Protect and Optimize Cerebral Perfusion  Flowsheets (Taken 6/18/2025 0238)  Sensory Stimulation Regulation:   care clustered   lighting decreased   quiet environment promoted   visual stimulation minimized  Cerebral Perfusion Promotion: blood pressure monitored  Goal: Optimal Pain Control and Function  Outcome: Met  Intervention: Monitor and Manage Pain  Flowsheets (Taken 6/18/2025 0238)  Pain Management Interventions: care clustered

## 2025-06-18 NOTE — ASSESSMENT & PLAN NOTE
51 y/o M presenting with disorientation and confusion for the past several days (exact time of onset unclear). He reports being unable to remember where he parked his car, difficulty with timelines, and inability to remember waking up in the mornings. CTH obtained in the ED revealed an acute L thalamic IPH.    Antithrombotics for secondary stroke prevention: Antiplatelets: None: Intracerebral Hemorrhage    Statins for secondary stroke prevention and hyperlipidemia, if present:   Statins: Atorvastatin- 40 mg daily - not indicated for ICH; however, patient has PMH of hyperlipidemia    Aggressive risk factor modification: HTN, DM, HLD     Rehab efforts: The patient has been evaluated by a stroke team provider and the therapy needs have been fully considered based off the presenting complaints and exam findings. The following therapy evaluations are needed: PT evaluate and treat, OT evaluate and treat, SLP evaluate and treat, PM&R evaluate for appropriate placement    Diagnostics ordered/pending: CT scan of head without contrast to asses brain parenchyma, HgbA1C to assess blood glucose levels, Lipid Profile to assess cholesterol levels, MRA head to assess vasculature, MRI head without contrast to assess brain parenchyma, TTE to assess cardiac function/status , TSH to assess thyroid function    VTE prophylaxis: Mechanical prophylaxis: Place SCDs  None: Reason for No Pharmacological VTE Prophylaxis: History of systemic or intracranial bleeding    BP parameters: ICH: SBP < 150    - Admit to NCC  - VS/Neuro checks q1  - NSGY, Vascular Neurology consulted  - No acute surgical intervention indicated at this time  - HOB >/= 30  - MRI/MRA brain 6/17 w/ evolving subacute L thalamic IPH. No acutely concerning changes       - Plan for f/u MRI/MRA in 6-8wk unless otherwise indicated  - SBP goal < 150       - Labetalol, hydralazine PRN; discontinued Cardene   - Continue to advance diet as tolerated  - Monitor I/O  - CBC, CMP, Mag,  Phos daily  - Heparin for DVT PPX  - PT/OT/SLP as appropriate

## 2025-06-18 NOTE — ASSESSMENT & PLAN NOTE
Mr. Cecil Clark is a 50 y.o. patient here for mental status changes. Complex prior neurovascular history with right thalamic ICH (2021) and R MCA stroke (2023) with residual LSW. Patient has been feeling more confused and disoriented on the last 3 days. No evidence of headaches or sensory changes. Hypertensive on admission on the 210s range, started on cardene gtt. Initial NIHSS 3 for L arm/leg drift from residual LSW with LUE ataxia/dysmetria with action and intention tremor. CTH with new small left thalamic parenchymal hemorrhage with mass effect on the left lateral ventricle without MLS. No hydrocephalus noted. No acute interventions per neurosurgery. ETIOLOGY: hypertensive.     Optimizing BP, renal function improved. Will monitor for a day and possible discharge tomorrow.       Antithrombotics for secondary stroke prevention: Not recommending any AP in the setting of acute L thalamic ICH.     Statins for secondary stroke prevention and hyperlipidemia, if present: Recommending holding statins in the acute setting of L thalamic ICH on a patient with complex prior neurovascular history for the high risk of bleeding.     Aggressive risk factor modification: HTN     Rehab efforts: The patient has been evaluated by a stroke team provider and the therapy needs have been fully considered based off the presenting complaints and exam findings. The following therapy evaluations are needed: PT evaluate and treat, OT evaluate and treat, SLP evaluate and treat, PM&R evaluate for appropriate placement    Diagnostics ordered/pending: None     VTE prophylaxis: None: Reason for No Pharmacological VTE Prophylaxis: History of systemic or intracranial bleeding    BP parameters: ICH: SBP Goal Range 130-150 -

## 2025-06-18 NOTE — SUBJECTIVE & OBJECTIVE
Objective:     Vitals:  Temp: 98 °F (36.7 °C)  Pulse: 92  Rhythm: normal sinus rhythm  BP: (!) 140/91  MAP (mmHg): 110  Resp: (!) 22  SpO2: 97 %    Temp  Min: 97.5 °F (36.4 °C)  Max: 98.4 °F (36.9 °C)  Pulse  Min: 83  Max: 105  BP  Min: 114/67  Max: 173/104  MAP (mmHg)  Min: 81  Max: 133  Resp  Min: 11  Max: 28  SpO2  Min: 95 %  Max: 99 %    06/17 0701 - 06/18 0700  In: 1190.4 [P.O.:920; I.V.:270.4]  Out: 400 [Urine:400]   Unmeasured Output  Unmeasured Urine Occurrence: 1  Unmeasured Stool Occurrence: 1        Physical Exam  Vitals and nursing note reviewed.   Constitutional:       General: He is not in acute distress.  HENT:      Head: Normocephalic.      Nose: Nose normal.   Eyes:      Extraocular Movements: Extraocular movements intact.      Pupils: Pupils are equal, round, and reactive to light.   Cardiovascular:      Rate and Rhythm: Normal rate and regular rhythm.   Pulmonary:      Effort: Pulmonary effort is normal.      Breath sounds: Normal breath sounds.   Abdominal:      General: Abdomen is flat.      Tenderness: There is no abdominal tenderness.   Musculoskeletal:         General: Normal range of motion.      Cervical back: Normal range of motion.   Skin:     General: Skin is warm and dry.   Neurological:      Mental Status: He is alert.      GCS: GCS eye subscore is 4. GCS verbal subscore is 5. GCS motor subscore is 6.      Cranial Nerves: No cranial nerve deficit or facial asymmetry.      Sensory: Sensation is intact.      Motor: Weakness (LUE weakness (baseline)) and pronator drift (mild b/l UE drift) present.   Psychiatric:         Attention and Perception: Attention normal.         Mood and Affect: Affect is flat.         Behavior: Behavior normal. Behavior is cooperative.            Medications:  Continuousnicardipine, Last Rate: Stopped (06/18/25 1054)    Scheduledatorvastatin, 40 mg, Daily  carvediloL, 6.25 mg, BID  heparin (porcine), 5,000 Units, Q8H  mupirocin, , BID  sacubitriL-valsartan, 1  tablet, BID    PRNdextrose 50%, 12.5 g, PRN  dextrose 50%, 25 g, PRN  glucagon (human recombinant), 1 mg, PRN  glucose, 16 g, PRN  glucose, 24 g, PRN  hydrALAZINE, 10 mg, Q4H PRN  insulin aspart U-100, 0-10 Units, QID (AC + HS) PRN  labetalol, 10 mg, Q4H PRN  ondansetron, 4 mg, Q8H PRN  sodium chloride 0.9%, 10 mL, PRN      Today I personally reviewed pertinent medications, lines/drains/airways, imaging, cardiology results, laboratory results, microbiology results, notably:    Diet  Diet Consistent Carbohydrate 2000 Calories (up to 75 gm per meal); Thin; Standard Tray  Diet Consistent Carbohydrate 2000 Calories (up to 75 gm per meal); Thin; Standard Tray

## 2025-06-18 NOTE — NURSING
"[facility] Care Plan  POC reviewed with Cecil Clark and family at 1400. Patient verbalized understanding. Questions and concerns addressed. No acute events today. Pt progressing toward goals. Will continue to monitor. See below and flowsheets for full assessment and VS info.     - DC cardene gtt  - PRNs given for BP control  - PHQ9 depression screen completed  - DC senna due to diarrhea  - very poor appetite and little oral intake        Is this a stroke patient? yes- Stroke booklet reviewed with patient and is at bedside.   Care Plan Individualization: does not like overhead light on    Neuro:  [place] Coma Scale  Best Eye Response: 3-->(E3) to speech  Best Motor Response: 6-->(M6) obeys commands  Best Verbal Response: 5-->(V5) oriented  [place] Coma Scale Score: 14  Assessment Qualifiers: Patient not sedated/intubated  Pupil PERRLA: yes     24 hr Temp:  [97.6 °F (36.4 °C)-98.4 °F (36.9 °C)]     CV:   Rhythm: normal sinus rhythm  BP goals:   SBP < 150  MAP > 65    Resp:           Plan: N/A    GI/:     Diet/Nutrition Received: consistent carb/diabetic diet  Last Bowel Movement: 06/18/25       Intake/Output Summary (Last 24 hours) at 6/18/2025 1744  Last data filed at 6/18/2025 1056  Gross per 24 hour   Intake 717.47 ml   Output 400 ml   Net 317.47 ml     Unmeasured Output  Unmeasured Urine Occurrence: 1  Unmeasured Stool Occurrence: 1    Labs/Accuchecks:  Recent Labs   Lab 06/18/25 0226   WBC 6.81   RBC 4.94   HGB 14.6   HCT 43.8         Recent Labs   Lab 06/18/25 0226 06/18/25  0900     --    K 3.2* 3.3*   CO2 23  --      --    BUN 22*  --    CREATININE 1.7*  --    ALKPHOS 60  --    ALT 13  --    AST 22  --    BILITOT 1.3*  --       Recent Labs   Lab 06/16/25  2019   PROTIME 10.8   INR 1.0   APTT 27.3    No results for input(s): "CPK", "CPKMB", "TROPONINI", "MB" in the last 168 hours.    Electrolytes: No replacement orders  Accuchecks: ACHS    Gtts:      LDA/Wounds:    Juan Risk Assessment  Sensory " Perception: 4-->no impairment  Moisture: 4-->rarely moist  Activity: 3-->walks occasionally  Mobility: 3-->slightly limited  Nutrition: 2-->probably inadequate  Friction and Shear: 3-->no apparent problem  Juan Score: 19    Is your juan score 12 or less? no            Restraints:        [facility]

## 2025-06-18 NOTE — NURSING
Patient flat and withdrawn throughout shift. PHQ 9 depression screen completed by RN with patient. Results are mild. Sebastian ALVAREZ and Diamond ALVAREZ from [facility] team notified.

## 2025-06-18 NOTE — PLAN OF CARE
06/18/25 1502   Rounds   Attendance Provider;;Physical therapist   Discharge Plan A Home with family   Why the patient remains in the hospital Requires continued medical care   Transition of Care Barriers None     Alice Duron MSW, LCSW  [facility]  Case Management Dept.

## 2025-06-18 NOTE — ASSESSMENT & PLAN NOTE
- Last ECHO on 3/7/25 w/ global hypokinesis, severely reduced systolic function w/ estimated EF 25-30%, and diastolic dysfunction  - CXR w/o focal consolidation, effusion, consolidations  - Continue home Entresto   - Hold home Lasix for now, resume as appropriate

## 2025-06-18 NOTE — PROGRESS NOTES
[facility]  Neurocritical Care  Progress Note    Admit Date: 6/16/2025  Service Date: 06/18/2025  Length of Stay: 2    Subjective:     Chief Complaint: ICH (intracerebral hemorrhage)    History of Present Illness: 49 y/o M with PMH of HTN, HLD, combined systolic and diastolic CHF, R thalamic ICH (2021), R internal capsule stroke (2023) w/ residual LSW, and T2DM presenting with disorientation and confusion for the past several days, exact time of onset unclear. He reports being unable to remember where he parked his car, difficulty with timelines, and inability to remember waking up in the mornings. CTH obtained in the ED revealed an acute L thalamic IPH. Questionable use of DAPT as ASA/Plavix listed on home med list, but patient unable to state which meds he is currently taking. Given recent disorientation/confusion and concern for possible AP usage, DDAVP administered. Also unclear if patient taking any home meds as /139 upon ED arrival, requiring initiation of Cardene. He will be admitted to [facility] for further management.    Hospital Course: 06/17/2025: patient was admitted to the [facility] overnight. No acute intervals. BP controlled on Cardene gtt with goals <140 sys. Restarted entresto. Repeat CTH stable. MRI today shows evolving recent to subacute aged left thalamic parenchymal hemorrhage.    6/18: Increased coreg to 6.25; will continue to wean Cardene as tolerated. Heparin DVT prophylaxis started. Anticipate SD if patient maintains BP goal off of Cardene.      Objective:     Vitals:  Temp: 98 °F (36.7 °C)  Pulse: 92  Rhythm: normal sinus rhythm  BP: (!) 140/91  MAP (mmHg): 110  Resp: (!) 22  SpO2: 97 %    Temp  Min: 97.5 °F (36.4 °C)  Max: 98.4 °F (36.9 °C)  Pulse  Min: 83  Max: 105  BP  Min: 114/67  Max: 173/104  MAP (mmHg)  Min: 81  Max: 133  Resp  Min: 11  Max: 28  SpO2  Min: 95 %  Max: 99 %    06/17 0701 - 06/18 0700  In: 1190.4 [P.O.:920; I.V.:270.4]  Out: 400 [Urine:400]   Unmeasured  Output  Unmeasured Urine Occurrence: 1  Unmeasured Stool Occurrence: 1        Physical Exam  Vitals and nursing note reviewed.   Constitutional:       General: He is not in acute distress.  HENT:      Head: Normocephalic.      Nose: Nose normal.   Eyes:      Extraocular Movements: Extraocular movements intact.      Pupils: Pupils are equal, round, and reactive to light.   Cardiovascular:      Rate and Rhythm: Normal rate and regular rhythm.   Pulmonary:      Effort: Pulmonary effort is normal.      Breath sounds: Normal breath sounds.   Abdominal:      General: Abdomen is flat.      Tenderness: There is no abdominal tenderness.   Musculoskeletal:         General: Normal range of motion.      Cervical back: Normal range of motion.   Skin:     General: Skin is warm and dry.   Neurological:      Mental Status: He is alert.      GCS: GCS eye subscore is 4. GCS verbal subscore is 5. GCS motor subscore is 6.      Cranial Nerves: No cranial nerve deficit or facial asymmetry.      Sensory: Sensation is intact.      Motor: Weakness (LUE weakness (baseline)) and pronator drift (mild b/l UE drift) present.   Psychiatric:         Attention and Perception: Attention normal.         Mood and Affect: Affect is flat.         Behavior: Behavior normal. Behavior is cooperative.            Medications:  Continuousnicardipine, Last Rate: Stopped (06/18/25 1054)    Scheduledatorvastatin, 40 mg, Daily  carvediloL, 6.25 mg, BID  heparin (porcine), 5,000 Units, Q8H  mupirocin, , BID  sacubitriL-valsartan, 1 tablet, BID    PRNdextrose 50%, 12.5 g, PRN  dextrose 50%, 25 g, PRN  glucagon (human recombinant), 1 mg, PRN  glucose, 16 g, PRN  glucose, 24 g, PRN  hydrALAZINE, 10 mg, Q4H PRN  insulin aspart U-100, 0-10 Units, QID (AC + HS) PRN  labetalol, 10 mg, Q4H PRN  ondansetron, 4 mg, Q8H PRN  sodium chloride 0.9%, 10 mL, PRN      Today I personally reviewed pertinent medications, lines/drains/airways, imaging, cardiology results, laboratory  results, microbiology results, notably:    Diet  Diet Consistent Carbohydrate 2000 Calories (up to 75 gm per meal); Thin; Standard Tray  Diet Consistent Carbohydrate 2000 Calories (up to 75 gm per meal); Thin; Standard Tray    Assessment/Plan:     Neuro  * ICH (intracerebral hemorrhage)  51 y/o M presenting with disorientation and confusion for the past several days (exact time of onset unclear). He reports being unable to remember where he parked his car, difficulty with timelines, and inability to remember waking up in the mornings. CTH obtained in the ED revealed an acute L thalamic IPH.    Antithrombotics for secondary stroke prevention: Antiplatelets: None: Intracerebral Hemorrhage    Statins for secondary stroke prevention and hyperlipidemia, if present:   Statins: Atorvastatin- 40 mg daily - not indicated for ICH; however, patient has PMH of hyperlipidemia    Aggressive risk factor modification: HTN, DM, HLD     Rehab efforts: The patient has been evaluated by a stroke team provider and the therapy needs have been fully considered based off the presenting complaints and exam findings. The following therapy evaluations are needed: PT evaluate and treat, OT evaluate and treat, SLP evaluate and treat, PM&R evaluate for appropriate placement    Diagnostics ordered/pending: CT scan of head without contrast to asses brain parenchyma, HgbA1C to assess blood glucose levels, Lipid Profile to assess cholesterol levels, MRA head to assess vasculature, MRI head without contrast to assess brain parenchyma, TTE to assess cardiac function/status , TSH to assess thyroid function    VTE prophylaxis: Mechanical prophylaxis: Place SCDs  None: Reason for No Pharmacological VTE Prophylaxis: History of systemic or intracranial bleeding    BP parameters: ICH: SBP < 150    - Admit to NCC  - VS/Neuro checks q1  - NSGY, Vascular Neurology consulted  - No acute surgical intervention indicated at this time  - HOB >/= 30  - MRI/MRA brain  6/17 w/ evolving subacute L thalamic IPH. No acutely concerning changes       - Plan for f/u MRI/MRA in 6-8wk unless otherwise indicated  - SBP goal < 150       - Labetalol, hydralazine PRN; discontinued Cardene   - Continue to advance diet as tolerated  - Monitor I/O  - CBC, CMP, Mag, Phos daily  - Heparin for DVT PPX  - PT/OT/SLP as appropriate    History of CVA with residual deficit  - R internal capsule stroke in 2023 w/ residual L-sided weakness    History of intracerebral hemorrhage without residual deficit  - R thalamic IPH in 2021 w/ residual L sided weakness    Cardiac/Vascular  Chronic combined systolic and diastolic congestive heart failure  - Last ECHO on 3/7/25 w/ global hypokinesis, severely reduced systolic function w/ estimated EF 25-30%, and diastolic dysfunction  - CXR w/o focal consolidation, effusion, consolidations  - Continue home Entresto   - Hold home Lasix for now, resume as appropriate    Hyperlipidemia  - Atorvastatin daily    Hypertension  - SBP goal < 150  - Increased Coreg BID  - Labetalol, hydralazine PRN       - Cardene discontinued    Renal/  Stage 2 chronic kidney disease  - No documented history of CKD; however, GFR 60 on admission  - Creatinine 1.4, baseline appears to be 1-1.5 per chart review  - GFR 37-45, creatinine 1.8-2.1 during recent admission (3/7-3/14)  - Avoid nephrotoxins  - Renally dose meds  - CMP daily       - Replace lytes PRN    Endocrine  Type 2 diabetes mellitus  - Hgb A1c 8.8  - Accuchecks q6 w/ SSI and aspart           The patient is being Prophylaxed for:  Venous Thromboembolism with: Chemical  Stress Ulcer with: None  Ventilator Pneumonia with: not applicable    Activity Orders            Progressive Mobility Protocol (mobilize patient to their highest level of functioning at least twice daily) starting at 06/17 0800    Diet Consistent Carbohydrate 2000 Calories (up to 75 gm per meal); Thin; Standard Tray: Carb Control starting at 06/17 0633    Turn patient  starting at 06/16 2200    Elevate HOB 30 starting at 06/16 2105          Full Code    Hank Cortes MD  Neurocritical Care  [facility]

## 2025-06-18 NOTE — PT/OT/SLP PROGRESS
Occupational Therapy      Patient Name:  Cecil Clark   MRN:  [id number]    Patient not seen today secondary to pt deferring OT session indicating no self-care needs as self-care previously taken care of. OT educating on OT roles, POC, call button for assistance, and importance of participation in OT sessions to increase independence. Will follow-up as appropriate.    6/18/2025

## 2025-06-18 NOTE — ASSESSMENT & PLAN NOTE
- SBP goal < 150  - Increased Coreg BID  - Labetalol, hydralazine PRN       - Cardene discontinued

## 2025-06-18 NOTE — EICU
Virtual ICU Quality Rounds    Admit Date: 6/16/2025  Hospital Day: 2    ICU Day: 1d 11h    24H Vital Sign Range:  Temp:  [97.5 °F (36.4 °C)-98.4 °F (36.9 °C)]   Pulse:  []   Resp:  [11-28]   BP: (114-173)/()   SpO2:  [95 %-99 %]     VICU Surveillance Screening                    LDA reconciliation : Yes

## 2025-06-18 NOTE — ASSESSMENT & PLAN NOTE
- No documented history of CKD; however, GFR 60 on admission  - Creatinine 1.4, baseline appears to be 1-1.5 per chart review  - GFR 37-45, creatinine 1.8-2.1 during recent admission (3/7-3/14)  - Avoid nephrotoxins  - Renally dose meds  - CMP daily       - Replace lytes PRN   76

## 2025-06-18 NOTE — SUBJECTIVE & OBJECTIVE
Neurologic Chief Complaint: L thalamus ICH    Subjective:     Interval History: Patient is seen for follow-up neurological assessment and treatment recommendations: See hospital course.     HPI, Past Medical, Family, and Social History remains the same as documented in the initial encounter.     Review of Systems   Constitutional:  Negative for chills and fever.   Neurological:  Negative for speech difficulty and weakness.     Scheduled Meds:   atorvastatin  40 mg Oral Daily    carvediloL  3.125 mg Oral BID    mupirocin   Nasal BID    sacubitriL-valsartan  1 tablet Oral BID    senna-docusate  1 tablet Oral BID     Continuous Infusions:   nicardipine  0-15 mg/hr Intravenous Continuous 25 mL/hr at 06/18/25 0611 5 mg/hr at 06/18/25 0611     PRN Meds:  Current Facility-Administered Medications:     dextrose 50%, 12.5 g, Intravenous, PRN    glucagon (human recombinant), 1 mg, Intramuscular, PRN    hydrALAZINE, 10 mg, Intravenous, Q4H PRN    insulin aspart U-100, 0-10 Units, Subcutaneous, Q6H PRN    labetalol, 10 mg, Intravenous, Q4H PRN    ondansetron, 4 mg, Intravenous, Q8H PRN    sodium chloride 0.9%, 10 mL, Intravenous, PRN    Objective:     Vital Signs (Most Recent):  Temp: 98.2 °F (36.8 °C) (06/18/25 0301)  Pulse: 93 (06/18/25 0720)  Resp: 12 (06/18/25 0720)  BP: (!) 144/83 (06/18/25 0631)  SpO2: 98 % (06/18/25 0720)  BP Location: Left arm    Vital Signs Range (Last 24H):  Temp:  [97.5 °F (36.4 °C)-98.4 °F (36.9 °C)]   Pulse:  []   Resp:  [6-28]   BP: (114-173)/()   SpO2:  [95 %-99 %]   BP Location: Left arm       Physical Exam  Vitals and nursing note reviewed.   Constitutional:       General: He is not in acute distress.     Appearance: Normal appearance.   Eyes:      Extraocular Movements: Extraocular movements intact.      Pupils: Pupils are equal, round, and reactive to light.   Abdominal:      General: There is no distension.      Palpations: There is no mass.   Neurological:      General: No focal  "deficit present.      Mental Status: He is alert and oriented to person, place, and time. Mental status is at baseline.              Neurological Exam:   LOC: alert  Attention Span: Good   Language: No aphasia  Articulation: No dysarthria  Orientation: Person, Place, Time   Visual Fields: Full  EOM (CN III, IV, VI): Full/intact  Pupils (CN II, III): PERRL  Facial Sensation (CN V): Normal  Facial Movement (CN VII): Symmetric facial expression    Motor: Arm left  Paresis: 5/5  Leg left  Paresis: 5/5  Arm right  Normal 5/5  Leg right Normal 5/5  Cerebellum: Upper Extremity Appendicular Ataxia (Finger Nose Finger)  Left  Sensation: Intact to light touch, temperature and vibration    Laboratory:  CMP:   Recent Labs   Lab 06/18/25 0226   CALCIUM 8.3*   ALBUMIN 3.0*   PROT 5.7*      K 3.2*   CO2 23      BUN 22*   CREATININE 1.7*   ALKPHOS 60   ALT 13   AST 22   BILITOT 1.3*     BMP:   Recent Labs   Lab 06/18/25 0226      K 3.2*      CO2 23   BUN 22*   CREATININE 1.7*   CALCIUM 8.3*     CBC:   Recent Labs   Lab 06/18/25 0226   WBC 6.81   RBC 4.94   HGB 14.6   HCT 43.8      MCV 89   MCH 29.6   MCHC 33.3     Lipid Panel:   Recent Labs   Lab 06/16/25 1859   CHOL 248*   LDLCALC 148.8   HDL 60   TRIG 196*     Coagulation:   Recent Labs   Lab 06/16/25 2019   INR 1.0   APTT 27.3     Platelet Aggregation Study: No results for input(s): "PLTAGG", "PLTAGINTERP", "PLTAGREGLACO", "ADPPLTAGGREG" in the last 168 hours.  Hgb A1C:   Recent Labs   Lab 06/16/25 1859   HGBA1C 8.8*     TSH:   Recent Labs   Lab 06/16/25 1859   TSH 1.616       Diagnostic Results     Brain Imaging/Vessel Imaging     MRA/MRI Brain WO - 6/17/2025    Impression:     MRI brain: Evolving recent to subacute aged left thalamic parenchymal hemorrhage.  Please note evaluation for underlying mass lesion limited by noncontrast technique.  Clinical correlation and follow-up recommended.     Patchy and confluent T2 FLAIR signal " abnormality supratentorial white matter elsewhere while nonspecific concerning for underlying chronic ischemic change     Ventricles stable without hydrocephalus     Remote hemorrhage right basal ganglia/thalamus with ipsilateral wallerian degeneration.     MRA head: Unremarkable MRA head as detailed above specifically without focal stenosis or proximal large vessel occlusion.     Please note there is no hypertrophied vasculature associated with left thalamic hemorrhage although only partially included in the field of view.  Further evaluation with repeat MRA imaging to include the entirety of the thalamic hemorrhage advised.    [facility] WO - 6/17/2025    Impression:     Stable CT appearance of the small acute parenchymal hematoma centered in the left thalamus.  No new intracranial hemorrhage.     Ventricles are stable in size.     Recommendations include clinical correlation and continued imaging follow-up.    [facility] WO - 6/16/2025    Impression:     1. Small acute parenchymal hemorrhage centered in the left thalamus, likely hypertensive in etiology.  There is local mass effect on the adjacent left lateral ventricle without significant midline shift.  2. Mild prominence of the lateral ventricles when compared to prior.  This may relate to interval volume loss, however early acute hydrocephalus may present similarly.  Recommend attention on short-term follow-up.  3. Chronic microvascular ischemic change and sequela of remote hemorrhage, as detailed above.  Right caudate suspected remote tiny lacunar type infarct but new from 10/25/2023 imaging.    Cardiac Imaging     TTE - 6/17/2025  Summary      Left Ventricle: The left ventricle is normal in size. Mildly increased ventricular mass. Mildly increased wall thickness. There is mild concentric hypertrophy. Mild global hypokinesis present. There is moderately reduced systolic function with a visually estimated ejection fraction of 35 - 40%. Quantitated ejection fraction is 32%.  Global longitudinal strain is reduced moderately reduced measuring -10.8% There is diastolic dysfunction but grade cannot be determined.    Right Ventricle: The right ventricle is normal in size Systolic function is normal.    Left Atrium: The left atrium is mildly dilated    Mitral Valve: There is mild regurgitation.    IVC/SVC: Normal venous pressure at 3 mmHg.

## 2025-06-18 NOTE — EICU
Intervention Initiated From:  COR / DAMASOU    Brandy intervened regarding:  Rounding (Video assessment)  VICU Night Rounds Checklist  24H Vital Sign Range:  Temp:  [97.5 °F (36.4 °C)-98.3 °F (36.8 °C)]   Pulse:  []   Resp:  [6-32]   BP: (118-210)/()   SpO2:  [94 %-100 %]     Video rounds

## 2025-06-19 LAB
ABSOLUTE EOSINOPHIL (OHS): 0.08 K/UL
ABSOLUTE MONOCYTE (OHS): 0.43 K/UL (ref 0.3–1)
ABSOLUTE NEUTROPHIL COUNT (OHS): 5.34 K/UL (ref 1.8–7.7)
ALBUMIN SERPL BCP-MCNC: 2.7 G/DL (ref 3.5–5.2)
ALP SERPL-CCNC: 51 UNIT/L (ref 40–150)
ALT SERPL W/O P-5'-P-CCNC: 11 UNIT/L (ref 10–44)
ANION GAP (OHS): 12 MMOL/L (ref 8–16)
AST SERPL-CCNC: 26 UNIT/L (ref 11–45)
BASOPHILS # BLD AUTO: 0.04 K/UL
BASOPHILS NFR BLD AUTO: 0.6 %
BILIRUB SERPL-MCNC: 1 MG/DL (ref 0.1–1)
BUN SERPL-MCNC: 24 MG/DL (ref 6–20)
CALCIUM SERPL-MCNC: 8.2 MG/DL (ref 8.7–10.5)
CHLORIDE SERPL-SCNC: 109 MMOL/L (ref 95–110)
CO2 SERPL-SCNC: 19 MMOL/L (ref 23–29)
CREAT SERPL-MCNC: 1.4 MG/DL (ref 0.5–1.4)
ERYTHROCYTE [DISTWIDTH] IN BLOOD BY AUTOMATED COUNT: 13.9 % (ref 11.5–14.5)
GFR SERPLBLD CREATININE-BSD FMLA CKD-EPI: 60 ML/MIN/1.73/M2
GLUCOSE SERPL-MCNC: 107 MG/DL (ref 70–110)
HCT VFR BLD AUTO: 47.4 % (ref 40–54)
HGB BLD-MCNC: 16 GM/DL (ref 14–18)
IMM GRANULOCYTES # BLD AUTO: 0.02 K/UL (ref 0–0.04)
IMM GRANULOCYTES NFR BLD AUTO: 0.3 % (ref 0–0.5)
LYMPHOCYTES # BLD AUTO: 0.97 K/UL (ref 1–4.8)
MAGNESIUM SERPL-MCNC: 1.9 MG/DL (ref 1.6–2.6)
MCH RBC QN AUTO: 30 PG (ref 27–31)
MCHC RBC AUTO-ENTMCNC: 33.8 G/DL (ref 32–36)
MCV RBC AUTO: 89 FL (ref 82–98)
NUCLEATED RBC (/100WBC) (OHS): 0 /100 WBC
PHOSPHATE SERPL-MCNC: 3.4 MG/DL (ref 2.7–4.5)
PLATELET # BLD AUTO: 198 K/UL (ref 150–450)
PMV BLD AUTO: 10.8 FL (ref 9.2–12.9)
POCT GLUCOSE: 126 MG/DL (ref 70–110)
POCT GLUCOSE: 167 MG/DL (ref 70–110)
POCT GLUCOSE: 196 MG/DL (ref 70–110)
POCT GLUCOSE: 232 MG/DL (ref 70–110)
POTASSIUM SERPL-SCNC: 4.1 MMOL/L (ref 3.5–5.1)
PROT SERPL-MCNC: 6 GM/DL (ref 6–8.4)
RBC # BLD AUTO: 5.33 M/UL (ref 4.6–6.2)
RELATIVE EOSINOPHIL (OHS): 1.2 %
RELATIVE LYMPHOCYTE (OHS): 14.1 % (ref 18–48)
RELATIVE MONOCYTE (OHS): 6.3 % (ref 4–15)
RELATIVE NEUTROPHIL (OHS): 77.5 % (ref 38–73)
SODIUM SERPL-SCNC: 140 MMOL/L (ref 136–145)
WBC # BLD AUTO: 6.88 K/UL (ref 3.9–12.7)

## 2025-06-19 PROCEDURE — 63600175 PHARM REV CODE 636 W HCPCS

## 2025-06-19 PROCEDURE — 25000003 PHARM REV CODE 250: Performed by: INTERNAL MEDICINE

## 2025-06-19 PROCEDURE — 11000001 HC ACUTE MED/SURG PRIVATE ROOM

## 2025-06-19 PROCEDURE — 92507 TX SP LANG VOICE COMM INDIV: CPT

## 2025-06-19 PROCEDURE — 63600175 PHARM REV CODE 636 W HCPCS: Performed by: PSYCHIATRY & NEUROLOGY

## 2025-06-19 PROCEDURE — 25000003 PHARM REV CODE 250

## 2025-06-19 PROCEDURE — 99233 SBSQ HOSP IP/OBS HIGH 50: CPT | Mod: ,,, | Performed by: STUDENT IN AN ORGANIZED HEALTH CARE EDUCATION/TRAINING PROGRAM

## 2025-06-19 PROCEDURE — 94761 N-INVAS EAR/PLS OXIMETRY MLT: CPT

## 2025-06-19 PROCEDURE — 84100 ASSAY OF PHOSPHORUS: CPT | Performed by: REGISTERED NURSE

## 2025-06-19 PROCEDURE — 25000003 PHARM REV CODE 250: Performed by: REGISTERED NURSE

## 2025-06-19 PROCEDURE — 85025 COMPLETE CBC W/AUTO DIFF WBC: CPT | Performed by: REGISTERED NURSE

## 2025-06-19 PROCEDURE — 83735 ASSAY OF MAGNESIUM: CPT | Performed by: REGISTERED NURSE

## 2025-06-19 PROCEDURE — 80053 COMPREHEN METABOLIC PANEL: CPT | Performed by: REGISTERED NURSE

## 2025-06-19 RX ORDER — FUROSEMIDE 20 MG/1
20 TABLET ORAL 2 TIMES DAILY
Status: DISCONTINUED | OUTPATIENT
Start: 2025-06-19 | End: 2025-06-21 | Stop reason: HOSPADM

## 2025-06-19 RX ADMIN — HEPARIN SODIUM 5000 UNITS: 5000 INJECTION INTRAVENOUS; SUBCUTANEOUS at 05:06

## 2025-06-19 RX ADMIN — INSULIN ASPART 2 UNITS: 100 INJECTION, SOLUTION INTRAVENOUS; SUBCUTANEOUS at 12:06

## 2025-06-19 RX ADMIN — MUPIROCIN: 20 OINTMENT TOPICAL at 09:06

## 2025-06-19 RX ADMIN — LABETALOL HYDROCHLORIDE 10 MG: 5 INJECTION, SOLUTION INTRAVENOUS at 10:06

## 2025-06-19 RX ADMIN — MUPIROCIN: 20 OINTMENT TOPICAL at 08:06

## 2025-06-19 RX ADMIN — SACUBITRIL AND VALSARTAN 1 TABLET: 24; 26 TABLET, FILM COATED ORAL at 08:06

## 2025-06-19 RX ADMIN — ATORVASTATIN CALCIUM 40 MG: 40 TABLET, FILM COATED ORAL at 09:06

## 2025-06-19 RX ADMIN — INSULIN ASPART 1 UNITS: 100 INJECTION, SOLUTION INTRAVENOUS; SUBCUTANEOUS at 08:06

## 2025-06-19 RX ADMIN — INSULIN ASPART 4 UNITS: 100 INJECTION, SOLUTION INTRAVENOUS; SUBCUTANEOUS at 05:06

## 2025-06-19 RX ADMIN — FUROSEMIDE 20 MG: 20 TABLET ORAL at 12:06

## 2025-06-19 RX ADMIN — HEPARIN SODIUM 5000 UNITS: 5000 INJECTION INTRAVENOUS; SUBCUTANEOUS at 10:06

## 2025-06-19 RX ADMIN — SACUBITRIL AND VALSARTAN 1 TABLET: 24; 26 TABLET, FILM COATED ORAL at 09:06

## 2025-06-19 RX ADMIN — HEPARIN SODIUM 5000 UNITS: 5000 INJECTION INTRAVENOUS; SUBCUTANEOUS at 02:06

## 2025-06-19 RX ADMIN — HYDRALAZINE HYDROCHLORIDE 10 MG: 20 INJECTION, SOLUTION INTRAMUSCULAR; INTRAVENOUS at 12:06

## 2025-06-19 RX ADMIN — CARVEDILOL 12.5 MG: 6.25 TABLET, FILM COATED ORAL at 09:06

## 2025-06-19 RX ADMIN — CARVEDILOL 12.5 MG: 6.25 TABLET, FILM COATED ORAL at 08:06

## 2025-06-19 NOTE — PT/OT/SLP PROGRESS
Occupational Therapy      Patient Name:  Cecil Clark   MRN:  [id number]    Patient not seen today secondary to patient deferring AM attempt, OT unable to return for second attempt. Will follow-up as appropriate.    6/19/2025

## 2025-06-19 NOTE — PT/OT/SLP PROGRESS
"Speech Language Pathology Treatment    Patient Name:  Cecil Clark   MRN:  [id number]  Admitting Diagnosis: ICH (intracerebral hemorrhage)    Recommendations:                 General Recommendations:  Speech/language therapy and Cognitive-linguistic therapy  Diet recommendations:  Regular Diet - IDDSI Level 7, Liquid Diet Level: Thin liquids - IDDSI Level 0   Aspiration Precautions: Strict aspiration precautions   General Precautions: Standard, aspiration, fall  Communication strategies:  provide increased time to answer and go to room if call light pushed  Discharge recommendations:  Moderate Intensity Therapy   Barriers to Discharge:  Decreased Care Giver Support    Assessment:     Cecil Clark is a 50 y.o. male with an SLP diagnosis of Cognitive-Linguistic Impairment.  He presents with decreased attention, decreased memory skills, decreased executive function skills and decreased insight into deficits.      Subjective     SLP reviewed Pt with RN  Pt presents with flat affect  He explains, "I guess that would be difficult" and "I take pictures of it"     Pain/Comfort:  Pain Rating 1: 0/10    Respiratory Status: Room air    Objective:     Has the patient been evaluated by SLP for swallowing?   Yes  Keep patient NPO? No     Pt unavailable upon initial attempt (RN care.) Upon later attempt, Pt found upright in bed with call light in reach as he talked to a friend on the phone. He was alert and oriented to person and place. He demonstrated a flat affect. Pt provided partial responses across attempts to elicit solutions to problem solving tasks. He completed fx math reasoning tasks for time management provided extra time and min verbal cues. Pt with minimal verbal responses across attempts to continue assessment of higher-level word-finding, mostly 1-2 word or head nods which required cues at times to expand. He completed fx writing task WNL.  Additional reading/writing tasks deferred 2/2 eyeglasses not present. He demonstrated " minimal sustained attention as he moved phones around in bed and benefited from consistent cues to redirect to structured tasks as session progressed. He was educated on SLP role and ongoing SLP POC. No additional questions. Pt did not demonstrate understanding.     Goals:   Multidisciplinary Problems       SLP Goals          Problem: SLP    Goal Priority Disciplines Outcome   SLP Goal     SLP Progressing   Description: Speech Language Pathology Goals  Goals expected to be met by 7/1  1.Pt will Ox4 utilizing external aids IND.   2. Pt will demonstrate understanding of external/internal memory strategies given min cues.   3. Pt will complete assessment of higher level word finding to determine need for tx.   4. Pt will complete mod complex reasoning/problem solving tasks with 90% accy given min cues.   5. Pt will complete assessment of reading, writing, and visual spatial skills to determine need for tx.                                 Plan:     Patient to be seen:  3 x/week   Plan of Care expires:  07/17/25  Plan of Care reviewed with:  patient   SLP Follow-Up:  Yes       Time Tracking:     SLP Treatment Date:   06/19/25  Speech Start Time:  1245  Speech Stop Time:  1255     Speech Total Time (min):  10 min    Billable Minutes: Speech Therapy Individual 10    06/19/2025

## 2025-06-19 NOTE — PLAN OF CARE
06/19/25 0942   Rounds   Attendance Provider;   Discharge Plan A Home with family   Why the patient remains in the hospital Requires continued medical care   Transition of Care Barriers None     Alice Duron MSW, LCSW  [facility]  Case Management Dept.

## 2025-06-19 NOTE — ASSESSMENT & PLAN NOTE
- Last ECHO on 3/7/25 w/ global hypokinesis, severely reduced systolic function w/ estimated EF 25-30%, and diastolic dysfunction  - CXR w/o focal consolidation, effusion, consolidations  - Continue home Entresto   - Resumed lasix 20mg bid (home dose 40mg bid)

## 2025-06-19 NOTE — ASSESSMENT & PLAN NOTE
49 y/o M presenting with disorientation and confusion for the past several days (exact time of onset unclear). He reports being unable to remember where he parked his car, difficulty with timelines, and inability to remember waking up in the mornings. CTH obtained in the ED revealed an acute L thalamic IPH.    Antithrombotics for secondary stroke prevention: Antiplatelets: None: Intracerebral Hemorrhage    Statins for secondary stroke prevention and hyperlipidemia, if present:   Statins: Atorvastatin- 40 mg daily - not indicated for ICH; however, patient has PMH of hyperlipidemia    Aggressive risk factor modification: HTN, DM, HLD     Rehab efforts: The patient has been evaluated by a stroke team provider and the therapy needs have been fully considered based off the presenting complaints and exam findings. The following therapy evaluations are needed: PT evaluate and treat, OT evaluate and treat, SLP evaluate and treat, PM&R evaluate for appropriate placement    Diagnostics ordered/pending: CT scan of head without contrast to asses brain parenchyma, HgbA1C to assess blood glucose levels, Lipid Profile to assess cholesterol levels, MRA head to assess vasculature, MRI head without contrast to assess brain parenchyma, TTE to assess cardiac function/status , TSH to assess thyroid function    VTE prophylaxis: Mechanical prophylaxis: Place SCDs  None: Reason for No Pharmacological VTE Prophylaxis: History of systemic or intracranial bleeding    BP parameters: ICH: SBP < 150    - Admit to NCC  - VS/Neuro checks q1  - NSGY, Vascular Neurology consulted  - No acute surgical intervention indicated at this time  - HOB >/= 30  - MRI/MRA brain 6/17 w/ evolving subacute L thalamic IPH. No acutely concerning changes       - Plan for f/u MRI/MRA in 6-8wk unless otherwise indicated  - SBP goal < 150       - Labetalol, hydralazine PRN; discontinued Cardene   - Continue to advance diet as tolerated  - Monitor I/O  - CBC, CMP, Mag,  Phos daily  - Heparin for DVT PPX  - PT/OT/SLP as appropriate

## 2025-06-19 NOTE — PLAN OF CARE
"[facility] Care Plan    POC reviewed with Cecil Clark and family at 0300. Patient verbalized understanding. Questions and concerns addressed. No acute events today. Pt progressing toward goals. Will continue to monitor. See below and flowsheets for full assessment and VS info.     -BM x1 overnight  -PRN labetolol and hydralazine given x1 each  -pt did not have any desire to eat or do anything other than sleep and use the bathroom        Is this a stroke patient? yes- Stroke booklet reviewed with patient and is at bedside.   Care Plan Individualization:     Neuro:  Glenda Coma Scale  Best Eye Response: 3-->(E3) to speech  Best Motor Response: 6-->(M6) obeys commands  Best Verbal Response: 5-->(V5) oriented  [place] Coma Scale Score: 14  Assessment Qualifiers: Patient not sedated/intubated  Pupil PERRLA: yes     24 hr Temp:  [97.6 °F (36.4 °C)-98.1 °F (36.7 °C)]     CV:   Rhythm: normal sinus rhythm  BP goals:   SBP < 160  MAP > 65    Resp:           Plan: N/A    GI/:     Diet/Nutrition Received: consistent carb/diabetic diet  Last Bowel Movement: 06/19/25       Intake/Output Summary (Last 24 hours) at 6/19/2025 0505  Last data filed at 6/19/2025 0325  Gross per 24 hour   Intake 285.09 ml   Output 750 ml   Net -464.91 ml     Unmeasured Output  Unmeasured Urine Occurrence: 1  Unmeasured Stool Occurrence: 1    Labs/Accuchecks:  Recent Labs   Lab 06/19/25  0323   WBC 6.88   RBC 5.33   HGB 16.0   HCT 47.4         Recent Labs   Lab 06/19/25  0323      K 4.1   CO2 19*      BUN 24*   CREATININE 1.4   ALKPHOS 51   ALT 11   AST 26   BILITOT 1.0      Recent Labs   Lab 06/16/25  2019   PROTIME 10.8   INR 1.0   APTT 27.3    No results for input(s): "CPK", "CPKMB", "TROPONINI", "MB" in the last 168 hours.    Electrolytes: N/A - electrolytes WDL  Accuchecks: ACHS    Gtts:      LDA/Wounds:    Juan Risk Assessment  Sensory Perception: 4-->no impairment  Moisture: 4-->rarely moist  Activity: 3-->walks " occasionally  Mobility: 3-->slightly limited  Nutrition: 1-->very poor  Friction and Shear: 3-->no apparent problem  Juan Score: 18  Is your juan score 12 or less? no          Restraints:        WCTM       Problem: Adult Inpatient Plan of Care  Goal: Patient-Specific Goal (Individualized)  Outcome: Not Progressing  Goal: Absence of Hospital-Acquired Illness or Injury  Outcome: Progressing  Goal: Optimal Comfort and Wellbeing  Outcome: Not Progressing  Goal: Readiness for Transition of Care  Outcome: Not Progressing     Problem: Stroke, Intracerebral Hemorrhage  Goal: Optimal Coping  Outcome: Not Progressing  Goal: Effective Bowel Elimination  Outcome: Progressing  Goal: Optimal Cognitive Function  Outcome: Progressing  Goal: Effective Communication Skills  Outcome: Progressing  Goal: Optimal Functional Ability  Outcome: Progressing  Goal: Optimal Nutrition Intake  Outcome: Not Progressing  Goal: Effective Oxygenation and Ventilation  Outcome: Progressing  Goal: Improved Sensorimotor Function  Outcome: Progressing  Goal: Safe and Effective Swallow  Outcome: Progressing  Goal: Effective Urinary Elimination  Outcome: Progressing     Problem: Diabetes Comorbidity  Goal: Blood Glucose Level Within Targeted Range  Outcome: Progressing     Problem: Acute Kidney Injury/Impairment  Goal: Fluid and Electrolyte Balance  Outcome: Progressing  Goal: Improved Oral Intake  Outcome: Progressing  Goal: Effective Renal Function  Outcome: Progressing     Problem: Wound  Goal: Optimal Coping  Outcome: Progressing  Goal: Optimal Functional Ability  Outcome: Progressing  Goal: Absence of Infection Signs and Symptoms  Outcome: Progressing  Goal: Improved Oral Intake  Outcome: Progressing  Goal: Optimal Pain Control and Function  Outcome: Progressing  Goal: Skin Health and Integrity  Outcome: Progressing  Goal: Optimal Wound Healing  Outcome: Progressing

## 2025-06-19 NOTE — SUBJECTIVE & OBJECTIVE
Objective:     Vitals:  Temp: 98.1 °F (36.7 °C)  Pulse: 92  Rhythm: normal sinus rhythm  BP: 138/81  MAP (mmHg): 104  Resp: (!) 33  SpO2: 95 %    Temp  Min: 97.6 °F (36.4 °C)  Max: 98.1 °F (36.7 °C)  Pulse  Min: 79  Max: 94  BP  Min: 127/77  Max: 180/111  MAP (mmHg)  Min: 97  Max: 140  Resp  Min: 12  Max: 39  SpO2  Min: 95 %  Max: 99 %    06/18 0701 - 06/19 0700  In: 257.6 [P.O.:140; I.V.:117.6]  Out: 750 [Urine:750]   Unmeasured Output  Unmeasured Urine Occurrence: 1  Unmeasured Stool Occurrence: 1        Physical Exam  Vitals and nursing note reviewed.   Constitutional:       General: He is not in acute distress.  HENT:      Head: Normocephalic.      Nose: Nose normal.   Eyes:      Extraocular Movements: Extraocular movements intact.      Pupils: Pupils are equal, round, and reactive to light.   Cardiovascular:      Rate and Rhythm: Normal rate and regular rhythm.   Pulmonary:      Effort: Pulmonary effort is normal.      Breath sounds: Normal breath sounds.   Abdominal:      General: Abdomen is flat.      Tenderness: There is no abdominal tenderness.   Musculoskeletal:         General: Normal range of motion.      Cervical back: Normal range of motion.   Skin:     General: Skin is warm and dry.   Neurological:      Mental Status: He is alert.      GCS: GCS eye subscore is 4. GCS verbal subscore is 5. GCS motor subscore is 6.      Cranial Nerves: No cranial nerve deficit or facial asymmetry.      Sensory: Sensation is intact.      Motor: Weakness (LUE weakness (likely baseline given history of previous CVAs)) present.   Psychiatric:         Attention and Perception: Attention normal.         Mood and Affect: Affect is flat.         Behavior: Behavior normal. Behavior is cooperative.        Medications:  Continuous Scheduledatorvastatin, 40 mg, Daily  carvediloL, 12.5 mg, BID  furosemide, 20 mg, BID  heparin (porcine), 5,000 Units, Q8H  mupirocin, , BID  sacubitriL-valsartan, 1 tablet, BID    PRNdextrose 50%,  12.5 g, PRN  dextrose 50%, 25 g, PRN  glucagon (human recombinant), 1 mg, PRN  glucose, 16 g, PRN  glucose, 24 g, PRN  hydrALAZINE, 10 mg, Q4H PRN  insulin aspart U-100, 0-10 Units, QID (AC + HS) PRN  labetalol, 10 mg, Q4H PRN  ondansetron, 4 mg, Q8H PRN  sodium chloride 0.9%, 10 mL, PRN      Today I personally reviewed pertinent medications, lines/drains/airways, imaging, cardiology results, laboratory results, microbiology results, notably:    Diet  Diet Consistent Carbohydrate 2000 Calories (up to 75 gm per meal); Thin; Standard Tray  Diet Consistent Carbohydrate 2000 Calories (up to 75 gm per meal); Thin; Standard Tray

## 2025-06-19 NOTE — ASSESSMENT & PLAN NOTE
- No documented history of CKD; however, GFR 60 on admission  - Renal function improving; suspect DIONTE superimposed on CKD  - GFR 37-45, creatinine 1.8-2.1 during recent admission (3/7-3/14)  - Avoid nephrotoxins  - Renally dose meds  - CMP daily       - Replace lytes PRN

## 2025-06-19 NOTE — PLAN OF CARE
"[facility] Care Plan  POC reviewed with Cecil Clark and family at 1600. Patient and Family verbalized understanding. Questions and concerns addressed. No acute events today. Pt progressing toward goals. Will continue to monitor. See below and flowsheets for full assessment and VS info.     - Upon sisters request RN asked [facility] team if the pt is able to fly to [place] after discharge, team approved, sister notified.  - Transfer orders.    Is this a stroke patient? yes- Stroke booklet reviewed with family and is at bedside.   Care Plan Individualization:     Neuro:  Glenda Coma Scale  Best Eye Response: 4-->(E4) spontaneous  Best Motor Response: 6-->(M6) obeys commands  Best Verbal Response: 5-->(V5) oriented  Glenda Coma Scale Score: 15  Assessment Qualifiers: Patient not sedated/intubated  Pupil PERRLA: yes     24 hr Temp:  [97.7 °F (36.5 °C)-98.4 °F (36.9 °C)]     CV:   Rhythm: normal sinus rhythm  BP goals:   SBP < 160  MAP > 65    Resp:           Plan: N/A    GI/:     Diet/Nutrition Received: consistent carb/diabetic diet  Last Bowel Movement: 06/19/25       Intake/Output Summary (Last 24 hours) at 6/19/2025 1654  Last data filed at 6/19/2025 1305  Gross per 24 hour   Intake 450 ml   Output 750 ml   Net -300 ml     Unmeasured Output  Unmeasured Urine Occurrence: 1  Unmeasured Stool Occurrence: 1    Labs/Accuchecks:  Recent Labs   Lab 06/19/25  0323   WBC 6.88   RBC 5.33   HGB 16.0   HCT 47.4         Recent Labs   Lab 06/19/25  0323      K 4.1   CO2 19*      BUN 24*   CREATININE 1.4   ALKPHOS 51   ALT 11   AST 26   BILITOT 1.0      Recent Labs   Lab 06/16/25  2019   PROTIME 10.8   INR 1.0   APTT 27.3    No results for input(s): "CPK", "CPKMB", "TROPONINI", "MB" in the last 168 hours.    Electrolytes: N/A - electrolytes WDL  Accuchecks: ACHS    Gtts:      LDA/Wounds:    Juan Risk Assessment  Sensory Perception: 4-->no impairment  Moisture: 4-->rarely moist  Activity: 3-->walks " occasionally  Mobility: 3-->slightly limited  Nutrition: 3-->adequate  Friction and Shear: 3-->no apparent problem  Juan Score: 20    Is your juan score 12 or less? no      WCTM

## 2025-06-19 NOTE — ASSESSMENT & PLAN NOTE
- SBP goal < 150  - Increased Coreg to 12.5mg BID  - Labetalol, hydralazine PRN       - Cardene discontinued  - Added lasix 20mg bid (home dose 40mg BID)

## 2025-06-19 NOTE — PROGRESS NOTES
[facility]  Vascular Neurology  [facility]  Progress Note    Assessment/Plan:     * ICH (intracerebral hemorrhage)  Mr. Cecil Clark is a 50 y.o. patient here for mental status changes. Complex prior neurovascular history with right thalamic ICH (2021) and R MCA stroke (2023) with residual LSW. Patient has been feeling more confused and disoriented on the last 3 days. No evidence of headaches or sensory changes. Hypertensive on admission on the 210s range, started on cardene gtt. Initial NIHSS 3 for L arm/leg drift from residual LSW with LUE ataxia/dysmetria with action and intention tremor. CTH with new small left thalamic parenchymal hemorrhage with mass effect on the left lateral ventricle without MLS. No hydrocephalus noted. No acute interventions per neurosurgery. ETIOLOGY: hypertensive.     Optimizing BP, renal function improved. Will monitor for a day and possible discharge tomorrow.       Antithrombotics for secondary stroke prevention: Not recommending any AP in the setting of acute L thalamic ICH.     Statins for secondary stroke prevention and hyperlipidemia, if present: Recommending holding statins in the acute setting of L thalamic ICH on a patient with complex prior neurovascular history for the high risk of bleeding.     Aggressive risk factor modification: HTN     Rehab efforts: The patient has been evaluated by a stroke team provider and the therapy needs have been fully considered based off the presenting complaints and exam findings. The following therapy evaluations are needed: PT evaluate and treat, OT evaluate and treat, SLP evaluate and treat, PM&R evaluate for appropriate placement    Diagnostics ordered/pending: None     VTE prophylaxis: None: Reason for No Pharmacological VTE Prophylaxis: History of systemic or intracranial bleeding    BP parameters: ICH: SBP Goal Range 130-150 -     Chronic combined systolic and diastolic congestive heart failure  Recent EF  35-40%, improved from 3/2025  Home entresto restarted today    Hyperlipidemia  Currently on atorvastatin per NCC.   Would recommend holding statins in the setting of ICH with complex neurovascular history.   Recommend trial of low dose Zetia 10. Will defer to NCC.     Type 2 diabetes mellitus  ICH risk factor.   Admission A1c 8.8.   Currently on MDSSI per NCC.     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  On home Lasix 40. Will defer to primary team when to resume.   Would recommend addition of Jardiance 10 for GDMT pathway, which usually tends to improve hypertension on HF patients. Will defer to primary.     Hypokalemia  Replacement per NCC protocol.   Normalized post repletion     Hypertension  ICH risk factor.   Admission SBP 180s-210s.  Started on cardene gtt in the ED, was off for a couple of hours but required drip back on today for SBP goal<140  Home Entresto and coreg resumed and started on lasix      History of intracerebral hemorrhage without residual deficit  -R thalamic IPH in 2021 06/17/2025 Admitted to [facility] last night after CTH revealed a new L thalamic IPH. No acute events overnight. Repeat CTH stable. Cardene drip retarted this morning for SBP goal<140. MRI today shows evolving recent to subacute aged left thalamic parenchymal hemorrhage, MRA unremarkable.   6-18-25 off of cardene at 1400, no acute event overnight, neuro stbale  06/19/2025 off cardene and resumed on home medications. Unclear whether patient is compliant with medications at home. Will s/d to NPU for BP optimization but if tolerating tomorrow, then can discharge home       STROKE DOCUMENTATION   Acute Stroke Times   Last Known Normal Date: 06/14/25  Unknown Normal Time: Unknown Time  Unknown Symptom Onset Date:  Unknown Date  Unknown Symptom Onset Time: Unknown Time    NIH Scale:  1a. Level of Consciousness: 0-->Alert, keenly responsive  1b. LOC Questions: 0-->Answers both questions correctly  1c. LOC Commands: 0-->Performs both tasks correctly  2. Best Gaze: 0-->Normal  3. Visual: 0-->No visual loss  4. Facial Palsy: 0-->Normal symmetrical movements  5a. Motor Arm, Left: 0-->No drift, limb holds 90 (or 45) degrees for full 10 secs  5b. Motor Arm, Right: 0-->No drift, limb holds 90 (or 45) degrees for full 10 secs  6a. Motor Leg, Left: 0-->No drift, leg holds 30 degree position for full 5 secs  6b. Motor Leg, Right: 0-->No drift, leg holds 30 degree position for full 5 secs  7. Limb Ataxia: 0-->Absent  8. Sensory: 0-->Normal, no sensory loss  9. Best Language: 0-->No aphasia, normal  10. Dysarthria: 0-->Normal  11. Extinction and Inattention (formerly Neglect): 0-->No abnormality  Total (NIH Stroke Scale): 0       Modified [place] Score: 0  [place] Coma Scale:    ABCD2 Score:    [id number] Score:   HAS -BLED Score:   ICH Score:0  Hunt & Gonzalez Classification:      Hemorrhagic change of an Ischemic Stroke: Does this patient have an ischemic stroke with hemorrhagic changes? No     Neurologic Chief Complaint: L thalamus ICH    Subjective:     Interval History: Patient is seen for follow-up neurological assessment and treatment recommendations: See hospital course.     HPI, Past Medical, Family, and Social History remains the same as documented in the initial encounter.     Review of Systems   Constitutional:  Negative for chills and fever.   Neurological:  Negative for speech difficulty and weakness.     Scheduled Meds:   atorvastatin  40 mg Oral Daily    carvediloL  3.125 mg Oral BID    mupirocin   Nasal BID    sacubitriL-valsartan  1 tablet Oral BID    senna-docusate  1 tablet Oral BID     Continuous Infusions:   nicardipine  0-15 mg/hr Intravenous Continuous 25 mL/hr at 06/18/25 0611 5 mg/hr at 06/18/25 0611     PRN  Meds:  Current Facility-Administered Medications:     dextrose 50%, 12.5 g, Intravenous, PRN    glucagon (human recombinant), 1 mg, Intramuscular, PRN    hydrALAZINE, 10 mg, Intravenous, Q4H PRN    insulin aspart U-100, 0-10 Units, Subcutaneous, Q6H PRN    labetalol, 10 mg, Intravenous, Q4H PRN    ondansetron, 4 mg, Intravenous, Q8H PRN    sodium chloride 0.9%, 10 mL, Intravenous, PRN    Objective:     Vital Signs (Most Recent):  Temp: 98.2 °F (36.8 °C) (06/18/25 0301)  Pulse: 93 (06/18/25 0720)  Resp: 12 (06/18/25 0720)  BP: (!) 144/83 (06/18/25 0631)  SpO2: 98 % (06/18/25 0720)  BP Location: Left arm    Vital Signs Range (Last 24H):  Temp:  [97.5 °F (36.4 °C)-98.4 °F (36.9 °C)]   Pulse:  []   Resp:  [6-28]   BP: (114-173)/()   SpO2:  [95 %-99 %]   BP Location: Left arm       Physical Exam  Vitals and nursing note reviewed.   Constitutional:       General: He is not in acute distress.     Appearance: Normal appearance.   Eyes:      Extraocular Movements: Extraocular movements intact.      Pupils: Pupils are equal, round, and reactive to light.   Abdominal:      General: There is no distension.      Palpations: There is no mass.   Neurological:      General: No focal deficit present.      Mental Status: He is alert and oriented to person, place, and time. Mental status is at baseline.              Neurological Exam:   LOC: alert  Attention Span: Good   Language: No aphasia  Articulation: No dysarthria  Orientation: Person, Place, Time   Visual Fields: Full  EOM (CN III, IV, VI): Full/intact  Pupils (CN II, III): PERRL  Facial Sensation (CN V): Normal  Facial Movement (CN VII): Symmetric facial expression    Motor: Arm left  Paresis: 5/5  Leg left  Paresis: 5/5  Arm right  Normal 5/5  Leg right Normal 5/5  Cerebellum: Upper Extremity Appendicular Ataxia (Finger Nose Finger)  Left  Sensation: Intact to light touch, temperature and vibration    Laboratory:  CMP:   Recent Labs   Lab 06/18/25  0226   CALCIUM  "8.3*   ALBUMIN 3.0*   PROT 5.7*      K 3.2*   CO2 23      BUN 22*   CREATININE 1.7*   ALKPHOS 60   ALT 13   AST 22   BILITOT 1.3*     BMP:   Recent Labs   Lab 06/18/25 0226      K 3.2*      CO2 23   BUN 22*   CREATININE 1.7*   CALCIUM 8.3*     CBC:   Recent Labs   Lab 06/18/25 0226   WBC 6.81   RBC 4.94   HGB 14.6   HCT 43.8      MCV 89   MCH 29.6   MCHC 33.3     Lipid Panel:   Recent Labs   Lab 06/16/25 1859   CHOL 248*   LDLCALC 148.8   HDL 60   TRIG 196*     Coagulation:   Recent Labs   Lab 06/16/25  2019   INR 1.0   APTT 27.3     Platelet Aggregation Study: No results for input(s): "PLTAGG", "PLTAGINTERP", "PLTAGREGLACO", "ADPPLTAGGREG" in the last 168 hours.  Hgb A1C:   Recent Labs   Lab 06/16/25 1859   HGBA1C 8.8*     TSH:   Recent Labs   Lab 06/16/25 1859   TSH 1.616       Diagnostic Results     Brain Imaging/Vessel Imaging     MRA/MRI Brain WO - 6/17/2025    Impression:     MRI brain: Evolving recent to subacute aged left thalamic parenchymal hemorrhage.  Please note evaluation for underlying mass lesion limited by noncontrast technique.  Clinical correlation and follow-up recommended.     Patchy and confluent T2 FLAIR signal abnormality supratentorial white matter elsewhere while nonspecific concerning for underlying chronic ischemic change     Ventricles stable without hydrocephalus     Remote hemorrhage right basal ganglia/thalamus with ipsilateral wallerian degeneration.     MRA head: Unremarkable MRA head as detailed above specifically without focal stenosis or proximal large vessel occlusion.     Please note there is no hypertrophied vasculature associated with left thalamic hemorrhage although only partially included in the field of view.  Further evaluation with repeat MRA imaging to include the entirety of the thalamic hemorrhage advised.    CTH WO - 6/17/2025    Impression:     Stable CT appearance of the small acute parenchymal hematoma centered in the left " thalamus.  No new intracranial hemorrhage.     Ventricles are stable in size.     Recommendations include clinical correlation and continued imaging follow-up.    CTH WO - 6/16/2025    Impression:     1. Small acute parenchymal hemorrhage centered in the left thalamus, likely hypertensive in etiology.  There is local mass effect on the adjacent left lateral ventricle without significant midline shift.  2. Mild prominence of the lateral ventricles when compared to prior.  This may relate to interval volume loss, however early acute hydrocephalus may present similarly.  Recommend attention on short-term follow-up.  3. Chronic microvascular ischemic change and sequela of remote hemorrhage, as detailed above.  Right caudate suspected remote tiny lacunar type infarct but new from 10/25/2023 imaging.    Cardiac Imaging     TTE - 6/17/2025  Summary      Left Ventricle: The left ventricle is normal in size. Mildly increased ventricular mass. Mildly increased wall thickness. There is mild concentric hypertrophy. Mild global hypokinesis present. There is moderately reduced systolic function with a visually estimated ejection fraction of 35 - 40%. Quantitated ejection fraction is 32%. Global longitudinal strain is reduced moderately reduced measuring -10.8% There is diastolic dysfunction but grade cannot be determined.    Right Ventricle: The right ventricle is normal in size Systolic function is normal.    Left Atrium: The left atrium is mildly dilated    Mitral Valve: There is mild regurgitation.    IVC/SVC: Normal venous pressure at 3 mmHg.    Natacha Cage MD  Comprehensive Stroke Center  Department of Vascular Neurology   [facility] - Neuro Critical Care

## 2025-06-19 NOTE — PROGRESS NOTES
[facility]  Neurocritical Care  Progress Note    Admit Date: 6/16/2025  Service Date: 06/19/2025  Length of Stay: 3    Subjective:     Chief Complaint: ICH (intracerebral hemorrhage)    History of Present Illness: 51 y/o M with PMH of HTN, HLD, combined systolic and diastolic CHF, R thalamic ICH (2021), R internal capsule stroke (2023) w/ residual LSW, and T2DM presenting with disorientation and confusion for the past several days, exact time of onset unclear. He reports being unable to remember where he parked his car, difficulty with timelines, and inability to remember waking up in the mornings. CTH obtained in the ED revealed an acute L thalamic IPH. Questionable use of DAPT as ASA/Plavix listed on home med list, but patient unable to state which meds he is currently taking. Given recent disorientation/confusion and concern for possible AP usage, DDAVP administered. Also unclear if patient taking any home meds as /139 upon ED arrival, requiring initiation of Cardene. He will be admitted to [facility] for further management.    Hospital Course: 06/17/2025: patient was admitted to the [facility] overnight. No acute intervals. BP controlled on Cardene gtt with goals <140 sys. Restarted entresto. Repeat CTH stable. MRI today shows evolving recent to subacute aged left thalamic parenchymal hemorrhage.    6/18/2025: Increased coreg to 6.25; will continue to wean Cardene as tolerated. Heparin DVT prophylaxis started. Anticipate SD if patient maintains BP goal off of Cardene.  6/19/2025: Increased coreg to 12.5 bid. Added lasix 20mg. No additional Cardene needed. SD to vascular neurology for monitoring of BP and renal function.      Objective:     Vitals:  Temp: 98.1 °F (36.7 °C)  Pulse: 92  Rhythm: normal sinus rhythm  BP: 138/81  MAP (mmHg): 104  Resp: (!) 33  SpO2: 95 %    Temp  Min: 97.6 °F (36.4 °C)  Max: 98.1 °F (36.7 °C)  Pulse  Min: 79  Max: 94  BP  Min: 127/77  Max: 180/111  MAP (mmHg)  Min: 97   Max: 140  Resp  Min: 12  Max: 39  SpO2  Min: 95 %  Max: 99 %    06/18 0701 - 06/19 0700  In: 257.6 [P.O.:140; I.V.:117.6]  Out: 750 [Urine:750]   Unmeasured Output  Unmeasured Urine Occurrence: 1  Unmeasured Stool Occurrence: 1        Physical Exam  Vitals and nursing note reviewed.   Constitutional:       General: He is not in acute distress.  HENT:      Head: Normocephalic.      Nose: Nose normal.   Eyes:      Extraocular Movements: Extraocular movements intact.      Pupils: Pupils are equal, round, and reactive to light.   Cardiovascular:      Rate and Rhythm: Normal rate and regular rhythm.   Pulmonary:      Effort: Pulmonary effort is normal.      Breath sounds: Normal breath sounds.   Abdominal:      General: Abdomen is flat.      Tenderness: There is no abdominal tenderness.   Musculoskeletal:         General: Normal range of motion.      Cervical back: Normal range of motion.   Skin:     General: Skin is warm and dry.   Neurological:      Mental Status: He is alert.      GCS: GCS eye subscore is 4. GCS verbal subscore is 5. GCS motor subscore is 6.      Cranial Nerves: No cranial nerve deficit or facial asymmetry.      Sensory: Sensation is intact.      Motor: Weakness (LUE weakness (likely baseline given history of previous CVAs)) present.   Psychiatric:         Attention and Perception: Attention normal.         Mood and Affect: Affect is flat.         Behavior: Behavior normal. Behavior is cooperative.        Medications:  Continuous Scheduledatorvastatin, 40 mg, Daily  carvediloL, 12.5 mg, BID  furosemide, 20 mg, BID  heparin (porcine), 5,000 Units, Q8H  mupirocin, , BID  sacubitriL-valsartan, 1 tablet, BID    PRNdextrose 50%, 12.5 g, PRN  dextrose 50%, 25 g, PRN  glucagon (human recombinant), 1 mg, PRN  glucose, 16 g, PRN  glucose, 24 g, PRN  hydrALAZINE, 10 mg, Q4H PRN  insulin aspart U-100, 0-10 Units, QID (AC + HS) PRN  labetalol, 10 mg, Q4H PRN  ondansetron, 4 mg, Q8H PRN  sodium chloride 0.9%, 10  mL, PRN      Today I personally reviewed pertinent medications, lines/drains/airways, imaging, cardiology results, laboratory results, microbiology results, notably:    Diet  Diet Consistent Carbohydrate 2000 Calories (up to 75 gm per meal); Thin; Standard Tray  Diet Consistent Carbohydrate 2000 Calories (up to 75 gm per meal); Thin; Standard Tray    Assessment/Plan:     Neuro  * ICH (intracerebral hemorrhage)  49 y/o M presenting with disorientation and confusion for the past several days (exact time of onset unclear). He reports being unable to remember where he parked his car, difficulty with timelines, and inability to remember waking up in the mornings. CTH obtained in the ED revealed an acute L thalamic IPH.    Antithrombotics for secondary stroke prevention: Antiplatelets: None: Intracerebral Hemorrhage    Statins for secondary stroke prevention and hyperlipidemia, if present:   Statins: Atorvastatin- 40 mg daily - not indicated for ICH; however, patient has PMH of hyperlipidemia    Aggressive risk factor modification: HTN, DM, HLD     Rehab efforts: The patient has been evaluated by a stroke team provider and the therapy needs have been fully considered based off the presenting complaints and exam findings. The following therapy evaluations are needed: PT evaluate and treat, OT evaluate and treat, SLP evaluate and treat, PM&R evaluate for appropriate placement    Diagnostics ordered/pending: CT scan of head without contrast to asses brain parenchyma, HgbA1C to assess blood glucose levels, Lipid Profile to assess cholesterol levels, MRA head to assess vasculature, MRI head without contrast to assess brain parenchyma, TTE to assess cardiac function/status , TSH to assess thyroid function    VTE prophylaxis: Mechanical prophylaxis: Place SCDs  None: Reason for No Pharmacological VTE Prophylaxis: History of systemic or intracranial bleeding    BP parameters: ICH: SBP < 150    - Admit to NCC  - VS/Neuro checks  q1  - NSGY, Vascular Neurology consulted  - No acute surgical intervention indicated at this time  - HOB >/= 30  - MRI/MRA brain 6/17 w/ evolving subacute L thalamic IPH. No acutely concerning changes       - Plan for f/u MRI/MRA in 6-8wk unless otherwise indicated  - SBP goal < 150       - Labetalol, hydralazine PRN; discontinued Cardene   - Continue to advance diet as tolerated  - Monitor I/O  - CBC, CMP, Mag, Phos daily  - Heparin for DVT PPX  - PT/OT/SLP as appropriate    Cardiac/Vascular  Chronic combined systolic and diastolic congestive heart failure  - Last ECHO on 3/7/25 w/ global hypokinesis, severely reduced systolic function w/ estimated EF 25-30%, and diastolic dysfunction  - CXR w/o focal consolidation, effusion, consolidations  - Continue home Entresto   - Resumed lasix 20mg bid (home dose 40mg bid)    Hypertension  - SBP goal < 150  - Increased Coreg to 12.5mg BID  - Labetalol, hydralazine PRN       - Cardene discontinued  - Added lasix 20mg bid (home dose 40mg BID)    Renal/  Stage 2 chronic kidney disease  - No documented history of CKD; however, GFR 60 on admission  - Renal function improving; suspect DIONTE superimposed on CKD  - GFR 37-45, creatinine 1.8-2.1 during recent admission (3/7-3/14)  - Avoid nephrotoxins  - Renally dose meds  - CMP daily       - Replace lytes PRN    Hypokalemia  -K+ 3.3 on admission  -CMP daily, replace PRN    Endocrine  Type 2 diabetes mellitus  - Hgb A1c 8.8  - Accuchecks q6 w/ SSI and aspart           The patient is being Prophylaxed for:  Venous Thromboembolism with: Chemical  Stress Ulcer with: None  Ventilator Pneumonia with: not applicable    Activity Orders            Progressive Mobility Protocol (mobilize patient to their highest level of functioning at least twice daily) starting at 06/17 0800    Diet Consistent Carbohydrate 2000 Calories (up to 75 gm per meal); Thin; Standard Tray: Carb Control starting at 06/17 0633    Turn patient starting at 06/16 2200     Elevate [facility] 30 starting at 06/16 9993          Full Code    Hank Cortes MD  Neurocritical Care  [facility] - Neuro Critical Care

## 2025-06-19 NOTE — EICU
Intervention Initiated From:  COR / DAMASOU    Brandy intervened regarding:  Rounding (Video assessment)  VICU Night Rounds Checklist  24H Vital Sign Range:  Temp:  [97.6 °F (36.4 °C)-98.4 °F (36.9 °C)]   Pulse:  []   Resp:  [11-24]   BP: (118-173)/()   SpO2:  [95 %-99 %]     Video rounds

## 2025-06-19 NOTE — EICU
Virtual ICU Quality Rounds    Admit Date: 6/16/2025  Hospital Day: 3    ICU Day: 2d 12h    24H Vital Sign Range:  Temp:  [97.6 °F (36.4 °C)-98.1 °F (36.7 °C)]   Pulse:  [79-93]   Resp:  [12-24]   BP: (125-166)/()   SpO2:  [96 %-99 %]     VICU Surveillance Screening                    LDA reconciliation : Yes

## 2025-06-20 LAB
ABSOLUTE EOSINOPHIL (OHS): 0.15 K/UL
ABSOLUTE MONOCYTE (OHS): 0.46 K/UL (ref 0.3–1)
ABSOLUTE NEUTROPHIL COUNT (OHS): 3.35 K/UL (ref 1.8–7.7)
ALBUMIN SERPL BCP-MCNC: 2.8 G/DL (ref 3.5–5.2)
ALP SERPL-CCNC: 48 UNIT/L (ref 40–150)
ALT SERPL W/O P-5'-P-CCNC: 10 UNIT/L (ref 10–44)
ANION GAP (OHS): 10 MMOL/L (ref 8–16)
AST SERPL-CCNC: 20 UNIT/L (ref 11–45)
BASOPHILS # BLD AUTO: 0.05 K/UL
BASOPHILS NFR BLD AUTO: 0.9 %
BILIRUB SERPL-MCNC: 0.7 MG/DL (ref 0.1–1)
BUN SERPL-MCNC: 22 MG/DL (ref 6–20)
CALCIUM SERPL-MCNC: 8.3 MG/DL (ref 8.7–10.5)
CHLORIDE SERPL-SCNC: 105 MMOL/L (ref 95–110)
CO2 SERPL-SCNC: 23 MMOL/L (ref 23–29)
CREAT SERPL-MCNC: 1.4 MG/DL (ref 0.5–1.4)
ERYTHROCYTE [DISTWIDTH] IN BLOOD BY AUTOMATED COUNT: 13.3 % (ref 11.5–14.5)
GFR SERPLBLD CREATININE-BSD FMLA CKD-EPI: 60 ML/MIN/1.73/M2
GLUCOSE SERPL-MCNC: 147 MG/DL (ref 70–110)
HCT VFR BLD AUTO: 42.7 % (ref 40–54)
HGB BLD-MCNC: 14.6 GM/DL (ref 14–18)
IMM GRANULOCYTES # BLD AUTO: 0.02 K/UL (ref 0–0.04)
IMM GRANULOCYTES NFR BLD AUTO: 0.4 % (ref 0–0.5)
INDIRECT COOMBS: NORMAL
LYMPHOCYTES # BLD AUTO: 1.44 K/UL (ref 1–4.8)
MAGNESIUM SERPL-MCNC: 1.7 MG/DL (ref 1.6–2.6)
MCH RBC QN AUTO: 30 PG (ref 27–31)
MCHC RBC AUTO-ENTMCNC: 34.2 G/DL (ref 32–36)
MCV RBC AUTO: 88 FL (ref 82–98)
NUCLEATED RBC (/100WBC) (OHS): 0 /100 WBC
PHOSPHATE SERPL-MCNC: 3.2 MG/DL (ref 2.7–4.5)
PLATELET # BLD AUTO: 186 K/UL (ref 150–450)
PMV BLD AUTO: 10.4 FL (ref 9.2–12.9)
POCT GLUCOSE: 144 MG/DL (ref 70–110)
POCT GLUCOSE: 151 MG/DL (ref 70–110)
POCT GLUCOSE: 220 MG/DL (ref 70–110)
POCT GLUCOSE: 277 MG/DL (ref 70–110)
POTASSIUM SERPL-SCNC: 3.2 MMOL/L (ref 3.5–5.1)
PROT SERPL-MCNC: 5.6 GM/DL (ref 6–8.4)
RBC # BLD AUTO: 4.86 M/UL (ref 4.6–6.2)
RELATIVE EOSINOPHIL (OHS): 2.7 %
RELATIVE LYMPHOCYTE (OHS): 26.3 % (ref 18–48)
RELATIVE MONOCYTE (OHS): 8.4 % (ref 4–15)
RELATIVE NEUTROPHIL (OHS): 61.3 % (ref 38–73)
RH BLD: NORMAL
SODIUM SERPL-SCNC: 138 MMOL/L (ref 136–145)
SPECIMEN OUTDATE: NORMAL
WBC # BLD AUTO: 5.47 K/UL (ref 3.9–12.7)

## 2025-06-20 PROCEDURE — 25000003 PHARM REV CODE 250: Performed by: REGISTERED NURSE

## 2025-06-20 PROCEDURE — 97535 SELF CARE MNGMENT TRAINING: CPT

## 2025-06-20 PROCEDURE — 83735 ASSAY OF MAGNESIUM: CPT | Performed by: REGISTERED NURSE

## 2025-06-20 PROCEDURE — 97530 THERAPEUTIC ACTIVITIES: CPT

## 2025-06-20 PROCEDURE — 99233 SBSQ HOSP IP/OBS HIGH 50: CPT | Mod: ,,, | Performed by: PSYCHIATRY & NEUROLOGY

## 2025-06-20 PROCEDURE — 63600175 PHARM REV CODE 636 W HCPCS

## 2025-06-20 PROCEDURE — 82040 ASSAY OF SERUM ALBUMIN: CPT | Performed by: REGISTERED NURSE

## 2025-06-20 PROCEDURE — 11000001 HC ACUTE MED/SURG PRIVATE ROOM

## 2025-06-20 PROCEDURE — 94761 N-INVAS EAR/PLS OXIMETRY MLT: CPT

## 2025-06-20 PROCEDURE — 84100 ASSAY OF PHOSPHORUS: CPT | Performed by: REGISTERED NURSE

## 2025-06-20 PROCEDURE — 25000003 PHARM REV CODE 250

## 2025-06-20 PROCEDURE — 25000003 PHARM REV CODE 250: Performed by: PSYCHIATRY & NEUROLOGY

## 2025-06-20 PROCEDURE — 86900 BLOOD TYPING SEROLOGIC ABO: CPT

## 2025-06-20 PROCEDURE — 85025 COMPLETE CBC W/AUTO DIFF WBC: CPT | Performed by: REGISTERED NURSE

## 2025-06-20 PROCEDURE — 25000003 PHARM REV CODE 250: Performed by: INTERNAL MEDICINE

## 2025-06-20 RX ORDER — SODIUM,POTASSIUM PHOSPHATES 280-250MG
2 POWDER IN PACKET (EA) ORAL
Status: CANCELLED | OUTPATIENT
Start: 2025-06-20

## 2025-06-20 RX ORDER — LANOLIN ALCOHOL/MO/W.PET/CERES
800 CREAM (GRAM) TOPICAL
Status: CANCELLED | OUTPATIENT
Start: 2025-06-20

## 2025-06-20 RX ADMIN — HEPARIN SODIUM 5000 UNITS: 5000 INJECTION INTRAVENOUS; SUBCUTANEOUS at 02:06

## 2025-06-20 RX ADMIN — MUPIROCIN: 20 OINTMENT TOPICAL at 08:06

## 2025-06-20 RX ADMIN — INSULIN ASPART 2 UNITS: 100 INJECTION, SOLUTION INTRAVENOUS; SUBCUTANEOUS at 07:06

## 2025-06-20 RX ADMIN — HEPARIN SODIUM 5000 UNITS: 5000 INJECTION INTRAVENOUS; SUBCUTANEOUS at 05:06

## 2025-06-20 RX ADMIN — SACUBITRIL AND VALSARTAN 1 TABLET: 24; 26 TABLET, FILM COATED ORAL at 11:06

## 2025-06-20 RX ADMIN — LABETALOL HYDROCHLORIDE 10 MG: 5 INJECTION, SOLUTION INTRAVENOUS at 06:06

## 2025-06-20 RX ADMIN — INSULIN ASPART 6 UNITS: 100 INJECTION, SOLUTION INTRAVENOUS; SUBCUTANEOUS at 05:06

## 2025-06-20 RX ADMIN — CARVEDILOL 12.5 MG: 6.25 TABLET, FILM COATED ORAL at 08:06

## 2025-06-20 RX ADMIN — HYDRALAZINE HYDROCHLORIDE 10 MG: 20 INJECTION, SOLUTION INTRAMUSCULAR; INTRAVENOUS at 09:06

## 2025-06-20 RX ADMIN — FUROSEMIDE 20 MG: 20 TABLET ORAL at 05:06

## 2025-06-20 RX ADMIN — ATORVASTATIN CALCIUM 40 MG: 40 TABLET, FILM COATED ORAL at 08:06

## 2025-06-20 RX ADMIN — HEPARIN SODIUM 5000 UNITS: 5000 INJECTION INTRAVENOUS; SUBCUTANEOUS at 08:06

## 2025-06-20 RX ADMIN — HYDRALAZINE HYDROCHLORIDE 10 MG: 20 INJECTION, SOLUTION INTRAMUSCULAR; INTRAVENOUS at 12:06

## 2025-06-20 RX ADMIN — SACUBITRIL AND VALSARTAN 1 TABLET: 49; 51 TABLET, FILM COATED ORAL at 08:06

## 2025-06-20 RX ADMIN — FUROSEMIDE 20 MG: 20 TABLET ORAL at 08:06

## 2025-06-20 RX ADMIN — INSULIN ASPART 4 UNITS: 100 INJECTION, SOLUTION INTRAVENOUS; SUBCUTANEOUS at 11:06

## 2025-06-20 RX ADMIN — SACUBITRIL AND VALSARTAN 1 TABLET: 24; 26 TABLET, FILM COATED ORAL at 08:06

## 2025-06-20 NOTE — PLAN OF CARE
SW spoke with pt sister via phone to discuss discharge planning.   Pt sister indicated that she will be relocating pt  because she is unable to assist him from [place].  Pt sister indicating through talking with him she realizes that  he has short term memory and does not remember a lot of things .   Pt sister indicates that if he discharge tomorrow she will have to leave [place] to come to get him.       SW informed pt sister that a list of home health facilities will be pull for choices but also told her that they may not accept because pt has [place] Medicaid.   SW will continue to monitor pt needs.     3:24 PM  SW attempted to follow up with the pt sister to inform her that  he will need to switch his [place] medicaid  to [place] .  A detailed message was left.        Discharge Plan A and Plan B have been determined by review of patient's clinical status, future medical and therapeutic needs, and coverage/benefits for post-acute care in coordination with multidisciplinary team members.    Alice Duron MSW, LCSW  [facility]  Case Management Dept.

## 2025-06-20 NOTE — PROGRESS NOTES
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 He reports being unable to remember where he parked his car, difficulty with timelines, and inability to remember waking up in the mornings. CTH obtained in the ED revealed an acute L thalamic IPH. Questionable use of DAPT as ASA/Plavix listed on home med list, but patient unable to state which meds he is currently taking. Given recent disorientation/confusion and concern for possible AP usage, DDAVP administered. Also unclear if patient taking any home meds as /139 upon ED arrival, requiring initiation of Cardene. He will be admitted to [facility] for further management.    Hospital Course: 06/17/2025: patient was admitted to the [facility] overnight. No acute intervals. BP controlled on Cardene gtt with goals <140 sys. Restarted entresto. Repeat CTH stable. MRI today shows evolving recent to subacute aged left thalamic parenchymal hemorrhage.    6/18/2025: Increased coreg to 6.25; will continue to wean Cardene as tolerated. Heparin DVT prophylaxis started. Anticipate SD if patient maintains BP goal off of Cardene.  6/19/2025: Increased coreg to 12.5 bid. Added lasix 20mg. No additional Cardene needed. SD to vascular neurology for monitoring of BP and renal function.  6/20/2025: Increased entresto as patient again became hypertensive overnight.       Objective:     Vitals:  Temp: 98.3 °F (36.8 °C)  Pulse: 88  Rhythm: normal sinus rhythm  BP: 138/80  MAP (mmHg): 103  Resp: 16  SpO2: 97 %    Temp  Min: 98.2 °F (36.8 °C)  Max: 98.5 °F (36.9  °C)  Pulse  Min: 84  Max: 94  BP  Min: 108/62  Max: 170/92  MAP (mmHg)  Min: 78  Max: 131  Resp  Min: 0  Max: 45  SpO2  Min: 94 %  Max: 100 %    06/19 0701 - 06/20 0700  In: 450 [P.O.:450]  Out: 825 [Urine:825]   Unmeasured Output  Unmeasured Urine Occurrence: 1  Unmeasured Stool Occurrence: 1        Physical Exam  Vitals and nursing note reviewed.   Constitutional:       General: He is not in acute distress.  HENT:      Head: Normocephalic.      Nose: Nose normal.   Eyes:      Extraocular Movements: Extraocular movements intact.      Pupils: Pupils are equal, round, and reactive to light.   Cardiovascular:      Rate and Rhythm: Normal rate and regular rhythm.   Pulmonary:      Effort: Pulmonary effort is normal.      Breath sounds: Normal breath sounds.   Abdominal:      General: Abdomen is flat.      Tenderness: There is no abdominal tenderness.   Musculoskeletal:         General: Normal range of motion.      Cervical back: Normal range of motion.   Skin:     General: Skin is warm and dry.   Neurological:      Mental Status: He is alert.      GCS: GCS eye subscore is 4. GCS verbal subscore is 5. GCS motor subscore is 6.      Cranial Nerves: No cranial nerve deficit or facial asymmetry.      Sensory: Sensation is intact.      Motor: Weakness and tremor in LUE and LLE (likely baseline given history of previous CVAs)   Psychiatric:         Attention and Perception: Attention normal.         Mood and Affect: Affect is flat.         Behavior: Behavior normal. Behavior is cooperative.        Medications:  Continuous Scheduledatorvastatin, 40 mg, Daily  carvediloL, 12.5 mg, BID  furosemide, 20 mg, BID  heparin (porcine), 5,000 Units, Q8H  mupirocin, , BID  sacubitriL-valsartan, 1 tablet, BID    PRNdextrose 50%, 12.5 g, PRN  dextrose 50%, 25 g, PRN  glucagon (human recombinant), 1 mg, PRN  glucose, 16 g, PRN  glucose, 24 g, PRN  hydrALAZINE, 10 mg, Q4H PRN  insulin aspart U-100, 0-10 Units, QID (AC + HS) PRN  labetalol,  10 mg, Q4H PRN  ondansetron, 4 mg, Q8H PRN  sodium chloride 0.9%, 10 mL, PRN      Today I personally reviewed pertinent medications, lines/drains/airways, imaging, cardiology results, laboratory results, microbiology results,     Diet  Diet Consistent Carbohydrate 2000 Calories (up to 75 gm per meal); Thin; Standard Tray  Diet Consistent Carbohydrate 2000 Calories (up to 75 gm per meal); Thin; Standard Tray    Assessment/Plan:     Neuro  * ICH (intracerebral hemorrhage)  49 y/o M presenting with disorientation and confusion for the past several days (exact time of onset unclear). He reports being unable to remember where he parked his car, difficulty with timelines, and inability to remember waking up in the mornings. CTH obtained in the ED revealed an acute L thalamic IPH.    Antithrombotics for secondary stroke prevention: Antiplatelets: None: Intracerebral Hemorrhage    Statins for secondary stroke prevention and hyperlipidemia, if present:   Statins: Atorvastatin- 40 mg daily - not indicated for ICH; however, patient has PMH of hyperlipidemia    Aggressive risk factor modification: HTN, DM, HLD     Rehab efforts: The patient has been evaluated by a stroke team provider and the therapy needs have been fully considered based off the presenting complaints and exam findings. The following therapy evaluations are needed: PT evaluate and treat, OT evaluate and treat, SLP evaluate and treat, PM&R evaluate for appropriate placement    Diagnostics ordered/pending: CT scan of head without contrast to asses brain parenchyma, HgbA1C to assess blood glucose levels, Lipid Profile to assess cholesterol levels, MRA head to assess vasculature, MRI head without contrast to assess brain parenchyma, TTE to assess cardiac function/status , TSH to assess thyroid function    VTE prophylaxis: Mechanical prophylaxis: Place SCDs  None: Reason for No Pharmacological VTE Prophylaxis: History of systemic or intracranial bleeding    BP  parameters: ICH: SBP < 160    - Admit to [facility]  - VS/Neuro checks q1  - NSGY, Vascular Neurology consulted  - No acute surgical intervention indicated at this time  - HOB >/= 30  - MRI/MRA brain 6/17 w/ evolving subacute L thalamic IPH. No acutely concerning changes       - Plan for f/u MRI/MRA in 6-8wk unless otherwise indicated  - SBP goal < 160       - Labetalol, hydralazine PRN; discontinued Cardene        - Increased entresto 6/20  - Continue to advance diet as tolerated  - Monitor I/O  - CBC, CMP, Mag, Phos daily  - Heparin for DVT PPX  - PT/OT/SLP as appropriate    History of CVA with residual deficit  - R internal capsule stroke in 2023 w/ residual L-sided weakness    History of intracerebral hemorrhage without residual deficit  - R thalamic IPH in 2021 w/ residual L sided weakness    Cardiac/Vascular  Chronic combined systolic and diastolic congestive heart failure  - Last ECHO on 3/7/25 w/ global hypokinesis, severely reduced systolic function w/ estimated EF 25-30%, and diastolic dysfunction  - CXR w/o focal consolidation, effusion, consolidations  - Increased Entresto (6/20)  - Resumed lasix 20mg bid (home dose 40mg bid)    Hyperlipidemia  - Atorvastatin daily    Hypertension  - SBP goal < 150  - Increased Coreg to 12.5mg BID, increased Entresto  - Labetalol, hydralazine PRN       - Cardene discontinued  - Lasix 20mg bid (home dose 40mg BID)    Renal/  Stage 2 chronic kidney disease  - No documented history of CKD; however, GFR 60 on admission  - Renal function improving; suspect DIONTE superimposed on CKD  - GFR 37-45, creatinine 1.8-2.1 during recent admission (3/7-3/14)  - Avoid nephrotoxins  - Renally dose meds  - CMP daily       - Replace lytes PRN    Hypokalemia  -K+ 3.3 on admission  -CMP daily, replace PRN    Endocrine  Type 2 diabetes mellitus  - Hgb A1c 8.8  - Accuchecks q6 w/ SSI and aspart           The patient is being Prophylaxed for:  Venous Thromboembolism with: Chemical  Stress Ulcer with:  None  Ventilator Pneumonia with: not applicable    Activity Orders            Progressive Mobility Protocol (mobilize patient to their highest level of functioning at least twice daily) starting at 06/17 0800    Diet Consistent Carbohydrate 2000 Calories (up to 75 gm per meal); Thin; Standard Tray: Carb Control starting at 06/17 0633    Turn patient starting at 06/16 2200    Elevate HOB 30 starting at 06/16 2105          Full Code    Hank Cortes MD  Neurocritical Care  [facility] - Neuro Critical Care

## 2025-06-20 NOTE — ASSESSMENT & PLAN NOTE
- SBP goal < 150  - Increased Coreg to 12.5mg BID, increased Entresto  - Labetalol, hydralazine PRN       - Cardene discontinued  - Lasix 20mg bid (home dose 40mg BID)

## 2025-06-20 NOTE — SUBJECTIVE & OBJECTIVE
Neurologic Chief Complaint: L thalamus ICH    Subjective:     Interval History: Patient is seen for follow-up neurological assessment and treatment recommendations: See hospital course.     HPI, Past Medical, Family, and Social History remains the same as documented in the initial encounter.     Review of Systems   Constitutional:  Negative for chills and fever.   Neurological:  Negative for speech difficulty and weakness.     Scheduled Meds:   atorvastatin  40 mg Oral Daily    carvediloL  12.5 mg Oral BID    furosemide  20 mg Oral BID    heparin (porcine)  5,000 Units Subcutaneous Q8H    mupirocin   Nasal BID    sacubitriL-valsartan  1 tablet Oral BID     Continuous Infusions:      PRN Meds:  Current Facility-Administered Medications:     dextrose 50%, 12.5 g, Intravenous, PRN    dextrose 50%, 25 g, Intravenous, PRN    glucagon (human recombinant), 1 mg, Intramuscular, PRN    glucose, 16 g, Oral, PRN    glucose, 24 g, Oral, PRN    hydrALAZINE, 10 mg, Intravenous, Q4H PRN    insulin aspart U-100, 0-10 Units, Subcutaneous, QID (AC + HS) PRN    labetalol, 10 mg, Intravenous, Q4H PRN    ondansetron, 4 mg, Intravenous, Q8H PRN    sodium chloride 0.9%, 10 mL, Intravenous, PRN    Objective:     Vital Signs (Most Recent):  Temp: 98.2 °F (36.8 °C) (06/20/25 0705)  Pulse: 86 (06/20/25 0742)  Resp: 16 (06/20/25 0742)  BP: 138/80 (06/20/25 0830)  SpO2: 96 % (06/20/25 0742)  BP Location: Left arm    Vital Signs Range (Last 24H):  Temp:  [98.1 °F (36.7 °C)-98.5 °F (36.9 °C)]   Pulse:  [84-94]   Resp:  [0-45]   BP: (108-180)/()   SpO2:  [94 %-100 %]   BP Location: Left arm       Physical Exam  Vitals and nursing note reviewed.   Constitutional:       Appearance: Normal appearance.   Neurological:      General: No focal deficit present.      Mental Status: He is alert and oriented to person, place, and time. Mental status is at baseline.              Neurological Exam:   LOC: alert  Attention Span: Good   Language: No  "aphasia  Articulation: No dysarthria  Orientation: Person, Place, Time    Motor: Arm left  Paresis: 5/5  Leg left  Paresis: 5/5  Arm right  Normal 5/5  Leg right Normal 5/5  Cerebellum: Upper Extremity Appendicular Ataxia (Finger Nose Finger)  Left  Sensation: Intact to light touch, temperature and vibration    Laboratory:  CMP:   Recent Labs   Lab 06/20/25 0314   CALCIUM 8.3*   ALBUMIN 2.8*   PROT 5.6*      K 3.2*   CO2 23      BUN 22*   CREATININE 1.4   ALKPHOS 48   ALT 10   AST 20   BILITOT 0.7     BMP:   Recent Labs   Lab 06/20/25 0314      K 3.2*      CO2 23   BUN 22*   CREATININE 1.4   CALCIUM 8.3*     CBC:   Recent Labs   Lab 06/20/25 0314   WBC 5.47   RBC 4.86   HGB 14.6   HCT 42.7      MCV 88   MCH 30.0   MCHC 34.2     Lipid Panel:   Recent Labs   Lab 06/16/25 1859   CHOL 248*   LDLCALC 148.8   HDL 60   TRIG 196*     Coagulation:   Recent Labs   Lab 06/16/25  2019   INR 1.0   APTT 27.3     Platelet Aggregation Study: No results for input(s): "PLTAGG", "PLTAGINTERP", "PLTAGREGLACO", "ADPPLTAGGREG" in the last 168 hours.  Hgb A1C:   Recent Labs   Lab 06/16/25 1859   HGBA1C 8.8*     TSH:   Recent Labs   Lab 06/16/25 1859   TSH 1.616       Diagnostic Results     Brain Imaging/Vessel Imaging     MRA/MRI Brain WO - 6/17/2025    MRI brain: Evolving recent to subacute aged left thalamic parenchymal hemorrhage.  Please note evaluation for underlying mass lesion limited by noncontrast technique.  Clinical correlation and follow-up recommended.     Patchy and confluent T2 FLAIR signal abnormality supratentorial white matter elsewhere while nonspecific concerning for underlying chronic ischemic change     Ventricles stable without hydrocephalus     Remote hemorrhage right basal ganglia/thalamus with ipsilateral wallerian degeneration.     MRA head: Unremarkable MRA head as detailed above specifically without focal stenosis or proximal large vessel occlusion.     Please note there is " no hypertrophied vasculature associated with left thalamic hemorrhage although only partially included in the field of view.  Further evaluation with repeat MRA imaging to include the entirety of the thalamic hemorrhage advised.    CTH WO - 6/17/2025  Stable CT appearance of the small acute parenchymal hematoma centered in the left thalamus.  No new intracranial hemorrhage.     Ventricles are stable in size.     Recommendations include clinical correlation and continued imaging follow-up.    CTH WO - 6/16/2025  1. Small acute parenchymal hemorrhage centered in the left thalamus, likely hypertensive in etiology.  There is local mass effect on the adjacent left lateral ventricle without significant midline shift.  2. Mild prominence of the lateral ventricles when compared to prior.  This may relate to interval volume loss, however early acute hydrocephalus may present similarly.  Recommend attention on short-term follow-up.  3. Chronic microvascular ischemic change and sequela of remote hemorrhage, as detailed above.  Right caudate suspected remote tiny lacunar type infarct but new from 10/25/2023 imaging.    Cardiac Imaging   TTE - 6/17/2025  Summary      Left Ventricle: The left ventricle is normal in size. Mildly increased ventricular mass. Mildly increased wall thickness. There is mild concentric hypertrophy. Mild global hypokinesis present. There is moderately reduced systolic function with a visually estimated ejection fraction of 35 - 40%. Quantitated ejection fraction is 32%. Global longitudinal strain is reduced moderately reduced measuring -10.8% There is diastolic dysfunction but grade cannot be determined.    Right Ventricle: The right ventricle is normal in size Systolic function is normal.    Left Atrium: The left atrium is mildly dilated    Mitral Valve: There is mild regurgitation.    IVC/SVC: Normal venous pressure at 3 mmHg.

## 2025-06-20 NOTE — PLAN OF CARE
"[facility] Care Plan    POC reviewed with Cecil Clark and family at 0300. Patient and Family verbalized understanding. Questions and concerns addressed. No acute events today. Pt progressing toward goals. Will continue to monitor. See below and flowsheets for full assessment and VS info.     -No acute changes overnight  -Pt's sister called and would really like to speak with the team and case management for a few concerns about his next steps once he leaves the hospital  -PRN labetolol given x2; PRN hydralazine given x1      Is this a stroke patient? yes- Stroke booklet reviewed with patient and is at bedside.   Care Plan Individualization:     Neuro:  Glenda Coma Scale  Best Eye Response: 4-->(E4) spontaneous  Best Motor Response: 6-->(M6) obeys commands  Best Verbal Response: 5-->(V5) oriented  [place] Coma Scale Score: 15  Assessment Qualifiers: Patient not sedated/intubated  Pupil PERRLA: yes     24 hr Temp:  [97.7 °F (36.5 °C)-98.5 °F (36.9 °C)]     CV:   Rhythm: normal sinus rhythm  BP goals:   SBP < 160  MAP > 65    Resp:           Plan: N/A    GI/:     Diet/Nutrition Received: consistent carb/diabetic diet  Last Bowel Movement: 06/19/25       Intake/Output Summary (Last 24 hours) at 6/20/2025 0404  Last data filed at 6/19/2025 2302  Gross per 24 hour   Intake 450 ml   Output 825 ml   Net -375 ml     Unmeasured Output  Unmeasured Urine Occurrence: 1  Unmeasured Stool Occurrence: 1    Labs/Accuchecks:  Recent Labs   Lab 06/20/25  0314   WBC 5.47   RBC 4.86   HGB 14.6   HCT 42.7         Recent Labs   Lab 06/20/25  0314      K 3.2*   CO2 23      BUN 22*   CREATININE 1.4   ALKPHOS 48   ALT 10   AST 20   BILITOT 0.7      Recent Labs   Lab 06/16/25  2019   PROTIME 10.8   INR 1.0   APTT 27.3    No results for input(s): "CPK", "CPKMB", "TROPONINI", "MB" in the last 168 hours.    Electrolytes: Electrolytes replaced  Accuchecks: ACHS    Gtts:      LDA/Wounds:    Juan Risk Assessment  Sensory Perception: " 4-->no impairment  Moisture: 4-->rarely moist  Activity: 3-->walks occasionally  Mobility: 3-->slightly limited  Nutrition: 2-->probably inadequate  Friction and Shear: 3-->no apparent problem  Juan Score: 19  Is your juan score 12 or less? no          Restraints:        WCTM       Problem: Adult Inpatient Plan of Care  Goal: Patient-Specific Goal (Individualized)  6/20/2025 0403 by Tiffanie Powers RN  Outcome: Not Progressing  6/20/2025 0402 by Tiffanie Powers RN  Outcome: Progressing  Goal: Absence of Hospital-Acquired Illness or Injury  6/20/2025 0403 by Tiffanie Powers RN  Outcome: Progressing  6/20/2025 0402 by Tiffanie Powers RN  Outcome: Progressing  Goal: Optimal Comfort and Wellbeing  6/20/2025 0403 by Tiffanie Powers RN  Outcome: Progressing  6/20/2025 0402 by Tiffanie Powers RN  Outcome: Progressing  Goal: Readiness for Transition of Care  6/20/2025 0403 by Tiffanie Powers RN  Outcome: Progressing  6/20/2025 0402 by Tiffanie Powers RN  Outcome: Progressing     Problem: Stroke, Intracerebral Hemorrhage  Goal: Optimal Coping  6/20/2025 0403 by Tiffanie Powers RN  Outcome: Not Progressing  6/20/2025 0402 by Tiffanie Powers RN  Outcome: Progressing  Goal: Effective Bowel Elimination  6/20/2025 0403 by Tiffanie Powers RN  Outcome: Progressing  6/20/2025 0402 by Tiffanie Powers RN  Outcome: Progressing  Goal: Optimal Cognitive Function  6/20/2025 0403 by Tiffanie Powers RN  Outcome: Progressing  6/20/2025 0402 by Tiffanie Powers RN  Outcome: Progressing  Goal: Effective Communication Skills  6/20/2025 0403 by Tiffanie Powers RN  Outcome: Progressing  6/20/2025 0402 by Tiffanie Powers RN  Outcome: Progressing  Goal: Optimal Functional Ability  6/20/2025 0403 by Tiffanie Powers RN  Outcome: Progressing  6/20/2025 0402 by Tiffanie Powers RN  Outcome: Progressing  Goal: Optimal Nutrition Intake  6/20/2025 0403 by Tiffanie Powers, RN  Outcome: Progressing  6/20/2025 0402 by  Tiffanie Powers RN  Outcome: Progressing  Goal: Effective Oxygenation and Ventilation  6/20/2025 0403 by Tiffanie Powers RN  Outcome: Progressing  6/20/2025 0402 by Tiffanie Powers RN  Outcome: Progressing  Goal: Improved Sensorimotor Function  6/20/2025 0403 by Tiffanie Powers RN  Outcome: Progressing  6/20/2025 0402 by Tiffanie Powers RN  Outcome: Progressing  Goal: Safe and Effective Swallow  6/20/2025 0403 by Tiffanie Powers RN  Outcome: Progressing  6/20/2025 0402 by Tiffanie Powers RN  Outcome: Progressing  Goal: Effective Urinary Elimination  6/20/2025 0403 by Tiffanie Powers RN  Outcome: Progressing  6/20/2025 0402 by Tiffanie Powers RN  Outcome: Progressing     Problem: Diabetes Comorbidity  Goal: Blood Glucose Level Within Targeted Range  6/20/2025 0403 by Tiffanie Powers RN  Outcome: Progressing  6/20/2025 0402 by Tiffanie Powers RN  Outcome: Progressing     Problem: Acute Kidney Injury/Impairment  Goal: Fluid and Electrolyte Balance  6/20/2025 0403 by Tiffanie Powers RN  Outcome: Progressing  6/20/2025 0402 by Tiffanie Powers RN  Outcome: Progressing  Goal: Improved Oral Intake  6/20/2025 0403 by Tiffanie Powers RN  Outcome: Progressing  6/20/2025 0402 by Tiffanie Powers RN  Outcome: Progressing  Goal: Effective Renal Function  6/20/2025 0403 by Tiffanie Powers RN  Outcome: Progressing  6/20/2025 0402 by Tiffanie Powers RN  Outcome: Progressing

## 2025-06-20 NOTE — ASSESSMENT & PLAN NOTE
- Last ECHO on 3/7/25 w/ global hypokinesis, severely reduced systolic function w/ estimated EF 25-30%, and diastolic dysfunction  - CXR w/o focal consolidation, effusion, consolidations  - Increased Entresto (6/20)  - Resumed lasix 20mg bid (home dose 40mg bid)

## 2025-06-20 NOTE — PT/OT/SLP PROGRESS
Speech Language Pathology      Cecil Clark  MRN: [id number]    SLP attempted to see Patient for therapy.  Patient not seen today secondary to Patient unavailable upon SLP attempts (RN care, working with therapy.) ST to continue to follow up and re-attempt per ST POC.    6/20/2025

## 2025-06-20 NOTE — EICU
Virtual ICU Quality Rounds    Admit Date: 6/16/2025  Hospital Day: 4    ICU Day: 3d 9h    24H Vital Sign Range:  Temp:  [98.1 °F (36.7 °C)-98.5 °F (36.9 °C)]   Pulse:  [81-94]   Resp:  [0-45]   BP: (108-180)/()   SpO2:  [94 %-100 %]     VICU Surveillance Screening      LDA reconciliation : Yes

## 2025-06-20 NOTE — PT/OT/SLP PROGRESS
Occupational Therapy   Treatment    Name: Cecil Clark  MRN: [id number]  Admitting Diagnosis:  ICH (intracerebral hemorrhage)       Recommendations:     Discharge Recommendations: Moderate Intensity Therapy  Discharge Equipment Recommendations:  none  Barriers to discharge:  None    Assessment:     Cecil Clark is a 50 y.o. male with a medical diagnosis of ICH (intracerebral hemorrhage).  He presents semi-reclined in bed upon arrival and agreeable to session. Intended session to focus on cognitive and deductive reasoning skills, however upon arrival pt requesting to complete multiple ADLs. Observed mild deficits with functional mobility and balance throughout session. When assessing cognition, pt demo's improvements from initial eval however may continue to experience mild deficits. Rec unchanged at this time though frequency decreased.     Deficits currently impede indep with ADLs, ADL t/fs, and functional mobility. Performance deficits affecting function are impaired cognition, decreased safety awareness, decreased lower extremity function, impaired self care skills, impaired balance.     Rehab Prognosis:  Good; patient would benefit from acute skilled OT services to address these deficits and reach maximum level of function.       Plan:     Patient to be seen 3 x/week to address the above listed problems via self-care/home management, community/work re-entry, therapeutic activities, therapeutic exercises, neuromuscular re-education, cognitive retraining, sensory integration  Plan of Care Expires: 07/17/25  Plan of Care Reviewed with: patient    Subjective     Chief Complaint: None stated  Patient/Family Comments/goals: return home   Pain/Comfort:  Pain Rating 1: 0/10    Objective:     Communicated with: RN prior to session.  Patient found HOB elevated with telemetry, pulse ox (continuous), blood pressure cuff upon OT entry to room.    General Precautions: Standard, fall, aspiration    Orthopedic Precautions:N/A  Braces:  N/A  Respiratory Status: Room air     Occupational Performance:     Bed Mobility:    Patient completed Supine to Sit with modified independence    Functional Mobility/Transfers:  Patient completed Sit <> Stand Transfer with contact guard assistance  with  hand-held assist   Patient completed Toilet Transfer with stand by assistance with  no AD  Functional Mobility: Pt completed functional mobility around room (~25') with SBA. Notable wall walking/furniture surfing during all mobility. No LOB observed.     Activities of Daily Living:  Grooming: supervision for oral care & washing face while standing at sink   Toileting: supervision for pericare while seated after void on std toilet       [facility] 6 Click ADL: 24    Treatment & Education:  No obvious changes in vital signs noted during session and pt tolerated session well. Pt educated on: Role of OT, OT plan of care, ADLs, bed mobility, general discharge plan , and safety with current level of function. Pt  verbalized understanding all education.       Patient left up in chair with all lines intact, chair alarm on, and RN notified    GOALS:   Multidisciplinary Problems       Occupational Therapy Goals          Problem: Occupational Therapy    Goal Priority Disciplines Outcome Interventions   Occupational Therapy Goal     OT, PT/OT Progressing    Description: Goals to be met by: 7/17/25     Patient will increase functional independence with ADLs by performing:    UE Dressing with [place].  LE Dressing with [place].  Grooming while standing with [place].  Toileting from toilet with [place] for hygiene and clothing management.   Supine to sit with [place].  Toilet transfer to toilet with [place].                         DME Justifications:  No DME recommended requiring DME justifications    Time Tracking:     OT Date of Treatment: 06/20/25  OT Start Time: 1121  OT Stop Time: 1144  OT Total Time (min): 23 min    Billable Minutes:Self Care/Home  Management 10  Therapeutic Activity 13    OT/DANNY: OT          6/20/2025

## 2025-06-20 NOTE — PROGRESS NOTES
[facility] Neuro Critical Care  Vascular Neurology  Comprehensive Stroke Center  Progress Note    Assessment/Plan:     * ICH (intracerebral hemorrhage)  Mr. Cecil Clark is a 50 y.o. patient here for mental status changes. Complex prior neurovascular history with right thalamic ICH (2021) and R MCA stroke (2023) with residual LSW. Patient has been feeling more confused and disoriented on the last 3 days. No evidence of headaches or sensory changes. Hypertensive on admission on the 210s range, started on cardene gtt. Initial NIHSS 3 for L arm/leg drift from residual LSW with LUE ataxia/dysmetria with action and intention tremor. CTH with new small left thalamic parenchymal hemorrhage with mass effect on the left lateral ventricle without MLS. No hydrocephalus noted. No acute interventions per neurosurgery. ETIOLOGY: hypertensive.     Optimizing BP, renal function improved. Will monitor for a day and possible discharge tomorrow.       Antithrombotics for secondary stroke prevention: Not recommending any AP in the setting of acute L thalamic ICH.     Statins for secondary stroke prevention and hyperlipidemia, if present: Recommending holding statins in the acute setting of L thalamic ICH on a patient with complex prior neurovascular history for the high risk of bleeding.     Aggressive risk factor modification: HTN     Rehab efforts: The patient has been evaluated by a stroke team provider and the therapy needs have been fully considered based off the presenting complaints and exam findings. The following therapy evaluations are needed: PT evaluate and treat, OT evaluate and treat, SLP evaluate and treat, PM&R evaluate for appropriate placement    Diagnostics ordered/pending: None     VTE prophylaxis: None: Reason for No Pharmacological VTE Prophylaxis: History of systemic or intracranial bleeding    BP parameters: ICH: SBP Goal Range 130-150 -     Hypertension  ICH risk factor.   Admission SBP 180s-210s.  Started on  cardene gtt in the ED, was off for a couple of hours but required drip back on today for SBP goal<140  Home Entresto and coreg resumed and started on lasix      Type 2 diabetes mellitus  ICH risk factor.   Admission A1c 8.8.   Currently on MDSSI per NCC.     Hyperlipidemia  Currently on atorvastatin per NCC.   Would recommend holding statins in the setting of ICH with complex neurovascular history.   Recommend trial of low dose Zetia 10. Will defer to NCC.     Chronic combined systolic and diastolic congestive heart failure  Recent EF 35-40%, improved from 3/2025  Home entresto restarted today    Hypokalemia  Replacement per NCC protocol.   Normalized post repletion     History of CVA with residual deficit  -R internal capsule stroke in 2023 w/ residual L-sided weakness     History of intracerebral hemorrhage without residual deficit  -R thalamic IPH in 2021     New onset of congestive heart failure  BNP 1.427 (3/6)  Last echo 3/7/25 with EF 25-30% with global hypokinesis and severely reduced systolic function.  On home Lasix 40. Will defer to primary team when to resume.   Would recommend addition of Jardiance 10 for GDMT pathway, which usually tends to improve hypertension on HF patients. Will defer to primary.          06/17/2025 Admitted to [facility] last night after CTH revealed a new L thalamic IPH. No acute events overnight. Repeat CTH stable. Cardene drip retarted this morning for SBP goal<140. MRI today shows evolving recent to subacute aged left thalamic parenchymal hemorrhage, MRA unremarkable.   6-18-25 off of cardene at 1400, no acute event overnight, neuro stbale  06/19/2025 off cardene and resumed on home medications. Unclear whether patient is compliant with medications at home. Will s/d to NPU for BP optimization but if tolerating tomorrow, then can discharge home   6-20-25 no acute events overnight, Entresto increased stable to step down or be discharged home      STROKE DOCUMENTATION   Acute Stroke  Times   Last Known Normal Date: 06/14/25  Unknown Normal Time: Unknown Time  Unknown Symptom Onset Date: Unknown Date  Unknown Symptom Onset Time: Unknown Time    NIH Scale:  1a. Level of Consciousness: 0-->Alert, keenly responsive  1b. LOC Questions: 0-->Answers both questions correctly  1c. LOC Commands: 0-->Performs both tasks correctly  2. Best Gaze: 0-->Normal  3. Visual: 0-->No visual loss  4. Facial Palsy: 0-->Normal symmetrical movements  5a. Motor Arm, Left: 0-->No drift, limb holds 90 (or 45) degrees for full 10 secs  5b. Motor Arm, Right: 0-->No drift, limb holds 90 (or 45) degrees for full 10 secs  6a. Motor Leg, Left: 0-->No drift, leg holds 30 degree position for full 5 secs  6b. Motor Leg, Right: 0-->No drift, leg holds 30 degree position for full 5 secs  7. Limb Ataxia: 0-->Absent  8. Sensory: 0-->Normal, no sensory loss  9. Best Language: 0-->No aphasia, normal  10. Dysarthria: 0-->Normal  11. Extinction and Inattention (formerly Neglect): 0-->No abnormality  Total (NIH Stroke Scale): 0       Modified Hipolito Score: 0  Glenda Coma Scale:    ABCD2 Score:    [id number] Score:   HAS -BLED Score:   ICH Score:0  Hunt & Gonzalez Classification:      Hemorrhagic change of an Ischemic Stroke: Does this patient have an ischemic stroke with hemorrhagic changes? No     Neurologic Chief Complaint: L thalamus ICH    Subjective:     Interval History: Patient is seen for follow-up neurological assessment and treatment recommendations: See hospital course.     HPI, Past Medical, Family, and Social History remains the same as documented in the initial encounter.     Review of Systems   Constitutional:  Negative for chills and fever.   Neurological:  Negative for speech difficulty and weakness.     Scheduled Meds:   atorvastatin  40 mg Oral Daily    carvediloL  12.5 mg Oral BID    furosemide  20 mg Oral BID    heparin (porcine)  5,000 Units Subcutaneous Q8H    mupirocin   Nasal BID    sacubitriL-valsartan  1 tablet  Oral BID     Continuous Infusions:      PRN Meds:  Current Facility-Administered Medications:     dextrose 50%, 12.5 g, Intravenous, PRN    dextrose 50%, 25 g, Intravenous, PRN    glucagon (human recombinant), 1 mg, Intramuscular, PRN    glucose, 16 g, Oral, PRN    glucose, 24 g, Oral, PRN    hydrALAZINE, 10 mg, Intravenous, Q4H PRN    insulin aspart U-100, 0-10 Units, Subcutaneous, QID (AC + HS) PRN    labetalol, 10 mg, Intravenous, Q4H PRN    ondansetron, 4 mg, Intravenous, Q8H PRN    sodium chloride 0.9%, 10 mL, Intravenous, PRN    Objective:     Vital Signs (Most Recent):  Temp: 98.2 °F (36.8 °C) (06/20/25 0705)  Pulse: 86 (06/20/25 0742)  Resp: 16 (06/20/25 0742)  BP: 138/80 (06/20/25 0830)  SpO2: 96 % (06/20/25 0742)  BP Location: Left arm    Vital Signs Range (Last 24H):  Temp:  [98.1 °F (36.7 °C)-98.5 °F (36.9 °C)]   Pulse:  [84-94]   Resp:  [0-45]   BP: (108-180)/()   SpO2:  [94 %-100 %]   BP Location: Left arm       Physical Exam  Vitals and nursing note reviewed.   Constitutional:       Appearance: Normal appearance.   Neurological:      General: No focal deficit present.      Mental Status: He is alert and oriented to person, place, and time. Mental status is at baseline.              Neurological Exam:   LOC: alert  Attention Span: Good   Language: No aphasia  Articulation: No dysarthria  Orientation: Person, Place, Time    Motor: Arm left  Paresis: 5/5  Leg left  Paresis: 5/5  Arm right  Normal 5/5  Leg right Normal 5/5  Cerebellum: Upper Extremity Appendicular Ataxia (Finger Nose Finger)  Left  Sensation: Intact to light touch, temperature and vibration    Laboratory:  CMP:   Recent Labs   Lab 06/20/25  0314   CALCIUM 8.3*   ALBUMIN 2.8*   PROT 5.6*      K 3.2*   CO2 23      BUN 22*   CREATININE 1.4   ALKPHOS 48   ALT 10   AST 20   BILITOT 0.7     BMP:   Recent Labs   Lab 06/20/25  0314      K 3.2*      CO2 23   BUN 22*   CREATININE 1.4   CALCIUM 8.3*     CBC:   Recent  "Labs   Lab 06/20/25  0314   WBC 5.47   RBC 4.86   HGB 14.6   HCT 42.7      MCV 88   MCH 30.0   MCHC 34.2     Lipid Panel:   Recent Labs   Lab 06/16/25 1859   CHOL 248*   LDLCALC 148.8   HDL 60   TRIG 196*     Coagulation:   Recent Labs   Lab 06/16/25  2019   INR 1.0   APTT 27.3     Platelet Aggregation Study: No results for input(s): "PLTAGG", "PLTAGINTERP", "PLTAGREGLACO", "ADPPLTAGGREG" in the last 168 hours.  Hgb A1C:   Recent Labs   Lab 06/16/25 1859   HGBA1C 8.8*     TSH:   Recent Labs   Lab 06/16/25 1859   TSH 1.616       Diagnostic Results     Brain Imaging/Vessel Imaging     MRA/MRI Brain WO - 6/17/2025    MRI brain: Evolving recent to subacute aged left thalamic parenchymal hemorrhage.  Please note evaluation for underlying mass lesion limited by noncontrast technique.  Clinical correlation and follow-up recommended.     Patchy and confluent T2 FLAIR signal abnormality supratentorial white matter elsewhere while nonspecific concerning for underlying chronic ischemic change     Ventricles stable without hydrocephalus     Remote hemorrhage right basal ganglia/thalamus with ipsilateral wallerian degeneration.     MRA head: Unremarkable MRA head as detailed above specifically without focal stenosis or proximal large vessel occlusion.     Please note there is no hypertrophied vasculature associated with left thalamic hemorrhage although only partially included in the field of view.  Further evaluation with repeat MRA imaging to include the entirety of the thalamic hemorrhage advised.    CTH WO - 6/17/2025  Stable CT appearance of the small acute parenchymal hematoma centered in the left thalamus.  No new intracranial hemorrhage.     Ventricles are stable in size.     Recommendations include clinical correlation and continued imaging follow-up.    CT WO - 6/16/2025  1. Small acute parenchymal hemorrhage centered in the left thalamus, likely hypertensive in etiology.  There is local mass effect on the " adjacent left lateral ventricle without significant midline shift.  2. Mild prominence of the lateral ventricles when compared to prior.  This may relate to interval volume loss, however early acute hydrocephalus may present similarly.  Recommend attention on short-term follow-up.  3. Chronic microvascular ischemic change and sequela of remote hemorrhage, as detailed above.  Right caudate suspected remote tiny lacunar type infarct but new from 10/25/2023 imaging.    Cardiac Imaging   TTE - 6/17/2025  Summary      Left Ventricle: The left ventricle is normal in size. Mildly increased ventricular mass. Mildly increased wall thickness. There is mild concentric hypertrophy. Mild global hypokinesis present. There is moderately reduced systolic function with a visually estimated ejection fraction of 35 - 40%. Quantitated ejection fraction is 32%. Global longitudinal strain is reduced moderately reduced measuring -10.8% There is diastolic dysfunction but grade cannot be determined.    Right Ventricle: The right ventricle is normal in size Systolic function is normal.    Left Atrium: The left atrium is mildly dilated    Mitral Valve: There is mild regurgitation.    IVC/SVC: Normal venous pressure at 3 mmHg.    Ana Byers, NP  [facility]  Department of Vascular Neurology   [facility] - [facility]

## 2025-06-20 NOTE — ASSESSMENT & PLAN NOTE
51 y/o M presenting with disorientation and confusion for the past several days (exact time of onset unclear). He reports being unable to remember where he parked his car, difficulty with timelines, and inability to remember waking up in the mornings. CTH obtained in the ED revealed an acute L thalamic IPH.    Antithrombotics for secondary stroke prevention: Antiplatelets: None: Intracerebral Hemorrhage    Statins for secondary stroke prevention and hyperlipidemia, if present:   Statins: Atorvastatin- 40 mg daily - not indicated for ICH; however, patient has PMH of hyperlipidemia    Aggressive risk factor modification: HTN, DM, HLD     Rehab efforts: The patient has been evaluated by a stroke team provider and the therapy needs have been fully considered based off the presenting complaints and exam findings. The following therapy evaluations are needed: PT evaluate and treat, OT evaluate and treat, SLP evaluate and treat, PM&R evaluate for appropriate placement    Diagnostics ordered/pending: CT scan of head without contrast to asses brain parenchyma, HgbA1C to assess blood glucose levels, Lipid Profile to assess cholesterol levels, MRA head to assess vasculature, MRI head without contrast to assess brain parenchyma, TTE to assess cardiac function/status , TSH to assess thyroid function    VTE prophylaxis: Mechanical prophylaxis: Place SCDs  None: Reason for No Pharmacological VTE Prophylaxis: History of systemic or intracranial bleeding    BP parameters: ICH: SBP < 160    - Admit to NCC  - VS/Neuro checks q1  - NSGY, Vascular Neurology consulted  - No acute surgical intervention indicated at this time  - HOB >/= 30  - MRI/MRA brain 6/17 w/ evolving subacute L thalamic IPH. No acutely concerning changes       - Plan for f/u MRI/MRA in 6-8wk unless otherwise indicated  - SBP goal < 160       - Labetalol, hydralazine PRN; discontinued Cardene        - Increased entresto 6/20  - Continue to advance diet as tolerated  -  Monitor I/O  - CBC, CMP, Mag, Phos daily  - Heparin for DVT PPX  - PT/OT/SLP as appropriate

## 2025-06-20 NOTE — SUBJECTIVE & OBJECTIVE
Objective:     Vitals:  Temp: 98.3 °F (36.8 °C)  Pulse: 88  Rhythm: normal sinus rhythm  BP: 138/80  MAP (mmHg): 103  Resp: 16  SpO2: 97 %    Temp  Min: 98.2 °F (36.8 °C)  Max: 98.5 °F (36.9 °C)  Pulse  Min: 84  Max: 94  BP  Min: 108/62  Max: 170/92  MAP (mmHg)  Min: 78  Max: 131  Resp  Min: 0  Max: 45  SpO2  Min: 94 %  Max: 100 %    06/19 0701 - 06/20 0700  In: 450 [P.O.:450]  Out: 825 [Urine:825]   Unmeasured Output  Unmeasured Urine Occurrence: 1  Unmeasured Stool Occurrence: 1        Physical Exam  Vitals and nursing note reviewed.   Constitutional:       General: He is not in acute distress.  HENT:      Head: Normocephalic.      Nose: Nose normal.   Eyes:      Extraocular Movements: Extraocular movements intact.      Pupils: Pupils are equal, round, and reactive to light.   Cardiovascular:      Rate and Rhythm: Normal rate and regular rhythm.   Pulmonary:      Effort: Pulmonary effort is normal.      Breath sounds: Normal breath sounds.   Abdominal:      General: Abdomen is flat.      Tenderness: There is no abdominal tenderness.   Musculoskeletal:         General: Normal range of motion.      Cervical back: Normal range of motion.   Skin:     General: Skin is warm and dry.   Neurological:      Mental Status: He is alert.      GCS: GCS eye subscore is 4. GCS verbal subscore is 5. GCS motor subscore is 6.      Cranial Nerves: No cranial nerve deficit or facial asymmetry.      Sensory: Sensation is intact.      Motor: Weakness and tremor in LUE and LLE (likely baseline given history of previous CVAs)   Psychiatric:         Attention and Perception: Attention normal.         Mood and Affect: Affect is flat.         Behavior: Behavior normal. Behavior is cooperative.        Medications:  Continuous Scheduledatorvastatin, 40 mg, Daily  carvediloL, 12.5 mg, BID  furosemide, 20 mg, BID  heparin (porcine), 5,000 Units, Q8H  mupirocin, , BID  sacubitriL-valsartan, 1 tablet, BID    PRNdextrose 50%, 12.5 g,  PRN  dextrose 50%, 25 g, PRN  glucagon (human recombinant), 1 mg, PRN  glucose, 16 g, PRN  glucose, 24 g, PRN  hydrALAZINE, 10 mg, Q4H PRN  insulin aspart U-100, 0-10 Units, QID (AC + HS) PRN  labetalol, 10 mg, Q4H PRN  ondansetron, 4 mg, Q8H PRN  sodium chloride 0.9%, 10 mL, PRN      Today I personally reviewed pertinent medications, lines/drains/airways, imaging, cardiology results, laboratory results, microbiology results,     Diet  Diet Consistent Carbohydrate 2000 Calories (up to 75 gm per meal); Thin; Standard Tray  Diet Consistent Carbohydrate 2000 Calories (up to 75 gm per meal); Thin; Standard Tray

## 2025-06-20 NOTE — PLAN OF CARE
"[facility] Care Plan  POC reviewed with Cecil Clark and family at 1700. Patient and Family verbalized understanding. Questions and concerns addressed. No acute events today. Pt progressing toward goals. Will continue to monitor. See below and flowsheets for full assessment and VS info.     - Potential plan of discharge by tomorrow.  - one more day of BP monitoring.  - pt neurologically and vitally stable.  - Please inform sister and Kacie when discharging pt.    Is this a stroke patient? yes- Stroke booklet reviewed with family and is at bedside.   Care Plan Individualization:     Neuro:  Glenda Coma Scale  Best Eye Response: 4-->(E4) spontaneous  Best Motor Response: 6-->(M6) obeys commands  Best Verbal Response: 5-->(V5) oriented  [place] Coma Scale Score: 15  Assessment Qualifiers: Patient not sedated/intubated  Pupil PERRLA: yes     24 hr Temp:  [98.2 °F (36.8 °C)-98.6 °F (37 °C)]     CV:   Rhythm: normal sinus rhythm  BP goals:   SBP < 160  MAP > 65    Resp:           Plan: N/A    GI/:     Diet/Nutrition Received: consistent carb/diabetic diet  Last Bowel Movement: 06/19/25       Intake/Output Summary (Last 24 hours) at 6/20/2025 1742  Last data filed at 6/20/2025 1705  Gross per 24 hour   Intake 260 ml   Output 825 ml   Net -565 ml     Unmeasured Output  Unmeasured Urine Occurrence: 1  Unmeasured Stool Occurrence: 1    Labs/Accuchecks:  Recent Labs   Lab 06/20/25  0314   WBC 5.47   RBC 4.86   HGB 14.6   HCT 42.7         Recent Labs   Lab 06/20/25  0314      K 3.2*   CO2 23      BUN 22*   CREATININE 1.4   ALKPHOS 48   ALT 10   AST 20   BILITOT 0.7      Recent Labs   Lab 06/16/25  2019   PROTIME 10.8   INR 1.0   APTT 27.3    No results for input(s): "CPK", "CPKMB", "TROPONINI", "MB" in the last 168 hours.    Electrolytes: No replacement orders  Accuchecks: Q4H    Gtts:      LDA/Wounds:    Juan Risk Assessment  Sensory Perception: 4-->no impairment  Moisture: 4-->rarely moist  Activity: " 3-->walks occasionally  Mobility: 3-->slightly limited  Nutrition: 2-->probably inadequate  Friction and Shear: 3-->no apparent problem  Juan Score: 19    Is your juan score 12 or less? no    WCTM

## 2025-06-20 NOTE — EICU
JEFFRYU Night Rounds Checklist  24H Vital Sign Range:  Temp:  [97.7 °F (36.5 °C)-98.4 °F (36.9 °C)]   Pulse:  [81-94]   Resp:  [13-45]   BP: (132-180)/()   SpO2:  [95 %-99 %]     Video rounds and LDA reconciliation

## 2025-06-21 VITALS
WEIGHT: 128 LBS | HEIGHT: 62 IN | OXYGEN SATURATION: 98 % | DIASTOLIC BLOOD PRESSURE: 74 MMHG | HEART RATE: 86 BPM | RESPIRATION RATE: 20 BRPM | SYSTOLIC BLOOD PRESSURE: 137 MMHG | BODY MASS INDEX: 23.55 KG/M2 | TEMPERATURE: 98 F

## 2025-06-21 LAB
ABSOLUTE EOSINOPHIL (OHS): 0.12 K/UL
ABSOLUTE MONOCYTE (OHS): 0.42 K/UL (ref 0.3–1)
ABSOLUTE NEUTROPHIL COUNT (OHS): 2.95 K/UL (ref 1.8–7.7)
ALBUMIN SERPL BCP-MCNC: 2.7 G/DL (ref 3.5–5.2)
ALP SERPL-CCNC: 49 UNIT/L (ref 40–150)
ALT SERPL W/O P-5'-P-CCNC: 9 UNIT/L (ref 10–44)
ANION GAP (OHS): 8 MMOL/L (ref 8–16)
AST SERPL-CCNC: 18 UNIT/L (ref 11–45)
BASOPHILS # BLD AUTO: 0.03 K/UL
BASOPHILS NFR BLD AUTO: 0.6 %
BILIRUB SERPL-MCNC: 0.7 MG/DL (ref 0.1–1)
BUN SERPL-MCNC: 20 MG/DL (ref 6–20)
CALCIUM SERPL-MCNC: 7.8 MG/DL (ref 8.7–10.5)
CHLORIDE SERPL-SCNC: 106 MMOL/L (ref 95–110)
CO2 SERPL-SCNC: 25 MMOL/L (ref 23–29)
CREAT SERPL-MCNC: 1.5 MG/DL (ref 0.5–1.4)
ERYTHROCYTE [DISTWIDTH] IN BLOOD BY AUTOMATED COUNT: 13.2 % (ref 11.5–14.5)
GFR SERPLBLD CREATININE-BSD FMLA CKD-EPI: 56 ML/MIN/1.73/M2
GLUCOSE SERPL-MCNC: 179 MG/DL (ref 70–110)
HCT VFR BLD AUTO: 41.5 % (ref 40–54)
HGB BLD-MCNC: 14.3 GM/DL (ref 14–18)
IMM GRANULOCYTES # BLD AUTO: 0.01 K/UL (ref 0–0.04)
IMM GRANULOCYTES NFR BLD AUTO: 0.2 % (ref 0–0.5)
LYMPHOCYTES # BLD AUTO: 1.17 K/UL (ref 1–4.8)
MAGNESIUM SERPL-MCNC: 1.7 MG/DL (ref 1.6–2.6)
MCH RBC QN AUTO: 30.2 PG (ref 27–31)
MCHC RBC AUTO-ENTMCNC: 34.5 G/DL (ref 32–36)
MCV RBC AUTO: 88 FL (ref 82–98)
NUCLEATED RBC (/100WBC) (OHS): 0 /100 WBC
PHOSPHATE SERPL-MCNC: 3.1 MG/DL (ref 2.7–4.5)
PLATELET # BLD AUTO: 158 K/UL (ref 150–450)
PMV BLD AUTO: 10.1 FL (ref 9.2–12.9)
POCT GLUCOSE: 157 MG/DL (ref 70–110)
POCT GLUCOSE: 214 MG/DL (ref 70–110)
POTASSIUM SERPL-SCNC: 3.1 MMOL/L (ref 3.5–5.1)
PROT SERPL-MCNC: 5.4 GM/DL (ref 6–8.4)
RBC # BLD AUTO: 4.74 M/UL (ref 4.6–6.2)
RELATIVE EOSINOPHIL (OHS): 2.6 %
RELATIVE LYMPHOCYTE (OHS): 24.9 % (ref 18–48)
RELATIVE MONOCYTE (OHS): 8.9 % (ref 4–15)
RELATIVE NEUTROPHIL (OHS): 62.8 % (ref 38–73)
SODIUM SERPL-SCNC: 139 MMOL/L (ref 136–145)
WBC # BLD AUTO: 4.7 K/UL (ref 3.9–12.7)

## 2025-06-21 PROCEDURE — 99233 SBSQ HOSP IP/OBS HIGH 50: CPT | Mod: ,,, | Performed by: PSYCHIATRY & NEUROLOGY

## 2025-06-21 PROCEDURE — 85025 COMPLETE CBC W/AUTO DIFF WBC: CPT | Performed by: REGISTERED NURSE

## 2025-06-21 PROCEDURE — 63600175 PHARM REV CODE 636 W HCPCS

## 2025-06-21 PROCEDURE — 25000003 PHARM REV CODE 250

## 2025-06-21 PROCEDURE — 25000003 PHARM REV CODE 250: Performed by: REGISTERED NURSE

## 2025-06-21 PROCEDURE — 99291 CRITICAL CARE FIRST HOUR: CPT | Mod: ,,, | Performed by: PSYCHIATRY & NEUROLOGY

## 2025-06-21 PROCEDURE — 84100 ASSAY OF PHOSPHORUS: CPT | Performed by: REGISTERED NURSE

## 2025-06-21 PROCEDURE — 83735 ASSAY OF MAGNESIUM: CPT | Performed by: REGISTERED NURSE

## 2025-06-21 PROCEDURE — 80053 COMPREHEN METABOLIC PANEL: CPT | Performed by: REGISTERED NURSE

## 2025-06-21 PROCEDURE — 25000003 PHARM REV CODE 250: Performed by: PSYCHIATRY & NEUROLOGY

## 2025-06-21 RX ORDER — FUROSEMIDE 20 MG/1
20 TABLET ORAL 2 TIMES DAILY
Qty: 60 TABLET | Refills: 11 | Status: SHIPPED | OUTPATIENT
Start: 2025-06-21 | End: 2026-06-21

## 2025-06-21 RX ORDER — LANOLIN ALCOHOL/MO/W.PET/CERES
800 CREAM (GRAM) TOPICAL EVERY 4 HOURS
Status: CANCELLED | OUTPATIENT
Start: 2025-06-21 | End: 2025-06-21

## 2025-06-21 RX ORDER — CARVEDILOL 12.5 MG/1
12.5 TABLET ORAL 2 TIMES DAILY
Qty: 180 TABLET | Refills: 3 | Status: SHIPPED | OUTPATIENT
Start: 2025-06-21 | End: 2026-06-21

## 2025-06-21 RX ORDER — FUROSEMIDE 20 MG/1
20 TABLET ORAL 2 TIMES DAILY
Qty: 60 TABLET | Refills: 11 | Status: SHIPPED | OUTPATIENT
Start: 2025-06-21 | End: 2025-06-21

## 2025-06-21 RX ORDER — CARVEDILOL 12.5 MG/1
12.5 TABLET ORAL 2 TIMES DAILY
Qty: 180 TABLET | Refills: 3 | Status: SHIPPED | OUTPATIENT
Start: 2025-06-21 | End: 2025-06-21

## 2025-06-21 RX ADMIN — LABETALOL HYDROCHLORIDE 10 MG: 5 INJECTION, SOLUTION INTRAVENOUS at 01:06

## 2025-06-21 RX ADMIN — SACUBITRIL AND VALSARTAN 1 TABLET: 49; 51 TABLET, FILM COATED ORAL at 09:06

## 2025-06-21 RX ADMIN — ATORVASTATIN CALCIUM 40 MG: 40 TABLET, FILM COATED ORAL at 09:06

## 2025-06-21 RX ADMIN — HEPARIN SODIUM 5000 UNITS: 5000 INJECTION INTRAVENOUS; SUBCUTANEOUS at 06:06

## 2025-06-21 RX ADMIN — HEPARIN SODIUM 5000 UNITS: 5000 INJECTION INTRAVENOUS; SUBCUTANEOUS at 02:06

## 2025-06-21 RX ADMIN — FUROSEMIDE 20 MG: 20 TABLET ORAL at 09:06

## 2025-06-21 RX ADMIN — CARVEDILOL 12.5 MG: 6.25 TABLET, FILM COATED ORAL at 09:06

## 2025-06-21 RX ADMIN — INSULIN ASPART 4 UNITS: 100 INJECTION, SOLUTION INTRAVENOUS; SUBCUTANEOUS at 12:06

## 2025-06-21 RX ADMIN — INSULIN ASPART 2 UNITS: 100 INJECTION, SOLUTION INTRAVENOUS; SUBCUTANEOUS at 09:06

## 2025-06-21 RX ADMIN — MUPIROCIN: 20 OINTMENT TOPICAL at 09:06

## 2025-06-21 RX ADMIN — LABETALOL HYDROCHLORIDE 10 MG: 5 INJECTION, SOLUTION INTRAVENOUS at 12:06

## 2025-06-21 NOTE — ASSESSMENT & PLAN NOTE
ICH risk factor.   Admission SBP 180s-210s.  Started on cardene gtt in the ED, was off for a couple of hours but required drip back on today for SBP goal<140  Home Entresto increased and coreg resumed and started on lasix  Controlled- OP follow up

## 2025-06-21 NOTE — PROGRESS NOTES
[facility]  Vascular Neurology  [facility]  Progress Note    Assessment/Plan:     * ICH (intracerebral hemorrhage)  Mr. Cecil Clark is a 50 y.o. patient here for mental status changes. Complex prior neurovascular history with right thalamic ICH (2021) and R MCA stroke (2023) with residual LSW. Patient has been feeling more confused and disoriented on the last 3 days. No evidence of headaches or sensory changes. Hypertensive on admission on the 210s range, started on cardene gtt. Initial NIHSS 3 for L arm/leg drift from residual LSW with LUE ataxia/dysmetria with action and intention tremor. CTH with new small left thalamic parenchymal hemorrhage with mass effect on the left lateral ventricle without MLS. No hydrocephalus noted. No acute interventions per neurosurgery. ETIOLOGY: hypertensive. OP follow w Dr. Barros    Antithrombotics for secondary stroke prevention: Not recommending any AP in the setting of acute L thalamic ICH.     Statins for secondary stroke prevention and hyperlipidemia, if present: Recommending holding statins in the acute setting of L thalamic ICH on a patient with complex prior neurovascular history for the high risk of bleeding.     Aggressive risk factor modification: HTN     Rehab efforts: The patient has been evaluated by a stroke team provider and the therapy needs have been fully considered based off the presenting complaints and exam findings. The following therapy evaluations are needed: PT evaluate and treat, OT evaluate and treat, SLP evaluate and treat, PM&R evaluate for appropriate placement    Diagnostics ordered/pending: None     VTE prophylaxis: None: Reason for No Pharmacological VTE Prophylaxis: History of systemic or intracranial bleeding    BP parameters: ICH: SBP Goal Range 130-150 -     Chronic combined systolic and diastolic congestive heart failure  Recent EF 35-40%, improved from 3/2025  Entresot increased ,coreg and lasix  resumed    Hyperlipidemia  Currently on atorvastatin per NCC.   Would recommend holding statins in the setting of ICH with complex neurovascular history.   Recommend trial of low dose Zetia 10.     Type 2 diabetes mellitus  ICH risk factor.   Admission A1c 8.8.   Currently on MDSSI per NCC.     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  See HTN    Hypokalemia  Replacement per NCC protocol.   Normalized post repletion     Hypertension  ICH risk factor.   Admission SBP 180s-210s.  Started on cardene gtt in the ED, was off for a couple of hours but required drip back on today for SBP goal<140  Home Entresto increased and coreg resumed and started on lasix  Controlled- OP follow up     History of intracerebral hemorrhage without residual deficit  -R thalamic IPH in 2021 06/17/2025 Admitted to [facility] last night after CTH revealed a new L thalamic IPH. No acute events overnight. Repeat CTH stable. Cardene drip retarted this morning for SBP goal<140. MRI today shows evolving recent to subacute aged left thalamic parenchymal hemorrhage, MRA unremarkable.   6-18-25 off of cardene at 1400, no acute event overnight, neuro stbale  06/19/2025 off cardene and resumed on home medications. Unclear whether patient is compliant with medications at home. Will s/d to NPU for BP optimization but if tolerating tomorrow, then can discharge home   6-20-25 no acute events overnight, Entresto increased stable to step down or be discharged home  06/21/2025 BP better controlled. Patient to discharge today with close follow up. Medications sent to [facility] pharmacy for patient to go home with. Will follow up w Dr. Barros for small vessel disease pathology, 1 stroke and 2 hemorrhages      STROKE DOCUMENTATION   Acute Stroke  Times   Last Known Normal Date: 06/14/25  Unknown Normal Time: Unknown Time  Unknown Symptom Onset Date: Unknown Date  Unknown Symptom Onset Time: Unknown Time    NIH Scale:  1a. Level of Consciousness: 0-->Alert, keenly responsive  1b. LOC Questions: 0-->Answers both questions correctly  1c. LOC Commands: 0-->Performs both tasks correctly  2. Best Gaze: 0-->Normal  3. Visual: 0-->No visual loss  4. Facial Palsy: 0-->Normal symmetrical movements  5a. Motor Arm, Left: 0-->No drift, limb holds 90 (or 45) degrees for full 10 secs  5b. Motor Arm, Right: 0-->No drift, limb holds 90 (or 45) degrees for full 10 secs  6a. Motor Leg, Left: 0-->No drift, leg holds 30 degree position for full 5 secs  6b. Motor Leg, Right: 0-->No drift, leg holds 30 degree position for full 5 secs  7. Limb Ataxia: 0-->Absent  8. Sensory: 0-->Normal, no sensory loss  9. Best Language: 0-->No aphasia, normal  10. Dysarthria: 0-->Normal  11. Extinction and Inattention (formerly Neglect): 0-->No abnormality  Total (NIH Stroke Scale): 0       Modified Hipolito Score: 0  Glenda Coma Scale:    ABCD2 Score:    [id number] Score:   HAS -BLED Score:   ICH Score:0  Hunt & Gonzalez Classification:      Hemorrhagic change of an Ischemic Stroke: Does this patient have an ischemic stroke with hemorrhagic changes? No     Neurologic Chief Complaint: L thalamic IPH    Subjective:     Interval History: Patient is seen for follow-up neurological assessment and treatment recommendations: see hospital course     HPI, Past Medical, Family, and Social History remains the same as documented in the initial encounter.     Review of Systems   Constitutional:  Negative for appetite change, chills and fever.   HENT:  Negative for congestion, sore throat, trouble swallowing and voice change.    Eyes: Negative.    Respiratory:  Negative for cough and shortness of breath.    Cardiovascular:  Negative for chest pain and leg swelling.   Gastrointestinal:  Negative for abdominal  distention, abdominal pain, constipation, nausea and vomiting.   Endocrine: Negative.    Genitourinary: Negative.    Musculoskeletal:  Negative for back pain and gait problem.   Skin: Negative.    Neurological:  Negative for weakness and headaches.   Psychiatric/Behavioral: Negative.       Scheduled Meds:   atorvastatin  40 mg Oral Daily    carvediloL  12.5 mg Oral BID    furosemide  20 mg Oral BID    heparin (porcine)  5,000 Units Subcutaneous Q8H    mupirocin   Nasal BID    sacubitriL-valsartan  1 tablet Oral BID     Continuous Infusions:  PRN Meds:  Current Facility-Administered Medications:     dextrose 50%, 12.5 g, Intravenous, PRN    dextrose 50%, 25 g, Intravenous, PRN    glucagon (human recombinant), 1 mg, Intramuscular, PRN    glucose, 16 g, Oral, PRN    glucose, 24 g, Oral, PRN    hydrALAZINE, 10 mg, Intravenous, Q4H PRN    insulin aspart U-100, 0-10 Units, Subcutaneous, QID (AC + HS) PRN    labetalol, 10 mg, Intravenous, Q4H PRN    ondansetron, 4 mg, Intravenous, Q8H PRN    sodium chloride 0.9%, 10 mL, Intravenous, PRN    Objective:     Vital Signs (Most Recent):  Temp: 97 °F (36.1 °C) (06/21/25 1101)  Pulse: 91 (06/21/25 1301)  Resp: (!) 25 (06/21/25 1101)  BP: (!) 157/114 (06/21/25 1301)  SpO2: 96 % (06/21/25 1301)  BP Location: Left arm    Vital Signs Range (Last 24H):  Temp:  [97 °F (36.1 °C)-98.6 °F (37 °C)]   Pulse:  []   Resp:  [13-36]   BP: (119-163)/()   SpO2:  [94 %-100 %]   BP Location: Left arm       Physical Exam  Constitutional:       Appearance: Normal appearance.   HENT:      Head: Normocephalic and atraumatic.      Nose: Nose normal.      Mouth/Throat:      Mouth: Mucous membranes are moist.   Eyes:      Extraocular Movements: Extraocular movements intact.      Pupils: Pupils are equal, round, and reactive to light.   Cardiovascular:      Rate and Rhythm: Normal rate and regular rhythm.   Pulmonary:      Breath sounds: Normal breath sounds.   Abdominal:      General: Bowel  sounds are normal. There is no distension.      Palpations: Abdomen is soft.      Tenderness: There is no abdominal tenderness.   Musculoskeletal:         General: No swelling. Normal range of motion.      Cervical back: Normal range of motion.   Skin:     General: Skin is warm.      Capillary Refill: Capillary refill takes less than 2 seconds.   Neurological:      General: No focal deficit present.      Mental Status: He is alert and oriented to person, place, and time. Mental status is at baseline.              Neurological Exam:   LOC: alert  Attention Span: Good   Language: No aphasia  Facial Sensation (CN V): Normal  Facial Movement (CN VII): Symmetric facial expression    Motor: Arm left  Normal 5/5  Leg left  Normal 5/5  Arm right  Normal 5/5  Leg right Normal 5/5  Sensation: Intact to light touch, temperature and vibration    Laboratory:  CMP:   Recent Labs   Lab 06/21/25  0610   CALCIUM 7.8*   ALBUMIN 2.7*   PROT 5.4*      K 3.1*   CO2 25      BUN 20   CREATININE 1.5*   ALKPHOS 49   ALT 9*   AST 18   BILITOT 0.7     CBC:   Recent Labs   Lab 06/21/25  0610   WBC 4.70   RBC 4.74   HGB 14.3   HCT 41.5      MCV 88   MCH 30.2   MCHC 34.5       Diagnostic Results     Brain Imaging   MRA/MRI Brain WO - 6/17/2025    MRI brain: Evolving recent to subacute aged left thalamic parenchymal hemorrhage.  Please note evaluation for underlying mass lesion limited by noncontrast technique.  Clinical correlation and follow-up recommended.     Patchy and confluent T2 FLAIR signal abnormality supratentorial white matter elsewhere while nonspecific concerning for underlying chronic ischemic change     Ventricles stable without hydrocephalus     Remote hemorrhage right basal ganglia/thalamus with ipsilateral wallerian degeneration.     MRA head: Unremarkable MRA head as detailed above specifically without focal stenosis or proximal large vessel occlusion.     Please note there is no hypertrophied vasculature  associated with left thalamic hemorrhage although only partially included in the field of view.  Further evaluation with repeat MRA imaging to include the entirety of the thalamic hemorrhage advised.    [facility] WO - 6/17/2025  Stable CT appearance of the small acute parenchymal hematoma centered in the left thalamus.  No new intracranial hemorrhage.     Ventricles are stable in size.     Recommendations include clinical correlation and continued imaging follow-up.    CT WO - 6/16/2025  1. Small acute parenchymal hemorrhage centered in the left thalamus, likely hypertensive in etiology.  There is local mass effect on the adjacent left lateral ventricle without significant midline shift.  2. Mild prominence of the lateral ventricles when compared to prior.  This may relate to interval volume loss, however early acute hydrocephalus may present similarly.  Recommend attention on short-term follow-up.  3. Chronic microvascular ischemic change and sequela of remote hemorrhage, as detailed above.  Right caudate suspected remote tiny lacunar type infarct but new from 10/25/2023 imaging.    Cardiac Imaging   TTE - 6/17/2025  Summary      Left Ventricle: The left ventricle is normal in size. Mildly increased ventricular mass. Mildly increased wall thickness. There is mild concentric hypertrophy. Mild global hypokinesis present. There is moderately reduced systolic function with a visually estimated ejection fraction of 35 - 40%. Quantitated ejection fraction is 32%. Global longitudinal strain is reduced moderately reduced measuring -10.8% There is diastolic dysfunction but grade cannot be determined.    Right Ventricle: The right ventricle is normal in size Systolic function is normal.    Left Atrium: The left atrium is mildly dilated    Mitral Valve: There is mild regurgitation.    IVC/SVC: Normal venous pressure at 3 mmHg.      Natacha Cage MD  [facility]  Department of Vascular Neurology   [facility]

## 2025-06-21 NOTE — SUBJECTIVE & OBJECTIVE
Neurologic Chief Complaint: L thalamic IPH    Subjective:     Interval History: Patient is seen for follow-up neurological assessment and treatment recommendations: see hospital course     HPI, Past Medical, Family, and Social History remains the same as documented in the initial encounter.     Review of Systems   Constitutional:  Negative for appetite change, chills and fever.   HENT:  Negative for congestion, sore throat, trouble swallowing and voice change.    Eyes: Negative.    Respiratory:  Negative for cough and shortness of breath.    Cardiovascular:  Negative for chest pain and leg swelling.   Gastrointestinal:  Negative for abdominal distention, abdominal pain, constipation, nausea and vomiting.   Endocrine: Negative.    Genitourinary: Negative.    Musculoskeletal:  Negative for back pain and gait problem.   Skin: Negative.    Neurological:  Negative for weakness and headaches.   Psychiatric/Behavioral: Negative.       Scheduled Meds:   atorvastatin  40 mg Oral Daily    carvediloL  12.5 mg Oral BID    furosemide  20 mg Oral BID    heparin (porcine)  5,000 Units Subcutaneous Q8H    mupirocin   Nasal BID    sacubitriL-valsartan  1 tablet Oral BID     Continuous Infusions:  PRN Meds:  Current Facility-Administered Medications:     dextrose 50%, 12.5 g, Intravenous, PRN    dextrose 50%, 25 g, Intravenous, PRN    glucagon (human recombinant), 1 mg, Intramuscular, PRN    glucose, 16 g, Oral, PRN    glucose, 24 g, Oral, PRN    hydrALAZINE, 10 mg, Intravenous, Q4H PRN    insulin aspart U-100, 0-10 Units, Subcutaneous, QID (AC + HS) PRN    labetalol, 10 mg, Intravenous, Q4H PRN    ondansetron, 4 mg, Intravenous, Q8H PRN    sodium chloride 0.9%, 10 mL, Intravenous, PRN    Objective:     Vital Signs (Most Recent):  Temp: 97 °F (36.1 °C) (06/21/25 1101)  Pulse: 91 (06/21/25 1301)  Resp: (!) 25 (06/21/25 1101)  BP: (!) 157/114 (06/21/25 1301)  SpO2: 96 % (06/21/25 1301)  BP Location: Left arm    Vital Signs Range (Last  24H):  Temp:  [97 °F (36.1 °C)-98.6 °F (37 °C)]   Pulse:  []   Resp:  [13-36]   BP: (119-163)/()   SpO2:  [94 %-100 %]   BP Location: Left arm       Physical Exam  Constitutional:       Appearance: Normal appearance.   HENT:      Head: Normocephalic and atraumatic.      Nose: Nose normal.      Mouth/Throat:      Mouth: Mucous membranes are moist.   Eyes:      Extraocular Movements: Extraocular movements intact.      Pupils: Pupils are equal, round, and reactive to light.   Cardiovascular:      Rate and Rhythm: Normal rate and regular rhythm.   Pulmonary:      Breath sounds: Normal breath sounds.   Abdominal:      General: Bowel sounds are normal. There is no distension.      Palpations: Abdomen is soft.      Tenderness: There is no abdominal tenderness.   Musculoskeletal:         General: No swelling. Normal range of motion.      Cervical back: Normal range of motion.   Skin:     General: Skin is warm.      Capillary Refill: Capillary refill takes less than 2 seconds.   Neurological:      General: No focal deficit present.      Mental Status: He is alert and oriented to person, place, and time. Mental status is at baseline.              Neurological Exam:   LOC: alert  Attention Span: Good   Language: No aphasia  Facial Sensation (CN V): Normal  Facial Movement (CN VII): Symmetric facial expression    Motor: Arm left  Normal 5/5  Leg left  Normal 5/5  Arm right  Normal 5/5  Leg right Normal 5/5  Sensation: Intact to light touch, temperature and vibration    Laboratory:  CMP:   Recent Labs   Lab 06/21/25  0610   CALCIUM 7.8*   ALBUMIN 2.7*   PROT 5.4*      K 3.1*   CO2 25      BUN 20   CREATININE 1.5*   ALKPHOS 49   ALT 9*   AST 18   BILITOT 0.7     CBC:   Recent Labs   Lab 06/21/25  0610   WBC 4.70   RBC 4.74   HGB 14.3   HCT 41.5      MCV 88   MCH 30.2   MCHC 34.5       Diagnostic Results     Brain Imaging   MRA/MRI Brain WO - 6/17/2025    MRI brain: Evolving recent to subacute aged  left thalamic parenchymal hemorrhage.  Please note evaluation for underlying mass lesion limited by noncontrast technique.  Clinical correlation and follow-up recommended.     Patchy and confluent T2 FLAIR signal abnormality supratentorial white matter elsewhere while nonspecific concerning for underlying chronic ischemic change     Ventricles stable without hydrocephalus     Remote hemorrhage right basal ganglia/thalamus with ipsilateral wallerian degeneration.     MRA head: Unremarkable MRA head as detailed above specifically without focal stenosis or proximal large vessel occlusion.     Please note there is no hypertrophied vasculature associated with left thalamic hemorrhage although only partially included in the field of view.  Further evaluation with repeat MRA imaging to include the entirety of the thalamic hemorrhage advised.    [facility] WO - 6/17/2025  Stable CT appearance of the small acute parenchymal hematoma centered in the left thalamus.  No new intracranial hemorrhage.     Ventricles are stable in size.     Recommendations include clinical correlation and continued imaging follow-up.    [facility] WO - 6/16/2025  1. Small acute parenchymal hemorrhage centered in the left thalamus, likely hypertensive in etiology.  There is local mass effect on the adjacent left lateral ventricle without significant midline shift.  2. Mild prominence of the lateral ventricles when compared to prior.  This may relate to interval volume loss, however early acute hydrocephalus may present similarly.  Recommend attention on short-term follow-up.  3. Chronic microvascular ischemic change and sequela of remote hemorrhage, as detailed above.  Right caudate suspected remote tiny lacunar type infarct but new from 10/25/2023 imaging.    Cardiac Imaging   TTE - 6/17/2025  Summary      Left Ventricle: The left ventricle is normal in size. Mildly increased ventricular mass. Mildly increased wall thickness. There is mild concentric  hypertrophy. Mild global hypokinesis present. There is moderately reduced systolic function with a visually estimated ejection fraction of 35 - 40%. Quantitated ejection fraction is 32%. Global longitudinal strain is reduced moderately reduced measuring -10.8% There is diastolic dysfunction but grade cannot be determined.    Right Ventricle: The right ventricle is normal in size Systolic function is normal.    Left Atrium: The left atrium is mildly dilated    Mitral Valve: There is mild regurgitation.    IVC/SVC: Normal venous pressure at 3 mmHg.

## 2025-06-21 NOTE — ASSESSMENT & PLAN NOTE
Mr. Cecil Clark is a 50 y.o. patient here for mental status changes. Complex prior neurovascular history with right thalamic ICH (2021) and R MCA stroke (2023) with residual LSW. Patient has been feeling more confused and disoriented on the last 3 days. No evidence of headaches or sensory changes. Hypertensive on admission on the 210s range, started on cardene gtt. Initial NIHSS 3 for L arm/leg drift from residual LSW with LUE ataxia/dysmetria with action and intention tremor. CTH with new small left thalamic parenchymal hemorrhage with mass effect on the left lateral ventricle without MLS. No hydrocephalus noted. No acute interventions per neurosurgery. ETIOLOGY: hypertensive. OP follow w Dr. Barros    Antithrombotics for secondary stroke prevention: Not recommending any AP in the setting of acute L thalamic ICH.     Statins for secondary stroke prevention and hyperlipidemia, if present: Recommending holding statins in the acute setting of L thalamic ICH on a patient with complex prior neurovascular history for the high risk of bleeding.     Aggressive risk factor modification: HTN     Rehab efforts: The patient has been evaluated by a stroke team provider and the therapy needs have been fully considered based off the presenting complaints and exam findings. The following therapy evaluations are needed: PT evaluate and treat, OT evaluate and treat, SLP evaluate and treat, PM&R evaluate for appropriate placement    Diagnostics ordered/pending: None     VTE prophylaxis: None: Reason for No Pharmacological VTE Prophylaxis: History of systemic or intracranial bleeding    BP parameters: ICH: SBP Goal Range 130-150 -

## 2025-06-21 NOTE — EICU
JEFFRYU Night Rounds Checklist  24H Vital Sign Range:  Temp:  [98.2 °F (36.8 °C)-98.6 °F (37 °C)]   Pulse:  []   Resp:  [0-36]   BP: (108-170)/()   SpO2:  [94 %-100 %]     Video rounds and LDA reconciliation

## 2025-06-21 NOTE — ASSESSMENT & PLAN NOTE
Currently on atorvastatin per NCC.   Would recommend holding statins in the setting of ICH with complex neurovascular history.   Recommend trial of low dose Zetia 10.

## 2025-06-21 NOTE — EICU
Virtual ICU Quality Rounds    Admit Date: 6/16/2025  Hospital Day: 5    ICU Day: 4d 10h    24H Vital Sign Range:  Temp:  [98.1 °F (36.7 °C)-98.6 °F (37 °C)]   Pulse:  []   Resp:  [13-36]   BP: (126-168)/()   SpO2:  [94 %-100 %]     VICU Surveillance Screening      LDA reconciliation : Yes

## 2025-06-21 NOTE — DISCHARGE SUMMARY
[facility] - Neuro Critical Care  Vascular Neurology  [facility]  Discharge Summary     Summary:     Admit Date: 6/16/2025  7:10 PM    Discharge Date and Time: 06/21/2025 3:09 PM    Attending Physician: Liliane Kwok MD     Discharge Provider: Natacha Cage MD    History of Present Illness: Mr. Cecil Clark is a 50 y.o. patient here for mental status changes. History of DM2, HTN, complex prior neurovascular history with right thalamic ICH (2021) and R MCA stroke (2023) with residual LSW. Per patient, he commented about not feeling himself and more confused and disoriented for the past 3 days. No headaches or sensory changes. Hypertensive in the ED with SBP 180s-210s. Per patient he was at his baseline when I examined him in [facility]. On my initial evaluation, NIHSS 3 for L arm and L leg drift (residual) with LUE ataxia/dysmetria with action and intention tremor, which the patient comments about the latter being new finding for him. CTH on this admission with new small left thalamic parenchymal hemorrhage with mass effect on the left lateral ventricle without MLS. No hydrocephalus.       Hospital Course (synopsis of major diagnoses, care, treatment, and services provided during the course of the hospital stay): 06/17/2025 Admitted to [facility] last night after CTH revealed a new L thalamic IPH. No acute events overnight. Repeat CTH stable. Cardene drip retarted this morning for SBP goal<140. MRI today shows evolving recent to subacute aged left thalamic parenchymal hemorrhage, MRA unremarkable.   6-18-25 off of cardene at 1400, no acute event overnight, neuro stbale  06/19/2025 off cardene and resumed on home medications. Unclear whether patient is compliant with medications at home. Will s/d to NPU for BP optimization but if tolerating tomorrow, then can discharge home   6-20-25 no acute events overnight, Entresto increased stable to step down or be discharged home  06/21/2025 BP better controlled.  Patient to discharge today with close follow up. Medications sent to [facility] pharmacy for patient to go home with. Will follow up w Dr. Barros for small vessel disease pathology, 1 stroke and 2 hemorrhages      Goals of Care Treatment Preferences:  Code Status: Full Code      Stroke Etiology: Hemorrhage ICH Lobar Hypertension    STROKE DOCUMENTATION   Acute Stroke Times   Last Known Normal Date: 06/14/25  Unknown Normal Time: Unknown Time  Unknown Symptom Onset Date: Unknown Date  Unknown Symptom Onset Time: Unknown Time     NIH Scale:  1a. Level of Consciousness: 0-->Alert, keenly responsive  1b. LOC Questions: 0-->Answers both questions correctly  1c. LOC Commands: 0-->Performs both tasks correctly  2. Best Gaze: 0-->Normal  3. Visual: 0-->No visual loss  4. Facial Palsy: 0-->Normal symmetrical movements  5a. Motor Arm, Left: 0-->No drift, limb holds 90 (or 45) degrees for full 10 secs  5b. Motor Arm, Right: 0-->No drift, limb holds 90 (or 45) degrees for full 10 secs  6a. Motor Leg, Left: 0-->No drift, leg holds 30 degree position for full 5 secs  6b. Motor Leg, Right: 0-->No drift, leg holds 30 degree position for full 5 secs  7. Limb Ataxia: 0-->Absent  8. Sensory: 0-->Normal, no sensory loss  9. Best Language: 0-->No aphasia, normal  10. Dysarthria: 0-->Normal  11. Extinction and Inattention (formerly Neglect): 0-->No abnormality  Total (NIH Stroke Scale): 0        Modified Hipolito Score: 0  Glenda Coma Scale:    ABCD2 Score:    [id number] Score:   HAS -BLED Score:   ICH Score:0  Hunt & Gonzalez Classification:       Assessment/Plan:     Diagnostic Results:      Brain Imaging:   MRA/MRI Brain WO - 6/17/2025    MRI brain: Evolving recent to subacute aged left thalamic parenchymal hemorrhage.  Please note evaluation for underlying mass lesion limited by noncontrast technique.  Clinical correlation and follow-up recommended.     Patchy and confluent T2 FLAIR signal abnormality supratentorial white matter elsewhere  while nonspecific concerning for underlying chronic ischemic change     Ventricles stable without hydrocephalus     Remote hemorrhage right basal ganglia/thalamus with ipsilateral wallerian degeneration.     MRA head: Unremarkable MRA head as detailed above specifically without focal stenosis or proximal large vessel occlusion.     Please note there is no hypertrophied vasculature associated with left thalamic hemorrhage although only partially included in the field of view.  Further evaluation with repeat MRA imaging to include the entirety of the thalamic hemorrhage advised.    CT WO - 6/17/2025  Stable CT appearance of the small acute parenchymal hematoma centered in the left thalamus.  No new intracranial hemorrhage.     Ventricles are stable in size.     Recommendations include clinical correlation and continued imaging follow-up.    [facility] WO - 6/16/2025  1. Small acute parenchymal hemorrhage centered in the left thalamus, likely hypertensive in etiology.  There is local mass effect on the adjacent left lateral ventricle without significant midline shift.  2. Mild prominence of the lateral ventricles when compared to prior.  This may relate to interval volume loss, however early acute hydrocephalus may present similarly.  Recommend attention on short-term follow-up.  3. Chronic microvascular ischemic change and sequela of remote hemorrhage, as detailed above.  Right caudate suspected remote tiny lacunar type infarct but new from 10/25/2023 imaging.    Cardiac Imaging   TTE - 6/17/2025  Summary      Left Ventricle: The left ventricle is normal in size. Mildly increased ventricular mass. Mildly increased wall thickness. There is mild concentric hypertrophy. Mild global hypokinesis present. There is moderately reduced systolic function with a visually estimated ejection fraction of 35 - 40%. Quantitated ejection fraction is 32%. Global longitudinal strain is reduced moderately reduced measuring -10.8% There is  diastolic dysfunction but grade cannot be determined.    Right Ventricle: The right ventricle is normal in size Systolic function is normal.    Left Atrium: The left atrium is mildly dilated    Mitral Valve: There is mild regurgitation.    IVC/SVC: Normal venous pressure at 3 mmHg.      Interventions: None    Complications: None    Disposition: Home or Self Care    Final Active Diagnoses:    Diagnosis Date Noted POA    PRINCIPAL PROBLEM:  ICH (intracerebral hemorrhage) [I61.9] 06/16/2025 Yes    Acute on chronic renal insufficiency [N28.9, N18.9] 06/18/2025 Yes    Chronic combined systolic and diastolic congestive heart failure [I50.42] 06/17/2025 Yes    Stage 2 chronic kidney disease [N18.2] 06/17/2025 Yes    Hyperlipidemia [E78.5] 06/16/2025 Yes    Type 2 diabetes mellitus [E11.9] 06/16/2025 Yes    History of CVA with residual deficit [I69.30] 03/07/2025 Not Applicable    New onset of congestive heart failure [I50.9] 03/07/2025 Yes    Hypertension [I10] 04/04/2021 Yes    Hypokalemia [E87.6] 04/04/2021 Yes    History of intracerebral hemorrhage without residual deficit [Z86.79] 04/04/2021 Not Applicable      Problems Resolved During this Admission:     No new Assessment & Plan notes have been filed under this hospital service since the last note was generated.  Service: Vascular Neurology      Recommendations:     Post-discharge complication risks: None    Stroke Education given to: patient    Follow-up in Stroke Clinic in 4-6 weeks.     Discharge Plan:  Aggresive risk factor modification:  Hypertension    Follow Up:   Follow-up Information       No, Primary Doctor Follow up.                             Patient Instructions:      Ambulatory referral/consult to Vascular Neurology   Standing Status: Future   Referral Priority: Routine Referral Type: Consultation   Referral Reason: Specialty Services Required   Referred to Provider: JOSE HUSAIN Requested Specialty: Vascular Neurology   Number of Visits Requested: 1      Ambulatory Referral/Consult to Occupational Therapy   Standing Status: Future   Referral Priority: Routine Referral Type: Occupational Therapy   Referral Reason: Specialty Services Required   Requested Specialty: Occupational Therapy   Number of Visits Requested: 1     Diet Cardiac   Order Comments: See Stroke Patient Education Guide Booklet for details.     Call 911 for any of the following:   Order Comments: Call 911  right away if any of the following warning signs come on suddenly, even if the symptoms only last for a few minutes. With stroke, timing is very important.   - Warning Signs of Stroke:  - Weakness: You may feel a sudden weakness, tingling or loss of feeling on one side of your face or body.  - Vision Problems: You may have sudden double vision or trouble seeing in one or both eyes.  - Speech Problems: You may have sudden trouble talking, slured speech, or problems understanding others.  - Headache: You may have sudden, severe headache.  - Movement Problems: You may experience dizziness, a feeling of spinning, a loss of balance, a feeling of falling or blackouts.     Activity as tolerated     Call MD for:  persistent dizziness, light-headedness, or visual disturbances     Call MD for:  increased confusion or weakness       Medications:  Reconciled Home Medications:      Medication List        START taking these medications      ENTRESTO 49-51 mg per tablet  Generic drug: sacubitriL-valsartan  Take 1 tablet by mouth 2 (two) times daily.  Replaces: ENTRESTO 24-26 mg per tablet            CHANGE how you take these medications      carvediloL 12.5 MG tablet  Commonly known as: COREG  Take 1 tablet (12.5 mg total) by mouth 2 (two) times daily.  What changed:   medication strength  how much to take     furosemide 20 MG tablet  Commonly known as: LASIX  Take 1 tablet (20 mg total) by mouth 2 (two) times daily.  What changed:   medication strength  how much to take            CONTINUE taking these  "medications      atorvastatin 40 MG tablet  Commonly known as: LIPITOR  Take 1 tablet (40 mg total) by mouth once daily.     BD ULTRA-FINE ANTONI PEN NEEDLE 32 gauge x 5/32" Ndle  Generic drug: pen needle, diabetic  Use for insulin adminstration up to 5 times daily     miscellaneous medical supply Kit  by Apply Externally route 2 (two) times a day. Use twice daily at 8 AM and 3 PM and record the value in MyChart as directed.     NovoLOG Flexpen U-100 Insulin 100 unit/mL (3 mL) Inpn pen  Generic drug: insulin aspart U-100  Inject 1-10 Units before meals and at bedtime for Hyperglycemia. Per sliding scale:Blood Glucose: 151-200    201-250   251-300   301-350  >350 Pre-meal:          2 units      4 units      6 units     8 units     10 units  10 pm:              1 units      2 units      3 units     4 units      5 units     TRUE METRIX GLUCOSE METER Misc  Generic drug: blood-glucose meter  Use to check blood glucose 5 times daily     TRUE METRIX GLUCOSE TEST STRIP Strp  Generic drug: blood sugar diagnostic  Use to check blood glucose 5 (five) times daily.     TRUEPLUS LANCETS 30 gauge Misc  Generic drug: lancets  Use to check blood glucose 5 (five) times daily.            STOP taking these medications      aspirin 81 MG EC tablet  Commonly known as: ECOTRIN     clopidogreL 75 mg tablet  Commonly known as: PLAVIX     ENTRESTO 24-26 mg per tablet  Generic drug: sacubitriL-valsartan  Replaced by: ENTRESTO 49-51 mg per tablet              Naatcha Cage MD  [facility]  Department of Vascular Neurology   [facility] - Neuro Critical Care     "

## 2025-06-21 NOTE — PLAN OF CARE
[facility] - Neuro Critical Care  Discharge Final Note    Primary Care Provider: Mi Primary Doctor    Expected Discharge Date: 6/21/2025    Final Discharge Note (most recent)       Final Note - 06/21/25 1556          Final Note    Assessment Type Final Discharge Note     Anticipated Discharge Disposition Home or Self Care     What phone number can be called within the next 1-3 days to see how you are doing after discharge? [phone]     Hospital Resources/Appts/Education Provided Provided patient/caregiver with written discharge plan information;Appointments scheduled and added to AVS        Post-Acute Status    Patient choice form signed by patient/caregiver List with quality metrics by geographic area provided     Discharge Delays None known at this time                     Important Message from Medicare             Contact Info       No, Primary Doctor   Relationship: PCP - General        Next Steps: Follow up          Patient marked medically ready and is agreeable to discharge. Patient to discharge to home to his sisters care and will stay with her in [place]. All necessary follow up appointments have been scheduled and added to patient AVS. No other case management needs at this time.     Future Appointments   Date Time Provider Department Center   6/26/2025  9:45 AM Monica Henning MD [facility]       EAGLE Frederick, MSW  Case Management  [facility]

## 2025-06-21 NOTE — ASSESSMENT & PLAN NOTE
BNP 1.427 (3/6)  Last echo 3/7/25 with EF 25-30% with global hypokinesis and severely reduced systolic function.  See HTN

## 2025-06-21 NOTE — PLAN OF CARE
Discharge orders per physician. AVS printed and reviewed at bedside with patient. All follow up appointments and medications reviewed at this time. All patient teaching demonstrated and family verbalized understanding with teachback method. All care plans and education resolved per adequate discharge. All peripheral IVs/LDAs removed at this time. Pt discharged to Home via family.

## 2025-06-21 NOTE — ASSESSMENT & PLAN NOTE
49 y/o M presenting with disorientation and confusion for the past several days (exact time of onset unclear). He reports being unable to remember where he parked his car, difficulty with timelines, and inability to remember waking up in the mornings. CTH obtained in the ED revealed an acute L thalamic IPH.    Antithrombotics for secondary stroke prevention: Antiplatelets: None: Intracerebral Hemorrhage    Statins for secondary stroke prevention and hyperlipidemia, if present:   Statins: Atorvastatin- 40 mg daily - not indicated for ICH; however, patient has PMH of hyperlipidemia    Aggressive risk factor modification: HTN, DM, HLD     Rehab efforts: The patient has been evaluated by a stroke team provider and the therapy needs have been fully considered based off the presenting complaints and exam findings. The following therapy evaluations are needed: PT evaluate and treat, OT evaluate and treat, SLP evaluate and treat, PM&R evaluate for appropriate placement    Diagnostics ordered/pending: CT scan of head without contrast to asses brain parenchyma, HgbA1C to assess blood glucose levels, Lipid Profile to assess cholesterol levels, MRA head to assess vasculature, MRI head without contrast to assess brain parenchyma, TTE to assess cardiac function/status , TSH to assess thyroid function    VTE prophylaxis: Mechanical prophylaxis: Place SCDs  None: Reason for No Pharmacological VTE Prophylaxis: History of systemic or intracranial bleeding    BP parameters: ICH: SBP < 160    - Admit to NCC  - VS/Neuro checks q1  - NSGY, Vascular Neurology consulted  - No acute surgical intervention indicated at this time  - HOB >/= 30  - MRI/MRA brain 6/17 w/ evolving subacute L thalamic IPH. No acutely concerning changes       - Plan for f/u MRI/MRA in 6-8wk unless otherwise indicated  - SBP goal < 160       - Labetalol, hydralazine PRN; discontinued Cardene        - Increased entresto 6/20  - Continue to advance diet as tolerated  -  Monitor I/O  - CBC, CMP, Mag, Phos daily  - Heparin for DVT PPX  - PT/OT/SLP as appropriate

## 2025-06-21 NOTE — SUBJECTIVE & OBJECTIVE
ROS    Objective:     Vitals:  Temp: 98.3 °F (36.8 °C)  Pulse: 93  Rhythm: normal sinus rhythm  BP: (!) 163/106  MAP (mmHg): 129  Resp: (!) 32  SpO2: 96 %    Temp  Min: 98.1 °F (36.7 °C)  Max: 98.6 °F (37 °C)  Pulse  Min: 78  Max: 102  BP  Min: 119/64  Max: 163/106  MAP (mmHg)  Min: 86  Max: 129  Resp  Min: 13  Max: 36  SpO2  Min: 94 %  Max: 100 %    06/20 0701 - 06/21 0700  In: 260 [P.O.:260]  Out: 700 [Urine:700]   Unmeasured Output  Unmeasured Urine Occurrence: 1  Unmeasured Stool Occurrence: 1        Physical Exam  Vitals and nursing note reviewed.   Constitutional:       General: He is not in acute distress.  HENT:      Head: Normocephalic.      Nose: Nose normal.   Eyes:      Extraocular Movements: Extraocular movements intact.      Pupils: Pupils are equal, round, and reactive to light.   Cardiovascular:      Rate and Rhythm: Normal rate and regular rhythm.   Pulmonary:      Effort: Pulmonary effort is normal.      Breath sounds: Normal breath sounds.   Abdominal:      General: Abdomen is flat.      Tenderness: There is no abdominal tenderness.   Musculoskeletal:         General: Normal range of motion.      Cervical back: Normal range of motion.   Skin:     General: Skin is warm and dry.   Neurological:      Mental Status: He is alert.      GCS: GCS eye subscore is 4. GCS verbal subscore is 5. GCS motor subscore is 6.      Cranial Nerves: No cranial nerve deficit or facial asymmetry.      Sensory: Sensation is intact.      Motor:   E4 V5 M6  PERRL  DELANO and antigravity to command   mild weakness in LUE and LLE (baseline)   Psychiatric:         Attention and Perception: Attention normal.           Behavior: Behavior normal. Behavior is cooperative.        Medications:  Continuous Scheduledatorvastatin, 40 mg, Daily  carvediloL, 12.5 mg, BID  furosemide, 20 mg, BID  heparin (porcine), 5,000 Units, Q8H  mupirocin, , BID  sacubitriL-valsartan, 1 tablet, BID    PRNdextrose 50%, 12.5 g, PRN  dextrose 50%, 25 g,  PRN  glucagon (human recombinant), 1 mg, PRN  glucose, 16 g, PRN  glucose, 24 g, PRN  hydrALAZINE, 10 mg, Q4H PRN  insulin aspart U-100, 0-10 Units, QID (AC + HS) PRN  labetalol, 10 mg, Q4H PRN  ondansetron, 4 mg, Q8H PRN  sodium chloride 0.9%, 10 mL, PRN      Today I personally reviewed pertinent medications, lines/drains/airways, imaging, cardiology results, laboratory results, microbiology results,     Diet  Diet Consistent Carbohydrate 2000 Calories (up to 75 gm per meal); Thin; Standard Tray  Diet Cardiac  Diet Consistent Carbohydrate 2000 Calories (up to 75 gm per meal); Thin; Standard Tray  Diet Cardiac

## 2025-06-21 NOTE — PLAN OF CARE
Hospital follow-up appointment was scheduled by CHW.  Patient follow-up appointment was scheduled for 6/26/25 at 9:45 am at [facility].      Santy WILBURN, RSW  Case Management

## 2025-06-21 NOTE — PROGRESS NOTES
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 He reports being unable to remember where he parked his car, difficulty with timelines, and inability to remember waking up in the mornings. CTH obtained in the ED revealed an acute L thalamic IPH. Questionable use of DAPT as ASA/Plavix listed on home med list, but patient unable to state which meds he is currently taking. Given recent disorientation/confusion and concern for possible AP usage, DDAVP administered. Also unclear if patient taking any home meds as /139 upon ED arrival, requiring initiation of Cardene. He will be admitted to [facility] for further management.    Hospital Course: 06/17/2025: patient was admitted to the [facility] overnight. No acute intervals. BP controlled on Cardene gtt with goals <140 sys. Restarted entresto. Repeat CTH stable. MRI today shows evolving recent to subacute aged left thalamic parenchymal hemorrhage.    6/18/2025: Increased coreg to 6.25; will continue to wean Cardene as tolerated. Heparin DVT prophylaxis started. Anticipate SD if patient maintains BP goal off of Cardene.  6/19/2025: Increased coreg to 12.5 bid. Added lasix 20mg. No additional Cardene needed. SD to vascular neurology for monitoring of BP and renal function.  6/20/2025: Increased entresto as patient again became hypertensive overnight.   06/21/2025 BP well controlled. NAEON. Stable for discharge home with family.     ROS    Objective:     Vitals:  Temp: 98.3 °F (36.8 °C)  Pulse: 93  Rhythm: normal sinus rhythm  BP: (!) 163/106  MAP  (mmHg): 129  Resp: (!) 32  SpO2: 96 %    Temp  Min: 98.1 °F (36.7 °C)  Max: 98.6 °F (37 °C)  Pulse  Min: 78  Max: 102  BP  Min: 119/64  Max: 163/106  MAP (mmHg)  Min: 86  Max: 129  Resp  Min: 13  Max: 36  SpO2  Min: 94 %  Max: 100 %    06/20 0701 - 06/21 0700  In: 260 [P.O.:260]  Out: 700 [Urine:700]   Unmeasured Output  Unmeasured Urine Occurrence: 1  Unmeasured Stool Occurrence: 1        Physical Exam  Vitals and nursing note reviewed.   Constitutional:       General: He is not in acute distress.  HENT:      Head: Normocephalic.      Nose: Nose normal.   Eyes:      Extraocular Movements: Extraocular movements intact.      Pupils: Pupils are equal, round, and reactive to light.   Cardiovascular:      Rate and Rhythm: Normal rate and regular rhythm.   Pulmonary:      Effort: Pulmonary effort is normal.      Breath sounds: Normal breath sounds.   Abdominal:      General: Abdomen is flat.      Tenderness: There is no abdominal tenderness.   Musculoskeletal:         General: Normal range of motion.      Cervical back: Normal range of motion.   Skin:     General: Skin is warm and dry.   Neurological:      Mental Status: He is alert.      GCS: GCS eye subscore is 4. GCS verbal subscore is 5. GCS motor subscore is 6.      Cranial Nerves: No cranial nerve deficit or facial asymmetry.      Sensory: Sensation is intact.      Motor:   E4 V5 M6  PERRL  DELANO and antigravity to command   mild weakness in LUE and LLE (baseline)   Psychiatric:         Attention and Perception: Attention normal.           Behavior: Behavior normal. Behavior is cooperative.        Medications:  Continuous Scheduledatorvastatin, 40 mg, Daily  carvediloL, 12.5 mg, BID  furosemide, 20 mg, BID  heparin (porcine), 5,000 Units, Q8H  mupirocin, , BID  sacubitriL-valsartan, 1 tablet, BID    PRNdextrose 50%, 12.5 g, PRN  dextrose 50%, 25 g, PRN  glucagon (human recombinant), 1 mg, PRN  glucose, 16 g, PRN  glucose, 24 g, PRN  hydrALAZINE, 10 mg, Q4H  PRN  insulin aspart U-100, 0-10 Units, QID (AC + HS) PRN  labetalol, 10 mg, Q4H PRN  ondansetron, 4 mg, Q8H PRN  sodium chloride 0.9%, 10 mL, PRN      Today I personally reviewed pertinent medications, lines/drains/airways, imaging, cardiology results, laboratory results, microbiology results,     Diet  Diet Consistent Carbohydrate 2000 Calories (up to 75 gm per meal); Thin; Standard Tray  Diet Cardiac  Diet Consistent Carbohydrate 2000 Calories (up to 75 gm per meal); Thin; Standard Tray  Diet Cardiac    Assessment/Plan:     Neuro  * ICH (intracerebral hemorrhage)  49 y/o M presenting with disorientation and confusion for the past several days (exact time of onset unclear). He reports being unable to remember where he parked his car, difficulty with timelines, and inability to remember waking up in the mornings. CTH obtained in the ED revealed an acute L thalamic IPH.    Antithrombotics for secondary stroke prevention: Antiplatelets: None: Intracerebral Hemorrhage    Statins for secondary stroke prevention and hyperlipidemia, if present:   Statins: Atorvastatin- 40 mg daily - not indicated for ICH; however, patient has PMH of hyperlipidemia    Aggressive risk factor modification: HTN, DM, HLD     Rehab efforts: The patient has been evaluated by a stroke team provider and the therapy needs have been fully considered based off the presenting complaints and exam findings. The following therapy evaluations are needed: PT evaluate and treat, OT evaluate and treat, SLP evaluate and treat, PM&R evaluate for appropriate placement    Diagnostics ordered/pending: CT scan of head without contrast to asses brain parenchyma, HgbA1C to assess blood glucose levels, Lipid Profile to assess cholesterol levels, MRA head to assess vasculature, MRI head without contrast to assess brain parenchyma, TTE to assess cardiac function/status , TSH to assess thyroid function    VTE prophylaxis: Mechanical prophylaxis: Place SCDs  None: Reason  for No Pharmacological VTE Prophylaxis: History of systemic or intracranial bleeding    BP parameters: ICH: SBP < 160    - Admit to [facility]  - VS/Neuro checks q1  - NSGY, Vascular Neurology consulted  - No acute surgical intervention indicated at this time  - HOB >/= 30  - MRI/MRA brain 6/17 w/ evolving subacute L thalamic IPH. No acutely concerning changes       - Plan for f/u MRI/MRA in 6-8wk unless otherwise indicated  - SBP goal < 160       - Labetalol, hydralazine PRN; discontinued Cardene        - Increased entresto 6/20  - Continue to advance diet as tolerated  - Monitor I/O  - CBC, CMP, Mag, Phos daily  - Heparin for DVT PPX  - PT/OT/SLP as appropriate    History of CVA with residual deficit  - R internal capsule stroke in 2023 w/ residual L-sided weakness    History of intracerebral hemorrhage without residual deficit  - R thalamic IPH in 2021 w/ residual L sided weakness    Cardiac/Vascular  Chronic combined systolic and diastolic congestive heart failure  - Last ECHO on 3/7/25 w/ global hypokinesis, severely reduced systolic function w/ estimated EF 25-30%, and diastolic dysfunction  - CXR w/o focal consolidation, effusion, consolidations  - Increased Entresto (6/20)  - Resumed lasix 20mg bid (home dose 40mg bid)    Hyperlipidemia  - Atorvastatin daily    Hypertension  - SBP goal < 150  - Increased Coreg to 12.5mg BID, increased Entresto  - Labetalol, hydralazine PRN       - Cardene discontinued  - Lasix 20mg bid (home dose 40mg BID)    Renal/  Stage 2 chronic kidney disease  - No documented history of CKD; however, GFR 60 on admission  - Renal function improving; suspect DIONTE superimposed on CKD  - GFR 37-45, creatinine 1.8-2.1 during recent admission (3/7-3/14)  - Avoid nephrotoxins  - Renally dose meds  - CMP daily       - Replace lytes PRN    Hypokalemia  -K+ 3.3 on admission  -CMP daily, replace PRN    Endocrine  Type 2 diabetes mellitus  - Hgb A1c 8.8  - Accuchecks q6 w/ SSI and aspart           The  patient is being Prophylaxed for:  Venous Thromboembolism with: Mechanical or Chemical  Stress Ulcer with: H2B  Ventilator Pneumonia with: not applicable    Activity Orders            Progressive Mobility Protocol (mobilize patient to their highest level of functioning at least twice daily) starting at 06/17 0800    Diet Consistent Carbohydrate 2000 Calories (up to 75 gm per meal); Thin; Standard Tray: Carb Control starting at 06/17 0633    Turn patient starting at 06/16 2200    Elevate HOB 30 starting at 06/16 2105          Full Code    Dispo: Discharge at discretion of vascular neurology    Billing: Level 3    Reji Bustos, DO    Neurocritical Care  [facility] - Neuro Critical Care

## 2025-06-22 NOTE — ASSESSMENT & PLAN NOTE
Echo    Interpretation Summary    Left Ventricle: The left ventricle is normal in size. Mildly increased ventricular mass. Mildly increased wall thickness. There is mild concentric hypertrophy. Mild global hypokinesis present. There is moderately reduced systolic function with a visually estimated ejection fraction of 35 - 40%. Quantitated ejection fraction is 32%. Global longitudinal strain is reduced moderately reduced measuring -10.8% There is diastolic dysfunction but grade cannot be determined.    Right Ventricle: The right ventricle is normal in size Systolic function is normal.    Left Atrium: The left atrium is mildly dilated    Mitral Valve: There is mild regurgitation.    IVC/SVC: Normal venous pressure at 3 mmHg.    Current Heart Failure Medications  carvedilol (COREG) tablet, 2 times daily, Oral  , 2 times daily, Oral  furosemide (LASIX) tablet, 2 times daily, Oral    Plan  - Monitor strict I&Os and daily weights.    - Place on telemetry  - Low sodium diet  - diuresis daily

## 2025-06-30 ENCOUNTER — PATIENT MESSAGE (OUTPATIENT)
Dept: NEUROLOGY | Facility: HOSPITAL | Age: 50
End: 2025-06-30
Payer: MEDICAID

## 2025-07-18 ENCOUNTER — TELEPHONE (OUTPATIENT)
Dept: PRIMARY CARE CLINIC | Facility: CLINIC | Age: 50
End: 2025-07-18
Payer: MEDICAID

## 2025-07-18 NOTE — TELEPHONE ENCOUNTER
Copied from CRM #[id number]. Topic: Medications - Medication Refill  >> Jul 16, 2025  3:47 PM Teresa wrote:  .[email or web address]     Patient is calling for Medical Advice regarding: pt sister is calling to find out if there is a way to have his refills shipped to him in on [place]. Because he has [place] medicaid he is unable to get it in [place]. Please call and advise. [phone]. - Fiona     How long has patient had these symptoms:    Pharmacy name and phone#:    Patient wants a call back or thru [facility], provide patient's call back phone number:    Comments:    Please advise patient replies from provider may take up to 48 hours.

## 2025-07-21 NOTE — TELEPHONE ENCOUNTER
Please let patient know there is no option from our end, if patient is in emergent need he can seek care at the nearest ED or urgent care